# Patient Record
Sex: MALE | Race: OTHER | HISPANIC OR LATINO | Employment: OTHER | URBAN - METROPOLITAN AREA
[De-identification: names, ages, dates, MRNs, and addresses within clinical notes are randomized per-mention and may not be internally consistent; named-entity substitution may affect disease eponyms.]

---

## 2017-03-21 ENCOUNTER — ALLSCRIPTS OFFICE VISIT (OUTPATIENT)
Dept: OTHER | Facility: OTHER | Age: 67
End: 2017-03-21

## 2017-04-07 ENCOUNTER — ALLSCRIPTS OFFICE VISIT (OUTPATIENT)
Dept: OTHER | Facility: OTHER | Age: 67
End: 2017-04-07

## 2018-01-10 ENCOUNTER — GENERIC CONVERSION - ENCOUNTER (OUTPATIENT)
Dept: OTHER | Facility: OTHER | Age: 68
End: 2018-01-10

## 2018-01-10 DIAGNOSIS — I10 ESSENTIAL (PRIMARY) HYPERTENSION: ICD-10-CM

## 2018-01-12 VITALS
DIASTOLIC BLOOD PRESSURE: 70 MMHG | TEMPERATURE: 98.2 F | WEIGHT: 192.24 LBS | SYSTOLIC BLOOD PRESSURE: 132 MMHG | HEIGHT: 65 IN | HEART RATE: 76 BPM | BODY MASS INDEX: 32.03 KG/M2

## 2018-01-13 NOTE — RESULT NOTES
Message   small high risk polyps removed, fair prep  repeat colonoscopy in 1-3 years  enter reminder for 2 years  Verified Results  (1) TISSUE EXAM [id number] 04:57PM Vivian Chowdhury     Test Name Result Flag Reference   LAB AP CASE REPORT (Report)     Surgical Pathology Report             Case: [id number]                   Authorizing Provider: Edmond Medel MD       Collected:      08/18/2016 1657        Ordering Location:   [address] [place] [address]   Received:      08/19/2016 [facility]                               Pathologist:      Darius Auguste MD                           Specimens:  A) - Polyp, Colorectal, 6mm polyp, cecum                                B) - Polyp, Colorectal, descending colon, cold bx   LAB AP FINAL DIAGNOSIS (Report)     A  Polyp, 6mm polyp, cecum:    - Tubular adenoma  - Negative for high grade dysplasia and malignancy  B  Polyp, descending colon:    - Tubular adenoma  - Negative for high grade dysplasia and malignancy  Electronically signed by Darius Auguste MD on 8/22/2016 at 11:10 AM   LAB AP NOTE      Interpretation performed at [facility], [address] [address]   LAB AP SURGICAL ADDITIONAL INFORMATION (Report)     These tests were developed and their performance characteristics   determined by [facility] or [facility]  They may not be cleared or approved by the [place]  Food and   Drug Administration  The FDA has determined that such clearance or   approval is not necessary  These tests are used for clinical purposes  They should not be regarded as investigational or for research  This   laboratory has been approved by CLIA 88, designated as a high-complexity   laboratory and is qualified to perform these tests  LAB AP GROSS DESCRIPTION (Report)     A   The specimen is received in formalin, labeled with the patient's name   and hospital number, and is designated 6 mm polyp cecum  The specimen   consists of a single tan soft tissue fragment measuring 0 4 cm  Entirely   submitted  One cassette  B  The specimen is received in formalin, labeled with the patient's name   and hospital number, and is designated descending colon biopsy  The   specimen consists of a single tan soft tissue fragment measuring 0 4 cm  Entirely submitted  One cassette  Note: The estimated total formalin fixation time based upon information   provided by the submitting clinician and the standard processing schedule   is 35 5 hours      MAC

## 2018-01-24 VITALS
WEIGHT: 194 LBS | HEART RATE: 64 BPM | DIASTOLIC BLOOD PRESSURE: 62 MMHG | TEMPERATURE: 98 F | BODY MASS INDEX: 32.32 KG/M2 | SYSTOLIC BLOOD PRESSURE: 108 MMHG | HEIGHT: 65 IN

## 2018-03-15 DIAGNOSIS — E78.5 HYPERLIPIDEMIA: ICD-10-CM

## 2018-03-15 DIAGNOSIS — I10 HYPERTENSION: Primary | ICD-10-CM

## 2018-03-15 RX ORDER — AMLODIPINE BESYLATE 5 MG/1
5 TABLET ORAL DAILY
Qty: 90 TABLET | Refills: 3 | Status: SHIPPED | OUTPATIENT
Start: 2018-03-15 | End: 2019-01-15 | Stop reason: SDUPTHER

## 2018-03-15 RX ORDER — ATORVASTATIN CALCIUM 20 MG/1
20 TABLET, FILM COATED ORAL
Qty: 90 TABLET | Refills: 3 | Status: SHIPPED | OUTPATIENT
Start: 2018-03-15 | End: 2019-01-15 | Stop reason: SDUPTHER

## 2018-03-15 RX ORDER — LISINOPRIL AND HYDROCHLOROTHIAZIDE 25; 20 MG/1; MG/1
1 TABLET ORAL DAILY
Qty: 90 TABLET | Refills: 3 | Status: SHIPPED | OUTPATIENT
Start: 2018-03-15 | End: 2019-01-15 | Stop reason: SDUPTHER

## 2018-06-19 DIAGNOSIS — K21.9 GERD (GASTROESOPHAGEAL REFLUX DISEASE): Primary | ICD-10-CM

## 2018-06-19 RX ORDER — OMEPRAZOLE 20 MG/1
20 CAPSULE, DELAYED RELEASE ORAL DAILY
Qty: 30 CAPSULE | Refills: 6 | Status: SHIPPED | OUTPATIENT
Start: 2018-06-19 | End: 2019-10-07

## 2018-06-19 RX ORDER — OMEPRAZOLE 20 MG/1
1 CAPSULE, DELAYED RELEASE ORAL DAILY
COMMUNITY
Start: 2017-08-01 | End: 2018-06-19 | Stop reason: SDUPTHER

## 2018-12-04 ENCOUNTER — OFFICE VISIT (OUTPATIENT)
Dept: INTERNAL MEDICINE CLINIC | Facility: CLINIC | Age: 68
End: 2018-12-04
Payer: COMMERCIAL

## 2018-12-04 VITALS
SYSTOLIC BLOOD PRESSURE: 120 MMHG | WEIGHT: 197.97 LBS | DIASTOLIC BLOOD PRESSURE: 60 MMHG | HEART RATE: 60 BPM | HEIGHT: 66 IN | TEMPERATURE: 98.1 F | BODY MASS INDEX: 31.82 KG/M2

## 2018-12-04 DIAGNOSIS — Z00.00 HEALTH CARE MAINTENANCE: Primary | ICD-10-CM

## 2018-12-04 DIAGNOSIS — I10 HYPERTENSION: ICD-10-CM

## 2018-12-04 DIAGNOSIS — Z23 NEED FOR INFLUENZA VACCINATION: ICD-10-CM

## 2018-12-04 PROCEDURE — G0008 ADMIN INFLUENZA VIRUS VAC: HCPCS | Performed by: INTERNAL MEDICINE

## 2018-12-04 PROCEDURE — 90662 IIV NO PRSV INCREASED AG IM: CPT | Performed by: INTERNAL MEDICINE

## 2018-12-04 PROCEDURE — 99213 OFFICE O/P EST LOW 20 MIN: CPT | Performed by: INTERNAL MEDICINE

## 2018-12-04 RX ORDER — POLYETHYLENE GLYCOL 3350, SODIUM CHLORIDE, SODIUM BICARBONATE, POTASSIUM CHLORIDE 420; 11.2; 5.72; 1.48 G/4L; G/4L; G/4L; G/4L
POWDER, FOR SOLUTION ORAL
COMMUNITY
Start: 2016-07-08 | End: 2021-01-06 | Stop reason: ALTCHOICE

## 2018-12-04 NOTE — PROGRESS NOTES
INTERNAL MEDICINE FOLLOW-UP OFFICE VISIT  [facility]  [address] [place], [place] [address] [address]    NAME: Chad Petersen  AGE: 70 y o  SEX: male    DATE OF ENCOUNTER: 12/4/2018    Assessment and Plan   Patient is a 57-year-old male with past medical history of hypertension who presents for general follow-up  Hypertension  Patient is currently taking amlodipine 5 mg daily, lisinopril 20 mg daily, HCTZ 25 mg daily  Pt denies chest pain, palpitations, headache, visual changes  Patient is compliant with medication  · Continue BP meds    GERD  Patient taking Prilosec 20 mg daily  Symptoms occur 1-2 times per week  Consider De-escalating next visit as patient not interested at this time  · Continue Prilosec 20 mg daily    Health maintenance  · Flu shot clinic today  · Up-to-date on colonoscopy (2016, due in September 2019), up-to-date on PPSV23/PCV 13, up-to-date on Tdap (2015)  · Ordered CBC, BMP, A1c, lipid panel S patient has not had labs for over 2 years  Diagnoses and all orders for this visit:    Health care maintenance  -     CBC; Future  -     Lipid panel; Future  -     Basic metabolic panel; Future  -     Hemoglobin A1C; Future  -     CBC; Future  -     Basic metabolic panel; Future  -     Hemoglobin A1C; Future  -     Lipid panel; Future    Need for influenza vaccination  -     PREFERRED: influenza vaccine, 8195-9637, high-dose, PF 0 5 mL, for patients 65 yr+ (FLUZONE HIGH-DOSE)    Other orders  -     polyethylene glycol-electrolytes (NULYTELY) 4000 mL solution;  Take by mouth        Orders Placed This Encounter   Procedures    PREFERRED: influenza vaccine, [id number], high-dose, PF 0 5 mL, for patients 65 yr+ (FLUZONE HIGH-DOSE)    CBC    Lipid panel    Basic metabolic panel    Hemoglobin A1C    CBC    Basic metabolic panel    Hemoglobin A1C    Lipid panel       - Counseling Documentation: patient was counseled regarding: diagnostic results    Chief Complaint     Chief Complaint   Patient presents with    Follow-up     pt "lab request"       History of Present Illness     Patient is a 58-year-old male with past medical history of hypertension presents for general follow-up  See a/p for further details  Patient denies chest pain, palpitations, SOB, cough, abdominal pain, nausea, vomiting, constipation, diarrhea, fevers/chills, headaches, dysuria  The following portions of the patient's history were reviewed and updated as appropriate: allergies, current medications, past family history, past medical history, past social history, past surgical history and problem list     Review of Systems     ROS  CONSTITUTIONAL: Denies any fever, chills, rigors, and weight loss  HEENT: No earache or tinnitus  Denies hearing loss  CARDIOVASCULAR: No chest pain or palpitations  RESPIRATORY: Denies any cough, hemoptysis, shortness of breath or dyspnea on exertion  GASTROINTESTINAL: Denies abdominal pain, nausea, vomitng   NEUROLOGIC: No dizziness or vertigo, denies headaches  MUSCULOSKELETAL: Denies any muscle or joint pain  SKIN: Denies skin rashes or itching  Active Problem List   There is no problem list on file for this patient  Objective     /60 (BP Location: Left arm, Patient Position: Sitting, Cuff Size: Adult)   Pulse 60   Temp 98 1 °F (36 7 °C) (Oral)   Ht 5' 5 5" (1 664 m)   Wt 89 8 kg (197 lb 15 6 oz)   BMI 32 44 kg/m²     Physical Exam:   GENERAL: NAD  HEENT:  NC/AT, PERRL, EOMI, MMM, no scleral icterus  CARDIAC:  RRR, +S1/S2, no S3/S4 heard, no m/g/r  PULMONARY:  CTA B/L, no wheezing/rales/rhonci, non-labored breathing  ABDOMEN:  Soft, NT/ND, +BS, no rebound/guarding/rigidity  Extremities:  2+ Pulses in DP/PT  No edema, cyanosis, or clubbing  NEUROLOGIC:  Alert/oriented x3   No motor or sensory deficits  SKIN:  No rashes or erythema        Current Medications     Current Outpatient Prescriptions:    amLODIPine (NORVASC) 5 mg tablet, Take 1 tablet (5 mg total) by mouth daily, Disp: 90 tablet, Rfl: 3    aspirin 81 MG tablet, Take 81 mg by mouth daily  , Disp: , Rfl:     atorvastatin (LIPITOR) 20 mg tablet, Take 1 tablet (20 mg total) by mouth daily at bedtime, Disp: 90 tablet, Rfl: 3    bisacodyl (bisacodyl) 5 mg EC tablet, Take 5 mg by mouth daily as needed for constipation  , Disp: , Rfl:     lisinopril-hydrochlorothiazide (PRINZIDE,ZESTORETIC) 20-25 MG per tablet, Take 1 tablet by mouth daily, Disp: 90 tablet, Rfl: 3    omeprazole (PriLOSEC) 20 mg delayed release capsule, Take 1 capsule (20 mg total) by mouth daily, Disp: 30 capsule, Rfl: 6    polyethylene glycol-electrolytes (NULYTELY) 4000 mL solution, Take by mouth, Disp: , Rfl:     Health Maintenance     Health Maintenance   Topic Date Due    Depression Screening PHQ  1950    Fall Risk  11/09/2014    INFLUENZA VACCINE  07/01/2018    CRC Screening: Colonoscopy  08/18/2018    DTaP,Tdap,and Td Vaccines (2 - Td) 09/22/2025    Hepatitis C Screening  Completed    Pneumococcal PPSV23/PCV13 65+ Years / Low and Medium Risk  Completed     Immunization History   Administered Date(s) Administered    Influenza Quadrivalent, 6-35 Months IM 01/10/2018    Influenza TIV (IM) 09/22/2015    Pneumococcal Conjugate 13-Valent 01/10/2018    Pneumococcal Polysaccharide PPV23 09/22/2015    Tdap 09/22/2015       REYMUNDO Tate    Internal Medicine PGY-2  12/4/2018 3:16 PM

## 2018-12-11 ENCOUNTER — APPOINTMENT (OUTPATIENT)
Dept: LAB | Facility: CLINIC | Age: 68
End: 2018-12-11
Payer: COMMERCIAL

## 2018-12-11 DIAGNOSIS — Z00.00 HEALTH CARE MAINTENANCE: ICD-10-CM

## 2018-12-11 LAB
ANION GAP SERPL CALCULATED.3IONS-SCNC: 11 MMOL/L (ref 4–13)
BUN SERPL-MCNC: 22 MG/DL (ref 5–25)
CALCIUM SERPL-MCNC: 8.6 MG/DL (ref 8.3–10.1)
CHLORIDE SERPL-SCNC: 105 MMOL/L (ref 100–108)
CHOLEST SERPL-MCNC: 126 MG/DL (ref 50–200)
CO2 SERPL-SCNC: 28 MMOL/L (ref 21–32)
CREAT SERPL-MCNC: 0.93 MG/DL (ref 0.6–1.3)
ERYTHROCYTE [DISTWIDTH] IN BLOOD BY AUTOMATED COUNT: 13.5 % (ref 11.6–15.1)
EST. AVERAGE GLUCOSE BLD GHB EST-MCNC: 126 MG/DL
GFR SERPL CREATININE-BSD FRML MDRD: 83 ML/MIN/1.73SQ M
GLUCOSE P FAST SERPL-MCNC: 81 MG/DL (ref 65–99)
HBA1C MFR BLD: 6 % (ref 4.2–6.3)
HCT VFR BLD AUTO: 39.3 % (ref 36.5–49.3)
HDLC SERPL-MCNC: 60 MG/DL (ref 40–60)
HGB BLD-MCNC: 12.7 G/DL (ref 12–17)
LDLC SERPL CALC-MCNC: 55 MG/DL (ref 0–100)
MCH RBC QN AUTO: 30.3 PG (ref 26.8–34.3)
MCHC RBC AUTO-ENTMCNC: 32.3 G/DL (ref 31.4–37.4)
MCV RBC AUTO: 94 FL (ref 82–98)
NONHDLC SERPL-MCNC: 66 MG/DL
PLATELET # BLD AUTO: 231 THOUSANDS/UL (ref 149–390)
PMV BLD AUTO: 11.2 FL (ref 8.9–12.7)
POTASSIUM SERPL-SCNC: 3.9 MMOL/L (ref 3.5–5.3)
RBC # BLD AUTO: 4.19 MILLION/UL (ref 3.88–5.62)
SODIUM SERPL-SCNC: 144 MMOL/L (ref 136–145)
TRIGL SERPL-MCNC: 56 MG/DL
WBC # BLD AUTO: 7.49 THOUSAND/UL (ref 4.31–10.16)

## 2018-12-11 PROCEDURE — 80048 BASIC METABOLIC PNL TOTAL CA: CPT

## 2018-12-11 PROCEDURE — 80061 LIPID PANEL: CPT

## 2018-12-11 PROCEDURE — 83036 HEMOGLOBIN GLYCOSYLATED A1C: CPT

## 2018-12-11 PROCEDURE — 36415 COLL VENOUS BLD VENIPUNCTURE: CPT

## 2018-12-11 PROCEDURE — 85027 COMPLETE CBC AUTOMATED: CPT

## 2018-12-18 ENCOUNTER — TELEPHONE (OUTPATIENT)
Dept: INTERNAL MEDICINE CLINIC | Facility: CLINIC | Age: 68
End: 2018-12-18

## 2018-12-19 NOTE — TELEPHONE ENCOUNTER
Form completed by Dr Wilfredo Kent) and reviewed by medical assistant/nurse  Form placed in (RED) folder in Clerical flow station

## 2019-01-15 DIAGNOSIS — I10 HYPERTENSION, UNSPECIFIED TYPE: ICD-10-CM

## 2019-01-15 DIAGNOSIS — E78.5 HYPERLIPIDEMIA, UNSPECIFIED HYPERLIPIDEMIA TYPE: ICD-10-CM

## 2019-01-15 RX ORDER — ATORVASTATIN CALCIUM 20 MG/1
20 TABLET, FILM COATED ORAL
Qty: 90 TABLET | Refills: 3 | Status: SHIPPED | OUTPATIENT
Start: 2019-01-15 | End: 2019-12-24 | Stop reason: SDUPTHER

## 2019-01-15 RX ORDER — AMLODIPINE BESYLATE 5 MG/1
5 TABLET ORAL DAILY
Qty: 90 TABLET | Refills: 3 | Status: SHIPPED | OUTPATIENT
Start: 2019-01-15 | End: 2019-12-24 | Stop reason: SDUPTHER

## 2019-01-15 RX ORDER — LISINOPRIL AND HYDROCHLOROTHIAZIDE 25; 20 MG/1; MG/1
1 TABLET ORAL DAILY
Qty: 90 TABLET | Refills: 3 | Status: SHIPPED | OUTPATIENT
Start: 2019-01-15 | End: 2019-12-24 | Stop reason: SDUPTHER

## 2019-05-31 ENCOUNTER — OFFICE VISIT (OUTPATIENT)
Dept: INTERNAL MEDICINE CLINIC | Facility: CLINIC | Age: 69
End: 2019-05-31

## 2019-05-31 VITALS
BODY MASS INDEX: 33.65 KG/M2 | SYSTOLIC BLOOD PRESSURE: 130 MMHG | WEIGHT: 201.94 LBS | HEART RATE: 64 BPM | DIASTOLIC BLOOD PRESSURE: 72 MMHG | HEIGHT: 65 IN | TEMPERATURE: 98.1 F

## 2019-05-31 DIAGNOSIS — K21.9 GERD (GASTROESOPHAGEAL REFLUX DISEASE): Primary | ICD-10-CM

## 2019-05-31 DIAGNOSIS — R73.03 PREDIABETES: ICD-10-CM

## 2019-05-31 DIAGNOSIS — I10 HTN (HYPERTENSION): ICD-10-CM

## 2019-05-31 DIAGNOSIS — Z12.11 COLON CANCER SCREENING: ICD-10-CM

## 2019-05-31 PROCEDURE — 99213 OFFICE O/P EST LOW 20 MIN: CPT | Performed by: INTERNAL MEDICINE

## 2019-07-02 ENCOUNTER — TELEPHONE (OUTPATIENT)
Dept: INTERNAL MEDICINE CLINIC | Facility: CLINIC | Age: 69
End: 2019-07-02

## 2019-07-02 NOTE — TELEPHONE ENCOUNTER
Please review this [facility] us [facility]  Form attached to this encounter  Thank you

## 2019-08-16 ENCOUNTER — OFFICE VISIT (OUTPATIENT)
Dept: GASTROENTEROLOGY | Facility: CLINIC | Age: 69
End: 2019-08-16
Payer: COMMERCIAL

## 2019-08-16 VITALS
BODY MASS INDEX: 33.26 KG/M2 | HEART RATE: 65 BPM | DIASTOLIC BLOOD PRESSURE: 76 MMHG | HEIGHT: 65 IN | TEMPERATURE: 99 F | SYSTOLIC BLOOD PRESSURE: 122 MMHG | WEIGHT: 199.6 LBS

## 2019-08-16 DIAGNOSIS — K21.9 GASTROESOPHAGEAL REFLUX DISEASE, ESOPHAGITIS PRESENCE NOT SPECIFIED: Primary | ICD-10-CM

## 2019-08-16 DIAGNOSIS — Z12.11 SCREENING FOR COLON CANCER: ICD-10-CM

## 2019-08-16 DIAGNOSIS — Z86.010 HISTORY OF COLON POLYPS: ICD-10-CM

## 2019-08-16 PROCEDURE — 99214 OFFICE O/P EST MOD 30 MIN: CPT | Performed by: PHYSICIAN ASSISTANT

## 2019-08-16 NOTE — PATIENT INSTRUCTIONS
Patient scheduled for colon/EGD on 9/17/19 at [facility] w/Dr Librado John/Carlos instructions gone over during office visit  Patient scheduled for f/u on 10/7/19 w/ Mike Gomez

## 2019-08-16 NOTE — PROGRESS NOTES
[facility]  Homa Brooke 70 y o  male MRN: [id number]  Encounter: [id number]    ASSESSMENT AND PLAN:    1  GERD   -he has a long history of reflux, he takes Prilosec 20 mg daily and states that generally his reflux is under good control    -he states that sometimes he will have reflux for a few days in a row and then he will not have any reflux for weeks  -he has had reflux for more than 5 years so I do recommend a screening upper endoscopy for Ortiz's esophagus as he has never had this  He does not have any alarm symptoms  -follow-up in the office after the procedure to discuss potentially weaning off of the omeprazole   -we reviewed reflux precautions including dietary and lifestyle changes  Would suggest weight loss as this will likely help with his reflux symptoms  2  Screening for colon cancer/history of polyps   -He had a colonoscopy in 2016 with 2 polyps removed, diverticulosis seen and internal hemorrhoids noted  The prep quality was fair so 3 year follow-up was recommended, he is now due  Both polyps were tubular adenomas negative for high-grade dysplasia or malignancy  -we discussed the risks and benefits and he is agreeable, he can schedule the colonoscopy and EGD at the same appointment    -he denies any symptoms at this time, he denies any history of difficulty with anesthesia  He would prefer to schedule the [facility] [place] because it is convenient for him   ____________________________________________________________    SUBJECTIVE:    27-year-old male past medical history of hypertension, GERD presents to the office for follow-up  He had a colonoscopy in 2016 with 2 polyps removed, diverticulosis seen and internal hemorrhoids noted  The prep quality was fair so 3 year follow-up was recommended, he is now due  Both polyps were tubular adenomas negative for high-grade dysplasia or malignancy      He states that overall he has been feeling well and he denies any abdominal pain, nausea, vomiting, difficulty swallowing, hematochezia, melena, early satiety, constipation, diarrhea or jaundice  He states he has a bowel movement every day  He does have a history of reflux and takes omeprazole 20 mg daily for this and feels that he generally has good control of his reflux symptoms  He states sometimes he will have reflux for a few days in a row and then he will not have it for weeks  He feels that the medication is working well for him  REVIEW OF SYSTEMS IS OTHERWISE NEGATIVE  Historical Information   Past Medical History:   Diagnosis Date    Cataract     Hypertension     Throat ulcer      Past Surgical History:   Procedure Laterality Date    COLONOSCOPY N/A 8/18/2016    Procedure: COLONOSCOPY;  Surgeon: Prince Ramirez MD;  [facility]; Service:     EYE SURGERY Bilateral      Social History   Social History     Substance and Sexual Activity   Alcohol Use No    Comment: pt "sober for 30+ years"     Social History     Substance and Sexual Activity   Drug Use No     Social History     Tobacco Use   Smoking Status Former Smoker   Smokeless Tobacco Never Used     Family History   Problem Relation Age of Onset    Hypertension Mother     Hypertension Father        Meds/Allergies       Current Outpatient Medications:     amLODIPine (NORVASC) 5 mg tablet    aspirin 81 MG tablet    atorvastatin (LIPITOR) 20 mg tablet    bisacodyl (bisacodyl) 5 mg EC tablet    lisinopril-hydrochlorothiazide (PRINZIDE,ZESTORETIC) 20-25 MG per tablet    omeprazole (PriLOSEC) 20 mg delayed release capsule    polyethylene glycol-electrolytes (NULYTELY) 4000 mL solution    Allergies   Allergen Reactions    Lactose      diarrhea           Objective     Blood pressure 122/76, pulse 65, temperature 99 °F (37 2 °C), temperature source Tympanic, height 5' 5" (1 651 m), weight 90 5 kg (199 lb 9 6 oz)        PHYSICAL EXAM:      Physical Exam Constitutional: He is oriented to person, place, and time  He appears well-developed  No distress  Over weight   HENT:   Head: Normocephalic and atraumatic  Eyes: Right eye exhibits no discharge  Left eye exhibits no discharge  No scleral icterus  Neck: Neck supple  No tracheal deviation present  Cardiovascular: Normal rate, regular rhythm, normal heart sounds and intact distal pulses  Exam reveals no gallop and no friction rub  No murmur heard  Pulmonary/Chest: Effort normal and breath sounds normal  No respiratory distress  He has no wheezes  He has no rales  Abdominal: Soft  Bowel sounds are normal  He exhibits no distension  There is no tenderness  There is no rebound and no guarding  Neurological: He is alert and oriented to person, place, and time  Skin: Skin is warm and dry  Psychiatric: He has a normal mood and affect

## 2019-09-13 NOTE — PRE-PROCEDURE INSTRUCTIONS
Pre-Surgery Instructions:   Medication Instructions    amLODIPine (NORVASC) 5 mg tablet Instructed patient per Anesthesia Guidelines   lisinopril-hydrochlorothiazide (PRINZIDE,ZESTORETIC) 20-25 MG per tablet Instructed patient per Anesthesia Guidelines

## 2019-09-16 ENCOUNTER — ANESTHESIA EVENT (OUTPATIENT)
Dept: GASTROENTEROLOGY | Facility: HOSPITAL | Age: 69
End: 2019-09-16

## 2019-09-16 NOTE — ANESTHESIA PREPROCEDURE EVALUATION
Review of Systems/Medical History  Patient summary reviewed  Chart reviewed      Cardiovascular  Hyperlipidemia, Hypertension ,    Pulmonary  Negative pulmonary ROS        GI/Hepatic    GERD , Bowel prep  Comment: Diverticulosis     Negative  ROS        Endo/Other  Negative endo/other ROS      GYN       Hematology  Negative hematology ROS      Musculoskeletal  Negative musculoskeletal ROS        Neurology  Negative neurology ROS      Psychology   Negative psychology ROS              Physical Exam    Airway    Mallampati score: II  TM Distance: >3 FB  Neck ROM: full     Dental   Comment: Missing many,     Cardiovascular  Cardiovascular exam normal    Pulmonary  Pulmonary exam normal     Other Findings        Anesthesia Plan  ASA Score- 2     Anesthesia Type- IV sedation with anesthesia with ASA Monitors  Additional Monitors:   Airway Plan:         Plan Factors-    Induction-     Postoperative Plan-     Informed Consent- Anesthetic plan and risks discussed with patient  I personally reviewed this patient with the CRNA  Discussed and agreed on the Anesthesia Plan with the CRNA  Georgette Ormond

## 2019-09-17 ENCOUNTER — ANESTHESIA (OUTPATIENT)
Dept: GASTROENTEROLOGY | Facility: HOSPITAL | Age: 69
End: 2019-09-17

## 2019-09-17 ENCOUNTER — HOSPITAL ENCOUNTER (OUTPATIENT)
Dept: GASTROENTEROLOGY | Facility: HOSPITAL | Age: 69
Setting detail: OUTPATIENT SURGERY
Discharge: HOME | End: 2019-09-17
Admitting: ANESTHESIOLOGY
Payer: COMMERCIAL

## 2019-09-17 VITALS
OXYGEN SATURATION: 97 % | RESPIRATION RATE: 18 BRPM | TEMPERATURE: 97.6 F | SYSTOLIC BLOOD PRESSURE: 131 MMHG | DIASTOLIC BLOOD PRESSURE: 62 MMHG | HEART RATE: 60 BPM

## 2019-09-17 DIAGNOSIS — Z12.11 SCREENING FOR COLON CANCER: ICD-10-CM

## 2019-09-17 DIAGNOSIS — K21.9 GASTROESOPHAGEAL REFLUX DISEASE, ESOPHAGITIS PRESENCE NOT SPECIFIED: ICD-10-CM

## 2019-09-17 DIAGNOSIS — Z86.010 HISTORY OF COLON POLYPS: ICD-10-CM

## 2019-09-17 PROCEDURE — 88305 TISSUE EXAM BY PATHOLOGIST: CPT | Performed by: PATHOLOGY

## 2019-09-17 PROCEDURE — NC001 PR NO CHARGE: Performed by: INTERNAL MEDICINE

## 2019-09-17 PROCEDURE — 43239 EGD BIOPSY SINGLE/MULTIPLE: CPT | Performed by: INTERNAL MEDICINE

## 2019-09-17 PROCEDURE — 45380 COLONOSCOPY AND BIOPSY: CPT | Performed by: INTERNAL MEDICINE

## 2019-09-17 RX ORDER — SODIUM CHLORIDE 9 MG/ML
75 INJECTION, SOLUTION INTRAVENOUS CONTINUOUS
Status: DISCONTINUED | OUTPATIENT
Start: 2019-09-17 | End: 2019-09-21 | Stop reason: HOSPADM

## 2019-09-17 RX ORDER — PROPOFOL 10 MG/ML
INJECTION, EMULSION INTRAVENOUS AS NEEDED
Status: DISCONTINUED | OUTPATIENT
Start: 2019-09-17 | End: 2019-09-17 | Stop reason: SURG

## 2019-09-17 RX ORDER — SODIUM CHLORIDE 9 MG/ML
INJECTION, SOLUTION INTRAVENOUS CONTINUOUS PRN
Status: DISCONTINUED | OUTPATIENT
Start: 2019-09-17 | End: 2019-09-17 | Stop reason: SURG

## 2019-09-17 RX ORDER — LIDOCAINE HYDROCHLORIDE 10 MG/ML
INJECTION, SOLUTION INFILTRATION; PERINEURAL AS NEEDED
Status: DISCONTINUED | OUTPATIENT
Start: 2019-09-17 | End: 2019-09-17 | Stop reason: SURG

## 2019-09-17 RX ADMIN — LIDOCAINE HYDROCHLORIDE 50 MG: 10 INJECTION, SOLUTION INFILTRATION; PERINEURAL at 12:45

## 2019-09-17 RX ADMIN — SODIUM CHLORIDE: 0.9 INJECTION, SOLUTION INTRAVENOUS at 12:26

## 2019-09-17 RX ADMIN — PROPOFOL 100 MG: 10 INJECTION, EMULSION INTRAVENOUS at 12:45

## 2019-09-17 RX ADMIN — PROPOFOL 50 MG: 10 INJECTION, EMULSION INTRAVENOUS at 12:57

## 2019-09-17 RX ADMIN — SODIUM CHLORIDE 75 ML/HR: 0.9 INJECTION, SOLUTION INTRAVENOUS at 11:34

## 2019-09-17 RX ADMIN — PROPOFOL 50 MG: 10 INJECTION, EMULSION INTRAVENOUS at 12:51

## 2019-09-17 RX ADMIN — PROPOFOL 50 MG: 10 INJECTION, EMULSION INTRAVENOUS at 12:48

## 2019-09-17 NOTE — NURSING NOTE
Pt is awake,alert,refuses po,wants to eat outside the hospital  Pt verbalizes an understanding of all instructions  Jerry Olson

## 2019-09-17 NOTE — DISCHARGE INSTRUCTIONS
Colonoscopia   LO QUE NECESITA SABER:   Brittny colonoscopia es un procedimiento para examinar con un endoscopio el interior de allred colon (intestino)  Shayy brittny colonoscopia, es posible que le retiren pólipos o crecimientos de tejidos  Es normal que se sienta inflamado o que tenga molestia abdominal  Usted debería estar expulsando los gases  Si tiene hemorroides o si le removieron pólipos, usted podría presentar brittny pequeña cantidad de sangrado  INSTRUCCIONES SOBRE EL HANSEL HOSPITALARIA:   Busque atención médica de inmediato si:   · Usted presenta brittny cantidad hayde de roney chano brillante en francisco evacuaciones intestinales  · Allred abdomen está elena y firme y usted siente dolor intenso  · Usted tiene dificultad repentina para respirar  Pregúntele a allred [employer] vitaminas y minerales son adecuados para usted  · Usted presenta sarpullido o urticaria  · Usted tiene fiebre dentro de las 24 horas después de allred procedimiento       · Usted no ha tenido brittny evacuación intestinal después de 3 días de allred procedimiento  · Usted tiene preguntas o inquietudes acerca de allred condición o cuidado  Actividad:   · No levante nada, no se esfuerce o corra  por 3 días después de allred procedimiento  · Descanse después de allred procedimiento  A usted le schulz administrado medicamento para relajarse  No  maneje o tome decisiones importantes hasta el día siguiente de allred procedimiento  Regrese a francisco actividades normales según le indiquen  · Alivie los gases y la incomodidad de la inflamación  acostándose en allred costado derecho con brittny almohada térmica sobre allred abdomen  Es posible que necesite caminar un poco para ayudar a eliminar los gases  Coma comidas pequeñas hasta que se alivie de la inflamación  Si a usted le removieron pólipos:  Por 7 días después de allred procedimiento:  · No  tome aspirina  · No  realice paseos largos en joey  Acuda a francisco consultas de control con allred médico según le indicaron    Anote francisco preguntas para que se acuerde de hacerlas humberto francisco visitas  © 2017 hermann Gallagher  Information is for End User's use only and may not be sold, redistributed or otherwise used for commercial purposes  All illustrations and images included in CareNotes® are the copyrighted property of A D A M , Inc  or Tomasz Hines  Esta información es sólo para uso en educación  Marrero intención no es darle un consejo médico sobre enfermedades o tratamientos  Colsulte con marrero Melven Rimes farmacéutico antes de seguir cualquier régimen médico para saber si es seguro y efectivo para usted

## 2019-09-17 NOTE — H&P
History and Physical - [facility]  Vance Petersen 70 y o  male MRN: [id number]                  HPI: Clinton Pacheco is a 70y o  year old male who presents for acid reflux and colon cancer screening      REVIEW OF SYSTEMS: Per the HPI, and otherwise unremarkable  Historical Information   Past Medical History:   Diagnosis Date    Cataract     Colon polyp     Diverticulosis     GERD (gastroesophageal reflux disease)     Hyperlipidemia     Hypertension     Throat ulcer      Past Surgical History:   Procedure Laterality Date    COLONOSCOPY N/A 8/18/2016    Procedure: COLONOSCOPY;  Surgeon: Lars Zhong MD;  [facility]; Service:     EYE SURGERY Bilateral      Social History   Social History     Substance and Sexual Activity   Alcohol Use No    Comment: pt "sober for 30+ years"     Social History     Substance and Sexual Activity   Drug Use No     Social History     Tobacco Use   Smoking Status Former Smoker   Smokeless Tobacco Never Used     Family History   Problem Relation Age of Onset    Hypertension Mother     Hypertension Father        Meds/Allergies       (Not in a hospital admission)    Allergies   Allergen Reactions    Lactose      diarrhea       Objective     /64   Pulse 73   Temp 97 7 °F (36 5 °C) (Temporal)   Resp 20   SpO2 94%       PHYSICAL EXAM    Gen: NAD  CV: RRR  CHEST: Clear  ABD: soft, NT/ND  EXT: no edema      ASSESSMENT/PLAN:  This is a 70y o  year old male here for acid reflux and colon cancer screening, and he is stable and optimized for his procedure

## 2019-09-27 DIAGNOSIS — A04.8 H. PYLORI INFECTION: Primary | ICD-10-CM

## 2019-09-27 RX ORDER — CLARITHROMYCIN 500 MG/1
500 TABLET, COATED ORAL EVERY 12 HOURS SCHEDULED
Qty: 28 TABLET | Refills: 0 | Status: SHIPPED | OUTPATIENT
Start: 2019-09-27 | End: 2019-10-11

## 2019-09-27 RX ORDER — OMEPRAZOLE 20 MG/1
20 CAPSULE, DELAYED RELEASE ORAL 2 TIMES DAILY
Qty: 28 CAPSULE | Refills: 0 | Status: SHIPPED | OUTPATIENT
Start: 2019-09-27 | End: 2019-10-07

## 2019-09-27 RX ORDER — AMOXICILLIN 500 MG/1
1000 CAPSULE ORAL EVERY 12 HOURS SCHEDULED
Qty: 56 CAPSULE | Refills: 0 | Status: SHIPPED | OUTPATIENT
Start: 2019-09-27 | End: 2019-10-11

## 2019-10-07 ENCOUNTER — OFFICE VISIT (OUTPATIENT)
Dept: GASTROENTEROLOGY | Facility: CLINIC | Age: 69
End: 2019-10-07
Payer: COMMERCIAL

## 2019-10-07 VITALS
HEIGHT: 65 IN | HEART RATE: 69 BPM | TEMPERATURE: 98 F | WEIGHT: 200.6 LBS | BODY MASS INDEX: 33.42 KG/M2 | SYSTOLIC BLOOD PRESSURE: 124 MMHG | DIASTOLIC BLOOD PRESSURE: 71 MMHG

## 2019-10-07 DIAGNOSIS — Z12.11 COLON CANCER SCREENING: ICD-10-CM

## 2019-10-07 DIAGNOSIS — K21.9 GASTROESOPHAGEAL REFLUX DISEASE, ESOPHAGITIS PRESENCE NOT SPECIFIED: Primary | ICD-10-CM

## 2019-10-07 DIAGNOSIS — A04.8 H. PYLORI INFECTION: ICD-10-CM

## 2019-10-07 PROCEDURE — 99214 OFFICE O/P EST MOD 30 MIN: CPT | Performed by: PHYSICIAN ASSISTANT

## 2019-10-07 RX ORDER — FAMOTIDINE 20 MG/1
20 TABLET, FILM COATED ORAL 2 TIMES DAILY PRN
Qty: 180 TABLET | Refills: 3 | Status: SHIPPED | OUTPATIENT
Start: 2019-10-07 | End: 2020-09-24 | Stop reason: SDUPTHER

## 2019-10-07 NOTE — PROGRESS NOTES
[facility] - Outpatient Follow-up Note  Mikayla Weldon 70 y o  male MRN: [id number]  Encounter: [id number]          ASSESSMENT AND PLAN:      1  Gastroesophageal reflux disease, esophagitis presence not specified:   He was previously on Prilosec 20 milligrams daily he admits to no episodes of acid reflux  We will try weaning him off this medication in use Pepcid 20 milligrams twice daily as needed  If symptoms reoccur, he knows we may resume low-dose Prilosec at least effective dose  GERD handout was offered but he politely declined as he knows what his triggers are   - famotidine (PEPCID) 20 mg tablet; Take 1 tablet (20 mg total) by mouth 2 (two) times a day as needed for heartburn  Dispense: 180 tablet; Refill: 3  -f/u in 6 months    2  H pylori infection:   EGD with biopsy revealed gastritis and erosion in the duodenum  Biopsies revealed H pylori  He was treated with antibiotics  We did discuss that after 2 weeks off Prilosec he will have H pylori stool antigen to ensure eradication of the infection   -check h pylori stool antigen     3  Colon cancer screening:   Colonoscopy 09/2019 with a fair revealed large internal hemorrhoids and 2, less than 5 millimeter polyps in the cecum and transverse colon  One revealed tubular adenoma  Due to fair prep repeat colonoscopy was recommended in 3 years  -repeat colonoscopy in 3 years  -reminder entered    ______________________________________________________________________    SUBJECTIVE:  Fauzia Ryan is a pleasant 76-year-old male with a past medical history hypertension who is here for follow-up of GERD and after EGD and colonoscopy  He underwent EGD and colonoscopy in September of 2018  EGD revealed gastritis and duodenal erosion, biopsies positive for H pylori  Colonoscopy revealed large internal hemorrhoids and 2, less than 5 millimeter polyps in the cecum and transverse colon  Biopsies revealed 1 tubular adenoma    Due to fair bowel prep colonoscopy was recommended to be repeated in 3 years  As far as H pylori he is prescribed antibiotics and states that he has 2 days left  He understands that after he has completed the antibiotics and is off antacid x2 weeks he should have H pylori stool test done to confirm eradication of the infection  He feels well otherwise and denies any heartburn on his Prilosec 20 milligrams daily, nausea, vomiting, abdominal pain, change in bowel habits, melena or hematochezia  REVIEW OF SYSTEMS IS OTHERWISE NEGATIVE  Historical Information   Past Medical History:   Diagnosis Date    Cataract     Colon polyp     Diverticulosis     GERD (gastroesophageal reflux disease)     Hyperlipidemia     Hypertension     Throat ulcer      Past Surgical History:   Procedure Laterality Date    COLONOSCOPY N/A 8/18/2016    Procedure: COLONOSCOPY;  Surgeon: Taurus Pinon MD;  Location: [facility];   Service:     EYE SURGERY Bilateral     UPPER GASTROINTESTINAL ENDOSCOPY       Social History   Social History     Substance and Sexual Activity   Alcohol Use No    Comment: pt "sober for 30+ years"     Social History     Substance and Sexual Activity   Drug Use No     Social History     Tobacco Use   Smoking Status Former Smoker   Smokeless Tobacco Never Used     Family History   Problem Relation Age of Onset    Hypertension Mother     Hypertension Father        Meds/Allergies       Current Outpatient Medications:     amLODIPine (NORVASC) 5 mg tablet    amoxicillin (AMOXIL) 500 mg capsule    aspirin 81 MG tablet    atorvastatin (LIPITOR) 20 mg tablet    bisacodyl (bisacodyl) 5 mg EC tablet    clarithromycin (BIAXIN) 500 mg tablet    lisinopril-hydrochlorothiazide (PRINZIDE,ZESTORETIC) 20-25 MG per tablet    polyethylene glycol-electrolytes (NULYTELY) 4000 mL solution    famotidine (PEPCID) 20 mg tablet    polyethylene glycol (GOLYTELY) 4000 mL solution    Allergies   Allergen Reactions    Lactose diarrhea           Objective     Blood pressure 124/71, pulse 69, temperature 98 °F (36 7 °C), temperature source Tympanic, height 5' 5" (1 651 m), weight 91 kg (200 lb 9 6 oz)  Body mass index is 33 38 kg/m²  PHYSICAL EXAM:      General Appearance:   Alert, cooperative, no distress   HEENT:   Normocephalic, atraumatic, anicteric  Right eye exhibits no discharge  Left eye exhibits no discharge  No scleral icterus    Neck:  Supple, symmetrical, trachea midline, no stridor    Lungs:   Clear to auscultation bilaterally; no rales, rhonchi or wheezing; respirations unlabored    Heart[de-identified]   Regular rate and rhythm; no murmur, rub, or gallop  Abdomen:   Soft, non-tender, non-distended; normal bowel sounds; no masses, no organomegaly    Genitalia:   Deferred    Rectal:   Deferred    Extremities:  No cyanosis, clubbing or edema        Skin:  No jaundice, rashes, or lesions          Lab Results:   No visits with results within 1 Day(s) from this visit     Latest known visit with results is:   Hospital Outpatient Visit on 09/17/2019   Component Date Value    Case Report 09/17/2019                      Value:Surgical Pathology Report                         Case: [id number]                                   Authorizing Provider:  Nicholas Keating MD              Collected:           09/17/2019 1249              Ordering [facility]:     [facility] Received:            09/17/2019 1404                                     Heart Endoscopy                                                              Pathologist:           Taylor Franklin MD                                                        Specimens:   A) - Duodenum, small duodenum erosion bx                                                            B) - Stomach, gastric bx                                                                            C) - Large Intestine, Cecum, cecal polyp                                                            D) - Large Intestine, Transverse Colon, transverse colon polyp                             Final Diagnosis 09/17/2019                      Value: This result contains rich text formatting which cannot be displayed here   Additional Information 09/17/2019                      Value: This result contains rich text formatting which cannot be displayed here   Synoptic Checklist 09/17/2019                      Value:  (COLON/RECTUM POLYP FORM-GI - All Specimens)                                                                                                                 : Adenoma(s)     Iline Mast Description 09/17/2019                      Value: This result contains rich text formatting which cannot be displayed here  Radiology Results:   No results found

## 2019-10-22 ENCOUNTER — APPOINTMENT (OUTPATIENT)
Dept: LAB | Facility: HOSPITAL | Age: 69
End: 2019-10-22
Payer: COMMERCIAL

## 2019-10-22 DIAGNOSIS — A04.8 H. PYLORI INFECTION: ICD-10-CM

## 2019-10-22 PROCEDURE — 87338 HPYLORI STOOL AG IA: CPT

## 2019-10-24 LAB — H PYLORI AG STL QL IA: NEGATIVE

## 2019-12-24 DIAGNOSIS — I10 HYPERTENSION, UNSPECIFIED TYPE: ICD-10-CM

## 2019-12-24 DIAGNOSIS — E78.5 HYPERLIPIDEMIA, UNSPECIFIED HYPERLIPIDEMIA TYPE: ICD-10-CM

## 2019-12-26 RX ORDER — AMLODIPINE BESYLATE 5 MG/1
5 TABLET ORAL DAILY
Qty: 90 TABLET | Refills: 3 | Status: SHIPPED | OUTPATIENT
Start: 2019-12-26 | End: 2020-09-24 | Stop reason: SDUPTHER

## 2019-12-26 RX ORDER — LISINOPRIL AND HYDROCHLOROTHIAZIDE 25; 20 MG/1; MG/1
1 TABLET ORAL DAILY
Qty: 90 TABLET | Refills: 3 | Status: SHIPPED | OUTPATIENT
Start: 2019-12-26 | End: 2020-09-24 | Stop reason: SDUPTHER

## 2019-12-26 RX ORDER — ATORVASTATIN CALCIUM 20 MG/1
20 TABLET, FILM COATED ORAL
Qty: 90 TABLET | Refills: 3 | Status: SHIPPED | OUTPATIENT
Start: 2019-12-26 | End: 2020-09-24 | Stop reason: SDUPTHER

## 2020-02-26 ENCOUNTER — TELEPHONE (OUTPATIENT)
Dept: GASTROENTEROLOGY | Facility: CLINIC | Age: 70
End: 2020-02-26

## 2020-09-24 ENCOUNTER — OFFICE VISIT (OUTPATIENT)
Dept: INTERNAL MEDICINE CLINIC | Facility: CLINIC | Age: 70
End: 2020-09-24

## 2020-09-24 VITALS
WEIGHT: 197.53 LBS | DIASTOLIC BLOOD PRESSURE: 84 MMHG | HEART RATE: 58 BPM | SYSTOLIC BLOOD PRESSURE: 120 MMHG | TEMPERATURE: 98.7 F | OXYGEN SATURATION: 97 % | HEIGHT: 65 IN | BODY MASS INDEX: 32.91 KG/M2

## 2020-09-24 DIAGNOSIS — R73.03 PREDIABETES: ICD-10-CM

## 2020-09-24 DIAGNOSIS — K21.9 GASTROESOPHAGEAL REFLUX DISEASE, ESOPHAGITIS PRESENCE NOT SPECIFIED: ICD-10-CM

## 2020-09-24 DIAGNOSIS — Z23 NEED FOR INFLUENZA VACCINATION: Primary | ICD-10-CM

## 2020-09-24 DIAGNOSIS — I10 HYPERTENSION, UNSPECIFIED TYPE: ICD-10-CM

## 2020-09-24 DIAGNOSIS — E78.5 HYPERLIPIDEMIA, UNSPECIFIED HYPERLIPIDEMIA TYPE: ICD-10-CM

## 2020-09-24 PROCEDURE — G0008 ADMIN INFLUENZA VIRUS VAC: HCPCS | Performed by: INTERNAL MEDICINE

## 2020-09-24 PROCEDURE — 99213 OFFICE O/P EST LOW 20 MIN: CPT | Performed by: INTERNAL MEDICINE

## 2020-09-24 PROCEDURE — 90662 IIV NO PRSV INCREASED AG IM: CPT | Performed by: INTERNAL MEDICINE

## 2020-09-24 RX ORDER — ATORVASTATIN CALCIUM 20 MG/1
20 TABLET, FILM COATED ORAL
Qty: 90 TABLET | Refills: 6 | Status: SHIPPED | OUTPATIENT
Start: 2020-09-24 | End: 2021-10-15 | Stop reason: SDUPTHER

## 2020-09-24 RX ORDER — AMLODIPINE BESYLATE 5 MG/1
5 TABLET ORAL DAILY
Qty: 90 TABLET | Refills: 6 | Status: SHIPPED | OUTPATIENT
Start: 2020-09-24 | End: 2021-10-15 | Stop reason: SDUPTHER

## 2020-09-24 RX ORDER — LISINOPRIL AND HYDROCHLOROTHIAZIDE 25; 20 MG/1; MG/1
1 TABLET ORAL DAILY
Qty: 90 TABLET | Refills: 6 | Status: SHIPPED | OUTPATIENT
Start: 2020-09-24 | End: 2021-10-15 | Stop reason: SDUPTHER

## 2020-09-24 RX ORDER — FAMOTIDINE 20 MG/1
20 TABLET, FILM COATED ORAL 2 TIMES DAILY PRN
Qty: 180 TABLET | Refills: 6 | Status: SHIPPED | OUTPATIENT
Start: 2020-09-24 | End: 2021-05-26 | Stop reason: ALTCHOICE

## 2020-09-24 NOTE — PROGRESS NOTES
[facility] Visit Note  Michele Marco A [place] 78 y o  male   MRN: [id number]    Assessment and Plan      GERD stable at this time  Denies symptoms of [place] today  Patient follows up with gastroenterology  EGD 2019 was significant for gastritis and H pylori which was treated and confirmed eradicated  Plan  · Continue Pepcid 20 mg daily  · Follow-up results of CBC    Hypertension blood pressure today is 120/84  He admits to medication compliance  Plan  · Continue amlodipine 5 mg daily  · Lisinopril- hydrochlorothiazide  · BMP ordered as patient is on diuretics    Pre diabetes A1c 6 2% in 2018  Plan  · Repeat A1c ordered    PHQ-9 Depression Screening    PHQ-9:    Frequency of the following problems over the past two weeks:       Little interest or pleasure in doing things:  0 - not at all  Feeling down, depressed, or hopeless:  0 - not at all  PHQ-2 Score:  0       BMI Counseling: Body mass index is 32 87 kg/m²  The BMI is above normal  Exercise recommendations include exercising 3-5 times per week  Health Maintenance:  · Colonoscopy 2019; tubular adenomatous polyps removed, internal/external hemorrhoids found  Patient has 2 repeat colonoscopy is due in 3 [id number]) because of inadequate bowel prep and follow-up of the polyps per GI recs  · Tdap due 2025, hep C completed  · Annual wellness visit in 3 months  · Patient received flu shot today 09/24/2020    Schedule a follow-up appointment in 6months    Chief Complaint:  Follow-up of chronic medical condition  Subjective     History of Present Illness:  Pt  Is a 76y/o male with a pmhx of GERD,HTN, presents for follow-up of his chronic medical condition  Patient admits to medication compliance  His blood pressure today is 120/84  He denies symptoms of GERD today  Patient was counseled on the benefits of weight loss and exercise/medication compliance    Patient lost 3 lb since 10/19 which is intentional    Scripts for medication refill was sent to the pharmacy  Patient denies headaches, dizziness, lightheadedness, chest pain , shortness of breath, palpitation, nausea , vomiting, diarrhea , constipation, abdominal pain, numbness tingling sensation of his extremities patient looks stable on room air not in any obvious distress  Review of Systems 12 point review of system unrearkable      Current Outpatient Medications:     amLODIPine (NORVASC) 5 mg tablet, Take 1 tablet (5 mg total) by mouth daily, Disp: 90 tablet, Rfl: 6    aspirin 81 MG tablet, Take 81 mg by mouth daily  , Disp: , Rfl:     atorvastatin (LIPITOR) 20 mg tablet, Take 1 tablet (20 mg total) by mouth daily at bedtime, Disp: 90 tablet, Rfl: 6    bisacodyl (bisacodyl) 5 mg EC tablet, Take 5 mg by mouth daily as needed for constipation  , Disp: , Rfl:     famotidine (PEPCID) 20 mg tablet, Take 1 tablet (20 mg total) by mouth 2 (two) times a day as needed for heartburn, Disp: 180 tablet, Rfl: 6    lisinopril-hydrochlorothiazide (PRINZIDE,ZESTORETIC) 20-25 MG per tablet, Take 1 tablet by mouth daily, Disp: 90 tablet, Rfl: 6    polyethylene glycol-electrolytes (NULYTELY) 4000 mL solution, Take by mouth, Disp: , Rfl:     polyethylene glycol (GOLYTELY) 4000 mL solution, Take 4,000 mL by mouth once for 1 dose, Disp: 4000 mL, Rfl: 0  Past Medical History:   Diagnosis Date    Cataract     Colon polyp     Diverticulosis     GERD (gastroesophageal reflux disease)     Hyperlipidemia     Hypertension     Throat ulcer      Past Surgical History:   Procedure Laterality Date    COLONOSCOPY N/A 8/18/2016    Procedure: COLONOSCOPY;  Surgeon: Sivakumar Dunlap MD;  Location[facility];   Service:    [facility] EYE SURGERY Bilateral     UPPER GASTROINTESTINAL ENDOSCOPY       Social History     Socioeconomic History    Marital status: Single     Spouse name: Not on file    Number of children: Not on file    Years of education: Not on file    Highest education level: Not on file   Occupational History    Not on file   Social Needs    Financial resource strain: Not on file    Food insecurity     Worry: Not on file     Inability: Not on file    Transportation needs     Medical: Not on file     Non-medical: Not on file   Tobacco Use    Smoking status: Former Smoker    Smokeless tobacco: Never Used   Substance and Sexual Activity    Alcohol use: No     Comment: pt "sober for 30+ years"    Drug use: No    Sexual activity: Never     Comment:    Lifestyle    Physical activity     Days per week: Not on file     Minutes per session: Not on file    Stress: Not on file   Relationships    Social connections     Talks on phone: Not on file     Gets together: Not on file     Attends Tenriism service: Not on file     Active member of club or organization: Not on file     Attends meetings of clubs or organizations: Not on file     Relationship status: Not on file    Intimate partner violence     Fear of current or ex partner: Not on file     Emotionally abused: Not on file     Physically abused: Not on file     Forced sexual activity: Not on file   Other Topics Concern    Not on file   Social History Narrative    Lives with family     Family History   Problem Relation Age of Onset    Hypertension Mother     Hypertension Father      Allergies   Allergen Reactions    Lactose      diarrhea       Objective     Vitals:    09/24/20 1332   BP: 120/84   BP Location: Left arm   Patient Position: Sitting   Cuff Size: Standard   Pulse: 58   Temp: 98 7 °F (37 1 °C)   TempSrc: Temporal   SpO2: 97%   Weight: 89 6 kg (197 lb 8 5 oz)   Height: 5' 5" (1 651 m)       Physical exam:     GENERAL: NAD   HEENT:  NC/AT, PERRL, EOMI, no scleral icterus  CARDIAC:  RRR, +S1/S2, no S3/S4 heard, no m/g/r  PULMONARY:  CTA B/L, no wheezing/rales/rhonci, non-labored breathing  ABDOMEN:  Soft, NT/ND,no rebound/guarding/rigidity  Extremities:   No edema, cyanosis, or clubbing  NEUROLOGIC: Grossly intact  SKIN:  No rashes or erythema noted on exposed skin  Psych: Normal affect        ==  PLEASE NOTE:  This encounter was completed utilizing the [employer] Direct Speech Voice Recognition Software  Grammatical errors, random word insertions, pronoun errors and incomplete sentences are occasional consequences of the system due to software limitations, ambient noise and hardware issues  These may be missed by proof reading prior to affixing electronic signature  Any questions or concerns about the content, text or information contained within the body of this dictation should be directly addressed to the physician for clarification  Please do not hesitate to call me directly if you have any any questions or concerns

## 2020-09-30 LAB — HBA1C MFR BLD HPLC: 5.8 %

## 2021-01-06 ENCOUNTER — OFFICE VISIT (OUTPATIENT)
Dept: INTERNAL MEDICINE CLINIC | Facility: CLINIC | Age: 71
End: 2021-01-06

## 2021-01-06 VITALS
BODY MASS INDEX: 33.65 KG/M2 | DIASTOLIC BLOOD PRESSURE: 67 MMHG | HEART RATE: 67 BPM | SYSTOLIC BLOOD PRESSURE: 118 MMHG | WEIGHT: 202.2 LBS

## 2021-01-06 DIAGNOSIS — R26.89 BALANCE DISORDER: ICD-10-CM

## 2021-01-06 DIAGNOSIS — I10 HYPERTENSION, UNSPECIFIED TYPE: Primary | ICD-10-CM

## 2021-01-06 PROCEDURE — G0438 PPPS, INITIAL VISIT: HCPCS | Performed by: INTERNAL MEDICINE

## 2021-01-06 RX ORDER — BLOOD PRESSURE TEST KIT
KIT MISCELLANEOUS WEEKLY
Qty: 1 EACH | Refills: 0 | Status: SHIPPED | OUTPATIENT
Start: 2021-01-06 | End: 2022-02-01 | Stop reason: ALTCHOICE

## 2021-01-06 NOTE — PROGRESS NOTES
Assessment and Plan:      GERD stable at this time  Denies symptoms of [place] today  Patient follows up with gastroenterology  EGD 2019 was significant for gastritis and H pylori which was treated and confirmed eradicated  CBC 09/2020   Normal   Plan  · Continue Pepcid 20 mg daily        Hypertension blood pressure today is   118/67  He admits to medication compliance  BMP  09/2020 is normal     Plan  · Continue amlodipine 5 mg daily  · Lisinopril- hydrochlorothiazide  ·   Script was printed for blood pressure acute today and patient  Was advised to present blood pressure log at his next visit  Pre diabetes A1c 5 8 on 09/30/2020 improved from 6 2% in 2018  Patient was counseled on the benefits of diet and exercise and he verbalized understanding  Will consider starting patient on metformin in the future   Obesity patient's BMI is 33 65 patient was counseled on lifestyle modification/ benefits of diet and exercise     Occasional imbalance patient complained of occasional imbalance today  He denies headaches, lightheadedness, chest pain, shortness of breath, palpitation, numbness tingling sensation of his extremity  On examination muscle strength is 5/5 bilateral upper and lower extremity, sensation was intact, normal range-of-motion,normal proprioception  Cranial nerves 1-12 within normal limits , Romberg sign negative  Patient is currently on 2 blood pressure medications  His blood pressures have been SBP between 116-134  I advised patient to monitor blood pressures once a week and return to the clinic with blood pressure log  Will consider discontinue amlodipine 5 mg during my next encounter with patient he blood pressures remain soft  Patient was advised to call the clinic if he develops lightheadedness, dizziness, chest pain, shortness of breath, palpitation   Most recent BMP and CBC unremarkable   Plan  ·  Continue amlodipine 5 mg daily and lisinopril- hydrochlorothiazide 20-25mg daily  · follow-up results of vitamin B12/ folate ordered today at next visit     Preventive health issues were discussed with patient, and age appropriate screening tests were ordered as noted in patient's After Visit Summary  Personalized health advice and appropriate referrals for health education or preventive services given if needed, as noted in patient's After Visit Summary  History of Present Illness:     Patient presents for Welcome to Medicare visit  Patient Care Team:  Karyn Hackett DO as PCP - General (Internal Medicine)  Antony Rivas DO as PCP - [address] [place] [address] (RTE)  Lety Armenta MD as Endoscopist     Review of Systems:     Review of Systems   Constitutional: Negative  HENT: Negative  Respiratory: Negative  Cardiovascular: Negative  Gastrointestinal: Negative  Musculoskeletal: Negative for neck stiffness  Neurological: Negative for dizziness  Occasional loss of balance   Psychiatric/Behavioral: Negative for agitation  Problem List:     Patient Active Problem List   Diagnosis    HTN (hypertension)    GERD (gastroesophageal reflux disease)    Prediabetes    H  pylori infection      Past Medical and Surgical History:     Past Medical History:   Diagnosis Date    Cataract     Colon polyp     Diverticulosis     GERD (gastroesophageal reflux disease)     Hyperlipidemia     Hypertension     Throat ulcer      Past Surgical History:   Procedure Laterality Date    COLONOSCOPY N/A 8/18/2016    Procedure: COLONOSCOPY;  Surgeon: Lety Armenta MD;  [facility];   Service:    [address] [place] [address] EYE SURGERY Bilateral     UPPER GASTROINTESTINAL ENDOSCOPY        Family History:     Family History   Problem Relation Age of Onset    Hypertension Mother     Hypertension Father       Social History:        Social History     Socioeconomic History    Marital status: Single     Spouse name: Not on file    Number of children: Not on file    Years of education: Not on file   [address] [place] [address] Highest education level: Not on file   Occupational History    Not on file   Social Needs    Financial resource strain: Not on file    Food insecurity     Worry: Not on file     Inability: Not on file    Transportation needs     Medical: Not on file     Non-medical: Not on file   Tobacco Use    Smoking status: Former Smoker    Smokeless tobacco: Never Used   Substance and Sexual Activity    Alcohol use: No     Comment: pt "sober for 30+ years"    Drug use: No    Sexual activity: Never     Comment:    Lifestyle    Physical activity     Days per week: Not on file     Minutes per session: Not on file    Stress: Not on file   Relationships    Social connections     Talks on phone: Not on file     Gets together: Not on file     Attends Jew service: Not on file     Active member of club or organization: Not on file     Attends meetings of clubs or organizations: Not on file     Relationship status: Not on file    Intimate partner violence     Fear of current or ex partner: Not on file     Emotionally abused: Not on file     Physically abused: Not on file     Forced sexual activity: Not on file   Other Topics Concern    Not on file   Social History Narrative    Lives with family      Medications and Allergies:     Current Outpatient Medications   Medication Sig Dispense Refill    amLODIPine (NORVASC) 5 mg tablet Take 1 tablet (5 mg total) by mouth daily 90 tablet 6    aspirin 81 MG tablet Take 81 mg by mouth daily   atorvastatin (LIPITOR) 20 mg tablet Take 1 tablet (20 mg total) by mouth daily at bedtime 90 tablet 6    bisacodyl (bisacodyl) 5 mg EC tablet Take 5 mg by mouth daily as needed for constipation        famotidine (PEPCID) 20 mg tablet Take 1 tablet (20 mg total) by mouth 2 (two) times a day as needed for heartburn 180 tablet 6    lisinopril-hydrochlorothiazide (PRINZIDE,ZESTORETIC) 20-25 MG per tablet Take 1 tablet by mouth daily 90 tablet 6    polyethylene glycol (GOLYTELY) 4000 mL solution Take 4,000 mL by mouth once for 1 dose 4000 mL 0    polyethylene glycol-electrolytes (NULYTELY) 4000 mL solution Take by mouth       No current facility-administered medications for this visit  Allergies   Allergen Reactions    Lactose      diarrhea      Immunizations:     Immunization History   Administered Date(s) Administered    Influenza Quadrivalent, 6-35 Months IM 01/10/2018    Influenza, high dose seasonal 0 7 mL 12/04/2018, 09/24/2020    Influenza, seasonal, injectable 09/22/2015    Pneumococcal Conjugate 13-Valent 01/10/2018    Pneumococcal Polysaccharide PPV23 09/22/2015    Tdap 09/22/2015      Health Maintenance:         Topic Date Due    Colonoscopy Surveillance  09/17/2021    Colorectal Cancer Screening  09/17/2029    Hepatitis C Screening  Completed     There are no preventive care reminders to display for this patient  Medicare Screening Tests and Risk Assessments:     Caitlin Collins is here for his Initial Wellness visit  Health Risk Assessment:   Patient rates overall health as very good  Patient feels that their physical health rating is same  Eyesight was rated as same  Hearing was rated as same  Patient feels that their emotional and mental health rating is much better  Pain experienced in the last 7 days has been some  Patient's pain rating has been 6/10  Patient states that he has experienced no weight loss or gain in last 6 months  Fall Risk Screening: In the past year, patient has experienced: no history of falling in past year      Home Safety:  Patient has trouble with stairs inside or outside of their home  Patient has working smoke alarms and has working carbon monoxide detector  Home safety hazards include: none  Nutrition:   Current diet is Regular  Medications:   Patient is currently taking over-the-counter supplements  OTC medications include: see medication list  Patient is able to manage medications       Activities of Daily Living (ADLs)/Instrumental Activities of Daily Living (IADLs):   Walk and transfer into and out of bed and chair?: Yes  Dress and groom yourself?: Yes    Bathe or shower yourself?: Yes    Feed yourself? Yes  Do your laundry/housekeeping?: Yes  Manage your money, pay your bills and track your expenses?: Yes  Make your own meals?: Yes    Do your own shopping?: Yes    Previous Hospitalizations:   Any hospitalizations or ED visits within the last 12 months?: No      PREVENTIVE SCREENINGS      Cardiovascular Screening:    General: Screening Not Indicated and History Lipid Disorder      Diabetes Screening:     General: Screening Current      Colorectal Cancer Screening:     General: Screening Current      Abdominal Aortic Aneurysm (AAA) Screening:    Risk factors include: age between 73-69 yo and tobacco use        Lung Cancer Screening:     General: Screening Not Indicated      Hepatitis C Screening:    General: Screening Current    No exam data present     Physical Exam:     There were no vitals taken for this visit  Physical Exam  Vitals signs and nursing note reviewed  Constitutional:       Appearance: He is well-developed  HENT:      Head: Normocephalic and atraumatic  Nose: Nose normal       Mouth/Throat:      Mouth: Mucous membranes are moist    Eyes:      Extraocular Movements: Extraocular movements intact  Conjunctiva/sclera: Conjunctivae normal       Pupils: Pupils are equal, round, and reactive to light  Neck:      Musculoskeletal: Normal range of motion and neck supple  No neck rigidity or muscular tenderness  Cardiovascular:      Rate and Rhythm: Normal rate and regular rhythm  Heart sounds: No murmur  Pulmonary:      Effort: Pulmonary effort is normal  No respiratory distress  Breath sounds: Normal breath sounds  Abdominal:      General: Bowel sounds are normal       Palpations: Abdomen is soft  Tenderness: There is no abdominal tenderness     Musculoskeletal: Normal range of motion  General: No swelling, tenderness, deformity or signs of injury  Right lower leg: No edema  Left lower leg: No edema  Skin:     General: Skin is warm and dry  Capillary Refill: Capillary refill takes less than 2 seconds  Neurological:      General: No focal deficit present  Mental Status: He is alert and oriented to person, place, and time     Psychiatric:         Mood and Affect: Mood normal          Behavior: Behavior normal           DO Zhang

## 2021-01-06 NOTE — PATIENT INSTRUCTIONS

## 2021-01-13 ENCOUNTER — LAB (OUTPATIENT)
Dept: LAB | Facility: HOSPITAL | Age: 71
End: 2021-01-13
Payer: COMMERCIAL

## 2021-01-13 DIAGNOSIS — K21.9 GASTROESOPHAGEAL REFLUX DISEASE: ICD-10-CM

## 2021-01-13 DIAGNOSIS — R26.89 BALANCE DISORDER: ICD-10-CM

## 2021-01-13 DIAGNOSIS — R73.03 PREDIABETES: ICD-10-CM

## 2021-01-13 DIAGNOSIS — I10 HYPERTENSION, UNSPECIFIED TYPE: ICD-10-CM

## 2021-01-13 LAB
ANION GAP SERPL CALCULATED.3IONS-SCNC: 5 MMOL/L (ref 4–13)
BASOPHILS # BLD AUTO: 0.1 THOUSANDS/ΜL (ref 0–0.1)
BASOPHILS NFR BLD AUTO: 1 % (ref 0–1)
BUN SERPL-MCNC: 22 MG/DL (ref 5–25)
CALCIUM SERPL-MCNC: 8.9 MG/DL (ref 8.3–10.1)
CHLORIDE SERPL-SCNC: 106 MMOL/L (ref 100–108)
CO2 SERPL-SCNC: 30 MMOL/L (ref 21–32)
CREAT SERPL-MCNC: 1 MG/DL (ref 0.6–1.3)
EOSINOPHIL # BLD AUTO: 0.32 THOUSAND/ΜL (ref 0–0.61)
EOSINOPHIL NFR BLD AUTO: 5 % (ref 0–6)
ERYTHROCYTE [DISTWIDTH] IN BLOOD BY AUTOMATED COUNT: 13.6 % (ref 11.6–15.1)
EST. AVERAGE GLUCOSE BLD GHB EST-MCNC: 114 MG/DL
FOLATE SERPL-MCNC: >20 NG/ML (ref 3.1–17.5)
GFR SERPL CREATININE-BSD FRML MDRD: 75 ML/MIN/1.73SQ M
GLUCOSE P FAST SERPL-MCNC: 84 MG/DL (ref 65–99)
HBA1C MFR BLD: 5.6 %
HCT VFR BLD AUTO: 39.8 % (ref 36.5–49.3)
HGB BLD-MCNC: 12.7 G/DL (ref 12–17)
IMM GRANULOCYTES # BLD AUTO: 0.02 THOUSAND/UL (ref 0–0.2)
IMM GRANULOCYTES NFR BLD AUTO: 0 % (ref 0–2)
LYMPHOCYTES # BLD AUTO: 2.39 THOUSANDS/ΜL (ref 0.6–4.47)
LYMPHOCYTES NFR BLD AUTO: 34 % (ref 14–44)
MCH RBC QN AUTO: 29.6 PG (ref 26.8–34.3)
MCHC RBC AUTO-ENTMCNC: 31.9 G/DL (ref 31.4–37.4)
MCV RBC AUTO: 93 FL (ref 82–98)
MONOCYTES # BLD AUTO: 0.85 THOUSAND/ΜL (ref 0.17–1.22)
MONOCYTES NFR BLD AUTO: 12 % (ref 4–12)
NEUTROPHILS # BLD AUTO: 3.39 THOUSANDS/ΜL (ref 1.85–7.62)
NEUTS SEG NFR BLD AUTO: 48 % (ref 43–75)
NRBC BLD AUTO-RTO: 0 /100 WBCS
PLATELET # BLD AUTO: 238 THOUSANDS/UL (ref 149–390)
PMV BLD AUTO: 11 FL (ref 8.9–12.7)
POTASSIUM SERPL-SCNC: 3.7 MMOL/L (ref 3.5–5.3)
RBC # BLD AUTO: 4.29 MILLION/UL (ref 3.88–5.62)
SODIUM SERPL-SCNC: 141 MMOL/L (ref 136–145)
VIT B12 SERPL-MCNC: 1152 PG/ML (ref 100–900)
WBC # BLD AUTO: 7.07 THOUSAND/UL (ref 4.31–10.16)

## 2021-01-13 PROCEDURE — 36415 COLL VENOUS BLD VENIPUNCTURE: CPT

## 2021-01-13 PROCEDURE — 80048 BASIC METABOLIC PNL TOTAL CA: CPT

## 2021-01-13 PROCEDURE — 83036 HEMOGLOBIN GLYCOSYLATED A1C: CPT

## 2021-01-13 PROCEDURE — 85025 COMPLETE CBC W/AUTO DIFF WBC: CPT

## 2021-01-13 PROCEDURE — 82746 ASSAY OF FOLIC ACID SERUM: CPT

## 2021-01-13 PROCEDURE — 82607 VITAMIN B-12: CPT

## 2021-02-08 ENCOUNTER — OFFICE VISIT (OUTPATIENT)
Dept: INTERNAL MEDICINE CLINIC | Facility: CLINIC | Age: 71
End: 2021-02-08

## 2021-02-08 VITALS
HEART RATE: 63 BPM | BODY MASS INDEX: 34.75 KG/M2 | WEIGHT: 208.8 LBS | SYSTOLIC BLOOD PRESSURE: 130 MMHG | OXYGEN SATURATION: 93 % | TEMPERATURE: 97.5 F | DIASTOLIC BLOOD PRESSURE: 65 MMHG

## 2021-02-08 DIAGNOSIS — Z09 FOLLOW UP: Primary | ICD-10-CM

## 2021-02-08 PROCEDURE — 99214 OFFICE O/P EST MOD 30 MIN: CPT | Performed by: INTERNAL MEDICINE

## 2021-02-08 NOTE — PROGRESS NOTES
[address] [facility], [place], operated by [facility] Visit Note  Power Petersen 69 y o  male   MRN: [id number]    Assessment and Plan      GERD stable at this time  Denies symptoms of Nery Greenhouse today  Patient follows up with gastroenterology  EGD 2019 was significant for gastritis and H pylori which was treated and confirmed eradicated  CBC 09/2020   Normal   Plan  · Continue Pepcid 20 mg bid prn         Hypertension blood pressure today is  130/75  He admits to medication compliance  BMP  09/2020 is normal     Plan  · Continue amlodipine 5 mg daily  · Lisinopril- hydrochlorothiazide       Pre diabetes A1c 5 8 on 09/30/2020 improved from 6 2% in 2018  Patient was counseled on the benefits of diet and exercise and he verbalized understanding  Will consider starting patient on metformin in the future        Obesity patient's BMI is 34 75 patient was counseled on lifestyle modification/ benefits of diet and exercise     BMI Counseling: Body mass index is 34 75 kg/m²  The BMI is above normal  Nutrition recommendations include reducing portion sizes, decreasing overall calorie intake, 3-5 servings of fruits/vegetables daily, reducing fast food intake, consuming healthier snacks, decreasing soda and/or juice intake and moderation in carbohydrate intake  Exercise recommendations include exercising 3-5 times per week  Occasional imbalance patient complained of occasional imbalance at his last visit  He admits is resolved He denies headaches, lightheadedness, chest pain, shortness of breath, palpitation, numbness tingling sensation of his extremity  On examination muscle strength is 5/5 bilateral upper and lower extremity, sensation was intact, normal range-of-motion,normal proprioception  Cranial nerves 1-12 within normal limits , Romberg sign negative  he was given a script for BMP, vitamin B12 and folate with results reviewed during today's visit and are all within normal limits    Patient is Japanese-speaking and could not clearly explain what he meant by occasional imbalance apparently at his last visit, he meant "he had not been using his cane and looses balance without his cane" states he feels much better and more confident with his cane  Blood pressure stable at this time  I will not cut back on his blood pressure medication at this time will continue to monitor  Patient was advised to call the clinic if he develops lightheadedness, chest pain, shortness of breath, palpitation, lower extremity weakness and he verbalized understanding  He is given the option of physical therapy but deferred at this time  Health Maintenance:  · Colonoscopy 2019; tubular adenomatous polyps removed, internal/external hemorrhoids found  Patient has 2 repeat colonoscopy is due in 3 [id number]) because of inadequate bowel prep and follow-up of the polyps per GI recs  · Tdap due 2025, hep C completed  · Patient received flu shot 09/24/2020    Schedule a follow-up appointment in 3months     Chief Complaint: follow up medical conditions  Subjective     History of Present Illness:  Pt  Is a 67y/o male with a pmhx of GERD,HTN, presents for follow-up of his chronic medical condition  Patient admits to medication compliance  His blood pressure today is 130/65  He denies symptoms of GERD today, admits to medication compliance  He has gained 6 lb since his last visit on 01/06/2021  He was previously 20 II lb now 208lbs, he states he has been resting more at home and does not do much activity, he was counseled on the benefits diet and exercise    At his last visit he complained of occasional imbalance, was asymptomatic exam at that time 5/5 strength bilateral upper lower extremity, his sensations were intact normal proprioception, range of motion his cranial nerves 1-12 were within normal limits Romberg sign was negative blood pressure was stable and he was given a script for BMP, vitamin B12 and folate with results reviewed during today's visit and are all within normal limits  He did not want any refill of his medication today  Patient denies imbalance he states that he feels very well and comfortable with his cane  Patient denies headaches, dizziness, lightheadedness, chest pain , shortness of breath, palpitation, nausea , vomiting, diarrhea , constipation, abdominal pain, numbness tingling sensation of his extremities patient looks stable on room air not in any obvious distress  Review of Systems 12 point review of system unremarkable except that listed in my HPI above      Current Outpatient Medications:     amLODIPine (NORVASC) 5 mg tablet, Take 1 tablet (5 mg total) by mouth daily, Disp: 90 tablet, Rfl: 6    aspirin 81 MG tablet, Take 81 mg by mouth daily  , Disp: , Rfl:     atorvastatin (LIPITOR) 20 mg tablet, Take 1 tablet (20 mg total) by mouth daily at bedtime, Disp: 90 tablet, Rfl: 6    Blood Pressure KIT, Use once a week, Disp: 1 each, Rfl: 0    lisinopril-hydrochlorothiazide (PRINZIDE,ZESTORETIC) 20-25 MG per tablet, Take 1 tablet by mouth daily, Disp: 90 tablet, Rfl: 6    bisacodyl (bisacodyl) 5 mg EC tablet, Take 5 mg by mouth daily as needed for constipation  , Disp: , Rfl:     famotidine (PEPCID) 20 mg tablet, Take 1 tablet (20 mg total) by mouth 2 (two) times a day as needed for heartburn, Disp: 180 tablet, Rfl: 6  Past Medical History:   Diagnosis Date    Cataract     Colon polyp     Diverticulosis     GERD (gastroesophageal reflux disease)     Hyperlipidemia     Hypertension     Throat ulcer      Past Surgical History:   Procedure Laterality Date    COLONOSCOPY N/A 8/18/2016    Procedure: COLONOSCOPY;  Surgeon: Samir Quiroz MD;  [facility];   Service:    [employer] EYE SURGERY Bilateral     UPPER GASTROINTESTINAL ENDOSCOPY       Social History     Socioeconomic History    Marital status: Single     Spouse name: Not on file    Number of children: Not on file    Years of education: Not on file    Highest education level: Not on file   Occupational History    Not on file   Social Needs    Financial resource strain: Not on file    Food insecurity     Worry: Not on file     Inability: Not on file    Transportation needs     Medical: Not on file     Non-medical: Not on file   Tobacco Use    Smoking status: Former Smoker    Smokeless tobacco: Never Used   Substance and Sexual Activity    Alcohol use: No     Comment: pt "sober for 30+ years"    Drug use: No    Sexual activity: Never     Comment:    Lifestyle    Physical activity     Days per week: Not on file     Minutes per session: Not on file    Stress: Not on file   Relationships    Social connections     Talks on phone: Not on file     Gets together: Not on file     Attends Worship service: Not on file     Active member of club or organization: Not on file     Attends meetings of clubs or organizations: Not on file     Relationship status: Not on file    Intimate partner violence     Fear of current or ex partner: Not on file     Emotionally abused: Not on file     Physically abused: Not on file     Forced sexual activity: Not on file   Other Topics Concern    Not on file   Social History Narrative    Lives with family     Family History   Problem Relation Age of Onset    Hypertension Mother     Hypertension Father      Allergies   Allergen Reactions    Lactose      diarrhea       Objective     Vitals:    02/08/21 0952   BP: 130/65   BP Location: Left arm   Patient Position: Sitting   Cuff Size: Standard   Pulse: 63   Temp: 97 5 °F (36 4 °C)   TempSrc: Temporal   SpO2: 93%   Weight: 94 7 kg (208 lb 12 8 oz)       Physical exam:     GENERAL: NAD   HEENT:  NC/AT, PERRL, EOMI, no scleral icterus  CARDIAC:  RRR, +S1/S2, no S3/S4 heard, no m/g/r  PULMONARY:  CTA B/L, no wheezing/rales/rhonci, non-labored breathing  ABDOMEN:  Soft, NT/ND,no rebound/guarding/rigidity  Extremities:  No edema, cyanosis, or clubbing  NEUROLOGIC: Grossly intact  SKIN:  No rashes or erythema noted on exposed skin  Psych: Normal affect        ==  PLEASE NOTE:  This encounter was completed utilizing the [employer] Direct Speech Voice Recognition Software  Grammatical errors, random word insertions, pronoun errors and incomplete sentences are occasional consequences of the system due to software limitations, ambient noise and hardware issues  These may be missed by proof reading prior to affixing electronic signature  Any questions or concerns about the content, text or information contained within the body of this dictation should be directly addressed to the physician for clarification  Please do not hesitate to call me directly if you have any any questions or concerns

## 2021-05-26 ENCOUNTER — OFFICE VISIT (OUTPATIENT)
Dept: INTERNAL MEDICINE CLINIC | Facility: CLINIC | Age: 71
End: 2021-05-26

## 2021-05-26 VITALS
OXYGEN SATURATION: 97 % | SYSTOLIC BLOOD PRESSURE: 137 MMHG | WEIGHT: 209.2 LBS | TEMPERATURE: 97 F | BODY MASS INDEX: 34.85 KG/M2 | HEART RATE: 61 BPM | DIASTOLIC BLOOD PRESSURE: 75 MMHG | HEIGHT: 65 IN

## 2021-05-26 DIAGNOSIS — K59.00 CONSTIPATION, UNSPECIFIED CONSTIPATION TYPE: Primary | ICD-10-CM

## 2021-05-26 PROCEDURE — 99213 OFFICE O/P EST LOW 20 MIN: CPT | Performed by: INTERNAL MEDICINE

## 2021-05-26 RX ORDER — DOCUSATE SODIUM 100 MG/1
100 CAPSULE, LIQUID FILLED ORAL 2 TIMES DAILY
Qty: 10 CAPSULE | Refills: 0 | Status: SHIPPED | OUTPATIENT
Start: 2021-05-26 | End: 2021-09-01 | Stop reason: ALTCHOICE

## 2021-05-26 NOTE — PROGRESS NOTES
[facility]  INTERNAL MEDICINE OFFICE VISIT     PATIENT INFORMATION     Barrington Petersen   69 y o  male   MRN: [id number]    ASSESSMENT/PLAN     GERD stable at this time  Denies symptoms of Tadeo Fujita today  Patient follows up with gastroenterology  EGD 2019 was significant for gastritis and H pylori which was treated and confirmed eradicated  CBC 01/2021   Normal       Hypertension blood pressure today is  137/75  He admits to medication compliance  BMP  1/2021 is normal     Plan  - Continue amlodipine 5 mg daily  - Lisinopril- hydrochlorothiazide 20-12  5      Pre diabetes A1c 5 6 on 1/13/21  Patient was counseled on the benefits of diet and exercise and he verbalized understanding  Will consider starting patient on metformin in the future if A1c becomes higher       Obesity patient's BMI is 34 75 patient was counseled on lifestyle modification/ benefits of diet and exercise    Constipation Pt  Complains of hard stool, no hematochezia  - Colace 100mg bid     PHQ-9 Depression Screening    PHQ-9:   Frequency of the following problems over the past two weeks:      Little interest or pleasure in doing things: 0 - not at all  Feeling down, depressed, or hopeless: 0 - not at all  PHQ-2 Score: 0         HEALTH MAINTENANCE     Immunization History   Administered Date(s) Administered    Influenza Quadrivalent, 6-35 Months IM 01/10/2018    Influenza, high dose seasonal 0 7 mL 12/04/2018, 09/24/2020    Influenza, seasonal, injectable 09/22/2015    Pneumococcal Conjugate 13-Valent 01/10/2018    Pneumococcal Polysaccharide PPV23 09/22/2015    Tdap 09/22/2015       Colonoscopy 2019; tubular adenomatous polyps removed, internal/external hemorrhoids found  Patient has 2 repeat colonoscopy is due in 3 [facility]60) because of inadequate bowel prep and follow-up of the polyps per GI recs    ·Tdap due 2025, hep C completed  ·Patient received flu shot 09/24/2020       CHIEF COMPLAINT     Chief Complaint   Patient presents with    Follow-up      HISTORY OF PRESENT ILLNESS   Patient is a 78-year-old male with a past medical history of GERD, hypertension, obesity presents for  3 months follow-up of his chronic medical condition  Today he states his GERD is stable and does not require his Pepcid 20 mg b i d  p r n ,  Blood pressure today is 137/75 has been between 269B to 437F systolic's over 65 diastolic's in previous visit,  I will continue current blood pressure regimen  Patient has a BMI of 34 81 with discussed at length about the importance of diet and exercise and he verbalized understanding  At his last visit he complained of occasional imbalance without his walker his X76 and folic acid is unremarkable , he denies falling in the past year  Patient was offered physical therapy and he declined I offered during this visit today and he still declining I encouraged patient to think about  starting Physical therapy, this will be discussed at his next visit  Patient denies headaches, dizziness, lightheadedness, chest pain , shortness of breath, palpitation, nausea , vomiting, diarrhea , constipation, abdominal pain, numbness tingling sensation of his extremities patient looks stable on room air not in any obvious distress  He has no complaint at this time  REVIEW OF SYSTEMS     Review of Systems 12 point review of system is unremarkable  OBJECTIVE     Vitals:    05/26/21 1433   BP: 137/75   BP Location: Left arm   Patient Position: Sitting   Cuff Size: Standard   Pulse: 61   Temp: (!) 97 °F (36 1 °C)   TempSrc: Temporal   SpO2: 97%   Weight: 94 9 kg (209 lb 3 2 oz)   Height: 5' 5" (1 651 m)     Physical Exam  Vitals signs and nursing note reviewed  Constitutional:       Appearance: Normal appearance  He is well-developed  He is obese  HENT:      Head: Normocephalic and atraumatic        Mouth/Throat:      Mouth: Mucous membranes are moist    Eyes:      Extraocular Movements: Extraocular movements intact  Conjunctiva/sclera: Conjunctivae normal    Neck:      Musculoskeletal: Normal range of motion and neck supple  Cardiovascular:      Rate and Rhythm: Normal rate and regular rhythm  Pulses: Normal pulses  Heart sounds: Normal heart sounds  No murmur  Pulmonary:      Effort: Pulmonary effort is normal  No respiratory distress  Breath sounds: Normal breath sounds  Abdominal:      General: Bowel sounds are normal  There is no distension  Palpations: Abdomen is soft  Tenderness: There is no abdominal tenderness  Musculoskeletal: Normal range of motion  General: No swelling or tenderness  Skin:     General: Skin is warm and dry  Capillary Refill: Capillary refill takes less than 2 seconds  Neurological:      General: No focal deficit present  Mental Status: He is alert and oriented to person, place, and time  Mental status is at baseline  Psychiatric:         Mood and Affect: Mood normal          Behavior: Behavior normal        CURRENT MEDICATIONS     Current Outpatient Medications:     amLODIPine (NORVASC) 5 mg tablet, Take 1 tablet (5 mg total) by mouth daily, Disp: 90 tablet, Rfl: 6    aspirin 81 MG tablet, Take 81 mg by mouth daily  , Disp: , Rfl:     atorvastatin (LIPITOR) 20 mg tablet, Take 1 tablet (20 mg total) by mouth daily at bedtime, Disp: 90 tablet, Rfl: 6    Blood Pressure KIT, Use once a week, Disp: 1 each, Rfl: 0    lisinopril-hydrochlorothiazide (PRINZIDE,ZESTORETIC) 20-25 MG per tablet, Take 1 tablet by mouth daily, Disp: 90 tablet, Rfl: 6    bisacodyl (bisacodyl) 5 mg EC tablet, Take 5 mg by mouth daily as needed for constipation  , Disp: , Rfl:     docusate sodium (COLACE) 100 mg capsule, Take 1 capsule (100 mg total) by mouth 2 (two) times a day, Disp: 10 capsule, Rfl: 0    PAST MEDICAL & SURGICAL HISTORY     Past Medical History:   Diagnosis Date    Cataract     Colon polyp     Diverticulosis     GERD (gastroesophageal reflux disease)     Hyperlipidemia     Hypertension     Throat ulcer      Past Surgical History:   Procedure Laterality Date    COLONOSCOPY N/A 8/18/2016    Procedure: COLONOSCOPY;  Surgeon: Shira Ames MD;  [facility];   Service:     EYE SURGERY Bilateral     UPPER GASTROINTESTINAL ENDOSCOPY       SOCIAL & FAMILY HISTORY     Social History     Socioeconomic History    Marital status: Single     Spouse name: Not on file    Number of children: Not on file    Years of education: Not on file    Highest education level: Not on file   Occupational History    Not on file   Social Needs    Financial resource strain: Not hard at all    Food insecurity     Worry: Never true     Inability: Never true   [employer] needs     Medical: No     Non-medical: No   Tobacco Use    Smoking status: Former Smoker    Smokeless tobacco: Never Used   Substance and Sexual Activity    Alcohol use: No     Comment: pt "sober for 30+ years"    Drug use: No    Sexual activity: Never     Comment:    Lifestyle    Physical activity     Days per week: 0 days     Minutes per session: 0 min    Stress: Not on file   Relationships    Social connections     Talks on phone: Not on file     Gets together: Not on file     Attends Religion service: Not on file     Active member of club or organization: Not on file     Attends meetings of clubs or organizations: Not on file     Relationship status: Not on file    Intimate partner violence     Fear of current or ex partner: Not on file     Emotionally abused: Not on file     Physically abused: Not on file     Forced sexual activity: Not on file   Other Topics Concern    Not on file   Social History Narrative    Lives with family     Social History     Substance and Sexual Activity   Alcohol Use No    Comment: pt "sober for 30+ years"     Substance and Sexual Activity   Alcohol Use No    Comment: pt "sober for 30+ years"        Substance and Sexual Activity   Drug Use No     Social History     Tobacco Use   Smoking Status Former Smoker   Smokeless Tobacco Never Used     Family History   Problem Relation Age of Onset    Hypertension Mother     Hypertension Father      ==  Virginia Hinkle MD  Internal Medicine Residency, PGY-2  [facility]  [address] [facility] [address] [address] [facility] [address] [facility]  Office: [phone]  Fax: [phone]

## 2021-09-01 ENCOUNTER — OFFICE VISIT (OUTPATIENT)
Dept: FAMILY MEDICINE CLINIC | Facility: CLINIC | Age: 71
End: 2021-09-01
Payer: COMMERCIAL

## 2021-09-01 ENCOUNTER — LAB (OUTPATIENT)
Dept: LAB | Facility: HOSPITAL | Age: 71
End: 2021-09-01
Payer: COMMERCIAL

## 2021-09-01 ENCOUNTER — HOSPITAL ENCOUNTER (OUTPATIENT)
Dept: RADIOLOGY | Facility: HOSPITAL | Age: 71
Discharge: HOME | End: 2021-09-01
Payer: COMMERCIAL

## 2021-09-01 VITALS
SYSTOLIC BLOOD PRESSURE: 120 MMHG | BODY MASS INDEX: 34.16 KG/M2 | OXYGEN SATURATION: 98 % | TEMPERATURE: 98 F | HEART RATE: 78 BPM | WEIGHT: 205 LBS | DIASTOLIC BLOOD PRESSURE: 70 MMHG | RESPIRATION RATE: 16 BRPM | HEIGHT: 65 IN

## 2021-09-01 DIAGNOSIS — G89.29 CHRONIC PAIN OF BOTH KNEES: ICD-10-CM

## 2021-09-01 DIAGNOSIS — M25.561 CHRONIC PAIN OF BOTH KNEES: ICD-10-CM

## 2021-09-01 DIAGNOSIS — E83.51 HYPOCALCEMIA: ICD-10-CM

## 2021-09-01 DIAGNOSIS — M25.562 CHRONIC PAIN OF BOTH KNEES: ICD-10-CM

## 2021-09-01 DIAGNOSIS — R26.89 BALANCE DISORDER: Primary | ICD-10-CM

## 2021-09-01 DIAGNOSIS — E78.2 MIXED HYPERLIPIDEMIA: ICD-10-CM

## 2021-09-01 DIAGNOSIS — D72.829 LEUKOCYTOSIS, UNSPECIFIED TYPE: ICD-10-CM

## 2021-09-01 DIAGNOSIS — I10 HYPERTENSION, UNSPECIFIED TYPE: Primary | ICD-10-CM

## 2021-09-01 LAB
ALBUMIN SERPL BCP-MCNC: 4.5 G/DL (ref 3–5.2)
ALP SERPL-CCNC: 68 U/L (ref 43–122)
ALT SERPL W P-5'-P-CCNC: 29 U/L
ANION GAP SERPL CALCULATED.3IONS-SCNC: 11 MMOL/L (ref 5–14)
AST SERPL W P-5'-P-CCNC: 40 U/L (ref 17–59)
BASOPHILS # BLD AUTO: 0.1 THOUSANDS/ΜL (ref 0–0.1)
BASOPHILS NFR BLD AUTO: 1 % (ref 0–1)
BILIRUB SERPL-MCNC: 0.46 MG/DL
BUN SERPL-MCNC: 22 MG/DL (ref 5–25)
CALCIUM SERPL-MCNC: 9.4 MG/DL (ref 8.4–10.2)
CHLORIDE SERPL-SCNC: 99 MMOL/L (ref 97–108)
CHOLEST SERPL-MCNC: 135 MG/DL
CO2 SERPL-SCNC: 29 MMOL/L (ref 22–30)
CREAT SERPL-MCNC: 1.03 MG/DL (ref 0.7–1.5)
EOSINOPHIL # BLD AUTO: 0.1 THOUSAND/ΜL (ref 0–0.4)
EOSINOPHIL NFR BLD AUTO: 1 % (ref 0–6)
ERYTHROCYTE [DISTWIDTH] IN BLOOD BY AUTOMATED COUNT: 13.7 %
FOLATE SERPL-MCNC: >20 NG/ML (ref 3.1–17.5)
GFR SERPL CREATININE-BSD FRML MDRD: 73 ML/MIN/1.73SQ M
GLUCOSE P FAST SERPL-MCNC: 90 MG/DL (ref 70–99)
HCT VFR BLD AUTO: 39.2 % (ref 41–53)
HDLC SERPL-MCNC: 42 MG/DL
HGB BLD-MCNC: 13 G/DL (ref 13.5–17.5)
LDLC SERPL CALC-MCNC: 67 MG/DL
LYMPHOCYTES # BLD AUTO: 2.1 THOUSANDS/ΜL (ref 0.5–4)
LYMPHOCYTES NFR BLD AUTO: 26 % (ref 25–45)
MCH RBC QN AUTO: 30.3 PG (ref 26–34)
MCHC RBC AUTO-ENTMCNC: 33.1 G/DL (ref 31–36)
MCV RBC AUTO: 91 FL (ref 80–100)
MONOCYTES # BLD AUTO: 0.8 THOUSAND/ΜL (ref 0.2–0.9)
MONOCYTES NFR BLD AUTO: 10 % (ref 1–10)
NEUTROPHILS # BLD AUTO: 5 THOUSANDS/ΜL (ref 1.8–7.8)
NEUTS SEG NFR BLD AUTO: 62 % (ref 45–65)
NONHDLC SERPL-MCNC: 93 MG/DL
PLATELET # BLD AUTO: 241 THOUSANDS/UL (ref 150–450)
PMV BLD AUTO: 9.2 FL (ref 8.9–12.7)
POTASSIUM SERPL-SCNC: 3.8 MMOL/L (ref 3.6–5)
PROT SERPL-MCNC: 7.7 G/DL (ref 5.9–8.4)
RBC # BLD AUTO: 4.29 MILLION/UL (ref 4.5–5.9)
SODIUM SERPL-SCNC: 139 MMOL/L (ref 137–147)
TRIGL SERPL-MCNC: 129 MG/DL
VIT B12 SERPL-MCNC: 1478 PG/ML (ref 100–900)
WBC # BLD AUTO: 8.1 THOUSAND/UL (ref 4.5–11)

## 2021-09-01 PROCEDURE — 99214 OFFICE O/P EST MOD 30 MIN: CPT | Performed by: FAMILY MEDICINE

## 2021-09-01 PROCEDURE — 80053 COMPREHEN METABOLIC PANEL: CPT

## 2021-09-01 PROCEDURE — 80061 LIPID PANEL: CPT

## 2021-09-01 PROCEDURE — 82607 VITAMIN B-12: CPT

## 2021-09-01 PROCEDURE — 73562 X-RAY EXAM OF KNEE 3: CPT

## 2021-09-01 PROCEDURE — 82746 ASSAY OF FOLIC ACID SERUM: CPT

## 2021-09-01 PROCEDURE — 36415 COLL VENOUS BLD VENIPUNCTURE: CPT

## 2021-09-01 PROCEDURE — 85025 COMPLETE CBC W/AUTO DIFF WBC: CPT

## 2021-09-01 RX ORDER — FAMOTIDINE 20 MG/1
20 TABLET, FILM COATED ORAL 2 TIMES DAILY
COMMUNITY
End: 2022-08-09 | Stop reason: ALTCHOICE

## 2021-09-01 NOTE — PROGRESS NOTES
Assessment/Plan:    HTN (hypertension)  Blood pressure is well control, patient will continue same treatment  Patient understands the risks associated with HTN and the need for adequate control and adherence to therapy  Explained to patient that therapeutic measure is lifelong lifestyle modification including:  Sodium reduction (<2 g/day)  Dietary Approaches to Stop Hypertension (DASH) diet (3 servings of fruit and vegetables daily, whole grains, low sodium, low-fat proteins)   Weight loss to BMI under 30 kg/m^2  Physical activity: 3 to 5 times/week of daily aerobic exercise for 30- to 50-minute sessions as tolerated   Avoid alcohol consumption  BMI 34 0-34 9,adult  exercise routinely, control carb's in diet, control weight or avoid gain more, increase grain , vegetables and fruit in diet  Chronic pain of both knees  The choice of NSAID's must be use with caution in a higher risk patient  Acetaminophen can be used safetly in a appropriate dose only in a needed basis  The use ice and physical therapy is necessary in order to get the best results  Always use ice and therapy to decrease or avoid the need for a Pharmacological agent  Hypocalcemia  I reviewed previous laboratory data and consulting notes, we are going to repeat labs to ensure stability      Leukocytosis  Repeat labs  Mixed hyperlipidemia  The need for statin therapy was explained to Mr Manoj Mendozaswetha  The USPSTF recommends that adults without a history of cardiovascular disease (CVD) (ie, symptomatic coronary artery disease or ischemic stroke) use a low- to moderate-dose statin for the prevention of CVD events and mortality when all of the following criteria are met: 1) they are aged 36 to 76 years; 2) they have 1 or more CVD risk factors (ie, dyslipidemia, diabetes, hypertension, or smoking); and 3) they have a calculated 10-year risk of a cardiovascular event of 10% or greater    Identification of dyslipidemia and calculation of 10-year CVD event risk requires universal lipids screening in adults aged 36 to 76 years  See the [address] Considerations section for more information on lipids screening and the assessment of cardiovascular risk  Diagnoses and all orders for this visit:    Hypertension, unspecified type    Leukocytosis, unspecified type  -     CBC and differential; Future    Hypocalcemia  -     Comprehensive metabolic panel; Future    BMI 34 0-34 9,adult    Mixed hyperlipidemia  -     Lipid panel; Future    Chronic pain of both knees  -     XR knee 3 vw left non injury; Future  -     XR knee 3 vw right non injury; Future    Other orders  -     famotidine (PEPCID) 20 mg tablet; Take 20 mg by mouth 2 (two) times a day          Subjective:      Patient ID: Greg Delaney is a 69 y o  male  Hypertension  Patient is here to follow HTN, patient states good compliance with treatment  Denies chest pain, shortness of breath, urinary problems  No exercise but follows low salt diet  Current medication includes: ACE inhibitor, diuretic and calcium channel blocker agents  amLODIPine  aspirin  atorvastatin  bisacodyl  famotidine  lisinopril-hydrochlorothiazide        Legs weakness: caused by increased amount of pain, going on for past 10 years  Gait problems: uses a cane  The following portions of the patient's history were reviewed and updated as appropriate: allergies, current medications, past family history, past medical history, past social history, past surgical history and problem list     Review of Systems   Constitutional: Negative for diaphoresis, fatigue, fever and unexpected weight change  Respiratory: Negative for apnea, cough, choking, chest tightness and shortness of breath  Cardiovascular: Negative for chest pain, palpitations and leg swelling  Gastrointestinal: Negative for abdominal distention, abdominal pain, anal bleeding, blood in stool and constipation  Musculoskeletal: Positive for arthralgias (bilateral knees ) and gait problem  Negative for back pain and joint swelling  Neurological: Negative for dizziness, facial asymmetry, light-headedness and headaches  Hematological:        Patient has history of leukocytosis  Psychiatric/Behavioral: Negative for behavioral problems, dysphoric mood and self-injury  The patient is not nervous/anxious  Objective:      /70 (BP Location: Left arm, Patient Position: Sitting, Cuff Size: Standard)   Pulse 78   Temp 98 °F (36 7 °C) (Temporal)   Resp 16   Ht 5' 5" (1 651 m)   Wt 93 kg (205 lb)   SpO2 98%   BMI 34 11 kg/m²          Physical Exam  Vitals and nursing note reviewed  Neck:      Thyroid: No thyroid mass or thyromegaly  Vascular: No carotid bruit or JVD  Trachea: No tracheal tenderness  Cardiovascular:      Rate and Rhythm: Normal rate and regular rhythm  No extrasystoles are present  Pulses: Normal pulses  Heart sounds: Normal heart sounds  Heart sounds not distant  No friction rub  Pulmonary:      Effort: Pulmonary effort is normal  No tachypnea or bradypnea  Breath sounds: Normal breath sounds  No stridor  Abdominal:      General: Bowel sounds are normal  There is no abdominal bruit  Palpations: Abdomen is soft  There is no hepatomegaly or splenomegaly  Hernia: No hernia is present  Genitourinary:     Testes:         Left: Tenderness (of knees, lost of quads muscle strength ) present  Musculoskeletal:         General: Normal range of motion  Cervical back: No edema or rigidity  Skin:     General: Skin is warm and dry  Neurological:      Mental Status: He is oriented to person, place, and time  Deep Tendon Reflexes: Reflexes are normal and symmetric  Psychiatric:         Behavior: Behavior normal          Thought Content: Thought content normal          Judgment: Judgment normal          BMI Counseling:  Body mass index is 34 11 kg/m²  The BMI is above normal  Nutrition recommendations include consuming healthier snacks, decreasing soda and/or juice intake and moderation in carbohydrate intake  Exercise recommendations include exercising 3-5 times per week

## 2021-09-09 PROBLEM — G89.29 CHRONIC PAIN OF BOTH KNEES: Status: ACTIVE | Noted: 2021-09-09

## 2021-09-09 PROBLEM — D72.829 LEUKOCYTOSIS: Status: ACTIVE | Noted: 2021-09-09

## 2021-09-09 PROBLEM — M25.562 CHRONIC PAIN OF BOTH KNEES: Status: ACTIVE | Noted: 2021-09-09

## 2021-09-09 PROBLEM — E83.51 HYPOCALCEMIA: Status: ACTIVE | Noted: 2021-09-09

## 2021-09-09 PROBLEM — M25.561 CHRONIC PAIN OF BOTH KNEES: Status: ACTIVE | Noted: 2021-09-09

## 2021-09-09 PROBLEM — E78.2 MIXED HYPERLIPIDEMIA: Status: ACTIVE | Noted: 2021-09-09

## 2021-09-09 NOTE — ASSESSMENT & PLAN NOTE
The need for statin therapy was explained to Mr Mango Meyers  The USPSTF recommends that adults without a history of cardiovascular disease (CVD) (ie, symptomatic coronary artery disease or ischemic stroke) use a low- to moderate-dose statin for the prevention of CVD events and mortality when all of the following criteria are met: 1) they are aged 36 to 76 years; 2) they have 1 or more CVD risk factors (ie, dyslipidemia, diabetes, hypertension, or smoking); and 3) they have a calculated 10-year risk of a cardiovascular event of 10% or greater  Identification of dyslipidemia and calculation of 10-year CVD event risk requires universal lipids screening in adults aged 36 to 76 years  See the [address] Considerations section for more information on lipids screening and the assessment of cardiovascular risk

## 2021-09-09 NOTE — ASSESSMENT & PLAN NOTE
The choice of NSAID's must be use with caution in a higher risk patient  Acetaminophen can be used safetly in a appropriate dose only in a needed basis  The use ice and physical therapy is necessary in order to get the best results  Always use ice and therapy to decrease or avoid the need for a Pharmacological agent

## 2021-09-09 NOTE — ASSESSMENT & PLAN NOTE
exercise routinely, control carb's in diet, control weight or avoid gain more, increase grain , vegetables and fruit in diet

## 2021-09-15 ENCOUNTER — HOSPITAL ENCOUNTER (EMERGENCY)
Facility: HOSPITAL | Age: 71
Discharge: HOME | End: 2021-09-15
Attending: EMERGENCY MEDICINE | Admitting: EMERGENCY MEDICINE
Payer: COMMERCIAL

## 2021-09-15 VITALS
RESPIRATION RATE: 18 BRPM | DIASTOLIC BLOOD PRESSURE: 58 MMHG | OXYGEN SATURATION: 99 % | HEART RATE: 69 BPM | SYSTOLIC BLOOD PRESSURE: 130 MMHG | TEMPERATURE: 97.7 F

## 2021-09-15 DIAGNOSIS — N17.9 ACUTE KIDNEY INJURY (HCC): ICD-10-CM

## 2021-09-15 DIAGNOSIS — R42 LIGHTHEADEDNESS: Primary | ICD-10-CM

## 2021-09-15 LAB
ALBUMIN SERPL BCP-MCNC: 3.6 G/DL (ref 3.5–5)
ALP SERPL-CCNC: 73 U/L (ref 46–116)
ALT SERPL W P-5'-P-CCNC: 31 U/L (ref 12–78)
ANION GAP SERPL CALCULATED.3IONS-SCNC: 7 MMOL/L (ref 4–13)
AST SERPL W P-5'-P-CCNC: 30 U/L (ref 5–45)
BASOPHILS # BLD AUTO: 0.11 THOUSANDS/ΜL (ref 0–0.1)
BASOPHILS NFR BLD AUTO: 1 % (ref 0–1)
BILIRUB SERPL-MCNC: 0.34 MG/DL (ref 0.2–1)
BUN SERPL-MCNC: 28 MG/DL (ref 5–25)
CALCIUM SERPL-MCNC: 9 MG/DL (ref 8.3–10.1)
CHLORIDE SERPL-SCNC: 107 MMOL/L (ref 100–108)
CO2 SERPL-SCNC: 26 MMOL/L (ref 21–32)
CREAT SERPL-MCNC: 1.71 MG/DL (ref 0.6–1.3)
EOSINOPHIL # BLD AUTO: 0.07 THOUSAND/ΜL (ref 0–0.61)
EOSINOPHIL NFR BLD AUTO: 1 % (ref 0–6)
ERYTHROCYTE [DISTWIDTH] IN BLOOD BY AUTOMATED COUNT: 13.7 % (ref 11.6–15.1)
GFR SERPL CREATININE-BSD FRML MDRD: 39 ML/MIN/1.73SQ M
GLUCOSE SERPL-MCNC: 111 MG/DL (ref 65–140)
HCT VFR BLD AUTO: 38.1 % (ref 36.5–49.3)
HGB BLD-MCNC: 12.7 G/DL (ref 12–17)
IMM GRANULOCYTES # BLD AUTO: 0.06 THOUSAND/UL (ref 0–0.2)
IMM GRANULOCYTES NFR BLD AUTO: 1 % (ref 0–2)
LYMPHOCYTES # BLD AUTO: 1.28 THOUSANDS/ΜL (ref 0.6–4.47)
LYMPHOCYTES NFR BLD AUTO: 12 % (ref 14–44)
MCH RBC QN AUTO: 30.6 PG (ref 26.8–34.3)
MCHC RBC AUTO-ENTMCNC: 33.3 G/DL (ref 31.4–37.4)
MCV RBC AUTO: 92 FL (ref 82–98)
MONOCYTES # BLD AUTO: 0.86 THOUSAND/ΜL (ref 0.17–1.22)
MONOCYTES NFR BLD AUTO: 8 % (ref 4–12)
NEUTROPHILS # BLD AUTO: 8.19 THOUSANDS/ΜL (ref 1.85–7.62)
NEUTS SEG NFR BLD AUTO: 77 % (ref 43–75)
NRBC BLD AUTO-RTO: 0 /100 WBCS
PLATELET # BLD AUTO: 218 THOUSANDS/UL (ref 149–390)
PMV BLD AUTO: 10.5 FL (ref 8.9–12.7)
POTASSIUM SERPL-SCNC: 3.9 MMOL/L (ref 3.5–5.3)
PROT SERPL-MCNC: 7.4 G/DL (ref 6.4–8.2)
RBC # BLD AUTO: 4.15 MILLION/UL (ref 3.88–5.62)
SODIUM SERPL-SCNC: 140 MMOL/L (ref 136–145)
TROPONIN I SERPL-MCNC: <0.02 NG/ML
WBC # BLD AUTO: 10.57 THOUSAND/UL (ref 4.31–10.16)

## 2021-09-15 PROCEDURE — 85025 COMPLETE CBC W/AUTO DIFF WBC: CPT | Performed by: EMERGENCY MEDICINE

## 2021-09-15 PROCEDURE — 36415 COLL VENOUS BLD VENIPUNCTURE: CPT | Performed by: EMERGENCY MEDICINE

## 2021-09-15 PROCEDURE — 99285 EMERGENCY DEPT VISIT HI MDM: CPT | Performed by: EMERGENCY MEDICINE

## 2021-09-15 PROCEDURE — 93005 ELECTROCARDIOGRAM TRACING: CPT

## 2021-09-15 PROCEDURE — 99284 EMERGENCY DEPT VISIT MOD MDM: CPT

## 2021-09-15 PROCEDURE — 80053 COMPREHEN METABOLIC PANEL: CPT | Performed by: EMERGENCY MEDICINE

## 2021-09-15 PROCEDURE — 84484 ASSAY OF TROPONIN QUANT: CPT | Performed by: STUDENT IN AN ORGANIZED HEALTH CARE EDUCATION/TRAINING PROGRAM

## 2021-09-15 NOTE — DISCHARGE INSTRUCTIONS
You were seen in the emergency department after almost passing out while in the heat  Your evaluation did not show any emergent findings  However, you did sustain an acute kidney injury  Please follow-up with your primary care provider in 2-5 days for further repeat blood work  Please drink plenty of fluids  Return to the emergency department if this occurs again, or if you develop any chest pain, shortness of breath, nausea, vomiting, dizziness, or any other new or concerning symptoms  Lo vieron en el departamento de emergencias después de orin desmayarse mientras estaba en el calor  Allred evaluación no mostró ningún hallazgo emergente  Sin embargo, sufrió brittny lesión renal aguda  Leana un seguimiento con allred proveedor de atención primaria en 2-5 días para repetir los análisis de [place]  Myra muchos líquidos  Regrese a la lindy de emergencias si esto ocurre nuevamente, o si presenta dolor en el pecho, dificultad para respirar, náuseas, vómitos, mareos o cualquier otro síntoma nuevo o preocupante

## 2021-09-15 NOTE — ED ATTENDING ATTESTATION
Final Diagnosis:  1  Lightheadedness    2  Acute kidney injury [facility])      ED Course as of Sep 19 1655   Wed Sep 15, 2021   1821 Repeat in the near future  Creatinine(!): 1 71 1821 Likely dehydration  Creatinine(!): 1 71       I, Nestor Mercado MD, saw and evaluated the patient  All available labs and X-rays were ordered by me or the resident and have been reviewed by myself  I discussed the patient with the resident / non-physician and agree with the resident's / non-physician practitioner's findings and plan as documented in the resident's / non-physician practicitioner's note, except where noted  At this point, I agree with the current assessment done in the ED  I was present during key portions of all procedures performed unless otherwise stated  Chief Complaint   Patient presents with    Syncope     pt was at home playing the piano and had a syncopal episode  Family caught him, no trauma, no head strike, no SOB, no chest pain     This is a 69 y o  male presenting for evaluation of syncopal episode  The patient states that he had a good breakfast this morning, seemed to be doing okay throughout most today, so was outside playing the piano when all the sudden he started get very nauseous, felt extremely hot  His family saw that he was not normal so caught him before he could fall over his head  No head strike, no shortness of breath no chest pain  Felt better after resting  He was given some food or something the eat EMS was called and he felt better  No fevers no vomiting  No chest pain shortness of breath  No palpitations  He has little bit of a headache  No bowel or bladder incontinence  No seizure activity  No history of similar  PMH:   has a past medical history of Cataract, Colon polyp, Diverticulosis, Gastritis, GERD (gastroesophageal reflux disease), Hyperlipidemia, Hypertension, and Throat ulcer      PSH:   has a past surgical history that includes Eye surgery (Bilateral); Colonoscopy (N/A, 8/18/2016); Upper gastrointestinal endoscopy (2019); and Colonoscopy w/ polypectomy (2019)  Social:  Social History     Substance and Sexual Activity   Alcohol Use No    Comment: pt "sober for 30+ years"     Social History     Tobacco Use   Smoking Status Former Smoker   Smokeless Tobacco Never Used     Social History     Substance and Sexual Activity   Drug Use No     PE:  Vitals:    09/15/21 1701   BP: 130/58   BP Location: Left arm   Pulse: 69   Resp: 18   Temp: 97 7 °F (36 5 °C)   TempSrc: Oral   SpO2: 99%   General: VSS, NAD, awake, alert  Well-nourished, well-developed  Appears stated age  Head: Normocephalic, atraumatic, nontender  Eyes: PERRL, EOM-I  No diplopia  No hyphema  No subconjunctival hemorrhages  Symmetrical lids  ENTAtraumatic external nose and ears  MMM  No stridor  Normal phonation  No drooling  Base of mouth is soft  No mastoid tenderness  Neck: Symmetric, trachea midline  No JVD  CV: Peripheral pulses +2 throughout  No chest wall tenderness  Lungs:   Unlabored   No retractions  No crepitus  No tachypnea  No paradoxical motion  Abd: +BS, soft, NT/ND    MSK:   FROM   No lower extremity edema  Back:   No CVAT  Skin: Dry, intact  Neuro: AAOx3, GCS 15, CN II-XII grossly intact  Motor grossly intact  Psychiatric/Behavioral: Appropriate mood and affect   Exam: deferred  A:  - heat related syncope  P:  - Given age/RFs ? syncope w/u + fluids  - likely DC    - 13 point ROS was performed and all are normal unless stated in the history above  - Nursing note reviewed  Vitals reviewed  - Orders placed by myself and/or advanced practitioner / resident     - Previous chart was reviewed  - Georgian language barrier: even with , he started to go on tangents and required redirection (by the  himself)  - History obtained from patient  - There are no limitations to the history obtained     - Critical care time: Not applicable for this patient       Code Status: No Order  Advance Directive and Living Will:      Power of :    POLST:      Medications - No data to display    No orders to display     Orders Placed This Encounter   Procedures    ED ECG Documentation Only    CBC and differential    Comprehensive metabolic panel    Troponin I    EKG RESULTS    ECG 12 lead    ECG 12 lead     Labs Reviewed   CBC AND DIFFERENTIAL - Abnormal       Result Value Ref Range Status    WBC 10 57 (*) 4 31 - 10 16 Thousand/uL Final    RBC 4 15  3 88 - 5 62 Million/uL Final    Hemoglobin 12 7  12 0 - 17 0 g/dL Final    Hematocrit 38 1  36 5 - 49 3 % Final    MCV 92  82 - 98 fL Final    MCH 30 6  26 8 - 34 3 pg Final    MCHC 33 3  31 4 - 37 4 g/dL Final    RDW 13 7  11 6 - 15 1 % Final    MPV 10 5  8 9 - 12 7 fL Final    Platelets 745  338 - 390 Thousands/uL Final    nRBC 0  /100 WBCs Final    Neutrophils Relative 77 (*) 43 - 75 % Final    Immat GRANS % 1  0 - 2 % Final    Lymphocytes Relative 12 (*) 14 - 44 % Final    Monocytes Relative 8  4 - 12 % Final    Eosinophils Relative 1  0 - 6 % Final    Basophils Relative 1  0 - 1 % Final    Neutrophils Absolute 8 19 (*) 1 85 - 7 62 Thousands/µL Final    Immature Grans Absolute 0 06  0 00 - 0 20 Thousand/uL Final    Lymphocytes Absolute 1 28  0 60 - 4 47 Thousands/µL Final    Monocytes Absolute 0 86  0 17 - 1 22 Thousand/µL Final    Eosinophils Absolute 0 07  0 00 - 0 61 Thousand/µL Final    Basophils Absolute 0 11 (*) 0 00 - 0 10 Thousands/µL Final   COMPREHENSIVE METABOLIC PANEL - Abnormal    Sodium 140  136 - 145 mmol/L Final    Potassium 3 9  3 5 - 5 3 mmol/L Final    Chloride 107  100 - 108 mmol/L Final    CO2 26  21 - 32 mmol/L Final    ANION GAP 7  4 - 13 mmol/L Final    BUN 28 (*) 5 - 25 mg/dL Final    Creatinine 1 71 (*) 0 60 - 1 30 mg/dL Final    Comment: Standardized to IDMS reference method    Glucose 111  65 - 140 mg/dL Final    Comment: If the patient is fasting, the ADA then defines impaired fasting glucose as > 100 mg/dL and diabetes as > or equal to 123 mg/dL  Specimen collection should occur prior to Sulfasalazine administration due to the potential for falsely depressed results  Specimen collection should occur prior to Sulfapyridine administration due to the potential for falsely elevated results  Calcium 9 0  8 3 - 10 1 mg/dL Final    AST 30  5 - 45 U/L Final    Comment: Specimen collection should occur prior to Sulfasalazine administration due to the potential for falsely depressed results  ALT 31  12 - 78 U/L Final    Comment: Specimen collection should occur prior to Sulfasalazine and/or Sulfapyridine administration due to the potential for falsely depressed results  Alkaline Phosphatase 73  46 - 116 U/L Final    Total Protein 7 4  6 4 - 8 2 g/dL Final    Albumin 3 6  3 5 - 5 0 g/dL Final    Total Bilirubin 0 34  0 20 - 1 00 mg/dL Final    Comment: Use of this assay is not recommended for patients undergoing treatment with eltrombopag due to the potential for falsely elevated results      eGFR 39  ml/min/1 73sq m Final    Narrative:     [place] guidelines for Chronic Kidney Disease (CKD):     Stage 1 with normal or high GFR (GFR > 90 mL/min/1 73 square meters)    Stage 2 Mild CKD (GFR = 60-89 mL/min/1 73 square meters)    Stage 3A Moderate CKD (GFR = 45-59 mL/min/1 73 square meters)    Stage 3B Moderate CKD (GFR = 30-44 mL/min/1 73 square meters)    Stage 4 Severe CKD (GFR = 15-29 mL/min/1 73 square meters)    Stage 5 End Stage CKD (GFR <15 mL/min/1 73 square meters)  Note: GFR calculation is accurate only with a steady state creatinine   TROPONIN I - Normal    Troponin I <0 02  <=0 04 ng/mL Final    Comment: Siemens Chemistry analyzer 99% cutoff is > 0 04 ng/mL in network labs     o cTnI 99% cutoff is useful only when applied to patients in the clinical setting of myocardial ischemia   o cTnI 99% cutoff should be interpreted in the context of clinical history, ECG findings and possibly cardiac imaging to establish correct diagnosis  o cTnI 99% cutoff may be suggestive but clearly not indicative of a coronary event without the clinical setting of myocardial ischemia  Time reflects when diagnosis was documented in both MDM as applicable and the Disposition within this note       Time User Action Codes Description Comment    9/15/2021  6:20 PM Shonda Jairon Add [R55] Vasovagal syncope     9/15/2021  6:20 PM Shonda Jairon Remove [R55] Vasovagal syncope     9/15/2021  6:21 PM Shonda Jairon Add [R42] Lightheadedness     9/15/2021  6:21 PM Shonda Jairon Add [N17 9] Acute kidney injury [facility])           ED Disposition       ED Disposition Condition Date/Time Comment    Discharge Stable Wed Sep 15, 2021  6:20 PM 19 Wilda Anderson discharge to home/self care  Follow-up Information       Follow up With Specialties Details Why Contact Info Additional [address] Emergency Department Emergency Medicine  As needed [address]  958 [facility] 64 [facility] Emergency Department, [address] [place] [address] 108    [address]  Call in 1 day  [phone]             Discharge Medication List as of 9/15/2021  6:26 PM        CONTINUE these medications which have NOT CHANGED    Details   amLODIPine (NORVASC) 5 mg tablet Take 1 tablet (5 mg total) by mouth daily, Starting Thu 9/24/2020, Normal      aspirin 81 MG tablet Take 81 mg by mouth daily  , Historical Med      atorvastatin (LIPITOR) 20 mg tablet Take 1 tablet (20 mg total) by mouth daily at bedtime, Starting Thu 9/24/2020, Normal      bisacodyl (bisacodyl) 5 mg EC tablet Take 5 mg by mouth daily as needed for constipation  , Historical Med      Blood Pressure KIT Use once a week, Starting Wed 1/6/2021, Print      famotidine (PEPCID) 20 mg tablet Take 20 mg by mouth 2 (two) times a day, Historical Med      lisinopril-hydrochlorothiazide (PRINZIDE,ZESTORETIC) 20-25 MG per tablet Take 1 tablet by mouth daily, Starting Thu 9/24/2020, Normal           No discharge procedures on file  Prior to Admission Medications   Prescriptions Last Dose Informant Patient Reported? Taking? Blood Pressure KIT   No No   Sig: Use once a week   amLODIPine (NORVASC) 5 mg tablet   No No   Sig: Take 1 tablet (5 mg total) by mouth daily   aspirin 81 MG tablet  Self Yes No   Sig: Take 81 mg by mouth daily  atorvastatin (LIPITOR) 20 mg tablet   No No   Sig: Take 1 tablet (20 mg total) by mouth daily at bedtime   bisacodyl (bisacodyl) 5 mg EC tablet  Self Yes No   Sig: Take 5 mg by mouth daily as needed for constipation  famotidine (PEPCID) 20 mg tablet   Yes No   Sig: Take 20 mg by mouth 2 (two) times a day   lisinopril-hydrochlorothiazide (PRINZIDE,ZESTORETIC) 20-25 MG per tablet   No No   Sig: Take 1 tablet by mouth daily      Facility-Administered Medications: None       Portions of the record may have been created with voice recognition software  Occasional wrong word or "sound a like" substitutions may have occurred due to the inherent limitations of voice recognition software  Read the chart carefully and recognize, using context, where substitutions have occurred      Electronically signed by:  Medardo Ford

## 2021-09-16 ENCOUNTER — OFFICE VISIT (OUTPATIENT)
Dept: FAMILY MEDICINE CLINIC | Facility: CLINIC | Age: 71
End: 2021-09-16
Payer: COMMERCIAL

## 2021-09-16 VITALS
HEART RATE: 71 BPM | WEIGHT: 207 LBS | TEMPERATURE: 98 F | HEIGHT: 65 IN | RESPIRATION RATE: 18 BRPM | DIASTOLIC BLOOD PRESSURE: 68 MMHG | BODY MASS INDEX: 34.49 KG/M2 | OXYGEN SATURATION: 98 % | SYSTOLIC BLOOD PRESSURE: 132 MMHG

## 2021-09-16 DIAGNOSIS — T67.1XXD HEAT SYNCOPE, SUBSEQUENT ENCOUNTER: Primary | ICD-10-CM

## 2021-09-16 DIAGNOSIS — N17.9 AKI (ACUTE KIDNEY INJURY) (HCC): ICD-10-CM

## 2021-09-16 PROBLEM — T67.1XXA HEAT SYNCOPE: Status: ACTIVE | Noted: 2021-09-16

## 2021-09-16 PROBLEM — D72.829 LEUKOCYTOSIS: Status: RESOLVED | Noted: 2021-09-09 | Resolved: 2021-09-16

## 2021-09-16 PROBLEM — A04.8 H. PYLORI INFECTION: Status: RESOLVED | Noted: 2019-09-27 | Resolved: 2021-09-16

## 2021-09-16 PROCEDURE — 99214 OFFICE O/P EST MOD 30 MIN: CPT | Performed by: PHYSICIAN ASSISTANT

## 2021-09-16 NOTE — PROGRESS NOTES
Assessment/Plan:    Heat syncope  Syncope secondary to dehydration and exposure to heat  Pt reports that he was sitting in the sun playing the piano for 4 hours  He denies drinking anything throughout that time, as he sings while he plays and reports that he is unable to drink while he is singing and playing  No injury or head strike  Advised pt to drink more water and avoid sitting out in the heat to prevent this from recurring  MIKE (acute kidney injury) ([facility] )  Lab Results   Component Value Date/Time    CREATININE 1 71 (H) 09/15/2021 05:19 PM    CREATININE 0 79 10/28/2015 09:51 AM    BUN 28 (H) 09/15/2021 05:19 PM    BUN 15 10/28/2015 09:51 AM    EGFR 39 09/15/2021 05:19 PM     MIKE 2/2 dehydration  1L NSS given in ED  Denies NSAID use  Advised pt to drink more water, as he does not drink much  Repeat BMP in one week to ensure resolution of MIKE  Diagnoses and all orders for this visit:    Heat syncope, subsequent encounter    MIKE (acute kidney injury) ([facility] )  -     Basic metabolic panel; Future          Subjective:      Patient ID: Dez Dahl is a 69 y o  male  Branda Shell presents to the office today for follow up s/p ED visit yesterday, 9/15, for evaluation following a syncopal episode  He was outside playing the piano and began to feel hot and nauseous  His family noticed that he wasn't looking well and were able to catch him when he passed out, so there was no injury or head strike  In the ED he was found to have an MIKE and was given 1000mL NSS and discharged to home  Pt is Korean speaking only, audio  Antolin Contreras ([id number]) provided interpretive services throughout encounter           The following portions of the patient's history were reviewed and updated as appropriate: allergies, current medications, past family history, past medical history, past social history, past surgical history and problem list     Review of Systems   Constitutional: Negative for chills, fever and unexpected weight change  HENT: Negative for congestion, rhinorrhea and sore throat  Eyes: Negative for visual disturbance  Respiratory: Negative for cough and shortness of breath  Cardiovascular: Negative for chest pain, palpitations and leg swelling  Gastrointestinal: Negative for abdominal pain, constipation, diarrhea, nausea and vomiting  Genitourinary: Negative for dysuria  Musculoskeletal: Negative for arthralgias and neck pain  Neurological: Negative for syncope and headaches  Psychiatric/Behavioral: The patient is not nervous/anxious  Objective:      /68 (BP Location: Left arm, Patient Position: Sitting, Cuff Size: Standard)   Pulse 71   Temp 98 °F (36 7 °C) (Temporal)   Resp 18   Ht 5' 5" (1 651 m)   Wt 93 9 kg (207 lb)   SpO2 98%   BMI 34 45 kg/m²          Physical Exam  Vitals reviewed  Constitutional:       General: He is not in acute distress  Appearance: He is obese  He is not toxic-appearing  HENT:      Head: Normocephalic and atraumatic  Eyes:      Extraocular Movements: Extraocular movements intact  Conjunctiva/sclera: Conjunctivae normal    Cardiovascular:      Rate and Rhythm: Normal rate and regular rhythm  Pulses: Normal pulses  Heart sounds: Normal heart sounds  No murmur heard  No friction rub  No gallop  Pulmonary:      Effort: Pulmonary effort is normal  No respiratory distress  Breath sounds: Normal breath sounds  No stridor  No wheezing, rhonchi or rales  Musculoskeletal:         General: Normal range of motion  Cervical back: Normal range of motion  Skin:     General: Skin is warm and dry  Neurological:      Mental Status: He is alert and oriented to person, place, and time  Gait: Gait normal    Psychiatric:         Mood and Affect: Mood normal          Behavior: Behavior normal          Thought Content:  Thought content normal            Chief Complaint   Patient presents with    Follow-up     ER

## 2021-09-16 NOTE — ASSESSMENT & PLAN NOTE
Syncope secondary to dehydration and exposure to heat  Pt reports that he was sitting in the sun playing the piano for 4 hours  He denies drinking anything throughout that time, as he sings while he plays and reports that he is unable to drink while he is singing and playing  No injury or head strike  Advised pt to drink more water and avoid sitting out in the heat to prevent this from recurring

## 2021-09-16 NOTE — ASSESSMENT & PLAN NOTE
Lab Results   Component Value Date/Time    CREATININE 1 71 (H) 09/15/2021 05:19 PM    CREATININE 0 79 10/28/2015 09:51 AM    BUN 28 (H) 09/15/2021 05:19 PM    BUN 15 10/28/2015 09:51 AM    EGFR 39 09/15/2021 05:19 PM     MIKE 2/2 dehydration  1L NSS given in ED  Denies NSAID use  Advised pt to drink more water, as he does not drink much  Repeat BMP in one week to ensure resolution of MIKE

## 2021-09-17 LAB
ATRIAL RATE: 131 BPM
P AXIS: 50 DEGREES
PR INTERVAL: 174 MS
QRS AXIS: 30 DEGREES
QRSD INTERVAL: 82 MS
QT INTERVAL: 446 MS
QTC INTERVAL: 430 MS
T WAVE AXIS: 38 DEGREES
VENTRICULAR RATE: 56 BPM

## 2021-09-17 PROCEDURE — 93010 ELECTROCARDIOGRAM REPORT: CPT | Performed by: INTERNAL MEDICINE

## 2021-09-17 NOTE — ED PROVIDER NOTES
History  Chief Complaint   Patient presents with    Syncope     pt was at home playing the piano and had a syncopal episode  Family caught him, no trauma, no head strike, no SOB, no chest pain     Patient is a 34-year-old male, past medical history of hypertension, hyperlipidemia, and GERD, who presents to the emergency department after almost passing out  Patient states this occurred just prior to arrival   He states he was playing piano outside in the heat when he began to feel very lightheaded and dizzy  He subsequently felt like he was going to pass out and started to fall off the bench, however his family caught him and he did not fall to the floor  He did not lose consciousness and remembers everything  After several minutes, patient states he began to feel better  However, EMS was called and patient was brought to the emergency department for further evaluation  Patient states he currently feels 100% back to normal and has no complaints at this time  He denies any chest pain, palpitations, shortness of breath, headaches, abdominal pain, or back pain prior to passing out  He states he has had similar episodes in the past, last episode a year ago in an almost identical situation  He states he did eat and drink this morning, but has not had anything since breakfast   He denies any fever, chills, nausea, vomiting, diarrhea, or any other new or concerning symptoms  History provided by:  Patient   used: Yes        Prior to Admission Medications   Prescriptions Last Dose Informant Patient Reported? Taking? Blood Pressure KIT   No No   Sig: Use once a week   amLODIPine (NORVASC) 5 mg tablet   No No   Sig: Take 1 tablet (5 mg total) by mouth daily   aspirin 81 MG tablet  Self Yes No   Sig: Take 81 mg by mouth daily     atorvastatin (LIPITOR) 20 mg tablet   No No   Sig: Take 1 tablet (20 mg total) by mouth daily at bedtime   bisacodyl (bisacodyl) 5 mg EC tablet  Self Yes No   Sig: Take 5 mg by mouth daily as needed for constipation  famotidine (PEPCID) 20 mg tablet   Yes No   Sig: Take 20 mg by mouth 2 (two) times a day   lisinopril-hydrochlorothiazide (PRINZIDE,ZESTORETIC) 20-25 MG per tablet   No No   Sig: Take 1 tablet by mouth daily      Facility-Administered Medications: None       Past Medical History:   Diagnosis Date    Cataract     Colon polyp     Diverticulosis     Gastritis     GERD (gastroesophageal reflux disease)     Hyperlipidemia     Hypertension     Throat ulcer        Past Surgical History:   Procedure Laterality Date    COLONOSCOPY N/A 8/18/2016    Procedure: COLONOSCOPY;  Surgeon: Neda Ralph MD;  [facility]; Service:     COLONOSCOPY W/ POLYPECTOMY  2019    EYE SURGERY Bilateral     UPPER GASTROINTESTINAL ENDOSCOPY  2019       Family History   Problem Relation Age of Onset    Hypertension Mother     Hypertension Father      I have reviewed and agree with the history as documented  E-Cigarette/Vaping    E-Cigarette Use Never User      E-Cigarette/Vaping Substances    Nicotine No     THC No     CBD No     Flavoring No     Other No     Unknown No      Social History     Tobacco Use    Smoking status: Former Smoker    Smokeless tobacco: Never Used   Vaping Use    Vaping Use: Never used   Substance Use Topics    Alcohol use: No     Comment: pt "sober for 30+ years"    Drug use: No        Review of Systems   Constitutional: Negative for chills and fever  Respiratory: Negative for cough and shortness of breath  Cardiovascular: Negative for chest pain and leg swelling  Gastrointestinal: Negative for abdominal pain, diarrhea, nausea and vomiting  Musculoskeletal: Negative for back pain and neck pain  Neurological: Positive for dizziness and light-headedness  Near-syncope   All other systems reviewed and are negative        Physical Exam  ED Triage Vitals [09/15/21 1701]   Temperature Pulse Respirations Blood Pressure SpO2 97 7 °F (36 5 °C) 69 18 130/58 99 %      Temp Source Heart Rate Source Patient Position - Orthostatic VS BP Location FiO2 (%)   Oral Monitor Lying Left arm --      Pain Score       --             Orthostatic Vital Signs  Vitals:    09/15/21 1701   BP: 130/58   Pulse: 69   Patient Position - Orthostatic VS: Lying       Physical Exam  Vitals and nursing note reviewed  Constitutional:       General: He is not in acute distress  Appearance: He is well-developed  He is not diaphoretic  HENT:      Head: Normocephalic and atraumatic  Right Ear: External ear normal       Left Ear: External ear normal       Nose: Nose normal       Mouth/Throat:      Mouth: Mucous membranes are moist    Eyes:      General: Lids are normal  No scleral icterus  Cardiovascular:      Rate and Rhythm: Normal rate and regular rhythm  Heart sounds: Normal heart sounds  No murmur heard  No friction rub  No gallop  Pulmonary:      Effort: Pulmonary effort is normal  No respiratory distress  Breath sounds: Normal breath sounds  No wheezing or rales  Abdominal:      Palpations: Abdomen is soft  Tenderness: There is no abdominal tenderness  There is no guarding or rebound  Musculoskeletal:         General: No deformity  Normal range of motion  Skin:     General: Skin is warm and dry  Neurological:      General: No focal deficit present  Mental Status: He is alert and oriented to person, place, and time  GCS: GCS eye subscore is 4  GCS verbal subscore is 5  GCS motor subscore is 6  Sensory: Sensation is intact  Motor: Motor function is intact     Psychiatric:         Mood and Affect: Mood normal          Behavior: Behavior normal          ED Medications  Medications - No data to display    Diagnostic Studies  Results Reviewed     Procedure Component Value Units Date/Time    Troponin I [[id number]]  (Normal) Collected: 09/15/21 1719    Lab Status: Final result Specimen: Blood from Arm, Left Updated: 09/15/21 1805     Troponin I <0 02 ng/mL     Comprehensive metabolic panel [[id number]]  (Abnormal) Collected: 09/15/21 1719    Lab Status: Final result Specimen: Blood from Arm, Left Updated: 09/15/21 1804     Sodium 140 mmol/L      Potassium 3 9 mmol/L      Chloride 107 mmol/L      CO2 26 mmol/L      ANION GAP 7 mmol/L      BUN 28 mg/dL      Creatinine 1 71 mg/dL      Glucose 111 mg/dL      Calcium 9 0 mg/dL      AST 30 U/L      ALT 31 U/L      Alkaline Phosphatase 73 U/L      Total Protein 7 4 g/dL      Albumin 3 6 g/dL      Total Bilirubin 0 34 mg/dL      eGFR 39 ml/min/1 73sq m     Narrative:      [place] guidelines for Chronic Kidney Disease (CKD):     Stage 1 with normal or high GFR (GFR > 90 mL/min/1 73 square meters)    Stage 2 Mild CKD (GFR = 60-89 mL/min/1 73 square meters)    Stage 3A Moderate CKD (GFR = 45-59 mL/min/1 73 square meters)    Stage 3B Moderate CKD (GFR = 30-44 mL/min/1 73 square meters)    Stage 4 Severe CKD (GFR = 15-29 mL/min/1 73 square meters)    Stage 5 End Stage CKD (GFR <15 mL/min/1 73 square meters)  Note: GFR calculation is accurate only with a steady state creatinine    CBC and differential [[id number]]  (Abnormal) Collected: 09/15/21 1719    Lab Status: Final result Specimen: Blood from Arm, Left Updated: 09/15/21 1742     WBC 10 57 Thousand/uL      RBC 4 15 Million/uL      Hemoglobin 12 7 g/dL      Hematocrit 38 1 %      MCV 92 fL      MCH 30 6 pg      MCHC 33 3 g/dL      RDW 13 7 %      MPV 10 5 fL      Platelets 343 Thousands/uL      nRBC 0 /100 WBCs      Neutrophils Relative 77 %      Immat GRANS % 1 %      Lymphocytes Relative 12 %      Monocytes Relative 8 %      Eosinophils Relative 1 %      Basophils Relative 1 %      Neutrophils Absolute 8 19 Thousands/µL      Immature Grans Absolute 0 06 Thousand/uL      Lymphocytes Absolute 1 28 Thousands/µL      Monocytes Absolute 0 86 Thousand/µL      Eosinophils Absolute 0 07 Thousand/µL Basophils Absolute 0 11 Thousands/µL                  No orders to display         Procedures  ECG 12 Lead Documentation Only    Date/Time: 9/15/2021 5:23 PM  Performed by: Scarlet Cobian DO  Authorized by: Scarlet Cobian DO     ECG reviewed by me, the ED Provider: yes    Patient location:  ED  Interpretation:     Interpretation: abnormal    Rate:     ECG rate:  56    ECG rate assessment: normal    Rhythm:     Rhythm: sinus rhythm    Ectopy:     Ectopy: PAC    QRS:     QRS axis:  Normal  Conduction:     Conduction: normal    ST segments:     ST segments:  Normal  T waves:     T waves: normal            ED Course  ED Course as of Sep 16 2126   Wed Sep 15, 2021   1742 WBC(!): 10 57   1742 Hemoglobin: 12 7   1805 Sodium: 140   1805 Potassium: 3 9   1805 Creatinine(!): 1 71   1805 BUN(!): 28   1805 eGFR: 39   1807 Troponin I: <0 02   1808 Patient was re-evaluated  Resting comfortably  No complaints at this time  Will discharge  Discussed results and findings with patient  Explained he does have an MIKE  As patient is currently tolerating p o  and has already received fluids, no further management necessary at this time  However, instructed patient to receive repeat blood work within the next couple of days by his primary care provider  Return precautions discussed  Patient verbalized understanding and agreed to plan of care  SBIRT 22yo+      Most Recent Value   SBIRT (22 yo +)   In order to provide better care to our patients, we are screening all of our patients for alcohol and drug use  Would it be okay to ask you these screening questions? Unable to answer at this time Filed at: 09/15/2021 1831                MDM  Number of Diagnoses or Management Options  Acute kidney injury [facility])  Lightheadedness  Diagnosis management comments: Patient is a 69 y o  male who presents to the ED for presyncope      Symptoms are consistent with pre-syncope, most likely due to vasovagal episode  Differential diagnosis includes orthostatic/dehydration  Presentation not consistent with seizures given short time course, no postictal state, and no seizure activity  Low suspicion for acute neurologic catastrophes including ICH given lack of trauma, or risk factors for bleeding  Low suspicion for vascular catastrophes including PE, thoracic aortic dissection, AAA rupture  Presentation not consistent with acute life threatening arrhythmia, structural heart disease or ACS  Plan: labs, troponin, EKG, IVF, serial reassessment                 Portions of the record may have been created with voice recognition software  Occasional wrong word or "sound a like" substitutions may have occurred due to the inherent limitations of voice recognition software  Read the chart carefully and recognize, using context, where substitutions have occurred  Amount and/or Complexity of Data Reviewed  Clinical lab tests: ordered and reviewed  Tests in the medicine section of CPT®: ordered and reviewed    Risk of Complications, Morbidity, and/or Mortality  Presenting problems: moderate  Diagnostic procedures: moderate  Management options: moderate    Patient Progress  Patient progress: stable      Disposition  Final diagnoses:   Lightheadedness   Acute kidney injury ([address] )     Time reflects when diagnosis was documented in both MDM as applicable and the Disposition within this note     Time User Action Codes Description Comment    9/15/2021  6:20 PM Laban First Add [R55] Vasovagal syncope     9/15/2021  6:20 PM Laban First Remove [R55] Vasovagal syncope     9/15/2021  6:21 PM Laban First Add [R42] Lightheadedness     9/15/2021  6:21 PM Laban First Add [N17 9] Acute kidney injury [facility])       ED Disposition     ED Disposition Condition Date/Time Comment    Discharge Stable Wed Sep 15, 2021  6:20 PM Major Anderson discharge to home/self care              Follow-up Information     Follow up With Specialties Details Why Contact Info Additional Information    [address] [address] [facility] Emergency Department Emergency Medicine  As needed [address]  [address] [facility] [address] Emergency Department, [address] , [place], [place], [address]    [address]  Call in 1 day  [phone]             Discharge Medication List as of 9/15/2021  6:26 PM      CONTINUE these medications which have NOT CHANGED    Details   amLODIPine (NORVASC) 5 mg tablet Take 1 tablet (5 mg total) by mouth daily, Starting Thu 9/24/2020, Normal      aspirin 81 MG tablet Take 81 mg by mouth daily  , Historical Med      atorvastatin (LIPITOR) 20 mg tablet Take 1 tablet (20 mg total) by mouth daily at bedtime, Starting Thu 9/24/2020, Normal      bisacodyl (bisacodyl) 5 mg EC tablet Take 5 mg by mouth daily as needed for constipation  , Historical Med      Blood Pressure KIT Use once a week, Starting Wed 1/6/2021, Print      famotidine (PEPCID) 20 mg tablet Take 20 mg by mouth 2 (two) times a day, Historical Med      lisinopril-hydrochlorothiazide (PRINZIDE,ZESTORETIC) 20-25 MG per tablet Take 1 tablet by mouth daily, Starting Thu 9/24/2020, Normal           No discharge procedures on file  PDMP Review     None           ED Provider  Attending physically available and evaluated Dez Dahl I managed the patient along with the ED Attending      Electronically Signed by         Corine Napier DO  09/17/21 [address] [place] [address], DO  09/17/21 124

## 2021-10-01 DIAGNOSIS — G89.29 CHRONIC PAIN OF BOTH KNEES: Primary | ICD-10-CM

## 2021-10-01 DIAGNOSIS — M25.561 CHRONIC PAIN OF BOTH KNEES: Primary | ICD-10-CM

## 2021-10-01 DIAGNOSIS — M25.562 CHRONIC PAIN OF BOTH KNEES: Primary | ICD-10-CM

## 2021-10-04 ENCOUNTER — APPOINTMENT (OUTPATIENT)
Dept: LAB | Facility: HOSPITAL | Age: 71
End: 2021-10-04
Payer: COMMERCIAL

## 2021-10-04 DIAGNOSIS — N17.9 AKI (ACUTE KIDNEY INJURY) (HCC): ICD-10-CM

## 2021-10-04 LAB
ANION GAP SERPL CALCULATED.3IONS-SCNC: 5 MMOL/L (ref 4–13)
BUN SERPL-MCNC: 24 MG/DL (ref 5–25)
CALCIUM SERPL-MCNC: 8.9 MG/DL (ref 8.3–10.1)
CHLORIDE SERPL-SCNC: 104 MMOL/L (ref 100–108)
CO2 SERPL-SCNC: 28 MMOL/L (ref 21–32)
CREAT SERPL-MCNC: 0.95 MG/DL (ref 0.6–1.3)
GFR SERPL CREATININE-BSD FRML MDRD: 80 ML/MIN/1.73SQ M
GLUCOSE P FAST SERPL-MCNC: 92 MG/DL (ref 65–99)
POTASSIUM SERPL-SCNC: 3.5 MMOL/L (ref 3.5–5.3)
SODIUM SERPL-SCNC: 137 MMOL/L (ref 136–145)

## 2021-10-04 PROCEDURE — 80048 BASIC METABOLIC PNL TOTAL CA: CPT

## 2021-10-04 PROCEDURE — 36415 COLL VENOUS BLD VENIPUNCTURE: CPT

## 2021-10-15 DIAGNOSIS — E78.5 HYPERLIPIDEMIA, UNSPECIFIED HYPERLIPIDEMIA TYPE: ICD-10-CM

## 2021-10-15 DIAGNOSIS — I10 HYPERTENSION, UNSPECIFIED TYPE: ICD-10-CM

## 2021-10-15 RX ORDER — AMLODIPINE BESYLATE 5 MG/1
5 TABLET ORAL DAILY
Qty: 30 TABLET | Refills: 0 | Status: SHIPPED | OUTPATIENT
Start: 2021-10-15 | End: 2022-02-01 | Stop reason: SDUPTHER

## 2021-10-15 RX ORDER — ATORVASTATIN CALCIUM 20 MG/1
20 TABLET, FILM COATED ORAL
Qty: 30 TABLET | Refills: 0 | Status: SHIPPED | OUTPATIENT
Start: 2021-10-15 | End: 2022-02-01 | Stop reason: SDUPTHER

## 2021-10-15 RX ORDER — LISINOPRIL AND HYDROCHLOROTHIAZIDE 25; 20 MG/1; MG/1
1 TABLET ORAL DAILY
Qty: 30 TABLET | Refills: 0 | Status: SHIPPED | OUTPATIENT
Start: 2021-10-15 | End: 2022-02-01 | Stop reason: SDUPTHER

## 2021-10-29 ENCOUNTER — OFFICE VISIT (OUTPATIENT)
Dept: OBGYN CLINIC | Facility: MEDICAL CENTER | Age: 71
End: 2021-10-29
Payer: COMMERCIAL

## 2021-10-29 ENCOUNTER — APPOINTMENT (OUTPATIENT)
Dept: RADIOLOGY | Facility: MEDICAL CENTER | Age: 71
End: 2021-10-29
Payer: COMMERCIAL

## 2021-10-29 VITALS
SYSTOLIC BLOOD PRESSURE: 129 MMHG | DIASTOLIC BLOOD PRESSURE: 61 MMHG | WEIGHT: 208.9 LBS | HEART RATE: 62 BPM | HEIGHT: 65 IN | BODY MASS INDEX: 34.81 KG/M2

## 2021-10-29 DIAGNOSIS — M17.12 PRIMARY OSTEOARTHRITIS OF LEFT KNEE: ICD-10-CM

## 2021-10-29 DIAGNOSIS — M17.0 ARTHRITIS OF BOTH KNEES: ICD-10-CM

## 2021-10-29 DIAGNOSIS — M17.11 PRIMARY OSTEOARTHRITIS OF RIGHT KNEE: Primary | ICD-10-CM

## 2021-10-29 PROCEDURE — 99204 OFFICE O/P NEW MOD 45 MIN: CPT | Performed by: ORTHOPAEDIC SURGERY

## 2021-10-29 PROCEDURE — 20610 DRAIN/INJ JOINT/BURSA W/O US: CPT | Performed by: ORTHOPAEDIC SURGERY

## 2021-10-29 PROCEDURE — 73560 X-RAY EXAM OF KNEE 1 OR 2: CPT

## 2021-10-29 RX ORDER — LIDOCAINE HYDROCHLORIDE 10 MG/ML
3 INJECTION, SOLUTION INFILTRATION; PERINEURAL
Status: COMPLETED | OUTPATIENT
Start: 2021-10-29 | End: 2021-10-29

## 2021-10-29 RX ORDER — METHYLPREDNISOLONE ACETATE 40 MG/ML
2 INJECTION, SUSPENSION INTRA-ARTICULAR; INTRALESIONAL; INTRAMUSCULAR; SOFT TISSUE
Status: COMPLETED | OUTPATIENT
Start: 2021-10-29 | End: 2021-10-29

## 2021-10-29 RX ADMIN — LIDOCAINE HYDROCHLORIDE 3 ML: 10 INJECTION, SOLUTION INFILTRATION; PERINEURAL at 10:57

## 2021-10-29 RX ADMIN — METHYLPREDNISOLONE ACETATE 2 ML: 40 INJECTION, SUSPENSION INTRA-ARTICULAR; INTRALESIONAL; INTRAMUSCULAR; SOFT TISSUE at 10:57

## 2021-11-08 ENCOUNTER — TELEPHONE (OUTPATIENT)
Dept: INTERNAL MEDICINE CLINIC | Facility: CLINIC | Age: 71
End: 2021-11-08

## 2021-12-10 ENCOUNTER — OFFICE VISIT (OUTPATIENT)
Dept: OBGYN CLINIC | Facility: MEDICAL CENTER | Age: 71
End: 2021-12-10
Payer: COMMERCIAL

## 2021-12-10 VITALS
BODY MASS INDEX: 34.66 KG/M2 | HEART RATE: 63 BPM | HEIGHT: 65 IN | WEIGHT: 208 LBS | DIASTOLIC BLOOD PRESSURE: 55 MMHG | SYSTOLIC BLOOD PRESSURE: 118 MMHG

## 2021-12-10 DIAGNOSIS — M17.12 PRIMARY OSTEOARTHRITIS OF LEFT KNEE: ICD-10-CM

## 2021-12-10 DIAGNOSIS — M17.11 PRIMARY OSTEOARTHRITIS OF RIGHT KNEE: Primary | ICD-10-CM

## 2021-12-10 PROCEDURE — 99213 OFFICE O/P EST LOW 20 MIN: CPT | Performed by: ORTHOPAEDIC SURGERY

## 2021-12-29 ENCOUNTER — APPOINTMENT (OUTPATIENT)
Dept: RADIOLOGY | Facility: MEDICAL CENTER | Age: 71
End: 2021-12-29
Payer: COMMERCIAL

## 2021-12-29 ENCOUNTER — OFFICE VISIT (OUTPATIENT)
Dept: OBGYN CLINIC | Facility: MEDICAL CENTER | Age: 71
End: 2021-12-29
Payer: COMMERCIAL

## 2021-12-29 VITALS
SYSTOLIC BLOOD PRESSURE: 127 MMHG | HEIGHT: 65 IN | DIASTOLIC BLOOD PRESSURE: 82 MMHG | HEART RATE: 77 BPM | BODY MASS INDEX: 33.82 KG/M2 | WEIGHT: 203 LBS

## 2021-12-29 DIAGNOSIS — G89.29 CHRONIC BILATERAL LOW BACK PAIN WITHOUT SCIATICA: Primary | ICD-10-CM

## 2021-12-29 DIAGNOSIS — M47.816 LUMBAR SPONDYLOSIS: ICD-10-CM

## 2021-12-29 DIAGNOSIS — M54.50 LUMBAR PAIN: ICD-10-CM

## 2021-12-29 DIAGNOSIS — M54.50 CHRONIC BILATERAL LOW BACK PAIN WITHOUT SCIATICA: Primary | ICD-10-CM

## 2021-12-29 PROCEDURE — 99214 OFFICE O/P EST MOD 30 MIN: CPT | Performed by: STUDENT IN AN ORGANIZED HEALTH CARE EDUCATION/TRAINING PROGRAM

## 2021-12-29 PROCEDURE — 72100 X-RAY EXAM L-S SPINE 2/3 VWS: CPT

## 2022-01-31 ENCOUNTER — OFFICE VISIT (OUTPATIENT)
Dept: OBGYN CLINIC | Facility: MEDICAL CENTER | Age: 72
End: 2022-01-31
Payer: COMMERCIAL

## 2022-01-31 VITALS
HEART RATE: 60 BPM | DIASTOLIC BLOOD PRESSURE: 66 MMHG | BODY MASS INDEX: 34.32 KG/M2 | HEIGHT: 65 IN | SYSTOLIC BLOOD PRESSURE: 139 MMHG | TEMPERATURE: 97.9 F | WEIGHT: 206 LBS

## 2022-01-31 DIAGNOSIS — M17.12 PRIMARY OSTEOARTHRITIS OF LEFT KNEE: ICD-10-CM

## 2022-01-31 DIAGNOSIS — M17.11 PRIMARY OSTEOARTHRITIS OF RIGHT KNEE: Primary | ICD-10-CM

## 2022-01-31 PROCEDURE — 99213 OFFICE O/P EST LOW 20 MIN: CPT | Performed by: ORTHOPAEDIC SURGERY

## 2022-01-31 PROCEDURE — 20610 DRAIN/INJ JOINT/BURSA W/O US: CPT | Performed by: ORTHOPAEDIC SURGERY

## 2022-01-31 RX ORDER — LIDOCAINE HYDROCHLORIDE 10 MG/ML
3 INJECTION, SOLUTION INFILTRATION; PERINEURAL
Status: COMPLETED | OUTPATIENT
Start: 2022-01-31 | End: 2022-01-31

## 2022-01-31 RX ORDER — TRIAMCINOLONE ACETONIDE 40 MG/ML
80 INJECTION, SUSPENSION INTRA-ARTICULAR; INTRAMUSCULAR
Status: COMPLETED | OUTPATIENT
Start: 2022-01-31 | End: 2022-01-31

## 2022-01-31 RX ADMIN — LIDOCAINE HYDROCHLORIDE 3 ML: 10 INJECTION, SOLUTION INFILTRATION; PERINEURAL at 10:23

## 2022-01-31 RX ADMIN — TRIAMCINOLONE ACETONIDE 80 MG: 40 INJECTION, SUSPENSION INTRA-ARTICULAR; INTRAMUSCULAR at 10:23

## 2022-01-31 NOTE — PROGRESS NOTES
Assessment/Plan:  1  Primary osteoarthritis of right knee    2  Primary osteoarthritis of left knee      Orders Placed This Encounter   Procedures    Large joint arthrocentesis: bilateral knee     Patient used  yoni on his phone to speak  Declined translation otherwise  Received steroid injection today bilateral knee's  Patient knows to ice and avoid strenuous activity for 1-2 days if needed  Can repeat cortisone in 3-4 months if needed  Stationary bike or elliptical for low impact exercise  Oral anti inflammatories as needed for pain    Return in about 3 months (around 4/30/2022) for Recheck  I answered all of the patient's questions during the visit and provided education of the patient's condition during the visit  The patient verbalized understanding of the information given and agrees with the plan  This note was dictated using [email or web address] software  It may contain errors including improperly dictated words  Please contact physician directly for any questions  Subjective   Chief Complaint: No chief complaint on file  Naz Rand is a 66 y o  male who presents for follow up for bilateral knee arthritis  Severe osteoarthritis of both knee's  At his last visit on 10/29/2022 new one he had both knees injected with steroid with benefit but starting to wear off  Pain primarily activity based  He did try hinged knee braces prior to last appt but bothersome  Review of Systems  ROS:    See HPI for musculoskeletal review  All other systems reviewed are negative     History:  Past Medical History:   Diagnosis Date    Cataract     Colon polyp     Diverticulosis     Gastritis     GERD (gastroesophageal reflux disease)     Hyperlipidemia     Hypertension     Throat ulcer      Past Surgical History:   Procedure Laterality Date    COLONOSCOPY N/A 8/18/2016    Procedure: COLONOSCOPY;  Surgeon: Donna Ramos MD;  [facility];   Service:     COLONOSCOPY W/ POLYPECTOMY 2019    EYE SURGERY Bilateral     UPPER GASTROINTESTINAL ENDOSCOPY  2019     Social History   Social History     Substance and Sexual Activity   Alcohol Use No    Comment: pt "sober for 30+ years"     Social History     Substance and Sexual Activity   Drug Use No     Social History     Tobacco Use   Smoking Status Former Smoker   Smokeless Tobacco Never Used     Family History:   Family History   Problem Relation Age of Onset    Hypertension Mother     Hypertension Father        Meds/Allergies   (Not in a hospital admission)    Allergies   Allergen Reactions    Lactose - Food Allergy Diarrhea     diarrhea          Objective     /66   Pulse 60   Temp 97 9 °F (36 6 °C)   Ht 5' 5" (1 651 m)   Wt 93 4 kg (206 lb)   BMI 34 28 kg/m²      PE:  AAOx 3  WDWN  Hearing intact, no drainage from eyes  no audible wheezing  no abdominal distension  LE compartments soft, skin intact    Ortho Exam:  bilateral Knee:   No erythema  no swelling  no effusion  no warmth  AROM: 5-115 on left, right 0-115   Stable to varus/valgus stress    Imaging Studies: no new imaging to review        Large joint arthrocentesis: bilateral knee  Universal Protocol:  Consent: Verbal consent obtained    Risks and benefits: risks, benefits and alternatives were discussed  Consent given by: patient  Patient understanding: patient states understanding of the procedure being performed  Site marked: the operative site was marked  Patient identity confirmed: verbally with patient    Supporting Documentation  Indications: pain   Procedure Details  Location: knee - bilateral knee  Preparation: Patient was prepped and draped in the usual sterile fashion  Needle size: 22 G  Ultrasound guidance: no  Approach: anterolateral    Medications (Right): 3 mL lidocaine 1 %; 80 mg triamcinolone acetonide 40 mg/mLMedications (Left): 3 mL lidocaine 1 %; 80 mg triamcinolone acetonide 40 mg/mL   Patient tolerance: patient tolerated the procedure well with no immediate complications  Dressing:  Sterile dressing applied                Scribe Attestation    I,:  Corinne Castano am acting as a scribe while in the presence of the attending physician :       I,:  Rosario Guillen DO personally performed the services described in this documentation    as scribed in my presence :

## 2022-02-01 ENCOUNTER — OFFICE VISIT (OUTPATIENT)
Dept: FAMILY MEDICINE CLINIC | Facility: CLINIC | Age: 72
End: 2022-02-01
Payer: COMMERCIAL

## 2022-02-01 VITALS
BODY MASS INDEX: 33.99 KG/M2 | HEIGHT: 65 IN | HEART RATE: 78 BPM | SYSTOLIC BLOOD PRESSURE: 130 MMHG | OXYGEN SATURATION: 98 % | TEMPERATURE: 97.9 F | DIASTOLIC BLOOD PRESSURE: 70 MMHG | RESPIRATION RATE: 16 BRPM | WEIGHT: 204 LBS

## 2022-02-01 DIAGNOSIS — D72.829 LEUKOCYTOSIS, UNSPECIFIED TYPE: Primary | ICD-10-CM

## 2022-02-01 DIAGNOSIS — E78.5 HYPERLIPIDEMIA, UNSPECIFIED HYPERLIPIDEMIA TYPE: ICD-10-CM

## 2022-02-01 DIAGNOSIS — I10 HYPERTENSION, UNSPECIFIED TYPE: ICD-10-CM

## 2022-02-01 DIAGNOSIS — K59.00 CONSTIPATION, UNSPECIFIED CONSTIPATION TYPE: ICD-10-CM

## 2022-02-01 DIAGNOSIS — Z23 NEED FOR SHINGLES VACCINE: ICD-10-CM

## 2022-02-01 DIAGNOSIS — Z00.00 MEDICARE ANNUAL WELLNESS VISIT, SUBSEQUENT: ICD-10-CM

## 2022-02-01 PROCEDURE — G0439 PPPS, SUBSEQ VISIT: HCPCS | Performed by: FAMILY MEDICINE

## 2022-02-01 PROCEDURE — 99214 OFFICE O/P EST MOD 30 MIN: CPT | Performed by: FAMILY MEDICINE

## 2022-02-01 RX ORDER — ZOSTER VACCINE RECOMBINANT, ADJUVANTED 50 MCG/0.5
0.5 KIT INTRAMUSCULAR ONCE
Qty: 1 EACH | Refills: 1 | Status: SHIPPED | OUTPATIENT
Start: 2022-02-01 | End: 2022-02-01

## 2022-02-01 RX ORDER — LINACLOTIDE 145 UG/1
1 CAPSULE, GELATIN COATED ORAL DAILY
Qty: 30 CAPSULE | Refills: 5 | Status: SHIPPED | OUTPATIENT
Start: 2022-02-01 | End: 2022-08-09 | Stop reason: SDUPTHER

## 2022-02-01 RX ORDER — LISINOPRIL AND HYDROCHLOROTHIAZIDE 25; 20 MG/1; MG/1
1 TABLET ORAL DAILY
Qty: 90 TABLET | Refills: 3 | Status: SHIPPED | OUTPATIENT
Start: 2022-02-01 | End: 2022-08-09 | Stop reason: SDUPTHER

## 2022-02-01 RX ORDER — AMLODIPINE BESYLATE 5 MG/1
5 TABLET ORAL DAILY
Qty: 90 TABLET | Refills: 3 | Status: SHIPPED | OUTPATIENT
Start: 2022-02-01 | End: 2022-08-09 | Stop reason: SDUPTHER

## 2022-02-01 RX ORDER — ATORVASTATIN CALCIUM 20 MG/1
20 TABLET, FILM COATED ORAL
Qty: 90 TABLET | Refills: 3 | Status: SHIPPED | OUTPATIENT
Start: 2022-02-01 | End: 2022-08-09 | Stop reason: SDUPTHER

## 2022-02-01 NOTE — PATIENT INSTRUCTIONS

## 2022-02-05 ENCOUNTER — LAB (OUTPATIENT)
Dept: LAB | Facility: HOSPITAL | Age: 72
End: 2022-02-05
Payer: COMMERCIAL

## 2022-02-05 DIAGNOSIS — D72.829 LEUKOCYTOSIS, UNSPECIFIED TYPE: ICD-10-CM

## 2022-02-05 LAB
BASOPHILS # BLD AUTO: 0.05 THOUSANDS/ΜL (ref 0–0.1)
BASOPHILS NFR BLD AUTO: 0 % (ref 0–1)
EOSINOPHIL # BLD AUTO: 0.09 THOUSAND/ΜL (ref 0–0.61)
EOSINOPHIL NFR BLD AUTO: 1 % (ref 0–6)
ERYTHROCYTE [DISTWIDTH] IN BLOOD BY AUTOMATED COUNT: 14.2 % (ref 11.6–15.1)
HCT VFR BLD AUTO: 39.8 % (ref 36.5–49.3)
HGB BLD-MCNC: 13 G/DL (ref 12–17)
IMM GRANULOCYTES # BLD AUTO: 0.16 THOUSAND/UL (ref 0–0.2)
IMM GRANULOCYTES NFR BLD AUTO: 1 % (ref 0–2)
LYMPHOCYTES # BLD AUTO: 2.24 THOUSANDS/ΜL (ref 0.6–4.47)
LYMPHOCYTES NFR BLD AUTO: 17 % (ref 14–44)
MCH RBC QN AUTO: 30.3 PG (ref 26.8–34.3)
MCHC RBC AUTO-ENTMCNC: 32.7 G/DL (ref 31.4–37.4)
MCV RBC AUTO: 93 FL (ref 82–98)
MONOCYTES # BLD AUTO: 1.2 THOUSAND/ΜL (ref 0.17–1.22)
MONOCYTES NFR BLD AUTO: 9 % (ref 4–12)
NEUTROPHILS # BLD AUTO: 9.18 THOUSANDS/ΜL (ref 1.85–7.62)
NEUTS SEG NFR BLD AUTO: 72 % (ref 43–75)
NRBC BLD AUTO-RTO: 0 /100 WBCS
PLATELET # BLD AUTO: 268 THOUSANDS/UL (ref 149–390)
PMV BLD AUTO: 10.7 FL (ref 8.9–12.7)
RBC # BLD AUTO: 4.29 MILLION/UL (ref 3.88–5.62)
WBC # BLD AUTO: 12.92 THOUSAND/UL (ref 4.31–10.16)

## 2022-02-05 PROCEDURE — 85025 COMPLETE CBC W/AUTO DIFF WBC: CPT

## 2022-02-05 PROCEDURE — 36415 COLL VENOUS BLD VENIPUNCTURE: CPT

## 2022-02-09 ENCOUNTER — OFFICE VISIT (OUTPATIENT)
Dept: OBGYN CLINIC | Facility: MEDICAL CENTER | Age: 72
End: 2022-02-09
Payer: COMMERCIAL

## 2022-02-09 VITALS
WEIGHT: 199.8 LBS | BODY MASS INDEX: 33.29 KG/M2 | HEIGHT: 65 IN | SYSTOLIC BLOOD PRESSURE: 120 MMHG | HEART RATE: 65 BPM | DIASTOLIC BLOOD PRESSURE: 63 MMHG | TEMPERATURE: 98.2 F

## 2022-02-09 DIAGNOSIS — M47.816 LUMBAR SPONDYLOSIS: ICD-10-CM

## 2022-02-09 DIAGNOSIS — M54.50 CHRONIC BILATERAL LOW BACK PAIN WITHOUT SCIATICA: Primary | ICD-10-CM

## 2022-02-09 DIAGNOSIS — G89.29 CHRONIC BILATERAL LOW BACK PAIN WITHOUT SCIATICA: Primary | ICD-10-CM

## 2022-02-09 PROCEDURE — 99213 OFFICE O/P EST LOW 20 MIN: CPT | Performed by: STUDENT IN AN ORGANIZED HEALTH CARE EDUCATION/TRAINING PROGRAM

## 2022-02-09 NOTE — PROGRESS NOTES
1  Chronic bilateral low back pain without sciatica     2  Lumbar spondylosis       No orders of the defined types were placed in this encounter  Imaging Studies (I personally reviewed images in PACS):     X-ray lumbar spine 12/29/2021:  Mild general spondylotic changes of the lumbar spine but otherwise no acute osseous abnormalities or lesions  Alignment is intact  IMPRESSION:   Chronic axial midline and paralumbar back pain - improved since last visit   Lumbar spondylosis    Other factors:   BMI 29   Occupation as a     PLAN:     Clinical exam and radiographic imaging reviewed with patient today, with impression as per above  I have discussed with the patient the pathophysiology of this diagnosis and reviewed how the examination correlates with this diagnosis   I reassured patient that he is progressively improving since last visit despite not attending formal physical therapy  I did encourage him to have a home exercise routine thus I did provide him home exercises as per patient instructions  I recommended daily adherence to home exercises prevent recurrence of this pain   In regards to pain control recommended p r n  Use of acetaminophen, heat/ice therapy 20 minutes on/off  Return if symptoms worsen or fail to improve  If no improvement or worsening pain of his back on follow-up visit, will consider ordering an MRI of his lumbar spine without contrast potentially referring to pain management for further treatment options   utilized for entirety of office visit including history, physical exam, results review, and patient instructions/treatment plan  CHIEF COMPLAINT:  F/o Low back pain      HPI:  Juaquin Burton is a 66 y o  male  who presents for     Visit 2/9/2022: Follow-up evaluation of low back pain:  Improved  Patient admits he did not follow-up with physical therapy since last visit    However, he feels that his pain has significantly improved since last visit  He does report some stiffness of his back but otherwise he is able to complete activities of daily living and sleep without issues  He denies rarely taking pain medications for his back  He denies waking up in the middle night in regards to back pain  He is interested in doing some degree therapy at home to prevent recurrence of this pain in the future  Medical, Surgical, Family, and Social History    Past Medical History:   Diagnosis Date    Cataract     Colon polyp     Diverticulosis     Gastritis     GERD (gastroesophageal reflux disease)     Hyperlipidemia     Hypertension     Throat ulcer     Visual impairment      Past Surgical History:   Procedure Laterality Date    COLONOSCOPY N/A 8/18/2016    Procedure: COLONOSCOPY;  Surgeon: Sergio Haines MD;  Location:  GI LAB;   Service:     COLONOSCOPY W/ POLYPECTOMY  2019    EYE SURGERY Bilateral     UPPER GASTROINTESTINAL ENDOSCOPY  2019     Social History   Social History     Substance and Sexual Activity   Alcohol Use No    Comment: pt "sober for 30+ years"     Social History     Substance and Sexual Activity   Drug Use No     Social History     Tobacco Use   Smoking Status Former Smoker   Smokeless Tobacco Never Used     Family History   Problem Relation Age of Onset    Hypertension Mother     Hypertension Father      Allergies   Allergen Reactions    Lactose - Food Allergy Diarrhea     diarrhea          Physical Exam  /63   Pulse 65   Temp 98 2 °F (36 8 °C)   Ht 5' 5" (1 651 m)   Wt 90 6 kg (199 lb 12 8 oz)   BMI 33 25 kg/m²     Constitutional:  see vital signs  Gen: well-developed, normocephalic/atraumatic, well-groomed  Eyes: No inflammation or discharge of conjunctiva or lids; sclera clear   Pharynx: no inflammation, lesion, or mass of lips  Neck: supple, no masses, non-distended  MSK: no inflammation, lesion, mass, or clubbing of nails and digits except for other than mentioned below  SKIN: no visible rashes or skin lesions  Pulmonary/Chest: Effort normal  No respiratory distress         Ortho Exam  BACK EXAM:  Gait: normal, no trendelenberg gait, no antalgic gait    BACK TENDERNESS:  Spinous Processes: denies  Paraspinal Muscles: denies  SI Joint: no  Sacrum: no    ROM:   Flexion: 90  Extension: 5  Lateral flexion: 10 b/l  Rotation: 20 b/l    DERMATOMAL SENSATION:  L1: normal   L2: normal   L3: normal   L4: normal   L5: normal   S1: normal    STRENGTH (bilateral):  Knee Extension: 5/5  Knee Flexion: 5/5  Foot Dorsiflexion: 5/5  Great Toe Extension: 5/5  Foot Plantarflexion: 5/5  Hip Flexion: 5/5    REFLEXES:  Patellar: 2+ bilateral  Achilles: 1+ bilateral  Clonus: negative bilateral    BACK:   SUPINE STRAIGHT LEG: negative (but aggravates lumbar pain)    HIP:  LOG ROLL: negative  RUFUS: negative  FADIR: negative

## 2022-02-09 NOTE — PATIENT INSTRUCTIONS
Ejercicios para la espalda baja   LO QUE NECESITA SABER:   Los ejercicios de la espalda baja ayudan a sanar y a fortalecer los músculos de allred espalda para evitar otra lesión  Pregúntele a allred médico si usted necesita acudir con un fisioterapeuta para que le indique ejercicios más avanzado  INSTRUCCIONES SOBRE EL HANSEL HOSPITALARIA:   Regrese a la lindy de emergencias si:  · Usted tiene dolor severo que le impide moverse  Comuníquese con allred médico si:  · Allred dolor empeora  · Usted tiene un dolor nuevo  · Usted tiene preguntas o inquietudes acerca de allred condición o cuidado  Realice los ejercicios para la espalda baja de manera hernandez:  · Ivan francisco ejercicios sobre brittny colchoneta o superficie firme (no en la cama) para corina soporte a la columna y evitar dolor en la parte baja de la espalda  · Muévase lenta y suavemente  Evite movimientos rápidos o bruscos  · Respire normalmente  No contenga la respiración  · Deténgase si siente dolor  Es normal que sienta cierta molestia al principio  Practicar los ejercicios con regularidad ayudará a disminuir allred incomodidad con el paso del [place]  Ejercicios para la espalda baja: Allred médico podría recomendarle que realice ejercicios para la espalda de 10 a 30 minutos cada día  También podría recomendarle que ivan ejercicios 1 a 3 veces cada día  Pregunte a allred médico cuáles ejercicios son los mejores para usted y con qué frecuencia hacerlos  · Bombeo del tobillo: Acuéstese boca arriba  Levante allred pie (con francisco dedos apuntando hacia allred porfirio)  Luego, baje allred pie (con los dedos apuntando lejos de usted)  Repita shanta ejercicio 10 veces en cada lado  · Deslizamiento de talón: Acuéstese boca arriba  Muy despacio doble brittny pierna y luego enderécela  Luego, doble la otra pierna y enderécela  Repita 10 veces en cada lado  · Inclinación pélvica: Acuéstese boca arriba con francisco rodillas dobladas y francisco pies planos sobre el piso   Coloque francisco brazos en [facility] posición relajada junto a allred cuerpo  Contraiga los músculos de allred abdomen y aplane allred espalda contra el piso  Sostenga está posición por 5 segundos  Repita 5 veces  · Estiramiento de la espalda: Acuéstese boca arriba con francisco dionne detrás de allred porfirio  Doble francisco rodillas y gire la mitad de allred cuerpo hacia un lado  Mantenga esta posición por 10 segundos  Repita 3 veces en cada lado  · Levantamiento de la pierna estirada: Acuéstese boca Richad Chahal con brittny pierna estirada  Doble la otra rodilla  Contraiga allred abdomen y luego levante lentamente la pierna estirada entre 6 a 12 pulgadas del piso  Mantenga esta posición por 1 a 5 segundos  Baje allred pierna lentamente  Repita 10 veces en cada pierna  · Rodillas al pecho: Acuéstese boca arriba con francisco rodillas dobladas y francisco pies planos sobre el piso  Kishan Pa brittny de las rodillas hacia allred pecho y sosténgala por 5 segundos  Regrese allred pierna a la posición inicial  Levante la otra rodilla hacia el pecho y sosténgala por 5 segundos  Leana esto 5 veces en cada lado  · Posición kenia camello: Coloque francisco dionne y [employer]  Arquee allred espalda [facility], hacia el techo y [place]  Arquee allred atye dorsal lo más posible  Sostenga está posición por 5 segundos  Levante allred porfirio hacia arriba y baje allred pecho hacia el piso  Sostenga está posición por 5 segundos  Leana 3 series o guru se le indique  · Posición de cuclillas contra la pared: Párese con allred espalda contra la pared  Contraiga los músculos de allred abdomen  Lentamente deslice allred cuerpo hasta que francisco rodillas queden dobladas en un ángulo de 45 grados  Mantenga esta posición por 5 segundos  Deslice lentamente allred espalda hacia arriba hasta quedar de pie  Repita 10 veces  · Posición de acurrucarse: Acuéstese boca arriba con francisco rodillas dobladas y francisco pies planos sobre el piso  Coloque francisco dionne con las sachin hacia abajo debajo de la curva de la parte baja de allred espalda  Después, con francisco codos [employer], levante francisco hombros y [place] 2 a 3 pulgadas  Mantenga allred porfirio a la misma altura de francisco hombros  Mantenga esta posición por 5 segundos  Cuando usted pueda hacer shanta ejercicio sin sentir dolor por 10 a 15 segundos, puede entonces añadir brittny rotación  Mientras francisco hombros y allred pecho estén levantados del piso, voltee levemente hacia la izquierda y [place]  Repita en el otro lado  · Ejercicio pájaro raquel: Coloque francisco dionne y rodillas sobre el piso  Mantenga francisco muñecas directamente debajo de francisco hombros y francisco rodillas directamente debajo de francisco caderas  Contraiga allred ombligo hacia adentro en dirección a allred columna  No estire ni arquee allred espalda  Ponga tensos francisco músculos abdominales  Levante un brazo extendido para que se alinee con allred porfirio  Luego, levante la pierna opuesta a allred brazo  Mantenga esta posición por 15 segundos  Baje allred Nydia Furlong y pierna lentamente y british [place] de lado  Leana 5 series  © Copyright [employer] 2021 Information is for End User's use only and may not be sold, redistributed or otherwise used for commercial purposes  All illustrations and images included in CareNotes® are the copyrighted property of A D A M , Inc  or [address] [place] [address] es sólo para uso en educación  Allred intención no es darle un consejo médico sobre enfermedades o tratamientos  Colsulte con allred Ozie Sharp farmacéutico antes de seguir cualquier régimen médico para saber si es seguro y efectivo para usted

## 2022-02-10 NOTE — RESULT ENCOUNTER NOTE
Please call patient  He has mild elevation fof white blood cells   We will repeat again in 1 month, then every 3 months

## 2022-02-16 NOTE — PROGRESS NOTES
Assessment/Plan:  1  Medicare annual wellness visit, subsequent  During this visit we have a goal to personalize prevention  I discussed the patient about Hyperlipidemia Hypertension- and unspecific constipation, the need for a life style plan and decrease the impact of current problems  Health risk assessment was discussed with patient also and the ways to stay healthier  We reviewed also the current medications, the need to avoid polypharmacy in his current treatment; also about how the chronic conditions are impacting now and later  Recommended a healthy diet and exercising frequently will help to control better patient's current chronic conditions;  Immunizations, and the need to compliance with current CDC's recommendations  Patient declined at this time advanced directives  I encouraged against the use alcohol, tobacco, recreational illegal prescribed and non-prescribed drugs  About smoking: recommended avoid exposure to second hand smoking  2  Leukocytosis, unspecified type  I reviewed previous laboratory data and consulting notes, we are going to repeat labs to ensure stability     CBC and differential; Future    3  Need for shingles vaccine  Explained to patient that Shingles, also known as herpes zoster, is a painful skin rash caused by reactivation of the varicella zoster virus (the same virus that causes chickenpox)  Facts: 99% of people over 48years of age are living with the virus that causes shingles  1 in 3 people will get shingles in their lifetime  Buelah Abt is the only vaccine proven to be up to 90% effective at preventing shingles in clinical trials  Zoster Vac Recomb Adjuvanted (Shingrix) 50 MCG/0 5ML SUSR; Inject 0 5 mL into a muscle once for 1 dose Repeat dose in 2 to 6 months  Dispense: 1 each; Refill: 1    4  Hypertension, unspecified type  Blood pressure is well control, patient will continue same treatment    Patient understands the risks associated with HTN and the need for adequate control and adherence to therapy  Explained to patient that therapeutic measure is lifelong lifestyle modification including:  Sodium reduction (<2 g/day)  Dietary Approaches to Stop Hypertension (DASH) diet (3 servings of fruit and vegetables daily, whole grains, low sodium, low-fat proteins)   Weight loss to BMI under 30 kg/m^2  Physical activity: 3 to 5 times/week of daily aerobic exercise for 30- to 50-minute sessions as tolerated   Avoid alcohol consumption  amLODIPine (NORVASC) 5 mg tablet; Take 1 tablet (5 mg total) by mouth daily  Dispense: 90 tablet; Refill: 3   lisinopril-hydrochlorothiazide (PRINZIDE,ZESTORETIC) 20-25 MG per tablet; Take 1 tablet by mouth daily  Dispense: 90 tablet; Refill: 3    5  Hyperlipidemia, unspecified hyperlipidemia type  Patient has hyperlipidemia , current recommendations are exercise and decrease  saturated fat in his diet  Will repeat that this labs and make further decisions in base non-HDL numbers and the vascular disease risks  atorvastatin (LIPITOR) 20 mg tablet; Take 1 tablet (20 mg total) by mouth daily at bedtime  Dispense: 90 tablet; Refill: 3    6  Constipation, unspecified constipation type   A high fiber diet has been found to increase bowel frequency and to be effective in treatment of constipation  Dietary fiber is a natural component of plant products, such as fruit, vegetables, and grains  It provides the bulk needed by the colon to eliminate body waste  As fiber passes through the colon, it acts as a sponge by absorbing water  This results in bulkier and softer stools  Waste then moves through the body more quickly allowing easier and more regular bowel movements  A diet high in fiber is not recommended for individuals who are immobile or who do not consume at least 1500 milliliters of fluids per day     linaCLOtide (Linzess) 145 MCG CAPS; Take 1 capsule (145 mcg total) by mouth daily  Dispense: 30 capsule;  Refill: 5  No problem-specific Assessment & Plan notes found for this encounter  Diagnoses and all orders for this visit:    Leukocytosis, unspecified type  -     CBC and differential; Future    Medicare annual wellness visit, subsequent    Need for shingles vaccine  -     Zoster Vac Recomb Adjuvanted (Shingrix) 50 MCG/0 5ML SUSR; Inject 0 5 mL into a muscle once for 1 dose Repeat dose in 2 to 6 months    Hypertension, unspecified type  -     amLODIPine (NORVASC) 5 mg tablet; Take 1 tablet (5 mg total) by mouth daily  -     lisinopril-hydrochlorothiazide (PRINZIDE,ZESTORETIC) 20-25 MG per tablet; Take 1 tablet by mouth daily    Hyperlipidemia, unspecified hyperlipidemia type  -     atorvastatin (LIPITOR) 20 mg tablet; Take 1 tablet (20 mg total) by mouth daily at bedtime    Constipation, unspecified constipation type  -     linaCLOtide (Linzess) 145 MCG CAPS; Take 1 capsule (145 mcg total) by mouth daily          Subjective:      Patient ID: Inez Pritchett is a 66 y o  male  Constipation is a chronic problem  The current episode started more than 1 year ago  The onset quality is sudden  The problem occurs intermittently  The problem has been waxing and waning  Eliel Zarate has pain occasionally in the lower abdomen  he has been using Colace and other home remedies  he does have recent colonoscopy  Patient is here also to follow HTN, patient states good compliance with treatment  Denies chest pain, shortness of breath, urinary problems  No exercise but follows low salt diet  Current medication includes: ACE inhibitor and calcium channel blocker agents      List of meds as follow:  amLODIPine  atorvastatin  lisinopril-hydrochlorothiazide        The following portions of the patient's history were reviewed and updated as appropriate: allergies, current medications, past family history, past medical history, past social history, past surgical history and problem list     Review of Systems      Objective:      /70 (BP Location: Left arm, Patient Position: Sitting, Cuff Size: Standard)   Pulse 78   Temp 97 9 °F (36 6 °C) (Temporal)   Resp 16   Ht 5' 5" (1 651 m)   Wt 92 5 kg (204 lb)   SpO2 98%   BMI 33 95 kg/m²          Physical Exam

## 2022-03-15 ENCOUNTER — APPOINTMENT (OUTPATIENT)
Dept: LAB | Facility: HOSPITAL | Age: 72
End: 2022-03-15
Payer: COMMERCIAL

## 2022-05-02 ENCOUNTER — OFFICE VISIT (OUTPATIENT)
Dept: OBGYN CLINIC | Facility: MEDICAL CENTER | Age: 72
End: 2022-05-02
Payer: COMMERCIAL

## 2022-05-02 VITALS
HEART RATE: 60 BPM | DIASTOLIC BLOOD PRESSURE: 68 MMHG | BODY MASS INDEX: 33.72 KG/M2 | WEIGHT: 202.4 LBS | SYSTOLIC BLOOD PRESSURE: 133 MMHG | HEIGHT: 65 IN

## 2022-05-02 DIAGNOSIS — M17.12 PRIMARY OSTEOARTHRITIS OF LEFT KNEE: ICD-10-CM

## 2022-05-02 DIAGNOSIS — M17.11 PRIMARY OSTEOARTHRITIS OF RIGHT KNEE: Primary | ICD-10-CM

## 2022-05-02 PROCEDURE — 20610 DRAIN/INJ JOINT/BURSA W/O US: CPT | Performed by: ORTHOPAEDIC SURGERY

## 2022-05-02 PROCEDURE — 99213 OFFICE O/P EST LOW 20 MIN: CPT | Performed by: ORTHOPAEDIC SURGERY

## 2022-05-02 RX ORDER — TRIAMCINOLONE ACETONIDE 40 MG/ML
40 INJECTION, SUSPENSION INTRA-ARTICULAR; INTRAMUSCULAR
Status: COMPLETED | OUTPATIENT
Start: 2022-05-02 | End: 2022-05-02

## 2022-05-02 RX ORDER — LIDOCAINE HYDROCHLORIDE 10 MG/ML
4 INJECTION, SOLUTION INFILTRATION; PERINEURAL
Status: COMPLETED | OUTPATIENT
Start: 2022-05-02 | End: 2022-05-02

## 2022-05-02 RX ADMIN — LIDOCAINE HYDROCHLORIDE 4 ML: 10 INJECTION, SOLUTION INFILTRATION; PERINEURAL at 11:38

## 2022-05-02 RX ADMIN — TRIAMCINOLONE ACETONIDE 40 MG: 40 INJECTION, SUSPENSION INTRA-ARTICULAR; INTRAMUSCULAR at 11:38

## 2022-05-02 NOTE — PROGRESS NOTES
Assessment/Plan:  1  Primary osteoarthritis of right knee    2  Primary osteoarthritis of left knee      Orders Placed This Encounter   Procedures    Large joint arthrocentesis: bilateral knee     -Findings today are consistent with bilateral knee severe osteoarthritis   - Cortisone steroid injection was administered today- bilateral knee  Patient should avoid strenuous activities for the next 1-2 days  Patient should avoid vaccines for the next 2 weeks if possible  If patient is diabetic, should monitor glucose levels for the next 7 to 10 days  Can apply cold compress for soreness  If patient feels relief with the cortisone injections, procedure can be repeated every 3 months  If patient sees minimal/no relief with cortisone injection, treatment with VISCO injections can be further discussed  Return in about 3 months (around 8/2/2022)  I answered all of the patient's questions during the visit and provided education of the patient's condition during the visit  The patient verbalized understanding of the information given and agrees with the plan  This note was dictated using [employer] software  It may contain errors including improperly dictated words  Please contact physician directly for any questions  Subjective   Chief Complaint:   Chief Complaint   Patient presents with    Left Knee - Follow-up    Right Knee - Follow-up       HPI  Vani Archer is a 66 y o  male who presents for follow up for bilateral knee OA  Patient was last seen on 1/31/2022 and received bilateral knee CSI- Patient states he received about 3 months of relief  Pain is located joint line and is described as achy  Patient states he experience increased pain with weight bearing activities  Patient wears bilateral knee compression sleeves daily  Patient is interested in repeat injections today  Translation via language line translation provided    Review of Systems  ROS:    See HPI for musculoskeletal review     All other systems reviewed are negative     History:  Past Medical History:   Diagnosis Date    Cataract     Colon polyp     Diverticulosis     Gastritis     GERD (gastroesophageal reflux disease)     Hyperlipidemia     Hypertension     Throat ulcer     Visual impairment      Past Surgical History:   Procedure Laterality Date    COLONOSCOPY N/A 8/18/2016    Procedure: COLONOSCOPY;  Surgeon: Hi Denny MD;  Location: [facility]; Service:     COLONOSCOPY W/ POLYPECTOMY  2019    EYE SURGERY Bilateral     UPPER GASTROINTESTINAL ENDOSCOPY  2019     Social History   Social History     Substance and Sexual Activity   Alcohol Use No    Comment: pt "sober for 30+ years"     Social History     Substance and Sexual Activity   Drug Use No     Social History     Tobacco Use   Smoking Status Former Smoker   Smokeless Tobacco Never Used     Family History:   Family History   Problem Relation Age of Onset    Hypertension Mother     Hypertension Father        Current Outpatient Medications on File Prior to Visit   Medication Sig Dispense Refill    amLODIPine (NORVASC) 5 mg tablet Take 1 tablet (5 mg total) by mouth daily 90 tablet 3    atorvastatin (LIPITOR) 20 mg tablet Take 1 tablet (20 mg total) by mouth daily at bedtime 90 tablet 3    famotidine (PEPCID) 20 mg tablet Take 20 mg by mouth 2 (two) times a day      linaCLOtide (Linzess) 145 MCG CAPS Take 1 capsule (145 mcg total) by mouth daily 30 capsule 5    lisinopril-hydrochlorothiazide (PRINZIDE,ZESTORETIC) 20-25 MG per tablet Take 1 tablet by mouth daily 90 tablet 3     No current facility-administered medications on file prior to visit       Allergies   Allergen Reactions    Lactose - Food Allergy Diarrhea     diarrhea        Objective     /68   Pulse 60   Ht 5' 5" (1 651 m)   Wt 91 8 kg (202 lb 6 4 oz)   BMI 33 68 kg/m²      PE:  AAOx 3  WDWN  Hearing intact, no drainage from eyes  no audible wheezing  no abdominal distension  LE compartments soft, skin intact    Ortho Exam:  bilateral Knee:   No erythema  no swelling  no effusion  no warmth  AROM: left 0-115 Right 0-115  Stable to varus/valgus stress    Imaging Studies: No new imaging reviewed today    Large joint arthrocentesis: bilateral knee  Universal Protocol:  Consent: Verbal consent obtained  Risks and benefits: risks, benefits and alternatives were discussed  Consent given by: patient  Time out: Immediately prior to procedure a "time out" was called to verify the correct patient, procedure, equipment, support staff and site/side marked as required    Timeout called at: 5/2/2022 11:37 AM   Patient understanding: patient states understanding of the procedure being performed  Site marked: the operative site was marked  Patient identity confirmed: verbally with patient    Supporting Documentation  Indications: pain   Procedure Details  Location: knee - bilateral knee  Preparation: Patient was prepped and draped in the usual sterile fashion  Needle size: 22 G  Ultrasound guidance: no  Approach: anterolateral    Medications (Right): 4 mL lidocaine 1 %; 40 mg triamcinolone acetonide 40 mg/mLMedications (Left): 40 mg triamcinolone acetonide 40 mg/mL; 4 mL lidocaine 1 %   Patient tolerance: patient tolerated the procedure well with no immediate complications  Dressing:  Sterile dressing applied          Scribe Attestation    I,:  Mg Burt am acting as a scribe while in the presence of the attending physician :       I,:  Alverto العلي DO personally performed the services described in this documentation    as scribed in my presence :

## 2022-05-04 ENCOUNTER — OFFICE VISIT (OUTPATIENT)
Dept: FAMILY MEDICINE CLINIC | Facility: CLINIC | Age: 72
End: 2022-05-04
Payer: COMMERCIAL

## 2022-05-04 VITALS
HEART RATE: 64 BPM | HEIGHT: 65 IN | BODY MASS INDEX: 33.66 KG/M2 | TEMPERATURE: 97.9 F | DIASTOLIC BLOOD PRESSURE: 70 MMHG | WEIGHT: 202 LBS | RESPIRATION RATE: 20 BRPM | SYSTOLIC BLOOD PRESSURE: 138 MMHG | OXYGEN SATURATION: 98 %

## 2022-05-04 DIAGNOSIS — N13.8 BPH WITH URINARY OBSTRUCTION: ICD-10-CM

## 2022-05-04 DIAGNOSIS — K62.5 RECTAL BLEEDING: ICD-10-CM

## 2022-05-04 DIAGNOSIS — E66.9 OBESITY (BMI 30.0-34.9): ICD-10-CM

## 2022-05-04 DIAGNOSIS — N40.2 PROSTATE NODULE: ICD-10-CM

## 2022-05-04 DIAGNOSIS — Z00.01 ENCOUNTER FOR GENERAL ADULT MEDICAL EXAMINATION WITH ABNORMAL FINDINGS: Primary | ICD-10-CM

## 2022-05-04 DIAGNOSIS — E78.5 HYPERLIPIDEMIA, UNSPECIFIED HYPERLIPIDEMIA TYPE: ICD-10-CM

## 2022-05-04 DIAGNOSIS — N40.1 BPH WITH URINARY OBSTRUCTION: ICD-10-CM

## 2022-05-04 PROCEDURE — 99397 PER PM REEVAL EST PAT 65+ YR: CPT | Performed by: FAMILY MEDICINE

## 2022-05-04 RX ORDER — FINASTERIDE 5 MG/1
5 TABLET, FILM COATED ORAL DAILY
Qty: 90 TABLET | Refills: 3 | Status: SHIPPED | OUTPATIENT
Start: 2022-05-04 | End: 2022-08-09 | Stop reason: SDUPTHER

## 2022-05-04 NOTE — PROGRESS NOTES
Assessment/Plan:    Physical exam was unremarkable except by the report of patient having rectal bleeding with not finding any positive finding the physical exam   He has a prostate nodule and PSA done to be requested  He has symptoms of BPH and will start penis study  I will follow patient in three month  No problem-specific Assessment & Plan notes found for this encounter  Diagnoses and all orders for this visit:    Encounter for general adult medical examination with abnormal findings    Rectal bleeding  -     Ambulatory referral to General Surgery; Future    Prostate nodule  -     PSA, total and free; Future    Obesity (BMI 30 0-34  9)    Hyperlipidemia, unspecified hyperlipidemia type  -     Comprehensive metabolic panel; Future  -     Lipid panel; Future    BPH with urinary obstruction  -     finasteride (PROSCAR) 5 mg tablet; Take 1 tablet (5 mg total) by mouth daily          Subjective:      Patient ID: Alan Castro is a 66 y o  male  HPI    Patient is complaining of rectal bleeding  Today is his annual physical exam   He has no bleeding at this time  The bleeding has not being   Extensive  He does have any abdominal pain at this time  he has urinary symptoms of BPH  His score AUA is 18  The following portions of the patient's history were reviewed and updated as appropriate: allergies, current medications, past family history, past medical history, past social history, past surgical history and problem list     Review of Systems      Objective:      /70 (BP Location: Left arm, Patient Position: Sitting, Cuff Size: Standard)   Pulse 64   Temp 97 9 °F (36 6 °C) (Temporal)   Resp 20   Ht 5' 5" (1 651 m)   Wt 91 6 kg (202 lb)   SpO2 98%   BMI 33 61 kg/m²          Physical Exam  Vitals and nursing note reviewed  Neck:      Thyroid: No thyroid mass or thyromegaly  Vascular: No carotid bruit or JVD  Trachea: No tracheal tenderness     Cardiovascular:      Rate and Rhythm: Normal rate and regular rhythm  No extrasystoles are present  Pulses: Normal pulses  Heart sounds: Normal heart sounds  Heart sounds not distant  No friction rub  Pulmonary:      Effort: Pulmonary effort is normal  No tachypnea or bradypnea  Breath sounds: Normal breath sounds  No stridor  Abdominal:      General: Bowel sounds are normal  There is no abdominal bruit  Palpations: Abdomen is soft  There is no hepatomegaly or splenomegaly  Hernia: No hernia is present  Genitourinary:     Prostate: Enlarged (40 g) and nodules present  Rectum: Guaiac result negative  No mass, tenderness, external hemorrhoid or internal hemorrhoid  Musculoskeletal:         General: Normal range of motion  Cervical back: No edema or rigidity  Skin:     General: Skin is warm and dry  Neurological:      Mental Status: He is oriented to person, place, and time  Deep Tendon Reflexes: Reflexes are normal and symmetric  Psychiatric:         Behavior: Behavior normal          Thought Content: Thought content normal          Judgment: Judgment normal            BMI Counseling: Body mass index is 33 61 kg/m²  The BMI is above normal  Nutrition recommendations include moderation in carbohydrate intake and increasing intake of lean protein  Exercise recommendations include exercising 3-5 times per week

## 2022-05-05 ENCOUNTER — TELEPHONE (OUTPATIENT)
Dept: ADMINISTRATIVE | Facility: OTHER | Age: 72
End: 2022-05-05

## 2022-05-05 NOTE — TELEPHONE ENCOUNTER
----- Message from Tami Anders RN sent at 5/4/2022  2:51 PM EDT -----  Regarding: medicare pe  05/04/22 2:52 PM    Hello, our patient Yariel Matt has had Medicare AWV completed/performed  Please assist in updating the patient chart by pulling the Care Everywhere (CE) document  and pulling the document from Notes Tab within Chart Review   The date of service is 2/2022    Thank you,  Tami Anders RN   [facility] [address]

## 2022-05-05 NOTE — TELEPHONE ENCOUNTER
Upon review of the In Basket request we were able to note that a Health Maintenance (HM) modifier was needed to update HM  We have made the appropriate adjustments to the HM Modifier  For future, HM Modifiers should be adjusted by the office staff  If you require additional support on adjusting modifiers, please email our Practice Liaisons  AWV's  should be linked at the office level  Any additional questions or concerns should be emailed to the Practice Liaisons via [email or web address] email, please do not reply via In Basket      Thank you  Jack Villalobos MA

## 2022-05-09 ENCOUNTER — LAB (OUTPATIENT)
Dept: LAB | Facility: HOSPITAL | Age: 72
End: 2022-05-09
Payer: COMMERCIAL

## 2022-05-09 DIAGNOSIS — N40.2 PROSTATE NODULE: ICD-10-CM

## 2022-05-09 DIAGNOSIS — E78.5 HYPERLIPIDEMIA, UNSPECIFIED HYPERLIPIDEMIA TYPE: ICD-10-CM

## 2022-05-09 LAB
ALBUMIN SERPL BCP-MCNC: 3.5 G/DL (ref 3.5–5)
ALP SERPL-CCNC: 54 U/L (ref 46–116)
ALT SERPL W P-5'-P-CCNC: 34 U/L (ref 12–78)
ANION GAP SERPL CALCULATED.3IONS-SCNC: 3 MMOL/L (ref 4–13)
AST SERPL W P-5'-P-CCNC: 22 U/L (ref 5–45)
BILIRUB SERPL-MCNC: 0.65 MG/DL (ref 0.2–1)
BUN SERPL-MCNC: 16 MG/DL (ref 5–25)
CALCIUM SERPL-MCNC: 9 MG/DL (ref 8.3–10.1)
CHLORIDE SERPL-SCNC: 106 MMOL/L (ref 100–108)
CHOLEST SERPL-MCNC: 145 MG/DL
CO2 SERPL-SCNC: 30 MMOL/L (ref 21–32)
CREAT SERPL-MCNC: 0.87 MG/DL (ref 0.6–1.3)
GFR SERPL CREATININE-BSD FRML MDRD: 86 ML/MIN/1.73SQ M
GLUCOSE P FAST SERPL-MCNC: 96 MG/DL (ref 65–99)
HDLC SERPL-MCNC: 77 MG/DL
LDLC SERPL CALC-MCNC: 46 MG/DL (ref 0–100)
NONHDLC SERPL-MCNC: 68 MG/DL
POTASSIUM SERPL-SCNC: 3.8 MMOL/L (ref 3.5–5.3)
PROT SERPL-MCNC: 7.5 G/DL (ref 6.4–8.2)
SODIUM SERPL-SCNC: 139 MMOL/L (ref 136–145)
TRIGL SERPL-MCNC: 111 MG/DL

## 2022-05-09 PROCEDURE — 84154 ASSAY OF PSA FREE: CPT

## 2022-05-09 PROCEDURE — 80053 COMPREHEN METABOLIC PANEL: CPT

## 2022-05-09 PROCEDURE — 80061 LIPID PANEL: CPT

## 2022-05-09 PROCEDURE — 84153 ASSAY OF PSA TOTAL: CPT

## 2022-05-10 LAB
PSA FREE MFR SERPL: 20.6 %
PSA FREE SERPL-MCNC: 0.33 NG/ML
PSA SERPL-MCNC: 1.6 NG/ML (ref 0–4)

## 2022-05-31 RX ORDER — SODIUM CHLORIDE 9 MG/ML
75 INJECTION, SOLUTION INTRAVENOUS CONTINUOUS
Status: CANCELLED | OUTPATIENT
Start: 2022-05-31

## 2022-06-01 ENCOUNTER — HOSPITAL ENCOUNTER (OUTPATIENT)
Dept: GASTROENTEROLOGY | Facility: HOSPITAL | Age: 72
Setting detail: OUTPATIENT SURGERY
Discharge: HOME | End: 2022-06-01
Attending: SURGERY | Admitting: SURGERY
Payer: COMMERCIAL

## 2022-06-01 ENCOUNTER — ANESTHESIA EVENT (OUTPATIENT)
Dept: GASTROENTEROLOGY | Facility: HOSPITAL | Age: 72
End: 2022-06-01

## 2022-06-01 ENCOUNTER — ANESTHESIA (OUTPATIENT)
Dept: GASTROENTEROLOGY | Facility: HOSPITAL | Age: 72
End: 2022-06-01

## 2022-06-01 VITALS
DIASTOLIC BLOOD PRESSURE: 79 MMHG | SYSTOLIC BLOOD PRESSURE: 143 MMHG | TEMPERATURE: 97.5 F | OXYGEN SATURATION: 96 % | WEIGHT: 202 LBS | BODY MASS INDEX: 33.66 KG/M2 | RESPIRATION RATE: 18 BRPM | HEIGHT: 65 IN | HEART RATE: 60 BPM

## 2022-06-01 DIAGNOSIS — K62.5 HEMORRHAGE OF ANUS AND RECTUM: ICD-10-CM

## 2022-06-01 PROCEDURE — 88305 TISSUE EXAM BY PATHOLOGIST: CPT | Performed by: PATHOLOGY

## 2022-06-01 RX ORDER — SODIUM CHLORIDE 9 MG/ML
INJECTION, SOLUTION INTRAVENOUS CONTINUOUS PRN
Status: DISCONTINUED | OUTPATIENT
Start: 2022-06-01 | End: 2022-06-01

## 2022-06-01 RX ORDER — PROPOFOL 10 MG/ML
INJECTION, EMULSION INTRAVENOUS AS NEEDED
Status: DISCONTINUED | OUTPATIENT
Start: 2022-06-01 | End: 2022-06-01

## 2022-06-01 RX ORDER — SODIUM CHLORIDE 9 MG/ML
75 INJECTION, SOLUTION INTRAVENOUS CONTINUOUS
Status: DISCONTINUED | OUTPATIENT
Start: 2022-06-01 | End: 2022-06-05 | Stop reason: HOSPADM

## 2022-06-01 RX ADMIN — PROPOFOL 100 MG: 10 INJECTION, EMULSION INTRAVENOUS at 08:49

## 2022-06-01 RX ADMIN — PROPOFOL 20 MG: 10 INJECTION, EMULSION INTRAVENOUS at 08:53

## 2022-06-01 RX ADMIN — SODIUM CHLORIDE: 0.9 INJECTION, SOLUTION INTRAVENOUS at 08:43

## 2022-06-01 RX ADMIN — PROPOFOL 20 MG: 10 INJECTION, EMULSION INTRAVENOUS at 08:57

## 2022-06-01 RX ADMIN — SODIUM CHLORIDE 75 ML/HR: 0.9 INJECTION, SOLUTION INTRAVENOUS at 06:54

## 2022-06-01 RX ADMIN — PROPOFOL 20 MG: 10 INJECTION, EMULSION INTRAVENOUS at 08:55

## 2022-06-01 RX ADMIN — PROPOFOL 20 MG: 10 INJECTION, EMULSION INTRAVENOUS at 08:51

## 2022-06-01 RX ADMIN — PROPOFOL 20 MG: 10 INJECTION, EMULSION INTRAVENOUS at 09:04

## 2022-06-01 RX ADMIN — PROPOFOL 20 MG: 10 INJECTION, EMULSION INTRAVENOUS at 09:02

## 2022-06-01 NOTE — H&P
H&P Exam - General Surgery   Via Hong Love 66 y o  male MRN: [id number]  Unit/Bed#:  Encounter: [id number]    Assessment/Plan     Assessment:  Rectal bleeding  Plan:  Colonoscopy    History of Present Illness   History, ROS and PFSH unobtainable from any source due to none  HPI:  Via Hong Love is a 66 y o  male who presents with rectal bleeding  Denies abdominal pain  Patient's last colonoscopy was 2019  Review of Systems   All other systems reviewed and are negative  Historical Information   Past Medical History:   Diagnosis Date    Cataract     Colon polyp     Diverticulosis     Gastritis     GERD (gastroesophageal reflux disease)     Hyperlipidemia     Hypertension     Throat ulcer     Visual impairment      Past Surgical History:   Procedure Laterality Date    COLONOSCOPY N/A 8/18/2016    Procedure: COLONOSCOPY;  Surgeon: Anne España MD;  Location: [facility];   Service:     COLONOSCOPY W/ POLYPECTOMY  2019    EYE SURGERY Bilateral     UPPER GASTROINTESTINAL ENDOSCOPY  2019     Social History   Social History     Substance and Sexual Activity   Alcohol Use No    Comment: pt "sober for 30+ years"     Social History     Substance and Sexual Activity   Drug Use No     Social History     Tobacco Use   Smoking Status Former Smoker   Smokeless Tobacco Never Used     E-Cigarette/Vaping    E-Cigarette Use Never User      E-Cigarette/Vaping Substances    Nicotine No     THC No     CBD No     Flavoring No     Other No     Unknown No      Family History: non-contributory    Meds/Allergies   all medications and allergies reviewed  Allergies   Allergen Reactions    Lactose - Food Allergy Diarrhea     diarrhea       Objective   First Vitals:   Blood Pressure: 157/72 (06/01/22 0645)  Pulse: 93 (06/01/22 0645)  Temperature: 97 8 °F (36 6 °C) (06/01/22 0645)  Temp Source: Temporal (06/01/22 0645)  Respirations: 20 (06/01/22 0645)  Height: 5' 5" (165 1 cm) (06/01/22 0293)  Weight - Scale: 91 6 kg (202 lb) (06/01/22 0649)  SpO2: 96 % (06/01/22 0645)    Current Vitals:   Blood Pressure: 157/72 (06/01/22 0645)  Pulse: 93 (06/01/22 0645)  Temperature: 97 8 °F (36 6 °C) (06/01/22 0645)  Temp Source: Temporal (06/01/22 0645)  Respirations: 20 (06/01/22 0645)  Height: 5' 5" (165 1 cm) (06/01/22 5076)  Weight - Scale: 91 6 kg (202 lb) (06/01/22 0649)  SpO2: 96 % (06/01/22 0645)    No intake or output data in the 24 hours ending 06/01/22 0757    Invasive Devices  Report    Peripheral Intravenous Line  Duration           Peripheral IV 12/14/16 Right Antecubital 1994 days    Peripheral IV 06/01/22 Right Hand <1 day                Physical Exam  Constitutional:       Appearance: Normal appearance  Cardiovascular:      Heart sounds: Normal heart sounds  Pulmonary:      Breath sounds: Normal breath sounds  Abdominal:      Palpations: Abdomen is soft  Tenderness: There is no abdominal tenderness  Musculoskeletal:         General: Normal range of motion  Cervical back: Normal range of motion  Skin:     General: Skin is warm and dry  Neurological:      General: No focal deficit present  Mental Status: He is alert and oriented to person, place, and time  Psychiatric:         Mood and Affect: Mood normal          Behavior: Behavior normal          Thought Content: Thought content normal          Judgment: Judgment normal          Lab Results: I have personally reviewed pertinent lab results  Imaging: I have personally reviewed pertinent reports  EKG, Pathology, and Other Studies: I have personally reviewed pertinent reports  Code Status: No Order  Advance Directive and Living Will:      Power of :    POLST:      Counseling / Coordination of Care  Total floor / unit time spent today 30 minutes  Greater than 50% of total time was spent with the patient and / or family counseling and / or coordination of care    A description of the counseling / coordination of care: Colonoscopy

## 2022-06-01 NOTE — ANESTHESIA PREPROCEDURE EVALUATION
Procedure:  COLONOSCOPY    Relevant Problems   CARDIO   (+) HTN (hypertension)   (+) Mixed hyperlipidemia      GI/HEPATIC   (+) GERD (gastroesophageal reflux disease)      /RENAL   (+) MIKE (acute kidney injury) ([facility] [address] )      Other   (+) Prediabetes        Physical Exam    Airway    Mallampati score: II  TM Distance: >3 FB  Neck ROM: full     Dental   upper dentures,     Cardiovascular      Pulmonary      Other Findings        Anesthesia Plan  ASA Score- 2     Anesthesia Type- IV sedation with anesthesia with ASA Monitors  Additional Monitors:   Airway Plan:           Plan Factors-Exercise tolerance (METS): >4 METS  Chart reviewed  Patient summary reviewed  Patient is not a current smoker  Patient did not smoke on day of surgery  Induction- intravenous  Postoperative Plan-     Informed Consent- Anesthetic plan and risks discussed with patient  I personally reviewed this patient with the CRNA  Discussed and agreed on the Anesthesia Plan with the CRNA  Mann Meyers

## 2022-06-01 NOTE — INTERVAL H&P NOTE
H&P reviewed  After examining the patient I find no changes in the patients condition since the H&P had been written      Vitals:    06/01/22 0645   BP: 157/72   Pulse: 93   Resp: 20   Temp: 97 8 °F (36 6 °C)   SpO2: 96%

## 2022-06-01 NOTE — ANESTHESIA POSTPROCEDURE EVALUATION
Post-Op Assessment Note    CV Status:  Stable    Pain management: adequate     Mental Status:  Alert and awake   Hydration Status:  Euvolemic   PONV Controlled:  Controlled   Airway Patency:  Patent      Post Op Vitals Reviewed: Yes      Staff: CRNA         No complications documented      BP   133/66   Temp      Pulse  63   Resp   12   SpO2   94

## 2022-06-07 NOTE — RESULT ENCOUNTER NOTE
Please call patient, colonoscopy reported a cecal polyp, we are awaiting for pathological report  No need for further colonoscopy at this time  In any case that will depend of results of polyp

## 2022-08-01 ENCOUNTER — OFFICE VISIT (OUTPATIENT)
Dept: OBGYN CLINIC | Facility: MEDICAL CENTER | Age: 72
End: 2022-08-01
Payer: COMMERCIAL

## 2022-08-01 VITALS
WEIGHT: 198.2 LBS | BODY MASS INDEX: 33.02 KG/M2 | DIASTOLIC BLOOD PRESSURE: 72 MMHG | HEART RATE: 61 BPM | SYSTOLIC BLOOD PRESSURE: 139 MMHG | HEIGHT: 65 IN

## 2022-08-01 DIAGNOSIS — M17.0 BILATERAL PRIMARY OSTEOARTHRITIS OF KNEE: Primary | ICD-10-CM

## 2022-08-01 PROCEDURE — 99213 OFFICE O/P EST LOW 20 MIN: CPT | Performed by: PHYSICIAN ASSISTANT

## 2022-08-01 PROCEDURE — 20610 DRAIN/INJ JOINT/BURSA W/O US: CPT | Performed by: PHYSICIAN ASSISTANT

## 2022-08-01 RX ORDER — TRIAMCINOLONE ACETONIDE 40 MG/ML
40 INJECTION, SUSPENSION INTRA-ARTICULAR; INTRAMUSCULAR
Status: COMPLETED | OUTPATIENT
Start: 2022-08-01 | End: 2022-08-01

## 2022-08-01 RX ORDER — BUPIVACAINE HYDROCHLORIDE 5 MG/ML
2 INJECTION, SOLUTION EPIDURAL; INTRACAUDAL
Status: COMPLETED | OUTPATIENT
Start: 2022-08-01 | End: 2022-08-01

## 2022-08-01 RX ADMIN — BUPIVACAINE HYDROCHLORIDE 2 ML: 5 INJECTION, SOLUTION EPIDURAL; INTRACAUDAL at 09:57

## 2022-08-01 RX ADMIN — TRIAMCINOLONE ACETONIDE 40 MG: 40 INJECTION, SUSPENSION INTRA-ARTICULAR; INTRAMUSCULAR at 09:57

## 2022-08-01 NOTE — PROGRESS NOTES
Assessment/Plan:  1  Bilateral primary osteoarthritis of knee      Orders Placed This Encounter   Procedures    Large joint arthrocentesis: bilateral knee       · Patient has severe bilateral knee osteoarthritis  · Patient received bilateral knee steroid injections today  Tolerated the procedures well  Advised to apply ice and avoid strenuous activity for 1-2 days as needed  · May take tylenol prn pain  · Continue activity as tolerated  · Patient would like to consider TKA in the future  He is aware he must wait 3 months between CSI and TKA  Return in about 3 months (around 11/1/2022) for bilat knee CSI vs TKA  I answered all of the patient's questions during the visit and provided education of the patient's condition during the visit  The patient verbalized understanding of the information given and agrees with the plan  This note was dictated using [employer] software  It may contain errors including improperly dictated words  Please contact physician directly for any questions  Subjective   Chief Complaint:   Chief Complaint   Patient presents with    Left Knee - Follow-up    Right Knee - Follow-up       HPI  Raynard Burkitt is a 66 y o  male who presents for follow up for bilateral knee pain and severe OA  Ipad was utilized for [address] translation  Patient received bilateral knee steroid injections on 5/2/22 and reports good pain relief  He notes the return of bilateral knee pain  He does not notice one knee being worse than the other  Patient is not taking anything for pain  He is interested in TKA in the future but has to organize at home first  He would like steroid injections today  Review of Systems  ROS:    See HPI for musculoskeletal review     All other systems reviewed are negative     History:  Past Medical History:   Diagnosis Date    Cataract     Colon polyp     Diverticulosis     Gastritis     GERD (gastroesophageal reflux disease)     Hyperlipidemia     Hypertension     Throat ulcer     Visual impairment      Past Surgical History:   Procedure Laterality Date    COLONOSCOPY N/A 8/18/2016    Procedure: COLONOSCOPY;  Surgeon: Ketty Gramajo MD;  Location: [facility]; Service:     COLONOSCOPY W/ POLYPECTOMY  2019    EYE SURGERY Bilateral     UPPER GASTROINTESTINAL ENDOSCOPY  2019     Social History   Social History     Substance and Sexual Activity   Alcohol Use No    Comment: pt "sober for 30+ years"     Social History     Substance and Sexual Activity   Drug Use No     Social History     Tobacco Use   Smoking Status Former Smoker   Smokeless Tobacco Never Used     Family History:   Family History   Problem Relation Age of Onset    Hypertension Mother     Hypertension Father        Current Outpatient Medications on File Prior to Visit   Medication Sig Dispense Refill    amLODIPine (NORVASC) 5 mg tablet Take 1 tablet (5 mg total) by mouth daily 90 tablet 3    atorvastatin (LIPITOR) 20 mg tablet Take 1 tablet (20 mg total) by mouth daily at bedtime 90 tablet 3    famotidine (PEPCID) 20 mg tablet Take 20 mg by mouth 2 (two) times a day      finasteride (PROSCAR) 5 mg tablet Take 1 tablet (5 mg total) by mouth daily 90 tablet 3    linaCLOtide (Linzess) 145 MCG CAPS Take 1 capsule (145 mcg total) by mouth daily 30 capsule 5    lisinopril-hydrochlorothiazide (PRINZIDE,ZESTORETIC) 20-25 MG per tablet Take 1 tablet by mouth daily 90 tablet 3     No current facility-administered medications on file prior to visit       Allergies   Allergen Reactions    Lactose - Food Allergy Diarrhea     diarrhea        Objective     /72   Pulse 61   Ht 5' 5" (1 651 m)   Wt 89 9 kg (198 lb 3 2 oz)   BMI 32 98 kg/m²      PE:  AAOx 3  WDWN  Hearing intact, no drainage from eyes  no audible wheezing  no abdominal distension  LE compartments soft, skin intact    Ortho Exam:  bilateral Knee:   No erythema  no swelling  no effusion  no warmth  + TTP medial joint line left knee  AROM: 3- 115  Stable to varus/valgus stress      Large joint arthrocentesis: bilateral knee  Universal Protocol:  Consent: Verbal consent obtained    Risks and benefits: risks, benefits and alternatives were discussed  Consent given by: patient  Site marked: the operative site was marked  Supporting Documentation  Indications: pain   Procedure Details  Location: knee - bilateral knee  Preparation: Patient was prepped and draped in the usual sterile fashion  Needle size: 22 G  Ultrasound guidance: no  Approach: anterolateral    Medications (Right): 2 mL bupivacaine (PF) 0 5 %; 40 mg triamcinolone acetonide 40 mg/mLMedications (Left): 2 mL bupivacaine (PF) 0 5 %; 40 mg triamcinolone acetonide 40 mg/mL   Patient tolerance: patient tolerated the procedure well with no immediate complications  Dressing:  Sterile dressing applied          Scribe Attestation    I,:  Emily Reaves PA-C am acting as a scribe while in the presence of the attending physician :       I,:  Suzanne Robertson DO personally performed the services described in this documentation    as scribed in my presence :

## 2022-08-09 ENCOUNTER — OFFICE VISIT (OUTPATIENT)
Dept: FAMILY MEDICINE CLINIC | Facility: CLINIC | Age: 72
End: 2022-08-09
Payer: COMMERCIAL

## 2022-08-09 VITALS
DIASTOLIC BLOOD PRESSURE: 76 MMHG | TEMPERATURE: 98 F | HEART RATE: 78 BPM | HEIGHT: 65 IN | BODY MASS INDEX: 32.65 KG/M2 | WEIGHT: 196 LBS | OXYGEN SATURATION: 98 % | SYSTOLIC BLOOD PRESSURE: 136 MMHG | RESPIRATION RATE: 20 BRPM

## 2022-08-09 DIAGNOSIS — E78.5 HYPERLIPIDEMIA, UNSPECIFIED HYPERLIPIDEMIA TYPE: ICD-10-CM

## 2022-08-09 DIAGNOSIS — N40.1 BPH WITH URINARY OBSTRUCTION: ICD-10-CM

## 2022-08-09 DIAGNOSIS — I10 HYPERTENSION, UNSPECIFIED TYPE: ICD-10-CM

## 2022-08-09 DIAGNOSIS — N13.8 BPH WITH URINARY OBSTRUCTION: ICD-10-CM

## 2022-08-09 DIAGNOSIS — K59.00 CONSTIPATION, UNSPECIFIED CONSTIPATION TYPE: ICD-10-CM

## 2022-08-09 PROCEDURE — 99214 OFFICE O/P EST MOD 30 MIN: CPT | Performed by: FAMILY MEDICINE

## 2022-08-09 RX ORDER — AMLODIPINE BESYLATE 5 MG/1
5 TABLET ORAL DAILY
Qty: 90 TABLET | Refills: 3 | Status: SHIPPED | OUTPATIENT
Start: 2022-08-09

## 2022-08-09 RX ORDER — ATORVASTATIN CALCIUM 20 MG/1
20 TABLET, FILM COATED ORAL
Qty: 90 TABLET | Refills: 3 | Status: SHIPPED | OUTPATIENT
Start: 2022-08-09

## 2022-08-09 RX ORDER — LINACLOTIDE 145 UG/1
1 CAPSULE, GELATIN COATED ORAL DAILY
Qty: 30 CAPSULE | Refills: 5 | Status: SHIPPED | OUTPATIENT
Start: 2022-08-09

## 2022-08-09 RX ORDER — LISINOPRIL AND HYDROCHLOROTHIAZIDE 25; 20 MG/1; MG/1
1 TABLET ORAL DAILY
Qty: 90 TABLET | Refills: 3 | Status: SHIPPED | OUTPATIENT
Start: 2022-08-09

## 2022-08-09 RX ORDER — FINASTERIDE 5 MG/1
5 TABLET, FILM COATED ORAL DAILY
Qty: 90 TABLET | Refills: 3 | Status: SHIPPED | OUTPATIENT
Start: 2022-08-09

## 2022-08-09 NOTE — PROGRESS NOTES
Assessment/Plan:  Blood pressure is well control, patient will continue same treatment  Patient understands the risks associated with HTN and the need for adequate control and adherence to therapy  Explained to patient that therapeutic measure is lifelong lifestyle modification including:  Sodium reduction (<2 g/day)  Dietary Approaches to Stop Hypertension (DASH) diet (3 servings of fruit and vegetables daily, whole grains, low sodium, low-fat proteins)   Weight loss to BMI under 30 kg/m^2  Physical activity: 3 to 5 times/week of daily aerobic exercise for 30- to 50-minute sessions as tolerated   Avoid alcohol consumption  No problem-specific Assessment & Plan notes found for this encounter  Diagnoses and all orders for this visit:    BPH with urinary obstruction  -     finasteride (PROSCAR) 5 mg tablet; Take 1 tablet (5 mg total) by mouth daily    Hyperlipidemia, unspecified hyperlipidemia type  -     atorvastatin (LIPITOR) 20 mg tablet; Take 1 tablet (20 mg total) by mouth daily at bedtime    Hypertension, unspecified type  -     amLODIPine (NORVASC) 5 mg tablet; Take 1 tablet (5 mg total) by mouth daily  -     lisinopril-hydrochlorothiazide (PRINZIDE,ZESTORETIC) 20-25 MG per tablet; Take 1 tablet by mouth daily    Constipation, unspecified constipation type  -     linaCLOtide (Linzess) 145 MCG CAPS; Take 1 capsule (145 mcg total) by mouth daily          Subjective:    Patient ID: Raynard Burkitt is a 66 y o  male  HPI   Patient is here to follow  HTN, patient states good compliance with treatment  Denies chest pain, shortness of breath, angina, urinary problems  No exercise but follows low salt diet       Lab Results   Component Value Date/Time    HGBA1C 5 6 01/13/2021 08:49 AM    HGBA1C 5 8 (H) 09/30/2020 09:25 AM    CHOLESTEROL 145 05/09/2022 08:56 AM    LDLCALC 46 05/09/2022 08:56 AM    LDLCALC 85 09/22/2015 10:56 AM    TRIG 111 05/09/2022 08:56 AM    TRIG 91 09/22/2015 10:56 AM CREATININE 0 87 05/09/2022 08:56 AM    CREATININE 0 79 10/28/2015 09:51 AM    EGFR 86 05/09/2022 08:56 AM       amLODIPine  atorvastatin  finasteride  Linzess Caps  lisinopril-hydrochlorothiazide       The following portions of the patient's history were reviewed and updated as appropriate: allergies, current medications, past family history, past medical history, past social history, past surgical history and problem list     Review of Systems   Constitutional: Negative for diaphoresis, fatigue, fever and unexpected weight change  Respiratory: Positive for cough  Negative for apnea, choking, chest tightness and shortness of breath  Cardiovascular: Negative for chest pain, palpitations and leg swelling  Gastrointestinal: Negative for abdominal distention, abdominal pain, anal bleeding, blood in stool and constipation  Musculoskeletal: Negative for arthralgias, back pain, gait problem and joint swelling  Neurological: Negative for dizziness, facial asymmetry, light-headedness and headaches  Psychiatric/Behavioral: Negative for behavioral problems, dysphoric mood and self-injury  The patient is not nervous/anxious  Objective:      /76 (BP Location: Left arm, Patient Position: Sitting, Cuff Size: Standard)   Pulse 78   Temp 98 °F (36 7 °C) (Temporal)   Resp 20   Ht 5' 5" (1 651 m)   Wt 88 9 kg (196 lb)   SpO2 98%   BMI 32 62 kg/m²          Physical Exam  Vitals and nursing note reviewed  Neck:      Thyroid: No thyroid mass or thyromegaly  Vascular: No carotid bruit or JVD  Trachea: No tracheal tenderness  Cardiovascular:      Rate and Rhythm: Normal rate and regular rhythm  No extrasystoles are present  Pulses: Normal pulses  Heart sounds: Normal heart sounds  Heart sounds not distant  No friction rub  Pulmonary:      Effort: Pulmonary effort is normal  No tachypnea or bradypnea  Breath sounds: No stridor        Comments: Decreased breath sounds in the right upper lobe  Abdominal:      General: Bowel sounds are normal  There is no abdominal bruit  Palpations: Abdomen is soft  There is no hepatomegaly or splenomegaly  Hernia: No hernia is present  Musculoskeletal:         General: Normal range of motion  Cervical back: No edema or rigidity  Skin:     General: Skin is warm and dry  Neurological:      Mental Status: He is oriented to person, place, and time  Deep Tendon Reflexes: Reflexes are normal and symmetric  Psychiatric:         Behavior: Behavior normal          Thought Content:  Thought content normal          Judgment: Judgment normal

## 2022-11-02 ENCOUNTER — OFFICE VISIT (OUTPATIENT)
Dept: OBGYN CLINIC | Facility: MEDICAL CENTER | Age: 72
End: 2022-11-02

## 2022-11-02 VITALS
DIASTOLIC BLOOD PRESSURE: 64 MMHG | BODY MASS INDEX: 32.32 KG/M2 | SYSTOLIC BLOOD PRESSURE: 136 MMHG | HEART RATE: 62 BPM | WEIGHT: 194 LBS | HEIGHT: 65 IN

## 2022-11-02 DIAGNOSIS — M17.0 BILATERAL PRIMARY OSTEOARTHRITIS OF KNEE: Primary | ICD-10-CM

## 2022-11-02 RX ORDER — BUPIVACAINE HYDROCHLORIDE 2.5 MG/ML
1 INJECTION, SOLUTION INFILTRATION; PERINEURAL
Status: COMPLETED | OUTPATIENT
Start: 2022-11-02 | End: 2022-11-02

## 2022-11-02 RX ORDER — TRIAMCINOLONE ACETONIDE 40 MG/ML
20 INJECTION, SUSPENSION INTRA-ARTICULAR; INTRAMUSCULAR
Status: COMPLETED | OUTPATIENT
Start: 2022-11-02 | End: 2022-11-02

## 2022-11-02 RX ADMIN — BUPIVACAINE HYDROCHLORIDE 1 ML: 2.5 INJECTION, SOLUTION INFILTRATION; PERINEURAL at 11:55

## 2022-11-02 RX ADMIN — TRIAMCINOLONE ACETONIDE 20 MG: 40 INJECTION, SUSPENSION INTRA-ARTICULAR; INTRAMUSCULAR at 11:55

## 2022-11-02 NOTE — PROGRESS NOTES
Assessment/Plan:  1  Bilateral primary osteoarthritis of knee      Orders Placed This Encounter   Procedures   • Large joint arthrocentesis: bilateral knee     · Patient has severe bilateral knee osteoarthritis   · Received bilateral knee  steroid injection today  Patient knows to ice and avoid strenuous activity for 1-2 days if needed  · Continue using over the counter knee braces for comfort   · Encouraged patient to work on his motion   · May may consider TKA in the new year   Return in about 3 months (around 2/2/2023) for Recheck Bilateral knee   I answered all of the patient's questions during the visit and provided education of the patient's condition during the visit  The patient verbalized understanding of the information given and agrees with the plan  This note was dictated using [employer] software  It may contain errors including improperly dictated words  Please contact physician directly for any questions  Subjective   Chief Complaint:   Chief Complaint   Patient presents with   • Left Knee - Follow-up   • Right Knee - Follow-up       [facility]  Debra Wilson is a 66 y o  male who presents for follow up for chronic bilateral knee pain due to severe osteoarthritis  Patient had bilateral knee steroid injections on 08/01/2022 and had relief close to three months  He states left knee is worse than right  He states he is having pain anterior aspect of knees  He denies any instability  Pain is worse with walking, standing, stairs and increased activities  He has been wearing over-the-counter knee braces with comfort  He is  not taking anything for pain  He is using a cane for his balance issue  Review of Systems  ROS:    See HPI for musculoskeletal review     All other systems reviewed are negative     History:  Past Medical History:   Diagnosis Date   • Cataract    • Colon polyp    • Diverticulosis    • Gastritis    • GERD (gastroesophageal reflux disease)    • Hyperlipidemia    • Hypertension    • Throat ulcer    • Visual impairment      Past Surgical History:   Procedure Laterality Date   • COLONOSCOPY N/A 08/18/2016    Procedure: COLONOSCOPY;  Surgeon: Amparo Gonzalez MD;  Location: [facility]; Service:    • COLONOSCOPY W/ POLYPECTOMY  2019    repeat 2022   • EYE SURGERY Bilateral    • UPPER GASTROINTESTINAL ENDOSCOPY  2019     Social History   Social History     Substance and Sexual Activity   Alcohol Use No    Comment: pt "sober for 30+ years"     Social History     Substance and Sexual Activity   Drug Use No     Social History     Tobacco Use   Smoking Status Former Smoker   Smokeless Tobacco Never Used     Family History:   Family History   Problem Relation Age of Onset   • Hypertension Mother    • Hypertension Father        Current Outpatient Medications on File Prior to Visit   Medication Sig Dispense Refill   • amLODIPine (NORVASC) 5 mg tablet Take 1 tablet (5 mg total) by mouth daily 90 tablet 3   • atorvastatin (LIPITOR) 20 mg tablet Take 1 tablet (20 mg total) by mouth daily at bedtime 90 tablet 3   • finasteride (PROSCAR) 5 mg tablet Take 1 tablet (5 mg total) by mouth daily 90 tablet 3   • linaCLOtide (Linzess) 145 MCG CAPS Take 1 capsule (145 mcg total) by mouth daily 30 capsule 5   • lisinopril-hydrochlorothiazide (PRINZIDE,ZESTORETIC) 20-25 MG per tablet Take 1 tablet by mouth daily 90 tablet 3     No current facility-administered medications on file prior to visit       Allergies   Allergen Reactions   • Lactose - Food Allergy Diarrhea     diarrhea        Objective     /64   Pulse 62   Ht 5' 5" (1 651 m)   Wt 88 kg (194 lb)   BMI 32 28 kg/m²      PE:  AAOx 3  WDWN  Hearing intact, no drainage from eyes  no audible wheezing  no abdominal distension  LE compartments soft, skin intact    Ortho Exam:  right Knee:   No erythema  no swelling  no effusion  no warmth  AROM:0-120       left Knee:   No erythema  no swelling  no effusion  no warmth  AROM: 0-110       Large joint arthrocentesis: bilateral knee  Universal Protocol:  Risks and benefits: risks, benefits and alternatives were discussed  Consent given by: patient  Time out: Immediately prior to procedure a "time out" was called to verify the correct patient, procedure, equipment, support staff and site/side marked as required    Timeout called at: 11/2/2022 11:55 AM   Patient understanding: patient states understanding of the procedure being performed  Site marked: the operative site was marked  Procedure Details  Location: knee - bilateral knee  Preparation: Patient was prepped and draped in the usual sterile fashion  Needle size: 22 G  Approach: anterolateral    Medications (Right): 1 mL bupivacaine 0 25 %; 20 mg triamcinolone acetonide 40 mg/mLMedications (Left): 1 mL bupivacaine 0 25 %; 20 mg triamcinolone acetonide 40 mg/mL   Dressing:  Sterile dressing applied                 Scribe Attestation    I,:  Sergio Malik am acting as a scribe while in the presence of the attending physician :       I,:  Alda Valdez,  personally performed the services described in this documentation    as scribed in my presence :

## 2023-01-25 PROBLEM — T67.1XXA HEAT SYNCOPE: Status: RESOLVED | Noted: 2021-09-16 | Resolved: 2023-01-25

## 2023-01-25 PROBLEM — N17.9 AKI (ACUTE KIDNEY INJURY) (HCC): Status: RESOLVED | Noted: 2021-09-16 | Resolved: 2023-01-25

## 2023-01-26 DIAGNOSIS — K59.00 CONSTIPATION, UNSPECIFIED CONSTIPATION TYPE: ICD-10-CM

## 2023-01-26 DIAGNOSIS — N40.1 BPH WITH URINARY OBSTRUCTION: ICD-10-CM

## 2023-01-26 DIAGNOSIS — N13.8 BPH WITH URINARY OBSTRUCTION: ICD-10-CM

## 2023-01-26 DIAGNOSIS — E78.5 HYPERLIPIDEMIA, UNSPECIFIED HYPERLIPIDEMIA TYPE: ICD-10-CM

## 2023-01-26 DIAGNOSIS — I10 HYPERTENSION, UNSPECIFIED TYPE: ICD-10-CM

## 2023-01-26 RX ORDER — FINASTERIDE 5 MG/1
5 TABLET, FILM COATED ORAL DAILY
Qty: 90 TABLET | Refills: 3 | Status: SHIPPED | OUTPATIENT
Start: 2023-01-26

## 2023-01-26 RX ORDER — LINACLOTIDE 145 UG/1
1 CAPSULE, GELATIN COATED ORAL DAILY
Qty: 30 CAPSULE | Refills: 5 | Status: SHIPPED | OUTPATIENT
Start: 2023-01-26

## 2023-01-26 RX ORDER — AMLODIPINE BESYLATE 5 MG/1
5 TABLET ORAL DAILY
Qty: 90 TABLET | Refills: 3 | Status: SHIPPED | OUTPATIENT
Start: 2023-01-26

## 2023-01-26 RX ORDER — LISINOPRIL AND HYDROCHLOROTHIAZIDE 25; 20 MG/1; MG/1
1 TABLET ORAL DAILY
Qty: 90 TABLET | Refills: 3 | Status: SHIPPED | OUTPATIENT
Start: 2023-01-26

## 2023-01-26 RX ORDER — ATORVASTATIN CALCIUM 20 MG/1
20 TABLET, FILM COATED ORAL
Qty: 90 TABLET | Refills: 3 | Status: SHIPPED | OUTPATIENT
Start: 2023-01-26

## 2023-02-01 ENCOUNTER — OFFICE VISIT (OUTPATIENT)
Dept: OBGYN CLINIC | Facility: MEDICAL CENTER | Age: 73
End: 2023-02-01

## 2023-02-01 VITALS
SYSTOLIC BLOOD PRESSURE: 148 MMHG | HEIGHT: 65 IN | BODY MASS INDEX: 34.42 KG/M2 | HEART RATE: 62 BPM | WEIGHT: 206.6 LBS | DIASTOLIC BLOOD PRESSURE: 69 MMHG

## 2023-02-01 DIAGNOSIS — M17.0 BILATERAL PRIMARY OSTEOARTHRITIS OF KNEE: Primary | ICD-10-CM

## 2023-02-01 RX ORDER — BUPIVACAINE HYDROCHLORIDE 2.5 MG/ML
2 INJECTION, SOLUTION INFILTRATION; PERINEURAL
Status: COMPLETED | OUTPATIENT
Start: 2023-02-01 | End: 2023-02-01

## 2023-02-01 RX ORDER — METHYLPREDNISOLONE ACETATE 40 MG/ML
1 INJECTION, SUSPENSION INTRA-ARTICULAR; INTRALESIONAL; INTRAMUSCULAR; SOFT TISSUE
Status: COMPLETED | OUTPATIENT
Start: 2023-02-01 | End: 2023-02-01

## 2023-02-01 RX ADMIN — BUPIVACAINE HYDROCHLORIDE 2 ML: 2.5 INJECTION, SOLUTION INFILTRATION; PERINEURAL at 10:46

## 2023-02-01 RX ADMIN — METHYLPREDNISOLONE ACETATE 1 ML: 40 INJECTION, SUSPENSION INTRA-ARTICULAR; INTRALESIONAL; INTRAMUSCULAR; SOFT TISSUE at 10:46

## 2023-02-01 NOTE — PROGRESS NOTES
Assessment/Plan:  1  Bilateral primary osteoarthritis of knee      Orders Placed This Encounter   Procedures   • Large joint arthrocentesis: bilateral knee     · Patient has severe bilateral knee osteoarthritis  · Received Bilateral knee  steroid injection today  Patient knows to ice and avoid strenuous activity for 1-2 days if needed  · He is aware he would need to wait 3 months after injections to have surgery  He can came back in 3 months for right knee CSI    · Continue bracing for comfort   · He would like to discuss surgery for  Left TKA  He has picked a date for 7/3/23  Will start the process scheduling surgery for left TKA   He will have to come back in June to sign consent for surgery   Return in about 3 months (around 5/1/2023) for Right knee CSI    I answered all of the patient's questions during the visit and provided education of the patient's condition during the visit  The patient verbalized understanding of the information given and agrees with the plan  This note was dictated using [employer] software  It may contain errors including improperly dictated words  Please contact physician directly for any questions  Subjective   Chief Complaint:   Chief Complaint   Patient presents with   • Left Knee - Follow-up   • Right Knee - Follow-up       HPI  Carlos Eduardo Munguia is a 67 y o  male who presents for follow up for bilateral knee pain due to severe osteoarthritis  He had bilateral knee steroid injections on 11/2/2022 and states he had relief until recently  He states left knee is worse than right knee  He is having pain anterior aspect bilaterally that comes and goes  Pain is worse with transitional positions and walking  He has been wearing bilateral knee braces with comfort  He is currently not taking any pain medications  Review of Systems  ROS:    See HPI for musculoskeletal review     All other systems reviewed are negative     History:  Past Medical History:   Diagnosis Date • Cataract    • Colon polyp    • Diverticulosis    • Gastritis    • GERD (gastroesophageal reflux disease)    • Hyperlipidemia    • Hypertension    • Throat ulcer    • Visual impairment      Past Surgical History:   Procedure Laterality Date   • COLONOSCOPY N/A 08/18/2016    Procedure: COLONOSCOPY;  Surgeon: Dema Councilman, MD;  Location: [facility]; Service:    • COLONOSCOPY W/ POLYPECTOMY  2019    repeat 2022   • EYE SURGERY Bilateral    • UPPER GASTROINTESTINAL ENDOSCOPY  2019     Social History   Social History     Substance and Sexual Activity   Alcohol Use No    Comment: pt "sober for 30+ years"     Social History     Substance and Sexual Activity   Drug Use No     Social History     Tobacco Use   Smoking Status Former   Smokeless Tobacco Never     Family History:   Family History   Problem Relation Age of Onset   • Hypertension Mother    • Hypertension Father        Current Outpatient Medications on File Prior to Visit   Medication Sig Dispense Refill   • amLODIPine (NORVASC) 5 mg tablet Take 1 tablet (5 mg total) by mouth daily 90 tablet 3   • atorvastatin (LIPITOR) 20 mg tablet Take 1 tablet (20 mg total) by mouth daily at bedtime 90 tablet 3   • finasteride (PROSCAR) 5 mg tablet Take 1 tablet (5 mg total) by mouth daily 90 tablet 3   • linaCLOtide (Linzess) 145 MCG CAPS Take 1 capsule (145 mcg total) by mouth daily 30 capsule 5   • lisinopril-hydrochlorothiazide (PRINZIDE,ZESTORETIC) 20-25 MG per tablet Take 1 tablet by mouth daily 90 tablet 3     No current facility-administered medications on file prior to visit       Allergies   Allergen Reactions   • Lactose - Food Allergy Diarrhea     diarrhea        Objective     /69   Pulse 62   Ht 5' 5" (1 651 m)   Wt 93 7 kg (206 lb 9 6 oz)   BMI 34 38 kg/m²      PE:  AAOx 3  WDWN  Hearing intact, no drainage from eyes  no audible wheezing  no abdominal distension  LE compartments soft, skin intact    Ortho Exam:  bilateral Knee:   No erythema  no swelling  no effusion  no warmth  AROM: Right 0-120  Left 0-110   Stable to varus/valgus stress    Large joint arthrocentesis: bilateral knee  Universal Protocol:  Consent: Verbal consent obtained  Risks and benefits: risks, benefits and alternatives were discussed  Consent given by: patient  Time out: Immediately prior to procedure a "time out" was called to verify the correct patient, procedure, equipment, support staff and site/side marked as required    Timeout called at: 2/1/2023 10:45 AM   Patient understanding: patient states understanding of the procedure being performed  Site marked: the operative site was marked  Supporting Documentation  Indications: pain and joint swelling   Procedure Details  Location: knee - bilateral knee  Preparation: Patient was prepped and draped in the usual sterile fashion  Needle size: 22 G  Approach: anterolateral    Medications (Right): 2 mL bupivacaine 0 25 %; 1 mL methylPREDNISolone acetate 40 mg/mLMedications (Left): 2 mL bupivacaine 0 25 %; 1 mL methylPREDNISolone acetate 40 mg/mL   Patient tolerance: patient tolerated the procedure well with no immediate complications  Dressing:  Sterile dressing applied            Scribe Attestation    I,:  Robert Malik am acting as a scribe while in the presence of the attending physician :       I,:  Ann-Marie Santiago DO personally performed the services described in this documentation    as scribed in my presence :

## 2023-02-09 ENCOUNTER — OFFICE VISIT (OUTPATIENT)
Dept: FAMILY MEDICINE CLINIC | Facility: CLINIC | Age: 73
End: 2023-02-09

## 2023-02-09 VITALS
BODY MASS INDEX: 34.15 KG/M2 | WEIGHT: 200 LBS | TEMPERATURE: 98 F | SYSTOLIC BLOOD PRESSURE: 126 MMHG | RESPIRATION RATE: 16 BRPM | OXYGEN SATURATION: 98 % | HEART RATE: 64 BPM | DIASTOLIC BLOOD PRESSURE: 70 MMHG | HEIGHT: 64 IN

## 2023-02-09 DIAGNOSIS — Z87.891 FORMER SMOKER: ICD-10-CM

## 2023-02-09 DIAGNOSIS — R09.89 ABDOMINAL BRUIT: ICD-10-CM

## 2023-02-09 DIAGNOSIS — M25.562 CHRONIC PAIN OF BOTH KNEES: Primary | ICD-10-CM

## 2023-02-09 DIAGNOSIS — Z00.00 MEDICARE ANNUAL WELLNESS VISIT, SUBSEQUENT: ICD-10-CM

## 2023-02-09 DIAGNOSIS — I10 PRIMARY HYPERTENSION: ICD-10-CM

## 2023-02-09 DIAGNOSIS — M25.561 CHRONIC PAIN OF BOTH KNEES: Primary | ICD-10-CM

## 2023-02-09 DIAGNOSIS — R73.03 PRE-DIABETES: ICD-10-CM

## 2023-02-09 DIAGNOSIS — G89.29 CHRONIC PAIN OF BOTH KNEES: Primary | ICD-10-CM

## 2023-02-09 NOTE — PATIENT INSTRUCTIONS
Medicare Preventive Visit Patient Instructions  Thank you for completing your Welcome to Medicare Visit or Medicare Annual Wellness Visit today  Your next wellness visit will be due in one year (2/10/2024)  The screening/preventive services that you may require over the next 5-10 years are detailed below  Some tests may not apply to you based off risk factors and/or age  Screening tests ordered at today's visit but not completed yet may show as past due  Also, please note that scanned in results may not display below  Preventive Screenings:  Service Recommendations Previous Testing/Comments   Colorectal Cancer Screening  · Colonoscopy    · Fecal Occult Blood Test (FOBT)/Fecal Immunochemical Test (FIT)  · Fecal DNA/Cologuard Test  · Flexible Sigmoidoscopy Age: 39-70 years old   Colonoscopy: every 10 years (May be performed more frequently if at higher risk)  OR  FOBT/FIT: every 1 year  OR  Cologuard: every 3 years  OR  Sigmoidoscopy: every 5 years  Screening may be recommended earlier than age 39 if at higher risk for colorectal cancer  Also, an individualized decision between you and your healthcare provider will decide whether screening between the ages of 74-80 would be appropriate   Colonoscopy: 06/01/2022  FOBT/FIT: Not on file  Cologuard: Not on file  Sigmoidoscopy: Not on file    Screening Current     Prostate Cancer Screening Individualized decision between patient and health care provider in men between ages of 53-78   Medicare will cover every 12 months beginning on the day after your 50th birthday PSA: 1 6 ng/mL     Screening Current     Hepatitis C Screening Once for adults born between 1945 and 1965  More frequently in patients at high risk for Hepatitis C Hep C Antibody: 09/22/2015    Screening Current   Diabetes Screening 1-2 times per year if you're at risk for diabetes or have pre-diabetes Fasting glucose: 96 mg/dL (5/9/2022)  A1C: 5 6 % (1/13/2021)  Screening Current   Cholesterol Screening Once every 5 years if you don't have a lipid disorder  May order more often based on risk factors  Lipid panel: 05/09/2022  Screening Not Indicated  History Lipid Disorder      Other Preventive Screenings Covered by Medicare:  1  Abdominal Aortic Aneurysm (AAA) Screening: covered once if your at risk  You're considered to be at risk if you have a family history of AAA or a male between the age of 73-68 who smoking at least 100 cigarettes in your lifetime  2  Lung Cancer Screening: covers low dose CT scan once per year if you meet all of the following conditions: (1) Age 50-69; (2) No signs or symptoms of lung cancer; (3) Current smoker or have quit smoking within the last 15 years; (4) You have a tobacco smoking history of at least 20 pack years (packs per day x number of years you smoked); (5) You get a written order from a healthcare provider  3  Glaucoma Screening: covered annually if you're considered high risk: (1) You have diabetes OR (2) Family history of glaucoma OR (3)  aged 48 and older OR (3)  American aged 72 and older  3  Osteoporosis Screening: covered every 2 years if you meet one of the following conditions: (1) Have a vertebral abnormality; (2) On glucocorticoid therapy for more than 3 months; (3) Have primary hyperparathyroidism; (4) On osteoporosis medications and need to assess response to drug therapy  5  HIV Screening: covered annually if you're between the age of 12-76  Also covered annually if you are younger than 13 and older than 72 with risk factors for HIV infection  For pregnant patients, it is covered up to 3 times per pregnancy      Immunizations:  Immunization Recommendations   Influenza Vaccine Annual influenza vaccination during flu season is recommended for all persons aged >= 6 months who do not have contraindications   Pneumococcal Vaccine   * Pneumococcal conjugate vaccine = PCV13 (Prevnar 13), PCV15 (Vaxneuvance), PCV20 (Prevnar 20)  * Pneumococcal polysaccharide vaccine = PPSV23 (Pneumovax) Adults 2364 years old: 1-3 doses may be recommended based on certain risk factors  Adults 72 years old: 1-2 doses may be recommended based off what pneumonia vaccine you previously received   Hepatitis B Vaccine 3 dose series if at intermediate or high risk (ex: diabetes, end stage renal disease, liver disease)   Tetanus (Td) Vaccine - COST NOT COVERED BY MEDICARE PART B Following completion of primary series, a booster dose should be given every 10 years to maintain immunity against tetanus  Td may also be given as tetanus wound prophylaxis  Tdap Vaccine - COST NOT COVERED BY MEDICARE PART B Recommended at least once for all adults  For pregnant patients, recommended with each pregnancy  Shingles Vaccine (Shingrix) - COST NOT COVERED BY MEDICARE PART B  2 shot series recommended in those aged 48 and above     Health Maintenance Due:      Topic Date Due   • Colorectal Cancer Screening  05/31/2027   • Hepatitis C Screening  Completed     Immunizations Due:      Topic Date Due   • COVID-19 Vaccine (4 - Booster for Pfizer series) 12/07/2021   • Influenza Vaccine (1) 09/01/2022     Advance Directives   What are advance directives? Advance directives are legal documents that state your wishes and plans for medical care  These plans are made ahead of time in case you lose your ability to make decisions for yourself  Advance directives can apply to any medical decision, such as the treatments you want, and if you want to donate organs  What are the types of advance directives? There are many types of advance directives, and each state has rules about how to use them  You may choose a combination of any of the following:  · Living will: This is a written record of the treatment you want  You can also choose which treatments you do not want, which to limit, and which to stop at a certain time  This includes surgery, medicine, IV fluid, and tube feedings     · Durable power of  for healthcare [facility]): This is a written record that states who you want to make healthcare choices for you when you are unable to make them for yourself  This person, called a proxy, is usually a family member or a friend  You may choose more than 1 proxy  · Do not resuscitate (DNR) order:  A DNR order is used in case your heart stops beating or you stop breathing  It is a request not to have certain forms of treatment, such as CPR  A DNR order may be included in other types of advance directives  · Medical directive: This covers the care that you want if you are in a coma, near death, or unable to make decisions for yourself  You can list the treatments you want for each condition  Treatment may include pain medicine, surgery, blood transfusions, dialysis, IV or tube feedings, and a ventilator (breathing machine)  · Values history: This document has questions about your views, beliefs, and how you feel and think about life  This information can help others choose the care that you would choose  Why are advance directives important? An advance directive helps you control your care  Although spoken wishes may be used, it is better to have your wishes written down  Spoken wishes can be misunderstood, or not followed  Treatments may be given even if you do not want them  An advance directive may make it easier for your family to make difficult choices about your care  Weight Management   Why it is important to manage your weight:  Being overweight increases your risk of health conditions such as heart disease, high blood pressure, type 2 diabetes, and certain types of cancer  It can also increase your risk for osteoarthritis, sleep apnea, and other respiratory problems  Aim for a slow, steady weight loss  Even a small amount of weight loss can lower your risk of health problems    How to lose weight safely:  A safe and healthy way to lose weight is to eat fewer calories and get regular exercise  You can lose up about 1 pound a week by decreasing the number of calories you eat by 500 calories each day  Healthy meal plan for weight management:  A healthy meal plan includes a variety of foods, contains fewer calories, and helps you stay healthy  A healthy meal plan includes the following:  · Eat whole-grain foods more often  A healthy meal plan should contain fiber  Fiber is the part of grains, fruits, and vegetables that is not broken down by your body  Whole-grain foods are healthy and provide extra fiber in your diet  Some examples of whole-grain foods are whole-wheat breads and pastas, oatmeal, brown rice, and bulgur  · Eat a variety of vegetables every day  Include dark, leafy greens such as spinach, kale, jenny greens, and mustard greens  Eat yellow and orange vegetables such as carrots, sweet potatoes, and winter squash  · Eat a variety of fruits every day  Choose fresh or canned fruit (canned in its own juice or light syrup) instead of juice  Fruit juice has very little or no fiber  · Eat low-fat dairy foods  Drink fat-free (skim) milk or 1% milk  Eat fat-free yogurt and low-fat cottage cheese  Try low-fat cheeses such as mozzarella and other reduced-fat cheeses  · Choose meat and other protein foods that are low in fat  Choose beans or other legumes such as split peas or lentils  Choose fish, skinless poultry (chicken or turkey), or lean cuts of red meat (beef or pork)  Before you cook meat or poultry, cut off any visible fat  · Use less fat and oil  Try baking foods instead of frying them  Add less fat, such as margarine, sour cream, regular salad dressing and mayonnaise to foods  Eat fewer high-fat foods  Some examples of high-fat foods include french fries, doughnuts, ice cream, and cakes  · Eat fewer sweets  Limit foods and drinks that are high in sugar  This includes candy, cookies, regular soda, and sweetened drinks    Exercise:  Exercise at least 30 minutes per day on most days of the week  Some examples of exercise include walking, biking, dancing, and swimming  You can also fit in more physical activity by taking the stairs instead of the elevator or parking farther away from stores  Ask your healthcare provider about the best exercise plan for you  © Copyright [employer] 2018 Information is for End User's use only and may not be sold, redistributed or otherwise used for commercial purposes   All illustrations and images included in CareNotes® are the copyrighted property of [employer]  or [address] [place] [address]

## 2023-02-09 NOTE — PROGRESS NOTES
Assessment and Plan:   1  Chronic pain of both knees  He will look forward surgery when His ortho doctor available after maternity leave  In mid time, use topical Voltaren  - Diclofenac Sodium (VOLTAREN) 1 %; Apply 2 g topically 4 (four) times a day  Dispense: 100 g; Refill: 5    2  Pre-diabetes  Patient has elevation of blood sugar without diagnosis of diabetes  Risk explained, follow up in 3 months after life style modification that was explained to patient and committed to proceed withy       - HEMOGLOBIN A1C W/ EAG ESTIMATION; Future    3  BMI 34 0-34 9,adult  The BMI is above normal  Nutrition recommendations include reducing portion sizes, decreasing overall calorie intake, 3-5 servings of fruits/vegetables daily, reducing fast food intake, consuming healthier snacks, decreasing soda and/or juice intake, moderation in carbohydrate intake, increasing intake of lean protein, reducing intake of saturated fat and trans fat, and reducing intake of cholesterol  Exercise recommendations include moderate aerobic physical activity for 150 minutes/week, vigorous aerobic physical activity for 75 minutes/week if possible, the most usual recommendation is exercising 3-5 times per week, joining a gym is a reasonable option with the precaution of the current situation, and strength training exercises are always important as we the as well  4  Primary hypertension  good control, continue same/  - Comprehensive metabolic panel; Future  - Lipid panel; Future    5  Abdominal bruit  *questionable bruit, requesting Abdominal US/Doppler   - US abdominal aorta; Future    6  Former smoker  Higher risk for AAA  Never examined before/  - US abdominal aorta;  Future      Problem List Items Addressed This Visit     Chronic pain of both knees - Primary    Relevant Medications    Diclofenac Sodium (VOLTAREN) 1 %    HTN (hypertension)    Relevant Orders    Comprehensive metabolic panel    Lipid panel   Other Visit Diagnoses Pre-diabetes        Relevant Orders    HEMOGLOBIN A1C W/ EAG ESTIMATION    BMI 34 0-34 9,adult        Abdominal bruit        Relevant Orders    US abdominal aorta    Former smoker        Relevant Orders    US abdominal aorta           Preventive health issues were discussed with patient, and age appropriate screening tests were ordered as noted in patient's After Visit Summary  Personalized health advice and appropriate referrals for health education or preventive services given if needed, as noted in patient's After Visit Summary  History of Present Illness:     Patient presents for a Medicare Wellness Visit    Patient presents with:  Medicare Wellness Visit  Hypertension: Patient is here to follow  HTN, patient states good compliance with treatment  Denies chest pain, shortness of breath, angina, urinary problems  No exercise but follows low salt diet  Lab Results       Component                Value               Date/Time                  HGBA1C                   5 6                 01/13/2021 08:49 AM        HGBA1C                   5 8 (H)             09/30/2020 09:25 AM        CHOLESTEROL              145                 05/09/2022 08:56 AM        LDLCALC                  46                  05/09/2022 08:56 AM        LDLCALC                  85                  09/22/2015 10:56 AM        TRIG                     111                 05/09/2022 08:56 AM        TRIG                     91                  09/22/2015 10:56 AM        CREATININE               0 87                05/09/2022 08:56 AM        CREATININE               0 79                10/28/2015 09:51 AM        EGFR                     86                  05/09/2022 08:56 AM     amLODIPine  atorvastatin  Diclofenac Sodium  finasteride  Linzess Caps  lisinopril-hydrochlorothiazide     Bilateral knee pain         Patient Care Team:  Toshia Kaye MD as PCP - General (Family Medicine)  Toshia Kaye MD as PCP - [address] [place] [address] (RTE)  Humza Cruz MD as PCP - [facility] ([facility])  Evie Rhodes MD as Endoscopist     Review of Systems:     Review of Systems   Constitutional: Negative for diaphoresis, fatigue, fever and unexpected weight change  Respiratory: Negative for apnea, cough, choking, chest tightness and shortness of breath  Cardiovascular: Negative for chest pain, palpitations and leg swelling  Gastrointestinal: Negative for abdominal distention, abdominal pain, anal bleeding, blood in stool and constipation  Musculoskeletal: Positive for arthralgias  Negative for back pain, gait problem and joint swelling  Neurological: Negative for dizziness, facial asymmetry, light-headedness and headaches  Psychiatric/Behavioral: Negative for behavioral problems, dysphoric mood and self-injury  The patient is not nervous/anxious  Problem List:     Patient Active Problem List   Diagnosis   • HTN (hypertension)   • GERD (gastroesophageal reflux disease)   • Prediabetes   • Hypocalcemia   • Mixed hyperlipidemia   • Chronic pain of both knees      Past Medical and Surgical History:     Past Medical History:   Diagnosis Date   • Cataract    • Colon polyp    • Diverticulosis    • Gastritis    • GERD (gastroesophageal reflux disease)    • Hyperlipidemia    • Hypertension    • Pre-diabetes    • Throat ulcer    • Visual impairment      Past Surgical History:   Procedure Laterality Date   • COLONOSCOPY N/A 08/18/2016    Procedure: COLONOSCOPY;  Surgeon: Evie Rhodes MD;  Location: [facility];   Service:    • COLONOSCOPY W/ POLYPECTOMY  2019    repeat 2022   • EYE SURGERY Bilateral    • UPPER GASTROINTESTINAL ENDOSCOPY  2019      Family History:     Family History   Problem Relation Age of Onset   • Hypertension Mother    • Hypertension Father       Social History:     Social History     Socioeconomic History   • Marital status: Single     Spouse name: None   • Number of children: None   • Years of education: None   • Highest education level: None   Occupational History   • None   Tobacco Use   • Smoking status: Former   • Smokeless tobacco: Never   Vaping Use   • Vaping Use: Never used   Substance and Sexual Activity   • Alcohol use: No     Comment: pt "sober for 30+ years"   • Drug use: No   • Sexual activity: Not Currently     Partners: Female     Comment:    Other Topics Concern   • None   Social History Narrative    Lives with family     Social Determinants of Health     Financial Resource Strain: Low Risk    • Difficulty of Paying Living Expenses: Not hard at all   Food Insecurity: Not on file   Transportation Needs: No Transportation Needs   • Lack of Transportation (Medical): No   • Lack of Transportation (Non-Medical): No   Physical Activity: Not on file   Stress: Not on file   Social Connections: Not on file   Intimate Partner Violence: Not on file   Housing Stability: Not on file      Medications and Allergies:     Current Outpatient Medications   Medication Sig Dispense Refill   • Diclofenac Sodium (VOLTAREN) 1 % Apply 2 g topically 4 (four) times a day 100 g 5   • amLODIPine (NORVASC) 5 mg tablet Take 1 tablet (5 mg total) by mouth daily 90 tablet 3   • atorvastatin (LIPITOR) 20 mg tablet Take 1 tablet (20 mg total) by mouth daily at bedtime 90 tablet 3   • finasteride (PROSCAR) 5 mg tablet Take 1 tablet (5 mg total) by mouth daily 90 tablet 3   • linaCLOtide (Linzess) 145 MCG CAPS Take 1 capsule (145 mcg total) by mouth daily 30 capsule 5   • lisinopril-hydrochlorothiazide (PRINZIDE,ZESTORETIC) 20-25 MG per tablet Take 1 tablet by mouth daily 90 tablet 3     No current facility-administered medications for this visit       Allergies   Allergen Reactions   • Lactose - Food Allergy Diarrhea     diarrhea      Immunizations:     Immunization History   Administered Date(s) Administered   • COVID-19 PFIZER VACCINE 0 3 ML IM 03/01/2021, 03/22/2021, 10/12/2021   • Influenza Quadrivalent, 6-35 Months IM 01/10/2018   • Influenza, high dose seasonal 0 7 mL 12/04/2018, 09/24/2020   • Influenza, seasonal, injectable 09/22/2015   • Pneumococcal Conjugate 13-Valent 01/10/2018   • Pneumococcal Polysaccharide PPV23 09/22/2015   • Tdap 09/22/2015      Health Maintenance:         Topic Date Due   • Colorectal Cancer Screening  05/31/2027   • Hepatitis C Screening  Completed         Topic Date Due   • COVID-19 Vaccine (4 - Booster for Pfizer series) 12/07/2021   • Influenza Vaccine (1) 09/01/2022      Medicare Screening Tests and Risk Assessments:     Magdy Amaro is here for his Subsequent Wellness visit  Last Medicare Wellness visit information reviewed, patient interviewed and updates made to the record today  Health Risk Assessment:   Patient rates overall health as good  Patient feels that their physical health rating is slightly worse  Patient is satisfied with their life  Eyesight was rated as same  Hearing was rated as same  Patient feels that their emotional and mental health rating is same  Patients states they are never, rarely angry  Patient states they are sometimes unusually tired/fatigued  Pain experienced in the last 7 days has been some  Patient's pain rating has been 3/10  Patient states that he has experienced no weight loss or gain in last 6 months  Depression Screening:   PHQ-2 Score: 0      Fall Risk Screening: In the past year, patient has experienced: no history of falling in past year      Home Safety:  Patient has trouble with stairs inside or outside of their home  Patient has working smoke alarms and has working carbon monoxide detector  Home safety hazards include: none  Nutrition:   Current diet is Regular  Medications:   Patient is currently taking over-the-counter supplements  OTC medications include: see medication list  Patient is able to manage medications       Activities of Daily Living (ADLs)/Instrumental Activities of Daily Living (IADLs):   Walk and transfer into and out of bed and chair?: Yes  Dress and groom yourself?: Yes    Bathe or shower yourself?: Yes    Feed yourself? Yes  Do your laundry/housekeeping?: Yes  Manage your money, pay your bills and track your expenses?: Yes  Make your own meals?: Yes    Do your own shopping?: Yes    Previous Hospitalizations:   Any hospitalizations or ED visits within the last 12 months?: Yes    How many hospitalizations have you had in the last year?: 1-2    Advance Care Planning:   Living will: No    Durable POA for healthcare: No    Advanced directive: No    Advanced directive counseling given: Yes    Five wishes given: Yes    Patient declined ACP directive: No    End of Life Decisions reviewed with patient: Yes    Provider agrees with end of life decisions: Yes      Cognitive Screening:   Provider or family/friend/caregiver concerned regarding cognition?: No    PREVENTIVE SCREENINGS      Cardiovascular Screening:    General: Screening Current    Due for: Lipid Panel      Diabetes Screening:     General: Screening Current    Due for: Blood Glucose      Colorectal Cancer Screening:     General: Screening Current    Due for: FOBT/FIT      Prostate Cancer Screening:    General: Screening Current      Osteoporosis Screening:    General: Screening Not Indicated    Due for: DXA Axial      Abdominal Aortic Aneurysm (AAA) Screening:    Risk factors include: age between 73-67 yo and tobacco use        General: Screening Not Indicated    Due for: Screening AAA Ultrasound      Lung Cancer Screening:     General: Screening Not Indicated      Hepatitis C Screening:    General: Screening Current    Hep C Screening Accepted: Yes      Screening, Brief Intervention, and Referral to Treatment (SBIRT)    Screening  Typical number of drinks in a day: 0  Typical number of drinks in a week: 0  Interpretation: Low risk drinking behavior      Single Item Drug Screening:  How often have you used an illegal drug (including marijuana) or a prescription medication for non-medical reasons in the past year? never    Single Item Drug Screen Score: 0  Interpretation: Negative screen for possible drug use disorder    Other Counseling Topics:   Alcohol use counseling, car/seat belt/driving safety, skin self-exam, sunscreen and calcium and vitamin D intake and regular weightbearing exercise  No results found  Physical Exam:     /70 (BP Location: Left arm, Patient Position: Sitting, Cuff Size: Standard)   Pulse 64   Temp 98 °F (36 7 °C) (Tympanic)   Resp 16   Ht 5' 4" (1 626 m)   Wt 90 7 kg (200 lb)   SpO2 98%   BMI 34 33 kg/m²     Physical Exam  Constitutional:       Appearance: Normal appearance  Cardiovascular:      Rate and Rhythm: Normal rate and regular rhythm  Abdominal:      Comments: Abdominal bruit     Musculoskeletal:         General: Tenderness ( bilateral knee ) present            Toshia Kaye MD

## 2023-02-22 LAB — HBA1C MFR BLD HPLC: 5.6 %

## 2023-03-01 ENCOUNTER — HOSPITAL ENCOUNTER (OUTPATIENT)
Dept: RADIOLOGY | Facility: HOSPITAL | Age: 73
Discharge: HOME | End: 2023-03-01

## 2023-03-01 DIAGNOSIS — R09.89 ABDOMINAL BRUIT: ICD-10-CM

## 2023-03-01 DIAGNOSIS — Z87.891 FORMER SMOKER: ICD-10-CM

## 2023-05-04 ENCOUNTER — OFFICE VISIT (OUTPATIENT)
Dept: OBGYN CLINIC | Facility: MEDICAL CENTER | Age: 73
End: 2023-05-04

## 2023-05-04 VITALS
HEART RATE: 67 BPM | WEIGHT: 206 LBS | SYSTOLIC BLOOD PRESSURE: 132 MMHG | HEIGHT: 64 IN | DIASTOLIC BLOOD PRESSURE: 69 MMHG | BODY MASS INDEX: 35.17 KG/M2

## 2023-05-04 DIAGNOSIS — G89.29 BILATERAL CHRONIC KNEE PAIN: ICD-10-CM

## 2023-05-04 DIAGNOSIS — M25.561 BILATERAL CHRONIC KNEE PAIN: ICD-10-CM

## 2023-05-04 DIAGNOSIS — M17.11 PRIMARY OSTEOARTHRITIS OF RIGHT KNEE: Primary | ICD-10-CM

## 2023-05-04 DIAGNOSIS — M25.562 BILATERAL CHRONIC KNEE PAIN: ICD-10-CM

## 2023-05-04 DIAGNOSIS — M17.12 PRIMARY OSTEOARTHRITIS OF LEFT KNEE: ICD-10-CM

## 2023-05-04 RX ORDER — TRIAMCINOLONE ACETONIDE 40 MG/ML
40 INJECTION, SUSPENSION INTRA-ARTICULAR; INTRAMUSCULAR
Status: COMPLETED | OUTPATIENT
Start: 2023-05-04 | End: 2023-05-04

## 2023-05-04 RX ORDER — BUPIVACAINE HYDROCHLORIDE 2.5 MG/ML
2 INJECTION, SOLUTION INFILTRATION; PERINEURAL
Status: COMPLETED | OUTPATIENT
Start: 2023-05-04 | End: 2023-05-04

## 2023-05-04 RX ADMIN — TRIAMCINOLONE ACETONIDE 40 MG: 40 INJECTION, SUSPENSION INTRA-ARTICULAR; INTRAMUSCULAR at 12:09

## 2023-05-04 RX ADMIN — BUPIVACAINE HYDROCHLORIDE 2 ML: 2.5 INJECTION, SOLUTION INFILTRATION; PERINEURAL at 12:09

## 2023-05-04 NOTE — PROGRESS NOTES
Assessment/Plan:  1  Primary osteoarthritis of right knee    2  Primary osteoarthritis of left knee    3  Bilateral chronic knee pain      Orders Placed This Encounter   Procedures    Large joint arthrocentesis       · Patient has severe bilateral knee osteoarthritis  · Patient is a surgical candidate at this point  He had previously selected July 3 for left TKA but he would like to postpone until 14th Calendar was updated  Patient would like to return in 1 month for his preoperative visit at which point details of surgery will be reviewed, he will sign consent, and x-rays will be obtained  He will likely require [facility] and cardiology clearance  · Patient received bilateral knee steroid injections today  Tolerated the procedures well  Postinjection instructions reviewed  Patient is aware he must wait 3 months between steroid injections and surgery  · May take Tylenol as needed for pain  · Continue activity as tolerated  · Continue knee sleeve as needed for comfort  Return in 1 month (on 6/4/2023) for L TKA planning  I answered all of the patient's questions during the visit and provided education of the patient's condition during the visit  The patient verbalized understanding of the information given and agrees with the plan  This note was dictated using [employer] software  It may contain errors including improperly dictated words  Please contact physician directly for any questions  Subjective   Chief Complaint:   Chief Complaint   Patient presents with    Left Knee - Follow-up    Right Knee - Follow-up       HÉCTOR  Uli Dimitris is a 67 y o  male who presents for follow up for bilateral knee pain  Cypriot translation provided by Jessi Conte  Patient received bilateral knee steroid injections at his last visit on 2/1/2023 and reports good pain relief from this injection    Patient states that he would like to postpone his left total knee replacement which was scheduled for 7/3 and "would like to pursue this in August instead  He would like to have steroid injections today  Left knee pain is worse than right knee pain he notes discomfort and stiffness  Patient has been wearing a knee sleeve for compression and support  Patient states he is not taking anything for pain  Patient would like to return in 1 month for preoperative visit with Dr Saloni Rene  Review of Systems  ROS:    See HPI for musculoskeletal review  All other systems reviewed are negative     History:  Past Medical History:   Diagnosis Date    Cataract     Colon polyp     Diverticulosis     Gastritis     GERD (gastroesophageal reflux disease)     Hyperlipidemia     Hypertension     Pre-diabetes     Throat ulcer     Visual impairment      Past Surgical History:   Procedure Laterality Date    COLONOSCOPY N/A 08/18/2016    Procedure: COLONOSCOPY;  Surgeon: Keiko Wiggins MD;  Location:  GI LAB;   Service:     COLONOSCOPY W/ POLYPECTOMY  2019    repeat 2022    EYE SURGERY Bilateral     UPPER GASTROINTESTINAL ENDOSCOPY  2019     Social History   Social History     Substance and Sexual Activity   Alcohol Use No    Comment: pt \"sober for 30+ years\"     Social History     Substance and Sexual Activity   Drug Use No     Social History     Tobacco Use   Smoking Status Former   Smokeless Tobacco Never     Family History:   Family History   Problem Relation Age of Onset    Hypertension Mother     Hypertension Father        Current Outpatient Medications on File Prior to Visit   Medication Sig Dispense Refill    amLODIPine (NORVASC) 5 mg tablet Take 1 tablet (5 mg total) by mouth daily 90 tablet 3    atorvastatin (LIPITOR) 20 mg tablet Take 1 tablet (20 mg total) by mouth daily at bedtime 90 tablet 3    Diclofenac Sodium (VOLTAREN) 1 % Apply 2 g topically 4 (four) times a day 100 g 5    finasteride (PROSCAR) 5 mg tablet Take 1 tablet (5 mg total) by mouth daily 90 tablet 3    linaCLOtide (Linzess) 145 MCG CAPS Take 1 " "capsule (145 mcg total) by mouth daily 30 capsule 5    lisinopril-hydrochlorothiazide (PRINZIDE,ZESTORETIC) 20-25 MG per tablet Take 1 tablet by mouth daily 90 tablet 3     No current facility-administered medications on file prior to visit  Allergies   Allergen Reactions    Lactose - Food Allergy Diarrhea     diarrhea        Objective     /69   Pulse 67   Ht 5' 4\" (1 626 m)   Wt 93 4 kg (206 lb)   BMI 35 36 kg/m²      PE:  AAOx 3  WDWN  Hearing intact, no drainage from eyes  no audible wheezing  no abdominal distension  LE compartments soft, skin intact    Ortho Exam:  bilateral Knee:   No erythema  no swelling  no effusion  no warmth  No TTP  AROM: right 5- 115, left 5- 110  Stable to varus/valgus stress      Large joint arthrocentesis: bilateral knee  Universal Protocol:  Consent: Verbal consent obtained    Risks and benefits: risks, benefits and alternatives were discussed  Consent given by: patient  Site marked: the operative site was marked  Supporting Documentation  Indications: pain   Procedure Details  Location: knee - bilateral knee  Preparation: Patient was prepped and draped in the usual sterile fashion  Needle size: 22 G  Ultrasound guidance: no  Approach: anterolateral    Medications (Right): 2 mL bupivacaine 0 25 %; 40 mg triamcinolone acetonide 40 mg/mLMedications (Left): 2 mL bupivacaine 0 25 %; 40 mg triamcinolone acetonide 40 mg/mL   Patient tolerance: patient tolerated the procedure well with no immediate complications  Dressing:  Sterile dressing applied                  "

## 2023-06-07 ENCOUNTER — OFFICE VISIT (OUTPATIENT)
Dept: OBGYN CLINIC | Facility: MEDICAL CENTER | Age: 73
End: 2023-06-07
Payer: COMMERCIAL

## 2023-06-07 ENCOUNTER — APPOINTMENT (OUTPATIENT)
Dept: RADIOLOGY | Facility: MEDICAL CENTER | Age: 73
End: 2023-06-07
Payer: COMMERCIAL

## 2023-06-07 VITALS
HEART RATE: 59 BPM | WEIGHT: 203.2 LBS | HEIGHT: 64 IN | DIASTOLIC BLOOD PRESSURE: 66 MMHG | BODY MASS INDEX: 34.69 KG/M2 | SYSTOLIC BLOOD PRESSURE: 145 MMHG

## 2023-06-07 DIAGNOSIS — Z01.89 ENCOUNTER FOR LOWER EXTREMITY COMPARISON IMAGING STUDY: ICD-10-CM

## 2023-06-07 DIAGNOSIS — M17.12 PRIMARY OSTEOARTHRITIS OF LEFT KNEE: ICD-10-CM

## 2023-06-07 DIAGNOSIS — M17.12 PRIMARY OSTEOARTHRITIS OF LEFT KNEE: Primary | ICD-10-CM

## 2023-06-07 DIAGNOSIS — Z01.818 PREOPERATIVE TESTING: ICD-10-CM

## 2023-06-07 PROCEDURE — 73560 X-RAY EXAM OF KNEE 1 OR 2: CPT

## 2023-06-07 PROCEDURE — 73564 X-RAY EXAM KNEE 4 OR MORE: CPT

## 2023-06-07 PROCEDURE — 99213 OFFICE O/P EST LOW 20 MIN: CPT | Performed by: ORTHOPAEDIC SURGERY

## 2023-06-07 PROCEDURE — 77073 BONE LENGTH STUDIES: CPT

## 2023-06-07 RX ORDER — CHLORHEXIDINE GLUCONATE 4 G/100ML
SOLUTION TOPICAL DAILY PRN
OUTPATIENT
Start: 2023-06-07

## 2023-06-07 RX ORDER — ACETAMINOPHEN 325 MG/1
975 TABLET ORAL ONCE
OUTPATIENT
Start: 2023-06-07 | End: 2023-06-07

## 2023-06-07 RX ORDER — CEFAZOLIN SODIUM 2 G/50ML
2000 SOLUTION INTRAVENOUS ONCE
OUTPATIENT
Start: 2023-06-07 | End: 2023-06-07

## 2023-06-07 RX ORDER — CHLORHEXIDINE GLUCONATE 0.12 MG/ML
15 RINSE ORAL ONCE
OUTPATIENT
Start: 2023-06-07 | End: 2023-06-07

## 2023-06-07 RX ORDER — SODIUM CHLORIDE 9 MG/ML
75 INJECTION, SOLUTION INTRAVENOUS CONTINUOUS
OUTPATIENT
Start: 2023-06-07

## 2023-06-07 RX ORDER — TRANEXAMIC ACID 10 MG/ML
1000 INJECTION, SOLUTION INTRAVENOUS ONCE
OUTPATIENT
Start: 2023-06-07 | End: 2023-06-07

## 2023-06-07 NOTE — PROGRESS NOTES
Assessment/Plan:  1  Primary osteoarthritis of left knee    2  Encounter for lower extremity comparison imaging study    3  Preoperative testing      Orders Placed This Encounter   Procedures   • TEDS Stockings   • XR knee 4+ vw left injury   • XR knee 1 or 2 vw right   • XR bone length (scanogram)   • Comprehensive metabolic panel   • Hemoglobin A1C W/EAG Estimation   • CBC and differential   • C-reactive protein   • Anemia Panel w/Reflex   • Protime-INR   • APTT   • Ambulatory referral to Physical Therapy   • Ambulatory referral to Internal Medicine   • Ambulatory referral to Cardiology   • Ambulatory referral to Dentistry   • Type and screen       · Patient has severe bilateral knee osteoarthritis  • Kevin Lees has severe bilateral knee arthritis  He has tried conservative treatment without adequate relief  We discussed treatment options as well as risks and benefits of treatment options  The patient would like to proceed with a left total knee arthroplasty  The risks of surgery include, but are not limited to infection, blood clot, wound healing problems, blood loss, damage to blood vessels and nerves, persistent pain and stiffness, fracture, need for additional surgery, need for revision surgery, failure of hardware, heart attack, stroke, death  The patient understood and agreed to by oral and written consent  I answered all questions regarding surgery    • Left TKA currently scheduled for 8/14/2023  • The patient will be on asprin 325mg BID for DVT prophylaxis - explained to patient that he will discontinue 81mg asprin for cardiac health while on this protocol  · The patient will obtain clearance from cardiology, internal medicine, and dentist  · Potential same day discharge  · Vitamin supplementation and post op pain medication protocol were reviewed with patient in great detail  · Post-operative rehabilitation and follow up protocol were reviewed with patient    Return Postop, for Post-op evaluation  I answered all of the patient's questions during the visit and provided education of the patient's condition during the visit  The patient verbalized understanding of the information given and agrees with the plan  This note was dictated using [employer] software  It may contain errors including improperly dictated words  Please contact physician directly for any questions  Subjective   Chief Complaint:   Chief Complaint   Patient presents with   • Left Knee - Follow-up       HPI  Allegra Godinez is a 67 y o  male who presents for follow up for bilateral knee pain  Yakut translation provided by Max Ellison MA  Patient received bilateral knee steroid injections at his last visit on 05/04/2023 and reports good pain relief from these injections, unfortunately his pain returned after approximately 1-1 5 months  Patient states that he would like to move forward with left total knee replacement which is currently scheduled for 8/14/2023  On today's presentation, he states that his left knee pain is worse than right knee pain  He reports stiffness, soreness, and pain exacerbation with knee flexion and extension motions  Patient has been wearing a knee sleeve for compression and support  Patient states he is not taking anything for pain  History of MRSA: no  History of blood clots: no  Family history of blood clots: no  History of Hepatitis C:no  History of HIV: no  Are you a smoker:no  Are you diabetic:no  Do you see a cardiologist: no  Have you been vaccinated for COVID:yes  Have you seen a dentist in the past year: no, 3-5 years    Review of Systems  ROS:    See HPI for musculoskeletal review     All other systems reviewed are negative     History:  Past Medical History:   Diagnosis Date   • Cataract    • Colon polyp    • Diverticulosis    • Gastritis    • GERD (gastroesophageal reflux disease)    • Hyperlipidemia    • Hypertension    • Pre-diabetes    • Throat ulcer    • Visual "impairment      Past Surgical History:   Procedure Laterality Date   • COLONOSCOPY N/A 08/18/2016    Procedure: COLONOSCOPY;  Surgeon: Corina Sams MD;  Location: [facility]; Service:    • COLONOSCOPY W/ POLYPECTOMY  2019    repeat 2022   • EYE SURGERY Bilateral    • UPPER GASTROINTESTINAL ENDOSCOPY  2019     Social History   Social History     Substance and Sexual Activity   Alcohol Use No    Comment: pt \"sober for 30+ years\"     Social History     Substance and Sexual Activity   Drug Use No     Social History     Tobacco Use   Smoking Status Former   Smokeless Tobacco Never     Family History:   Family History   Problem Relation Age of Onset   • Hypertension Mother    • Hypertension Father        Current Outpatient Medications on File Prior to Visit   Medication Sig Dispense Refill   • amLODIPine (NORVASC) 5 mg tablet Take 1 tablet (5 mg total) by mouth daily 90 tablet 3   • atorvastatin (LIPITOR) 20 mg tablet Take 1 tablet (20 mg total) by mouth daily at bedtime 90 tablet 3   • Diclofenac Sodium (VOLTAREN) 1 % Apply 2 g topically 4 (four) times a day 100 g 5   • finasteride (PROSCAR) 5 mg tablet Take 1 tablet (5 mg total) by mouth daily 90 tablet 3   • linaCLOtide (Linzess) 145 MCG CAPS Take 1 capsule (145 mcg total) by mouth daily 30 capsule 5   • lisinopril-hydrochlorothiazide (PRINZIDE,ZESTORETIC) 20-25 MG per tablet Take 1 tablet by mouth daily 90 tablet 3     No current facility-administered medications on file prior to visit  Allergies   Allergen Reactions   • Lactose - Food Allergy Diarrhea     diarrhea        Objective     /66   Pulse 59   Ht 5' 4\" (1 626 m)   Wt 92 2 kg (203 lb 3 2 oz)   BMI 34 88 kg/m²      PE:  AAOx 3  WDWN  Hearing intact, no drainage from eyes  no audible wheezing  no abdominal distension  LE compartments soft, skin intact    Ortho Exam:  bilateral Knee:    Antalgic gait pattern, uses single point cane as assistive device  No erythema  No swelling  Mild effusion  No " warmth  TTP over medial joint line  AROM: right 5- 115, left 3- 105  Stable to varus/valgus stress  Positive crepitus with active and passive motion    Study review:  Attending Physician has personally reviewed pertinent imaging in PACS, impression is as follows:    Review of radiographic series taken 6/7/2023 of the left knee shows advanced tricompartmental osteoarthritis of the bilateral knees, left more significant that right   Evidence of diffuse osteophyte formation, subchondral sclerosis, and complete joint space loss of the medial and lateral compartments in PA flex view      Procedures   No procedures performed this visit    Scribe Attestation    I,:  Georgette Pickett am acting as a scribe while in the presence of the attending physician :       I,:  Sheree Mcgee, DO personally performed the services described in this documentation    as scribed in my presence :

## 2023-06-09 ENCOUNTER — TELEPHONE (OUTPATIENT)
Dept: OBGYN CLINIC | Facility: CLINIC | Age: 73
End: 2023-06-09

## 2023-06-09 NOTE — TELEPHONE ENCOUNTER
Caller: Patient ( utilized)     Doctor: Dr Garibay     Reason for call: Needs some clarification on the referrals and where he is to be going to get cleared for surgery     Call back#: [phone]

## 2023-06-09 NOTE — TELEPHONE ENCOUNTER
Please use translation phones for 3 way call with  and patient if I am not available when you get back

## 2023-06-12 NOTE — TELEPHONE ENCOUNTER
Caller: Patient/    Doctor: Zina Parmar    Reason for call: Patient asked if he needs to see the dentist prior to surgery?  Advised will have office contact patient    Call back#: [phone]

## 2023-06-22 ENCOUNTER — OFFICE VISIT (OUTPATIENT)
Dept: DENTISTRY | Facility: CLINIC | Age: 73
End: 2023-06-22

## 2023-06-22 DIAGNOSIS — Z01.21 ENCOUNTER FOR DENTAL EXAMINATION AND CLEANING WITH ABNORMAL FINDINGS: Primary | ICD-10-CM

## 2023-06-22 PROCEDURE — D0150 COMPREHENSIVE ORAL EVALUATION - NEW OR ESTABLISHED PATIENT: HCPCS

## 2023-06-22 PROCEDURE — D0272 BITEWINGS - 2 RADIOGRAPHIC IMAGES: HCPCS

## 2023-06-22 PROCEDURE — D0330 PANORAMIC RADIOGRAPHIC IMAGE: HCPCS

## 2023-06-22 NOTE — PROGRESS NOTES
Comprehensive Exam/Clearance for Knee Replacement Surgery    Samreen Posey presents for a comprehensive exam  Verbal consent for treatment given in addition to the forms  Vietnamese iPAD  used  Reviewed health history - Patient is ASA II  Consents signed: Yes    Perio: 2B  Pain Scale: 0/10  Caries Assessment: moderate  Radiographs: Bitewing 2, Panorex    Oral Hygiene instruction reviewed and given  Hygiene recall visits recommended to the patient  Treatment Plan:  1  Infection control: N/A  2  Periodontal therapy: prophy for knee clearance; will re-evaluate after if SRPs are needed  3  Caries control: none  4  Occlusal evaluation: supra erupted teeth, but not hitting mandibular ridge    Prognosis is Good  Referrals needed: No    Next visit: prophy w/ hygiene for clearance for knee replacement  Pt does not have form from  , will need to call   (# below) to provide clearance  Remind pt to discuss w/ orthopedic surgeon if he needs to take antibiotics prior to dental appts in future after knee replacement surgery    LEFT KNEE REPLACEMENT  SURGERY DAY 08-  DR LEIVA  TEL: [phone]

## 2023-07-03 ENCOUNTER — OFFICE VISIT (OUTPATIENT)
Dept: DENTISTRY | Facility: CLINIC | Age: 73
End: 2023-07-03

## 2023-07-03 VITALS — DIASTOLIC BLOOD PRESSURE: 52 MMHG | SYSTOLIC BLOOD PRESSURE: 133 MMHG

## 2023-07-03 DIAGNOSIS — Z01.21 ENCOUNTER FOR DENTAL EXAMINATION AND CLEANING WITH ABNORMAL FINDINGS: Primary | ICD-10-CM

## 2023-07-03 PROCEDURE — D1110 PROPHYLAXIS - ADULT: HCPCS

## 2023-07-03 NOTE — DENTAL PROCEDURE DETAILS
Pt presented to [facility] dental clinic for previously scheduled prophy apt. And to obtain dental clearance for upcoming scheduled knee replacement surgery  8/14/23. Used Andorran translation Ipad [id number] & [id number]  Cc: none  Reviewed medical history    Prophy completed  Heavy supra and subgingival plaque, calculus and bldg. present  Hand scaled, flossed polished. Stressed importance of better homecare. Told pt using  that prior to surgery he needed SRP due to periodontal disease.  Pt understands and was ok with our trt plan  Dr. Constanza Burt denied clearance for dental until SRPs completed    [facility]  Form given to pt to give to his surgeon

## 2023-07-17 ENCOUNTER — CONSULT (OUTPATIENT)
Dept: CARDIOLOGY CLINIC | Facility: CLINIC | Age: 73
End: 2023-07-17
Payer: COMMERCIAL

## 2023-07-17 VITALS
WEIGHT: 200 LBS | BODY MASS INDEX: 34.15 KG/M2 | SYSTOLIC BLOOD PRESSURE: 136 MMHG | HEIGHT: 64 IN | DIASTOLIC BLOOD PRESSURE: 70 MMHG | HEART RATE: 51 BPM

## 2023-07-17 DIAGNOSIS — Z01.810 PREOP CARDIOVASCULAR EXAM: Primary | ICD-10-CM

## 2023-07-17 DIAGNOSIS — E78.2 MIXED HYPERLIPIDEMIA: ICD-10-CM

## 2023-07-17 DIAGNOSIS — E66.9 CLASS 1 OBESITY WITH SERIOUS COMORBIDITY AND BODY MASS INDEX (BMI) OF 34.0 TO 34.9 IN ADULT, UNSPECIFIED OBESITY TYPE: ICD-10-CM

## 2023-07-17 DIAGNOSIS — Z01.818 PREOPERATIVE TESTING: ICD-10-CM

## 2023-07-17 DIAGNOSIS — M17.12 PRIMARY OSTEOARTHRITIS OF LEFT KNEE: ICD-10-CM

## 2023-07-17 DIAGNOSIS — I10 PRIMARY HYPERTENSION: ICD-10-CM

## 2023-07-17 PROCEDURE — 99204 OFFICE O/P NEW MOD 45 MIN: CPT | Performed by: STUDENT IN AN ORGANIZED HEALTH CARE EDUCATION/TRAINING PROGRAM

## 2023-07-17 PROCEDURE — 93000 ELECTROCARDIOGRAM COMPLETE: CPT | Performed by: STUDENT IN AN ORGANIZED HEALTH CARE EDUCATION/TRAINING PROGRAM

## 2023-07-17 NOTE — PROGRESS NOTES
Cardiology Consultation     Kev Ovalles  [id number]  1950  [facility] CARDIOLOGY [address] [place] [address]      1. Preop cardiovascular exam  POCT ECG      2. Primary hypertension        3. Mixed hyperlipidemia        4. Class 1 obesity with serious comorbidity and body mass index (BMI) of 34.0 to 34.9 in adult, unspecified obesity type        5. Primary osteoarthritis of left knee  Ambulatory referral to Cardiology      6. Preoperative testing  Ambulatory referral to Cardiology          Discussion/Summary:  Preop cardiovascular exam  - Patient scheduled for left knee total arthroscopy on 8/14/2023  -No acute ischemic changes on ECG, relatively unchanged from prior on 9/15/2021  -Able to complete greater than 4 METS of activity without any chest pain or other cardiac symptoms  - No significant murmur or other concerning physical exam findings  - He is stable and low risk from a cardiac standpoint proceed with his left knee total arthroscopy  - He does not require any further cardiac testing at this time  Hypertension  - Continue with amlodipine 5 mg daily, and lisinopril/HCTZ 20/25 mg daily  -Blood pressure is well controlled today  Hyperlipidemia  - Continue with atorvastatin 20 mg daily  -Lipid profile 2/22/2023 showed total cholesterol 140, triglycerides 60, HDL 67, LDL 61  Obesity  - BMI of 34  -Encouraged healthy diet and exercising  Prediabetes  - Hemoglobin A1c 5.6 on 2/22/2023  Former smoker  Osteoarthritis    Follow-up as needed. History of Present Illness:  Kev Ovalles is a 67y.o. year old male with a past medical history of hypertension, hyperlipidemia, prediabetes, obesity, former smoker, GERD and osteoarthritis. Patient is mostly Belarusian-speaking, but does speak some [place]. Offered to use our  phone, however patient preferred to use [employer] on his phone for the visit. He reports feeling well today.   His main complaint is pain in his left knee which has been making it difficult for him to ambulate. He does go to the senior center every day and it takes him about 20 to 30 minutes in total each way to walk to the bus and then to the senior center. Denies any episodes of chest pain, palpitations, shortness of breath, or other cardiac symptoms with exertion. Denies any prior cardiac history. Denies any problems with anesthesia in the past.  Former smoker and former alcohol use.     Patient Active Problem List   Diagnosis   • HTN (hypertension)   • GERD (gastroesophageal reflux disease)   • Prediabetes   • Hypocalcemia   • Mixed hyperlipidemia   • Chronic pain of both knees   • Preoperative testing   • Primary osteoarthritis of left knee     Past Medical History:   Diagnosis Date   • Cataract    • Colon polyp    • Diverticulosis    • Gastritis    • GERD (gastroesophageal reflux disease)    • Hyperlipidemia    • Hypertension    • Pre-diabetes    • Throat ulcer    • Visual impairment      Social History     Socioeconomic History   • Marital status: Single     Spouse name: Not on file   • Number of children: Not on file   • Years of education: Not on file   • Highest education level: Not on file   Occupational History   • Not on file   Tobacco Use   • Smoking status: Former   • Smokeless tobacco: Never   Vaping Use   • Vaping Use: Never used   Substance and Sexual Activity   • Alcohol use: No     Comment: pt "sober for 30+ years"   • Drug use: No   • Sexual activity: Not Currently     Partners: Female     Comment:    Other Topics Concern   • Not on file   Social History Narrative    Lives with family     Social Determinants of Health     Financial Resource Strain: Low Risk  (2/9/2023)    Overall Financial Resource Strain (CARDIA)    • Difficulty of Paying Living Expenses: Not hard at all   Food Insecurity: No Food Insecurity (5/26/2021)    Hunger Vital Sign    • Worried About Running Out of Food in the Last Year: Never true    • Ran Out of Food in the Last Year: Never true   Transportation Needs: No Transportation Needs (2/9/2023)    PRAPARE - Transportation    • Lack of Transportation (Medical): No    • Lack of Transportation (Non-Medical): No   Physical Activity: Inactive (5/26/2021)    Exercise Vital Sign    • Days of Exercise per Week: 0 days    • Minutes of Exercise per Session: 0 min   Stress: Not on file   Social Connections: Not on file   Intimate Partner Violence: Not on file   Housing Stability: Not on file      Family History   Problem Relation Age of Onset   • Hypertension Mother    • Hypertension Father      Past Surgical History:   Procedure Laterality Date   • COLONOSCOPY N/A 08/18/2016    Procedure: COLONOSCOPY;  Surgeon: Giselle Meier MD;  Location: [facility];   Service:    • COLONOSCOPY W/ POLYPECTOMY  2019    repeat 2022   • EYE SURGERY Bilateral    • UPPER GASTROINTESTINAL ENDOSCOPY  2019       Current Outpatient Medications:   •  amLODIPine (NORVASC) 5 mg tablet, Take 1 tablet (5 mg total) by mouth daily, Disp: 90 tablet, Rfl: 3  •  atorvastatin (LIPITOR) 20 mg tablet, Take 1 tablet (20 mg total) by mouth daily at bedtime, Disp: 90 tablet, Rfl: 3  •  linaCLOtide (Linzess) 145 MCG CAPS, Take 1 capsule (145 mcg total) by mouth daily, Disp: 30 capsule, Rfl: 5  •  lisinopril-hydrochlorothiazide (PRINZIDE,ZESTORETIC) 20-25 MG per tablet, Take 1 tablet by mouth daily, Disp: 90 tablet, Rfl: 3  •  Diclofenac Sodium (VOLTAREN) 1 %, Apply 2 g topically 4 (four) times a day (Patient not taking: Reported on 7/17/2023), Disp: 100 g, Rfl: 5  •  finasteride (PROSCAR) 5 mg tablet, Take 1 tablet (5 mg total) by mouth daily (Patient not taking: Reported on 7/17/2023), Disp: 90 tablet, Rfl: 3  Allergies   Allergen Reactions   • Lactose - Food Allergy Diarrhea     diarrhea         Labs:  Lab Results   Component Value Date    ALT 34 05/09/2022    AST 22 05/09/2022    BUN 16 05/09/2022    CALCIUM 9.0 05/09/2022     2022    CHOL 167 2015    CO2 30 2022    CREATININE 0.87 2022    HDL 77 2022    HCT 39.2 2022    HGB 12.7 2022    HGBA1C 5.6 2023     2022    K 3.8 2022    PSA 1.6 2022     10/28/2015    TRIG 111 2022    WBC 8.82 2022       Imaging: No results found. EC2023: Sinus bradycardia at a rate of 51 bpm, possible septal infarct age-indeterminate    Review of Systems:  Review of Systems   Constitutional: Negative for chills, diaphoresis, fatigue and fever. HENT: Negative for congestion. Eyes: Negative for photophobia and visual disturbance. Respiratory: Negative for chest tightness and shortness of breath. Cardiovascular: Negative for chest pain, palpitations and leg swelling. Gastrointestinal: Negative for abdominal distention, abdominal pain, diarrhea, nausea and vomiting. Genitourinary: Negative for difficulty urinating and dysuria. Musculoskeletal: Positive for arthralgias and gait problem. Negative for joint swelling. Skin: Negative for color change, pallor and rash. Neurological: Negative for dizziness, syncope, numbness and headaches. Psychiatric/Behavioral: Negative for agitation, behavioral problems and confusion.          Vitals:    23 1542   BP: 136/70   Pulse: (!) 51      Vitals:    23 1542   Weight: 90.7 kg (200 lb)     Height: 5' 4" (162.6 cm)     Physical Exam:  General appearance:  Appears stated age, alert, well appearing and in no distress  HEENT:  PERRLA, EOMI, no scleral icterus, no conjunctival pallor  NECK:  Supple, No elevated JVP, no thyromegaly, no carotid bruits  HEART:  Regular rate and rhythm, normal S1/S2, no S3/S4, no murmur or rub  LUNGS:  Clear to auscultation bilaterally  ABDOMEN:  Soft, non-tender, positive bowel sounds, no rebound or guarding, no organomegaly   EXTREMITIES:  No edema  VASCULAR:  Normal pedal pulses   SKIN: No lesions or rashes on exposed skin  NEURO:  CN II-XII intact, no focal deficits

## 2023-07-18 ENCOUNTER — TELEPHONE (OUTPATIENT)
Dept: OBGYN CLINIC | Facility: HOSPITAL | Age: 73
End: 2023-07-18

## 2023-07-18 NOTE — TELEPHONE ENCOUNTER
Caller: [address] [place] [address]    Doctor: Gina Pérez     Reason for call: pt is requesting his PAT bloodwork be faxed to # [phone] Attn: [address] [place] [address]    Call back#: [phone]

## 2023-07-19 ENCOUNTER — TELEPHONE (OUTPATIENT)
Dept: OBGYN CLINIC | Facility: HOSPITAL | Age: 73
End: 2023-07-19

## 2023-07-19 LAB — HBA1C MFR BLD HPLC: 5.6 %

## 2023-07-24 ENCOUNTER — ANESTHESIA EVENT (OUTPATIENT)
Dept: PERIOP | Facility: HOSPITAL | Age: 73
End: 2023-07-24
Payer: COMMERCIAL

## 2023-07-24 ENCOUNTER — OFFICE VISIT (OUTPATIENT)
Dept: DENTISTRY | Facility: CLINIC | Age: 73
End: 2023-07-24

## 2023-07-24 VITALS — DIASTOLIC BLOOD PRESSURE: 75 MMHG | HEART RATE: 73 BPM | SYSTOLIC BLOOD PRESSURE: 144 MMHG

## 2023-07-24 DIAGNOSIS — K05.30 PERIODONTITIS: Primary | ICD-10-CM

## 2023-07-24 PROCEDURE — D4342 PERIODONTAL SCALING AND ROOT PLANING - 1 TO 3 TEETH PER QUADRANT: HCPCS

## 2023-07-24 NOTE — PRE-PROCEDURE INSTRUCTIONS
Pre-Surgery Instructions:   Medication Instructions   • amLODIPine (NORVASC) 5 mg tablet Take Day of Surgery; Continue to take as prescribed including DOS with a small sip of water, unless usually taken at night   • atorvastatin (LIPITOR) 20 mg tablet Take Day of Surgery; Continue to take as prescribed including DOS with a small sip of water, unless usually taken at night   • linaCLOtide (Linzess) 145 MCG CAPS Take Day of Surgery; Continue to take as prescribed including DOS with a small sip of water, unless usually taken at night   • lisinopril-hydrochlorothiazide (PRINZIDE,ZESTORETIC) 20-25 MG per tablet Do not take day of surgery; continue as prescribed excluding DOS    Medication instructions for day surgery reviewed. Please use only a sip of water to take your instructed medications. Avoid all over the counter vitamins, supplements and NSAIDS for one week prior to surgery per anesthesia guidelines. Tylenol is ok to take as needed. You will receive a call one business day prior to surgery with an arrival time and hospital directions. If your surgery is scheduled on a Monday, the hospital will be calling you on the Friday prior to your surgery. If you have not heard from anyone by 8pm, please call the hospital supervisor through the hospital  at [phone]. Reema Perez [phone]). Do not eat or drink anything after midnight the night before your surgery, including candy, mints, lifesavers, or chewing gum. Do not drink alcohol 24hrs before your surgery. Try not to smoke at least 24hrs before your surgery. Follow the pre surgery showering instructions as listed in the [facility] Surgical Experience Booklet” or otherwise provided by your surgeon's office. Do not shave the surgical area 24 hours before surgery. Do not apply any lotions, creams, including makeup, cologne, deodorant, or perfumes after showering on the day of your surgery. No contact lenses, eye make-up, or artificial eyelashes. Remove nail polish, including gel polish, and any artificial, gel, or acrylic nails if possible. Remove all jewelry including rings and body piercing jewelry. Wear causal clothing that is easy to take on and off. Consider your type of surgery. Keep any valuables, jewelry, piercings at home. Please bring any specially ordered equipment (sling, braces) if indicated. Arrange for a responsible person to drive you to and from the hospital on the day of your surgery. Visitor Guidelines discussed. Call the surgeon's office with any new illnesses, exposures, or additional questions prior to surgery. Please reference your [facility] Surgical Experience Booklet” for additional information to prepare for your upcoming surgery. See Geriatric Assessment below. ..    • Cognitive Assessment:   • CAM:   • TUG <15 sec:  • Falls (last 6 months): no  • Hand  score:  -Attempt 1:  -Attempt 2:  -Attempt 3:  • Elliott Total Score: 18  • PHQ- 9 Depression Scale:0  • Nutrition Assessment Score:12  • METS:7.73  • Health goals:  -What are your overall health goals? (quit smoking, wt. loss, rest, decrease stress)  Lose weight, increase mobility    -What brings you strength? (family, friends, Confucianism, health)  Family and friends    -What activities are important to you? (exercise, reading, travel, work)  exercise

## 2023-07-24 NOTE — PROGRESS NOTES
SRP in quads : UR,UL,LL 1-3 teeth each quad    Reviewed meds/hhx in [facility]  ASA: 2    Patient needs clearance for knee replacement surgery, he is already scheduled for surgery 8-14-23    Anesthesia-  Topical gel benzocaine 20% was applied to tissue. UR/UL/LL Buccal infiltrations, short, 1 carp 4% Septo w/ epi 1:100,000  Neg aspirations and no complications for all. Cavitron and hand instruments used  Bleeding: heavy  Supra/sub calculus was removed from tooth surfaces  Irrigated w/ .12% Chlorhexidine Gluconate    OHI: reviewed with the patient the need to maintain proper oral hygiene regimen at home. Brushing 2x/day for 2 minutes, flossing subgingivally daily and mouthrinse 1-2x/day for 60 seconds. Recommend otc pain reliever when he gets home prior to local anesthesia wearing off. Recommend warm salt water rinses. Recommend excellent homecare regimen especially on post SRP. Advised patient periodontal disease is an oral disease we can treat and maintain but cannot cure. Without proper dental visits and proper at home dental care this disease may return. Patient understands. Following scaling and root planing, a 4-6 week Perio Re-eval is recommended, to evaluate whether patient will need further periodontal surgery. Recommend maintaining 3/4 month recalls. Clearance form was printed and given to Dr Bailey Bailey for clearance approval prior to patient leaving today.

## 2023-07-25 DIAGNOSIS — M17.12 PRIMARY OSTEOARTHRITIS OF LEFT KNEE: Primary | ICD-10-CM

## 2023-07-25 RX ORDER — ASCORBIC ACID 500 MG
500 TABLET ORAL DAILY
Qty: 30 TABLET | Refills: 1 | Status: SHIPPED | OUTPATIENT
Start: 2023-07-25

## 2023-07-25 RX ORDER — FERROUS SULFATE TAB EC 324 MG (65 MG FE EQUIVALENT) 324 (65 FE) MG
324 TABLET DELAYED RESPONSE ORAL
Qty: 30 TABLET | Refills: 1 | Status: SHIPPED | OUTPATIENT
Start: 2023-07-25

## 2023-07-25 RX ORDER — FOLIC ACID 1 MG/1
1 TABLET ORAL DAILY
Qty: 30 TABLET | Refills: 1 | Status: SHIPPED | OUTPATIENT
Start: 2023-07-25

## 2023-07-25 RX ORDER — MULTIVITAMIN
1 TABLET ORAL DAILY
Qty: 30 TABLET | Refills: 1 | Status: SHIPPED | OUTPATIENT
Start: 2023-07-25

## 2023-07-26 NOTE — TELEPHONE ENCOUNTER
Preoperative Elective Admission Assessment s/w pt x25m w/ sp , Lee #[id number]    •     Living Situation: lives alone in [place] apartment , w/ elevator. Declined SW ref. First Floor Setup:  yes     DME: cane. RW ordered. pt aware [employer] will reach out about to the pt directly with financial responsibility and delivery information for pt's ordered DME. Patient's Current Level of Function: ambulates using the cane. Independent with ADLs. Post-op Caregiver and transportation: "many friends". Confirms he has spoken with them to confirm that a responsible friend can drive him home and get him settled in the apartment. Outpatient Physical Therapy Site:  Site: [facility]   pre and post-op appts scheduled? Y     Medication Management: self   Preferred Pharmacy for Post-op Medications: [address]   Blood Management Vitamin Regimen: taking as prescribed. Denies questions. Post-op anticoagulant: per surgeon ov note ""The patient will be on asprin 325mg BID for DVT prophylaxis - explained to patient that he will discontinue 81mg asprin for cardiac health while on this protocol"     DC Plan: pt plans for DC to home. Barriers to DC identified preoperatively: None    BMI: 34.33    Pt IQ: Pt is Belarusian speaking/writing, would need Mongolian Pt IQ if available. Needs all surveys. Patient Education:  Pt educated on post-op pain, early mobilization (POD0), same day DC, OP PT goal.  Patient educated that our goal is to appropriately discharge patient based off their post-op function while striving to maintain maximal independence. The goal is to discharge patient to home and for them to attend outpatient physical therapy.     Assigned to care team? y    Reviewed per OV note  • "The patient will be on asprin 325mg BID for DVT prophylaxis - explained to patient that he will discontinue 81mg asprin for cardiac health while on this protocol  • The patient will obtain clearance from cardiology, internal medicine, and dentist  Potential same day discharge"    CHW/SW: Pt declined. Pt encouraged to call with any questions, concerns or issues.

## 2023-07-31 ENCOUNTER — EVALUATION (OUTPATIENT)
Dept: PHYSICAL THERAPY | Facility: REHABILITATION | Age: 73
End: 2023-07-31
Payer: COMMERCIAL

## 2023-07-31 DIAGNOSIS — Z01.818 PREOPERATIVE TESTING: ICD-10-CM

## 2023-07-31 DIAGNOSIS — M17.12 PRIMARY OSTEOARTHRITIS OF LEFT KNEE: ICD-10-CM

## 2023-07-31 PROCEDURE — 97161 PT EVAL LOW COMPLEX 20 MIN: CPT

## 2023-07-31 NOTE — PROGRESS NOTES
Internal Medicine Pre-Operative Evaluation:     Reason for Visit: Pre-operative Evaluation for Risk Stratification and Optimization    Patient ID: Oscar Pollock is a 67 y.o. male. Surgery: Arthroplasty of left knee  Referring Provider: Dr. Young Barreto      Recommendations to Proceed withSurgery    Patient is considered to be Low risk for Medium risk procedure. After evaluation and discussion with patient with emphasis that all surgery has some degree of inherent risk it is determined this procedure is of acceptable risk  medically. Patient may proceed with planned procedure      Assessment      Primary osteoarthritis left knee  • Failed conservative measures  • Electing to undergo total joint arthroplasty    Pre-operative Medical Evaluation for planned surgery  • Patient meets preoperative quality goals as noted below  • Recommendations as listed in PLAN section below  • Contact surgical nurse  navigator with any questions regarding preoperative plan or schedule. HTN  • Stable  • Cont current medications as directed  • Monitor post operative BP   • Hold lisinopril/hctz the day prior to surgery and the morning of surgery  • OK to take amlodipine the morning of surgery    GERD  • Cont PPI  • Monitor for nausea/vomiting    Poor dentition  · Recent cleaning  · Recommend frequent follow up for periodontal disease    Obesity  • Recommend ongoing attempts at weight loss  • Current BMI 34          Patient Active Problem List   Diagnosis   • HTN (hypertension)   • GERD (gastroesophageal reflux disease)   • Prediabetes   • Hypocalcemia   • Mixed hyperlipidemia   • Chronic pain of both knees   • Preoperative testing   • Primary osteoarthritis of left knee        Plan:     1. Further preoperative workup as follows:   - none no further testing required may proceed with surgery    2.  Medication Management/Recommendations:   - continue all current medicines through morning of surgery with sip of water except the following:  - hold ACE / ARB specifically  24 hours prior to surgery  - hold diuretic / water pill  24 hours prior to surgery  - hold aspirin 7 days prior to surgery  - avoid use of NSAID such as motrin, advil, aleve for 7 days prior to surgery  - hold all OTC herbal or nutritional supplements 7 days before surgery    3. Patient requires further consultation with:   No Consults Required    4. Discharge Planning / Barriers to Discharge  none identified - patients has post discharge therapy plan in place, transportation arranged for discharge day, adequate family support at home to assist with discharge to home. Subjective:           History of Present Illness:     Trudy Lassiter is a 67 y.o. male who presents to the office today for a preoperative consultation at the request of surgeon. The patient understands this is an elective procedure and not emergent. They are electing to undergo planned procedure with an understanding that all surgery has inherent risk. They have worked with their surgeon and failed conservative treatment measures. Today they present for preoperative risk assessment and recommendations for optimization in preparation for surgery. Pt seen in surgical optimization center for upcoming proposed surgery. They have failed previous conservative measures and have elected surgical intervention. Pt meets presurgical lab and BMI optimization goals. Upon interview questioning patient is able to perform greater than 4 METs workload in daily life without any reported cardio-pulmonary symptoms. Cardiology cleared patient 7/17/23          ROS: No TIA's or unusual headaches, no dysphagia. No prolonged cough. No dyspnea or chest pain on exertion. No abdominal pain, change in bowel habits, black or bloody stools. No urinary tract or BPH symptoms. Positive reported pain in arthritic joint. Positive difficulty with gait. No skin rashes or issues. Objective:      /74   Pulse 61   Ht 5' 4" (1.626 m)   Wt 90.2 kg (198 lb 12.8 oz)   BMI 34.12 kg/m²       General Appearance: no distress, conversive  HEENT: PERRLA, conjuctiva normal; oropharynx clear; mucous membranes moist;   Neck:  Supple, no lymphadenopathy or thyromegaly  Lungs: breath sounds normal, normal respiratory effort, no retractions, expiratory effort normal  CV: normal heart sounds S1/S2, PMI normal   ABD: soft non tender, no masses , no hepatic or splenomegaly  EXT: DP pulses intact, no lymphadenopathy, no edema  Skin: normal turgor, normal texture, no rash  Psych: affect normal, mood normal  Neuro: AAOx3        The following portions of the patient's history were reviewed and updated as appropriate: allergies, current medications, past family history, past medical history, past social history, past surgical history and problem list.     Past History:       Past Medical History:   Diagnosis Date   • Cataract    • Colon polyp    • Diverticulosis    • Gastritis    • GERD (gastroesophageal reflux disease)    • Hyperlipidemia    • Hypertension    • Pre-diabetes    • Throat ulcer    • Visual impairment     Past Surgical History:   Procedure Laterality Date   • COLONOSCOPY N/A 08/18/2016    Procedure: COLONOSCOPY;  Surgeon: Ani Lowery MD;  Location: [facility]; Service:    • COLONOSCOPY W/ POLYPECTOMY  2019    repeat 2022   • EYE SURGERY Bilateral    • TONSILLECTOMY     • UPPER GASTROINTESTINAL ENDOSCOPY  2019          Social History     Tobacco Use   • Smoking status: Former   • Smokeless tobacco: Never   Vaping Use   • Vaping Use: Never used   Substance Use Topics   • Alcohol use: No     Comment: pt "sober for 30+ years"   • Drug use: No     Family History   Problem Relation Age of Onset   • Hypertension Mother    • Hypertension Father           Allergies:      Allergies   Allergen Reactions   • Lactose - Food Allergy Diarrhea     diarrhea        Current Medications:     Current Outpatient Medications   Medication Instructions   • amLODIPine (NORVASC) 5 mg, Oral, Daily   • ascorbic acid (VITAMIN C) 500 mg, Oral, Daily, Begin 30 days prior to surgery. • atorvastatin (LIPITOR) 20 mg, Oral, Daily at bedtime   • ferrous sulfate 324 mg, Oral, Daily before breakfast, Begin 30 days prior to surgery. • folic acid (FOLVITE) 1 mg, Oral, Daily, Begin 30 days prior to surgery. • Linzess 145 mcg, Oral, Daily   • lisinopril-hydrochlorothiazide (PRINZIDE,ZESTORETIC) 20-25 MG per tablet 1 tablet, Oral, Daily   • Multiple Vitamin (multivitamin) tablet 1 tablet, Oral, Daily, Begin 30 days prior to surgery. PRE-OP WORKSHEET DATA    Assessment of Pre-Operative Risks     MLJ Quality Hard Stops:  BMI (<40) : Estimated body mass index is 34.12 kg/m² as calculated from the following:    Height as of this encounter: 5' 4" (1.626 m). Weight as of this encounter: 90.2 kg (198 lb 12.8 oz). Hgb ( >11): Lab Results   Component Value Date    HGB 12.7 05/09/2022     HbA1c (<7.0) :   Lab Results   Component Value Date    HGBA1C 5.6 07/19/2023     GFR (>60) (Less then 45 = Nephrology consult):  CrCl cannot be calculated (Patient's most recent lab result is older than the maximum 7 days allowed. ). Active Decompensated Chronic Conditions which would delay surgery  No acutely decompensated medical issues such as recent CVA, MI, new onset arrhythmia, severe aortic stenosis, CHF, uncontrolled COPD       Exercise Capacity: (if less the 4 mets consider functional assessment via cardiac stress testing or consultation)    · Able to walk 2 blocks without symptoms?: Yes  · Able to walk 1 flights without symptoms?: Yes    Assessment of intra and post operative respiratory, hemodynamic and thrombotic risks     Prior Anesthesia Reactions: No     Personal history of venous thromboembolic disease? No    History of steroid use > 5 mg for >2 weeks within last year?  No    Cardiac Risk Estimation: per the Revised Cardiac Risk Index (Circ. 100:1043, 1999),     The patient's risk factors for cardiac complications include :  none    [facility]ell[facility] has a in hospital cardiac risk of RCI RISK CLASS I (0 risk factors, risk of major cardiac compl. appr. 0.5%) based on RCRI calculator          Pre-Op Data Reviewed:       Laboratory Results: I have personally reviewed the pertinent laboratory results/reports     EKG:I have personally reviewed pertinent reports. . I personally reviewed and interpreted available tracings in the electronic medical record    7/17/23=Sinus bradycardia at a rate of 51 bpm, possible septal infarct age-indeterminate    OLD RECORDS: reviewed old records in the chart review section if EHR on day of visit.     Previous cardiopulmonary studies within the past year:  · Echocardiogram: no  · Cardiac Catheterization: no  · Stress Test: no       Time of visit including pre-visit chart review, visit and post-visit coordination of plan and care , review of pre-surgical lab work, preparation and time spent documenting note in electronic medical record, time spent face-to-face in physical examination answering patient questions by care team 55 minutes             [address] [facility]

## 2023-07-31 NOTE — H&P (VIEW-ONLY)
Internal Medicine Pre-Operative Evaluation:     Reason for Visit: Pre-operative Evaluation for Risk Stratification and Optimization    Patient ID: Zeke Polo is a 67 y.o. male. Surgery: Arthroplasty of left knee  Referring Provider: Dr. Nisha Stephens      Recommendations to Proceed withSurgery    Patient is considered to be Low risk for Medium risk procedure. After evaluation and discussion with patient with emphasis that all surgery has some degree of inherent risk it is determined this procedure is of acceptable risk  medically. Patient may proceed with planned procedure      Assessment      Primary osteoarthritis left knee  • Failed conservative measures  • Electing to undergo total joint arthroplasty    Pre-operative Medical Evaluation for planned surgery  • Patient meets preoperative quality goals as noted below  • Recommendations as listed in PLAN section below  • Contact surgical nurse  navigator with any questions regarding preoperative plan or schedule. HTN  • Stable  • Cont current medications as directed  • Monitor post operative BP   • Hold lisinopril/hctz the day prior to surgery and the morning of surgery  • OK to take amlodipine the morning of surgery    GERD  • Cont PPI  • Monitor for nausea/vomiting    Poor dentition  · Recent cleaning  · Recommend frequent follow up for periodontal disease    Obesity  • Recommend ongoing attempts at weight loss  • Current BMI 34          Patient Active Problem List   Diagnosis   • HTN (hypertension)   • GERD (gastroesophageal reflux disease)   • Prediabetes   • Hypocalcemia   • Mixed hyperlipidemia   • Chronic pain of both knees   • Preoperative testing   • Primary osteoarthritis of left knee        Plan:     1. Further preoperative workup as follows:   - none no further testing required may proceed with surgery    2.  Medication Management/Recommendations:   - continue all current medicines through morning of surgery with sip of water except the following:  - hold ACE / ARB specifically  24 hours prior to surgery  - hold diuretic / water pill  24 hours prior to surgery  - hold aspirin 7 days prior to surgery  - avoid use of NSAID such as motrin, advil, aleve for 7 days prior to surgery  - hold all OTC herbal or nutritional supplements 7 days before surgery    3. Patient requires further consultation with:   No Consults Required    4. Discharge Planning / Barriers to Discharge  none identified - patients has post discharge therapy plan in place, transportation arranged for discharge day, adequate family support at home to assist with discharge to home. Subjective:           History of Present Illness:     Adam Mike is a 67 y.o. male who presents to the office today for a preoperative consultation at the request of surgeon. The patient understands this is an elective procedure and not emergent. They are electing to undergo planned procedure with an understanding that all surgery has inherent risk. They have worked with their surgeon and failed conservative treatment measures. Today they present for preoperative risk assessment and recommendations for optimization in preparation for surgery. Pt seen in surgical optimization center for upcoming proposed surgery. They have failed previous conservative measures and have elected surgical intervention. Pt meets presurgical lab and BMI optimization goals. Upon interview questioning patient is able to perform greater than 4 METs workload in daily life without any reported cardio-pulmonary symptoms. Cardiology cleared patient 7/17/23          ROS: No TIA's or unusual headaches, no dysphagia. No prolonged cough. No dyspnea or chest pain on exertion. No abdominal pain, change in bowel habits, black or bloody stools. No urinary tract or BPH symptoms. Positive reported pain in arthritic joint. Positive difficulty with gait. No skin rashes or issues. Objective:      /74   Pulse 61   Ht 5' 4" (1.626 m)   Wt 90.2 kg (198 lb 12.8 oz)   BMI 34.12 kg/m²       General Appearance: no distress, conversive  HEENT: PERRLA, conjuctiva normal; oropharynx clear; mucous membranes moist;   Neck:  Supple, no lymphadenopathy or thyromegaly  Lungs: breath sounds normal, normal respiratory effort, no retractions, expiratory effort normal  CV: normal heart sounds S1/S2, PMI normal   ABD: soft non tender, no masses , no hepatic or splenomegaly  EXT: DP pulses intact, no lymphadenopathy, no edema  Skin: normal turgor, normal texture, no rash  Psych: affect normal, mood normal  Neuro: AAOx3        The following portions of the patient's history were reviewed and updated as appropriate: allergies, current medications, past family history, past medical history, past social history, past surgical history and problem list.     Past History:       Past Medical History:   Diagnosis Date   • Cataract    • Colon polyp    • Diverticulosis    • Gastritis    • GERD (gastroesophageal reflux disease)    • Hyperlipidemia    • Hypertension    • Pre-diabetes    • Throat ulcer    • Visual impairment     Past Surgical History:   Procedure Laterality Date   • COLONOSCOPY N/A 08/18/2016    Procedure: COLONOSCOPY;  Surgeon: Moose Blackwell MD;  Location: [facility]; Service:    • COLONOSCOPY W/ POLYPECTOMY  2019    repeat 2022   • EYE SURGERY Bilateral    • TONSILLECTOMY     • UPPER GASTROINTESTINAL ENDOSCOPY  2019          Social History     Tobacco Use   • Smoking status: Former   • Smokeless tobacco: Never   Vaping Use   • Vaping Use: Never used   Substance Use Topics   • Alcohol use: No     Comment: pt "sober for 30+ years"   • Drug use: No     Family History   Problem Relation Age of Onset   • Hypertension Mother    • Hypertension Father           Allergies:      Allergies   Allergen Reactions   • Lactose - Food Allergy Diarrhea     diarrhea        Current Medications:     Current Outpatient Medications   Medication Instructions   • amLODIPine (NORVASC) 5 mg, Oral, Daily   • ascorbic acid (VITAMIN C) 500 mg, Oral, Daily, Begin 30 days prior to surgery. • atorvastatin (LIPITOR) 20 mg, Oral, Daily at bedtime   • ferrous sulfate 324 mg, Oral, Daily before breakfast, Begin 30 days prior to surgery. • folic acid (FOLVITE) 1 mg, Oral, Daily, Begin 30 days prior to surgery. • Linzess 145 mcg, Oral, Daily   • lisinopril-hydrochlorothiazide (PRINZIDE,ZESTORETIC) 20-25 MG per tablet 1 tablet, Oral, Daily   • Multiple Vitamin (multivitamin) tablet 1 tablet, Oral, Daily, Begin 30 days prior to surgery. PRE-OP WORKSHEET DATA    Assessment of Pre-Operative Risks     MLJ Quality Hard Stops:  BMI (<40) : Estimated body mass index is 34.12 kg/m² as calculated from the following:    Height as of this encounter: 5' 4" (1.626 m). Weight as of this encounter: 90.2 kg (198 lb 12.8 oz). Hgb ( >11): Lab Results   Component Value Date    HGB 12.7 05/09/2022     HbA1c (<7.0) :   Lab Results   Component Value Date    HGBA1C 5.6 07/19/2023     GFR (>60) (Less then 45 = Nephrology consult):  CrCl cannot be calculated (Patient's most recent lab result is older than the maximum 7 days allowed. ). Active Decompensated Chronic Conditions which would delay surgery  No acutely decompensated medical issues such as recent CVA, MI, new onset arrhythmia, severe aortic stenosis, CHF, uncontrolled COPD       Exercise Capacity: (if less the 4 mets consider functional assessment via cardiac stress testing or consultation)    · Able to walk 2 blocks without symptoms?: Yes  · Able to walk 1 flights without symptoms?: Yes    Assessment of intra and post operative respiratory, hemodynamic and thrombotic risks     Prior Anesthesia Reactions: No     Personal history of venous thromboembolic disease? No    History of steroid use > 5 mg for >2 weeks within last year?  No    Cardiac Risk Estimation: per the Revised Cardiac Risk Index (Circ. 100:1043, 1999),     The patient's risk factors for cardiac complications include :  none    [facility] has a in hospital cardiac risk of RCI RISK CLASS I (0 risk factors, risk of major cardiac compl. appr. 0.5%) based on RCRI calculator          Pre-Op Data Reviewed:       Laboratory Results: I have personally reviewed the pertinent laboratory results/reports     EKG:I have personally reviewed pertinent reports. . I personally reviewed and interpreted available tracings in the electronic medical record    7/17/23=Sinus bradycardia at a rate of 51 bpm, possible septal infarct age-indeterminate    OLD RECORDS: reviewed old records in the chart review section if EHR on day of visit.     Previous cardiopulmonary studies within the past year:  · Echocardiogram: no  · Cardiac Catheterization: no  · Stress Test: no       Time of visit including pre-visit chart review, visit and post-visit coordination of plan and care , review of pre-surgical lab work, preparation and time spent documenting note in electronic medical record, time spent face-to-face in physical examination answering patient questions by care team 55 minutes             [address]

## 2023-07-31 NOTE — PROGRESS NOTES
PT Evaluation     Today's date: 2023  Patient name: Max Mccabe  : 1950  MRN: [id number]  Referring provider: Pamella Broom  Dx:   Encounter Diagnosis     ICD-10-CM    1. Primary osteoarthritis of left knee  M17.12 Ambulatory referral to Physical Therapy     PT plan of care cert/re-cert      2. Preoperative testing  Z01.818 Ambulatory referral to Physical Therapy     PT plan of care cert/re-cert          Start Time: 1400  Stop Time: 1440  Total time in clinic (min): 40 minutes    Assessment  Assessment details: Max Mccabe is a  67 y.o. male presenting to PT for 1 pre-operative visit for a left TKA. Discussed DOS and patient's questions were answered to patient's satisfaction. Mobility/ROM results per above. Strength results per above. Balance/Gait (including Timed Up & Go) per above. Home Assessment was reviewed with patient. Home Preparation Checklist was reviewed with patient including identification of care partner. Post-operative pain management expectations discussed to the patient's satisfaction. Post-operative gait training for level ground, stairs, and car transfers was performed. Patient demonstrated competence with immediate post-operative home exercise program.  Expectation is that patient will be d/c to home with outpatient PT and will benefit from PT to address gait/strength dysfunction along with knee range of motion deficits to return to their prior level of functioning. Impairments: abnormal gait, abnormal or restricted ROM, abnormal movement, activity intolerance, impaired balance, impaired physical strength, lacks appropriate home exercise program and pain with function    Symptom irritability: moderateUnderstanding of Dx/Px/POC: good   Prognosis: good    Goals  Short Term Goals:   1. Patient will demonstrate independence with HEP by providing return demonstration of exercises  2.  Patient will report decreased symptom intensity during activity by 50%  3. Patient will negotiate full flight of stairs with LRAD  4. Patient will attain 90% of pre-op range of motion  5. Patient will reduce 5xsts by 3 seconds  6. Patient will reduce TUG time by 2 seconds  7. Patient will walk one mile without onset of knee pain    Long Term Goals:   1. Patient will improve FOTO to greater then goal  2. Patient will improve pain with activity to 2/10 or less  3. Patient will continue with HEP to allow for continued progress and function  4. Patient will equalize range of motion to contralateral side  5. Patient will reduce 5x sit to stand time by 9 seconds to demonstrate reduction in falls risk. 6. Patient will walk 2-3 miles without difficulty. Plan  Patient would benefit from: skilled physical therapy and PT eval  Referral necessary: No  Planned modality interventions: biofeedback, cryotherapy and thermotherapy: hydrocollator packs  Planned therapy interventions: joint mobilization, manual therapy, muscle pump exercises, neuromuscular re-education, patient education, strengthening, stretching, therapeutic activities, therapeutic exercise, home exercise program, graded exercise, graded activity, gait training, functional ROM exercises, flexibility, body mechanics training, balance, IASTM, kinesiology taping, massage, Nieves taping, motor coordination training and balance/weight bearing training  Frequency: 2x week  Plan of Care beginning date: 7/31/2023  Plan of Care expiration date: 10/9/2023  Treatment plan discussed with: patient        Subjective Evaluation    History of Present Illness  Mechanism of injury: Ver[place] Vyas is scheduled for a left TKA on 8/14 with Dr. Savage Collins. He reports 1-2 year of knee pain before his surgery. He says hie left leg gives him a lot of pain especially when he stands up from sitting down, or going up and down stairs. He says with standing or stairs, he gets a painful click in the front of his knee.  He is able to decrease his pain if he takes the stairs slowly or goes down stairs backwards. He says he wants to go up and down stairs easier, walk farther, and get up and down easier. He can currently walk about one mile before he has to stop because of his knee, and wants to walk about 4 miles. His daughter will be coming to stay with him for two weeks after surgery to help him. He lives on the 7th floor, but has an elevator to get to his floor. Recurrent probem    Quality of life: good    Patient Goals  Patient goals for therapy: increased motion, improved balance, decreased pain, increased strength, independence with ADLs/IADLs and return to sport/leisure activities  Patient's goals regarding treatment: Go up and down stairs easier, travel, walk longer distances (4 miles)   Pain  Current pain ratin  At best pain ratin  At worst pain rating: 10  Location: Left Knee    Social Support  Steps to enter house: yes  Stairs in house: no   Lives in: condominium  Lives with: alone    Treatments  Previous treatment: injection treatment and medication  Current treatment: medication        Objective  Interprative services provided by: # [id number]  [facility] Most Recent Value   PT/OT G-Codes    Current Score 79   Projected Score 86        GAIT: Patient walks with a single point cane, antalgic gait with slow, cautious steps.            MMT         AROM          PROM    Hip       L       R        L           R      L     R   Knee         Ext (L3)  2 3  2 0   Flex (S2)  110 105  108 112          Functional mobility    TUG: 15 seconds    5 x STS: 21 seconds      Patient education   Provided education about            Precautions: L TKA on       Manuals                                                                 Neuro Re-Ed                                                                                                        Ther Ex             Quad sets HEP            SLR HEP            Heel slides HEP Ther Activity                                       Gait Training                                       Modalities

## 2023-07-31 NOTE — PATIENT INSTRUCTIONS
Contact surgical nurse  navigator with any questions regarding preoperative plan or schedule.   Stop all over the counter supplements, herbal, naturopathic  medications for 1 week prior to surgery UNLESS prescribed by your surgeon  Hold NSAIDS (i.e. advil, alleve, motrin, ibuprofen, celebrex) minimum 7 days prior to surgery  Hold Asprin minimum 7 days prior to surgery  Recommend using Tylenol ( acetaminophen ) 500mg every eight hours during the first week post discharge in conjunction with any additional pain medicine prescribed by your surgeon  Use bowel medicines prescribed by your surgeon ( colace) daily post op during the first 1-2 weeks to avoid post operative constipation issues  Call [phone] with any post discharge concerns or medical issues  The morning of surgery take only the following medication with small sip of water: amlodipine  Hold lisinopril/hctz the day prior to surgery and the morning of surgery

## 2023-08-02 ENCOUNTER — OFFICE VISIT (OUTPATIENT)
Age: 73
End: 2023-08-02
Payer: COMMERCIAL

## 2023-08-02 VITALS
HEART RATE: 61 BPM | SYSTOLIC BLOOD PRESSURE: 142 MMHG | BODY MASS INDEX: 33.94 KG/M2 | DIASTOLIC BLOOD PRESSURE: 74 MMHG | HEIGHT: 64 IN | WEIGHT: 198.8 LBS

## 2023-08-02 DIAGNOSIS — K21.9 GASTROESOPHAGEAL REFLUX DISEASE, UNSPECIFIED WHETHER ESOPHAGITIS PRESENT: ICD-10-CM

## 2023-08-02 DIAGNOSIS — I10 HYPERTENSION, UNSPECIFIED TYPE: Primary | ICD-10-CM

## 2023-08-02 DIAGNOSIS — M17.12 PRIMARY OSTEOARTHRITIS OF LEFT KNEE: ICD-10-CM

## 2023-08-02 PROCEDURE — 99214 OFFICE O/P EST MOD 30 MIN: CPT | Performed by: INTERNAL MEDICINE

## 2023-08-03 ENCOUNTER — TELEPHONE (OUTPATIENT)
Age: 73
End: 2023-08-03

## 2023-08-03 NOTE — TELEPHONE ENCOUNTER
Caller: Daughter     Doctor: Dr. Radha Bonilla    Reason for call: Daughter calling stating that her father has surgery on 8/14/23 with Dr. Radha Bonilla and she has some questions in regard to the upcoming surgery. Patient asking to speak to surgery Scheduler.     Call back#: [phone]

## 2023-08-03 NOTE — TELEPHONE ENCOUNTER
Made multiple attempts to reach the patient's daughter at this number - I am having trouble getting it to dial out. If she calls back please provide her with my direct callback number, [phone]. Rec wait 3-4 wks prior to measurements for IOL sx, to ensure accuracy.

## 2023-08-04 ENCOUNTER — TELEPHONE (OUTPATIENT)
Dept: OBGYN CLINIC | Facility: MEDICAL CENTER | Age: 73
End: 2023-08-04

## 2023-08-04 NOTE — TELEPHONE ENCOUNTER
Caller: Patient    Doctor: Ye Clements    Reason for call: calling to check on the status of her message. Provided phone # per note below.     Call back#: n/a

## 2023-08-04 NOTE — TELEPHONE ENCOUNTER
Spoke with the patient's daughter. We reviewed that he is a potential same day discharge for surgery and that his surgical team would decide on the day of surgery if he would be going home same day or staying overnight. Discussed that she will be providing his transportation to and from the hospital.We discussed that they will receive a call with the surgery time on Friday 8/11. She expressed concerns that he is forgetting some of his information. Stated she would sit down with him later today and look at the [facility] [place] booklet to get information. She was encouraged to call back with any questions she may have.

## 2023-08-08 ENCOUNTER — OFFICE VISIT (OUTPATIENT)
Dept: FAMILY MEDICINE CLINIC | Facility: CLINIC | Age: 73
End: 2023-08-08
Payer: COMMERCIAL

## 2023-08-08 VITALS
TEMPERATURE: 98.1 F | DIASTOLIC BLOOD PRESSURE: 70 MMHG | RESPIRATION RATE: 16 BRPM | HEIGHT: 64 IN | HEART RATE: 60 BPM | WEIGHT: 204 LBS | SYSTOLIC BLOOD PRESSURE: 130 MMHG | BODY MASS INDEX: 34.83 KG/M2 | OXYGEN SATURATION: 98 %

## 2023-08-08 DIAGNOSIS — M17.12 OSTEOARTHRITIS OF LEFT KNEE, UNSPECIFIED OSTEOARTHRITIS TYPE: ICD-10-CM

## 2023-08-08 DIAGNOSIS — Z01.810 PREOPERATIVE CARDIOVASCULAR EXAMINATION: Primary | ICD-10-CM

## 2023-08-08 PROCEDURE — 99214 OFFICE O/P EST MOD 30 MIN: CPT | Performed by: FAMILY MEDICINE

## 2023-08-08 NOTE — PROGRESS NOTES
Name: Esau Romo      : 1950      MRN: [id number]  Encounter Provider: Eusebia Bailey MD  Encounter Date: 2023   Encounter department: [address] [place] [address]   After exam patient and reviewing labs and EKG, I found patient medically cleared for surgery. 1. Preoperative cardiovascular examination    2. Osteoarthritis of left knee, unspecified osteoarthritis type    3. BMI 35.0-35.9,adult    exercise routinely, control carb's in diet, control weight or avoid gain more, increase grain , vegetables and fruit in diet. Subjective      HPI   Here for Preoperative evaluation of his left knee by Katherine Cheema DO on 2023. Review of Systems    Current Outpatient Medications on File Prior to Visit   Medication Sig   • acetaminophen (TYLENOL) 500 mg tablet Take 2 tablets (1,000 mg total) by mouth every 8 (eight) hours   • amLODIPine (NORVASC) 5 mg tablet Take 1 tablet (5 mg total) by mouth daily   • ascorbic acid (VITAMIN C) 500 MG tablet Take 1 tablet (500 mg total) by mouth daily Begin 30 days prior to surgery. • aspirin 325 mg tablet Take 1 tablet (325 mg total) by mouth 2 (two) times a day Take with food. • atorvastatin (LIPITOR) 20 mg tablet Take 1 tablet (20 mg total) by mouth daily at bedtime   • docusate sodium (COLACE) 100 mg capsule Take 1 capsule (100 mg total) by mouth 2 (two) times a day   • ferrous sulfate 324 (65 Fe) mg Take 1 tablet (324 mg total) by mouth daily before breakfast Begin 30 days prior to surgery. • folic acid (FOLVITE) 1 mg tablet Take 1 tablet (1 mg total) by mouth daily Begin 30 days prior to surgery.    • gabapentin (NEURONTIN) 100 mg capsule Take 1 capsule (100 mg total) by mouth 3 (three) times a day   • linaCLOtide (Linzess) 145 MCG CAPS Take 1 capsule (145 mcg total) by mouth daily   • lisinopril-hydrochlorothiazide (PRINZIDE,ZESTORETIC) 20-25 MG per tablet Take 1 tablet by mouth daily • Multiple Vitamin (multivitamin) tablet Take 1 tablet by mouth daily Begin 30 days prior to surgery. • oxyCODONE (ROXICODONE) 10 MG TABS Take 1 tablet (10 mg total) by mouth every 4 (four) hours as needed for severe pain Max Daily Amount: 60 mg   • promethazine (PHENERGAN) 12.5 MG tablet Take 1 tablet (12.5 mg total) by mouth every 6 (six) hours as needed for nausea or vomiting       Objective     /70 (BP Location: Left arm, Patient Position: Sitting, Cuff Size: Standard)   Pulse 60   Temp 98.1 °F (36.7 °C) (Tympanic)   Resp 16   Ht 5' 4" (1.626 m)   Wt 92.5 kg (204 lb)   SpO2 98%   BMI 35.02 kg/m²     Physical Exam   Non distress. Lungs are cleared  Heart  rr and r no murmurs. Knee tenderness left more than right. Juan Luis Martinez MD  BMI Counseling: Body mass index is 35.02 kg/m². The BMI is above normal. Nutrition recommendations include reducing portion sizes.

## 2023-08-13 NOTE — ANESTHESIA PREPROCEDURE EVALUATION
Procedure:  ARTHROPLASTY KNEE TOTAL (Left: Knee)    Relevant Problems   CARDIO   (+) HTN (hypertension)   (+) Mixed hyperlipidemia      GI/HEPATIC   (+) GERD (gastroesophageal reflux disease)      MUSCULOSKELETAL   (+) Osteoarthritis of left knee      Other   (+) BMI 35.0-35.9,adult        Physical Exam    Airway    Mallampati score: III  TM Distance: >3 FB  Neck ROM: full     Dental       Cardiovascular  Cardiovascular exam normal    Pulmonary  Pulmonary exam normal     Other Findings        Anesthesia Plan  ASA Score- 2     Anesthesia Type- spinal with ASA Monitors. Additional Monitors:   Airway Plan:     Comment: Adductor block . Plan Factors-Exercise tolerance (METS): >4 METS. Chart reviewed. EKG reviewed. Existing labs reviewed. Patient summary reviewed. Patient is not a current smoker. Patient not instructed to abstain from smoking on day of procedure. Patient did not smoke on day of surgery. Induction-     Postoperative Plan- Plan for postoperative opioid use. Informed Consent- Anesthetic plan and risks discussed with patient. I personally reviewed this patient with the CRNA. Discussed and agreed on the Anesthesia Plan with the CRNA. .        Lab Results   Component Value Date    HGBA1C 5.6 07/19/2023       Lab Results   Component Value Date     10/28/2015    K 3.8 05/09/2022     05/09/2022    CO2 30 05/09/2022    ANIONGAP 7 10/28/2015    BUN 16 05/09/2022    CREATININE 0.87 05/09/2022    GLUCOSE 84 10/28/2015    GLUF 96 05/09/2022    CALCIUM 9.0 05/09/2022    AST 22 05/09/2022    ALT 34 05/09/2022    ALKPHOS 54 05/09/2022    EGFR 86 05/09/2022       Lab Results   Component Value Date    WBC 8.82 05/09/2022    HGB 12.7 05/09/2022    HCT 39.2 05/09/2022    MCV 94 05/09/2022     05/09/2022   Sinus bradycardia at a rate of 51 bpm, possible septal infarct age-indeterminate  Cardiology clearance reviewed

## 2023-08-14 ENCOUNTER — HOSPITAL ENCOUNTER (OUTPATIENT)
Facility: HOSPITAL | Age: 73
Setting detail: OUTPATIENT SURGERY
Discharge: HOME | End: 2023-08-14
Attending: ORTHOPAEDIC SURGERY | Admitting: ORTHOPAEDIC SURGERY
Payer: COMMERCIAL

## 2023-08-14 ENCOUNTER — ANESTHESIA (OUTPATIENT)
Dept: PERIOP | Facility: HOSPITAL | Age: 73
End: 2023-08-14
Payer: COMMERCIAL

## 2023-08-14 VITALS
HEIGHT: 64 IN | SYSTOLIC BLOOD PRESSURE: 142 MMHG | OXYGEN SATURATION: 98 % | HEART RATE: 57 BPM | BODY MASS INDEX: 34.49 KG/M2 | RESPIRATION RATE: 18 BRPM | WEIGHT: 202 LBS | TEMPERATURE: 97.6 F | DIASTOLIC BLOOD PRESSURE: 71 MMHG

## 2023-08-14 DIAGNOSIS — Z96.652 STATUS POST TOTAL KNEE REPLACEMENT, LEFT: Primary | ICD-10-CM

## 2023-08-14 LAB
ABO GROUP BLD: NORMAL
ABO GROUP BLD: NORMAL
BLD GP AB SCN SERPL QL: NEGATIVE
GLUCOSE SERPL-MCNC: 107 MG/DL (ref 65–140)
RH BLD: POSITIVE
RH BLD: POSITIVE
SPECIMEN EXPIRATION DATE: NORMAL

## 2023-08-14 PROCEDURE — C1776 JOINT DEVICE (IMPLANTABLE): HCPCS | Performed by: ORTHOPAEDIC SURGERY

## 2023-08-14 PROCEDURE — 86900 BLOOD TYPING SEROLOGIC ABO: CPT | Performed by: ORTHOPAEDIC SURGERY

## 2023-08-14 PROCEDURE — 97163 PT EVAL HIGH COMPLEX 45 MIN: CPT

## 2023-08-14 PROCEDURE — 82948 REAGENT STRIP/BLOOD GLUCOSE: CPT

## 2023-08-14 PROCEDURE — 27447 TOTAL KNEE ARTHROPLASTY: CPT | Performed by: ORTHOPAEDIC SURGERY

## 2023-08-14 PROCEDURE — C1713 ANCHOR/SCREW BN/BN,TIS/BN: HCPCS | Performed by: ORTHOPAEDIC SURGERY

## 2023-08-14 PROCEDURE — 27447 TOTAL KNEE ARTHROPLASTY: CPT | Performed by: PHYSICIAN ASSISTANT

## 2023-08-14 PROCEDURE — 86850 RBC ANTIBODY SCREEN: CPT | Performed by: ORTHOPAEDIC SURGERY

## 2023-08-14 PROCEDURE — 86920 COMPATIBILITY TEST SPIN: CPT

## 2023-08-14 PROCEDURE — C9290 INJ, BUPIVACAINE LIPOSOME: HCPCS | Performed by: ANESTHESIOLOGY

## 2023-08-14 PROCEDURE — 86901 BLOOD TYPING SEROLOGIC RH(D): CPT | Performed by: ORTHOPAEDIC SURGERY

## 2023-08-14 DEVICE — ATTUNE PATELLA MEDIALIZED DOME 41MM CEMENTED AOX
Type: IMPLANTABLE DEVICE | Site: KNEE | Status: FUNCTIONAL
Brand: ATTUNE

## 2023-08-14 DEVICE — ATTUNE KNEE SYSTEM TIBIAL BASE FIXED BEARING SIZE 8 CEMENTED
Type: IMPLANTABLE DEVICE | Site: KNEE | Status: FUNCTIONAL
Brand: ATTUNE

## 2023-08-14 DEVICE — SMARTSET HIGH PERFORMANCE MV MEDIUM VISCOSITY BONE CEMENT 40G
Type: IMPLANTABLE DEVICE | Site: KNEE | Status: FUNCTIONAL
Brand: SMARTSET

## 2023-08-14 DEVICE — ATTUNE KNEE SYSTEM FEMORAL CRUCIATE RETAINING SIZE 8 LEFT CEMENTED
Type: IMPLANTABLE DEVICE | Site: KNEE | Status: FUNCTIONAL
Brand: ATTUNE

## 2023-08-14 DEVICE — ATTUNE KNEE SYSTEM TIBIAL INSERT FIXED BEARING MEDIAL STABILIZED LEFT AOX 8, 8MM
Type: IMPLANTABLE DEVICE | Site: KNEE | Status: FUNCTIONAL
Brand: ATTUNE

## 2023-08-14 RX ORDER — ONDANSETRON 2 MG/ML
4 INJECTION INTRAMUSCULAR; INTRAVENOUS ONCE AS NEEDED
Status: DISCONTINUED | OUTPATIENT
Start: 2023-08-14 | End: 2023-08-14 | Stop reason: HOSPADM

## 2023-08-14 RX ORDER — SENNOSIDES 8.6 MG
1 TABLET ORAL DAILY
Status: DISCONTINUED | OUTPATIENT
Start: 2023-08-14 | End: 2023-08-15 | Stop reason: HOSPADM

## 2023-08-14 RX ORDER — SODIUM CHLORIDE, SODIUM LACTATE, POTASSIUM CHLORIDE, CALCIUM CHLORIDE 600; 310; 30; 20 MG/100ML; MG/100ML; MG/100ML; MG/100ML
125 INJECTION, SOLUTION INTRAVENOUS CONTINUOUS
Status: DISCONTINUED | OUTPATIENT
Start: 2023-08-14 | End: 2023-08-15 | Stop reason: HOSPADM

## 2023-08-14 RX ORDER — BISACODYL 10 MG
10 SUPPOSITORY, RECTAL RECTAL DAILY PRN
Status: DISCONTINUED | OUTPATIENT
Start: 2023-08-14 | End: 2023-08-15 | Stop reason: HOSPADM

## 2023-08-14 RX ORDER — DOCUSATE SODIUM 100 MG/1
100 CAPSULE, LIQUID FILLED ORAL 2 TIMES DAILY
Status: DISCONTINUED | OUTPATIENT
Start: 2023-08-14 | End: 2023-08-15 | Stop reason: HOSPADM

## 2023-08-14 RX ORDER — GABAPENTIN 100 MG/1
100 CAPSULE ORAL EVERY 8 HOURS
Status: DISCONTINUED | OUTPATIENT
Start: 2023-08-14 | End: 2023-08-15 | Stop reason: HOSPADM

## 2023-08-14 RX ORDER — LUBIPROSTONE 24 UG/1
24 CAPSULE ORAL 2 TIMES DAILY
Status: DISCONTINUED | OUTPATIENT
Start: 2023-08-14 | End: 2023-08-15 | Stop reason: HOSPADM

## 2023-08-14 RX ORDER — GABAPENTIN 100 MG/1
100 CAPSULE ORAL 3 TIMES DAILY
Qty: 90 CAPSULE | Refills: 0 | Status: SHIPPED | OUTPATIENT
Start: 2023-08-14

## 2023-08-14 RX ORDER — CHLORHEXIDINE GLUCONATE 0.12 MG/ML
15 RINSE ORAL ONCE
Status: COMPLETED | OUTPATIENT
Start: 2023-08-14 | End: 2023-08-14

## 2023-08-14 RX ORDER — CHLORHEXIDINE GLUCONATE 4 G/100ML
SOLUTION TOPICAL DAILY PRN
Status: DISCONTINUED | OUTPATIENT
Start: 2023-08-14 | End: 2023-08-14 | Stop reason: HOSPADM

## 2023-08-14 RX ORDER — FOLIC ACID 1 MG/1
1 TABLET ORAL DAILY
Status: DISCONTINUED | OUTPATIENT
Start: 2023-08-14 | End: 2023-08-15 | Stop reason: HOSPADM

## 2023-08-14 RX ORDER — SODIUM CHLORIDE, SODIUM LACTATE, POTASSIUM CHLORIDE, CALCIUM CHLORIDE 600; 310; 30; 20 MG/100ML; MG/100ML; MG/100ML; MG/100ML
50 INJECTION, SOLUTION INTRAVENOUS CONTINUOUS
Status: DISCONTINUED | OUTPATIENT
Start: 2023-08-14 | End: 2023-08-14

## 2023-08-14 RX ORDER — OXYCODONE HYDROCHLORIDE 10 MG/1
10 TABLET ORAL EVERY 4 HOURS PRN
Status: DISCONTINUED | OUTPATIENT
Start: 2023-08-14 | End: 2023-08-15 | Stop reason: HOSPADM

## 2023-08-14 RX ORDER — ASCORBIC ACID 500 MG
500 TABLET ORAL DAILY
Status: DISCONTINUED | OUTPATIENT
Start: 2023-08-14 | End: 2023-08-15 | Stop reason: HOSPADM

## 2023-08-14 RX ORDER — MAGNESIUM HYDROXIDE 1200 MG/15ML
LIQUID ORAL AS NEEDED
Status: DISCONTINUED | OUTPATIENT
Start: 2023-08-14 | End: 2023-08-14 | Stop reason: HOSPADM

## 2023-08-14 RX ORDER — BUPIVACAINE HYDROCHLORIDE 7.5 MG/ML
INJECTION, SOLUTION INTRASPINAL AS NEEDED
Status: DISCONTINUED | OUTPATIENT
Start: 2023-08-14 | End: 2023-08-14

## 2023-08-14 RX ORDER — SODIUM CHLORIDE 9 MG/ML
75 INJECTION, SOLUTION INTRAVENOUS CONTINUOUS
Status: DISCONTINUED | OUTPATIENT
Start: 2023-08-14 | End: 2023-08-14

## 2023-08-14 RX ORDER — EPHEDRINE SULFATE 50 MG/ML
INJECTION INTRAVENOUS AS NEEDED
Status: DISCONTINUED | OUTPATIENT
Start: 2023-08-14 | End: 2023-08-14

## 2023-08-14 RX ORDER — HYDROMORPHONE HCL/PF 1 MG/ML
0.5 SYRINGE (ML) INJECTION
Status: DISCONTINUED | OUTPATIENT
Start: 2023-08-14 | End: 2023-08-14 | Stop reason: HOSPADM

## 2023-08-14 RX ORDER — CALCIUM CARBONATE 500 MG/1
1000 TABLET, CHEWABLE ORAL DAILY PRN
Status: DISCONTINUED | OUTPATIENT
Start: 2023-08-14 | End: 2023-08-15 | Stop reason: HOSPADM

## 2023-08-14 RX ORDER — ATORVASTATIN CALCIUM 20 MG/1
20 TABLET, FILM COATED ORAL
Status: DISCONTINUED | OUTPATIENT
Start: 2023-08-14 | End: 2023-08-15 | Stop reason: HOSPADM

## 2023-08-14 RX ORDER — ONDANSETRON 2 MG/ML
4 INJECTION INTRAMUSCULAR; INTRAVENOUS EVERY 4 HOURS PRN
Status: DISCONTINUED | OUTPATIENT
Start: 2023-08-14 | End: 2023-08-15 | Stop reason: HOSPADM

## 2023-08-14 RX ORDER — BUPIVACAINE HYDROCHLORIDE 2.5 MG/ML
INJECTION, SOLUTION EPIDURAL; INFILTRATION; INTRACAUDAL
Status: COMPLETED | OUTPATIENT
Start: 2023-08-14 | End: 2023-08-14

## 2023-08-14 RX ORDER — MIDAZOLAM HYDROCHLORIDE 2 MG/2ML
INJECTION, SOLUTION INTRAMUSCULAR; INTRAVENOUS
Status: COMPLETED | OUTPATIENT
Start: 2023-08-14 | End: 2023-08-14

## 2023-08-14 RX ORDER — CEPHALEXIN 500 MG/1
500 CAPSULE ORAL EVERY 12 HOURS SCHEDULED
Qty: 2 CAPSULE | Refills: 0 | Status: SHIPPED | OUTPATIENT
Start: 2023-08-14 | End: 2023-08-15

## 2023-08-14 RX ORDER — ACETAMINOPHEN 325 MG/1
975 TABLET ORAL ONCE
Status: COMPLETED | OUTPATIENT
Start: 2023-08-14 | End: 2023-08-14

## 2023-08-14 RX ORDER — OXYCODONE HYDROCHLORIDE 5 MG/1
5 TABLET ORAL EVERY 4 HOURS PRN
Status: DISCONTINUED | OUTPATIENT
Start: 2023-08-14 | End: 2023-08-15 | Stop reason: HOSPADM

## 2023-08-14 RX ORDER — OXYCODONE HYDROCHLORIDE 10 MG/1
10 TABLET ORAL EVERY 4 HOURS PRN
Qty: 42 TABLET | Refills: 0 | Status: SHIPPED | OUTPATIENT
Start: 2023-08-14

## 2023-08-14 RX ORDER — FENTANYL CITRATE/PF 50 MCG/ML
25 SYRINGE (ML) INJECTION
Status: DISCONTINUED | OUTPATIENT
Start: 2023-08-14 | End: 2023-08-14 | Stop reason: HOSPADM

## 2023-08-14 RX ORDER — ENOXAPARIN SODIUM 100 MG/ML
40 INJECTION SUBCUTANEOUS DAILY
Status: DISCONTINUED | OUTPATIENT
Start: 2023-08-14 | End: 2023-08-15 | Stop reason: HOSPADM

## 2023-08-14 RX ORDER — ASPIRIN 325 MG
325 TABLET ORAL 2 TIMES DAILY
Qty: 84 TABLET | Refills: 0 | Status: SHIPPED | OUTPATIENT
Start: 2023-08-14

## 2023-08-14 RX ORDER — CEFAZOLIN SODIUM 2 G/50ML
2000 SOLUTION INTRAVENOUS EVERY 8 HOURS
Status: DISCONTINUED | OUTPATIENT
Start: 2023-08-14 | End: 2023-08-15 | Stop reason: HOSPADM

## 2023-08-14 RX ORDER — FENTANYL CITRATE 50 UG/ML
INJECTION, SOLUTION INTRAMUSCULAR; INTRAVENOUS
Status: COMPLETED | OUTPATIENT
Start: 2023-08-14 | End: 2023-08-14

## 2023-08-14 RX ORDER — SODIUM CHLORIDE 9 MG/ML
INJECTION, SOLUTION INTRAVENOUS AS NEEDED
Status: DISCONTINUED | OUTPATIENT
Start: 2023-08-14 | End: 2023-08-14 | Stop reason: HOSPADM

## 2023-08-14 RX ORDER — PROPOFOL 10 MG/ML
INJECTION, EMULSION INTRAVENOUS CONTINUOUS PRN
Status: DISCONTINUED | OUTPATIENT
Start: 2023-08-14 | End: 2023-08-14

## 2023-08-14 RX ORDER — DEXAMETHASONE SODIUM PHOSPHATE 10 MG/ML
INJECTION, SOLUTION INTRAMUSCULAR; INTRAVENOUS AS NEEDED
Status: DISCONTINUED | OUTPATIENT
Start: 2023-08-14 | End: 2023-08-14

## 2023-08-14 RX ORDER — HYDROMORPHONE HCL/PF 1 MG/ML
0.5 SYRINGE (ML) INJECTION EVERY 4 HOURS PRN
Status: DISCONTINUED | OUTPATIENT
Start: 2023-08-14 | End: 2023-08-15 | Stop reason: HOSPADM

## 2023-08-14 RX ORDER — PROMETHAZINE HYDROCHLORIDE 25 MG/ML
12.5 INJECTION, SOLUTION INTRAMUSCULAR; INTRAVENOUS ONCE AS NEEDED
Status: DISCONTINUED | OUTPATIENT
Start: 2023-08-14 | End: 2023-08-14 | Stop reason: HOSPADM

## 2023-08-14 RX ORDER — CEFAZOLIN SODIUM 2 G/50ML
2000 SOLUTION INTRAVENOUS ONCE
Status: COMPLETED | OUTPATIENT
Start: 2023-08-14 | End: 2023-08-14

## 2023-08-14 RX ORDER — TRANEXAMIC ACID 10 MG/ML
1000 INJECTION, SOLUTION INTRAVENOUS ONCE
Status: COMPLETED | OUTPATIENT
Start: 2023-08-14 | End: 2023-08-14

## 2023-08-14 RX ORDER — PROMETHAZINE HYDROCHLORIDE 12.5 MG/1
12.5 TABLET ORAL EVERY 6 HOURS PRN
Qty: 12 TABLET | Refills: 0 | Status: SHIPPED | OUTPATIENT
Start: 2023-08-14

## 2023-08-14 RX ORDER — ACETAMINOPHEN 325 MG/1
975 TABLET ORAL EVERY 8 HOURS SCHEDULED
Status: DISCONTINUED | OUTPATIENT
Start: 2023-08-14 | End: 2023-08-15 | Stop reason: HOSPADM

## 2023-08-14 RX ORDER — ONDANSETRON 2 MG/ML
INJECTION INTRAMUSCULAR; INTRAVENOUS AS NEEDED
Status: DISCONTINUED | OUTPATIENT
Start: 2023-08-14 | End: 2023-08-14

## 2023-08-14 RX ORDER — ACETAMINOPHEN 500 MG
1000 TABLET ORAL EVERY 8 HOURS
Refills: 0
Start: 2023-08-14

## 2023-08-14 RX ORDER — AMLODIPINE BESYLATE 5 MG/1
5 TABLET ORAL DAILY
Status: DISCONTINUED | OUTPATIENT
Start: 2023-08-14 | End: 2023-08-15 | Stop reason: HOSPADM

## 2023-08-14 RX ORDER — DOCUSATE SODIUM 100 MG/1
100 CAPSULE, LIQUID FILLED ORAL 2 TIMES DAILY
Qty: 30 CAPSULE | Refills: 0 | Status: SHIPPED | OUTPATIENT
Start: 2023-08-14

## 2023-08-14 RX ADMIN — ENOXAPARIN SODIUM 40 MG: 40 INJECTION SUBCUTANEOUS at 16:40

## 2023-08-14 RX ADMIN — MIDAZOLAM 2 MG: 1 INJECTION INTRAMUSCULAR; INTRAVENOUS at 09:50

## 2023-08-14 RX ADMIN — SODIUM CHLORIDE, SODIUM LACTATE, POTASSIUM CHLORIDE, AND CALCIUM CHLORIDE: .6; .31; .03; .02 INJECTION, SOLUTION INTRAVENOUS at 12:01

## 2023-08-14 RX ADMIN — FOLIC ACID 1 MG: 1 TABLET ORAL at 16:38

## 2023-08-14 RX ADMIN — PHENYLEPHRINE HYDROCHLORIDE 30 MCG/MIN: 10 INJECTION INTRAVENOUS at 11:36

## 2023-08-14 RX ADMIN — CHLORHEXIDINE GLUCONATE 0.12% ORAL RINSE 15 ML: 1.2 LIQUID ORAL at 07:51

## 2023-08-14 RX ADMIN — TRANEXAMIC ACID 1000 MG: 10 INJECTION, SOLUTION INTRAVENOUS at 11:08

## 2023-08-14 RX ADMIN — CEFAZOLIN SODIUM 2000 MG: 2 SOLUTION INTRAVENOUS at 19:24

## 2023-08-14 RX ADMIN — SODIUM CHLORIDE, SODIUM LACTATE, POTASSIUM CHLORIDE, AND CALCIUM CHLORIDE 125 ML/HR: .6; .31; .03; .02 INJECTION, SOLUTION INTRAVENOUS at 16:44

## 2023-08-14 RX ADMIN — FENTANYL CITRATE 50 MCG: 50 INJECTION, SOLUTION INTRAMUSCULAR; INTRAVENOUS at 09:50

## 2023-08-14 RX ADMIN — BUPIVACAINE HYDROCHLORIDE IN DEXTROSE 1.6 ML: 7.5 INJECTION, SOLUTION SUBARACHNOID at 11:02

## 2023-08-14 RX ADMIN — SENNOSIDES 8.6 MG: 8.6 TABLET, FILM COATED ORAL at 16:39

## 2023-08-14 RX ADMIN — BUPIVACAINE 20 ML: 13.3 INJECTION, SUSPENSION, LIPOSOMAL INFILTRATION at 09:57

## 2023-08-14 RX ADMIN — SODIUM CHLORIDE, SODIUM LACTATE, POTASSIUM CHLORIDE, AND CALCIUM CHLORIDE 50 ML/HR: .6; .31; .03; .02 INJECTION, SOLUTION INTRAVENOUS at 07:52

## 2023-08-14 RX ADMIN — ACETAMINOPHEN 975 MG: 325 TABLET ORAL at 16:38

## 2023-08-14 RX ADMIN — EPHEDRINE SULFATE 10 MG: 50 INJECTION, SOLUTION INTRAVENOUS at 11:29

## 2023-08-14 RX ADMIN — ONDANSETRON 4 MG: 2 INJECTION INTRAMUSCULAR; INTRAVENOUS at 11:11

## 2023-08-14 RX ADMIN — DEXAMETHASONE SODIUM PHOSPHATE 10 MG: 10 INJECTION, SOLUTION INTRAMUSCULAR; INTRAVENOUS at 11:09

## 2023-08-14 RX ADMIN — ONDANSETRON 4 MG: 2 INJECTION INTRAMUSCULAR; INTRAVENOUS at 20:51

## 2023-08-14 RX ADMIN — ACETAMINOPHEN 975 MG: 325 TABLET ORAL at 07:51

## 2023-08-14 RX ADMIN — SODIUM CHLORIDE, SODIUM LACTATE, POTASSIUM CHLORIDE, AND CALCIUM CHLORIDE: .6; .31; .03; .02 INJECTION, SOLUTION INTRAVENOUS at 11:15

## 2023-08-14 RX ADMIN — OXYCODONE HYDROCHLORIDE AND ACETAMINOPHEN 500 MG: 500 TABLET ORAL at 16:38

## 2023-08-14 RX ADMIN — PROPOFOL 140 MCG/KG/MIN: 10 INJECTION, EMULSION INTRAVENOUS at 11:05

## 2023-08-14 RX ADMIN — CEFAZOLIN SODIUM 2000 MG: 2 SOLUTION INTRAVENOUS at 10:48

## 2023-08-14 RX ADMIN — BUPIVACAINE HYDROCHLORIDE 10 ML: 2.5 INJECTION, SOLUTION EPIDURAL; INFILTRATION; INTRACAUDAL; PERINEURAL at 10:00

## 2023-08-14 RX ADMIN — IRON SUCROSE 300 MG: 20 INJECTION, SOLUTION INTRAVENOUS at 16:30

## 2023-08-14 RX ADMIN — GABAPENTIN 100 MG: 100 CAPSULE ORAL at 16:39

## 2023-08-14 NOTE — ANESTHESIA PROCEDURE NOTES
Spinal Block    Patient location during procedure: OR  Start time: 8/14/2023 11:02 AM  Reason for block: primary anesthetic  Staffing  Performed by: Ford Mora CRNA  Authorized by: Joyce Taylor MD    Preanesthetic Checklist  Completed: patient identified, IV checked, site marked, risks and benefits discussed, surgical consent, monitors and equipment checked, pre-op evaluation and timeout performed  Spinal Block  Patient position: sitting  Prep: Betadine  Patient monitoring: continuous pulse ox, frequent blood pressure checks and heart rate  Approach: midline  Location: L3-4  Injection technique: single-shot  Needle  Needle type: pencil-tip   Needle gauge: 25 G  Needle length: 10 cm  Assessment  Sensory level: T10  Injection Assessment:  positive aspiration for clear CSF.   Post-procedure:  site cleaned  Additional Notes  Lot # [id number]  Expiration date 2025-07-31

## 2023-08-14 NOTE — ANESTHESIA POSTPROCEDURE EVALUATION
Post-Op Assessment Note    CV Status:  Stable  Pain Score: 0    Pain management: adequate     Mental Status:  Alert and awake   Hydration Status:  Euvolemic   PONV Controlled:  Controlled   Airway Patency:  Patent   Two or more mitigation strategies used for obstructive sleep apnea   Post Op Vitals Reviewed: Yes            There were no known notable events for this encounter.     BP   146/65   Temp  97.4   Pulse 62   Resp  12   SpO2   98%

## 2023-08-14 NOTE — DISCHARGE INSTR - AVS FIRST PAGE
TOTAL KNEE REPLACEMENT DISCHARGE INSTRUCTIONS    Surgical Dressing: You may leave your dressing in place for 1 week. If it starts lifting off you can change it prior to that. Once you start changing your dressing you should change your dressing daily until drainage stops. Do not put any lotions or creams on your incision. If there are any signs of infection such as drainage persisting beyond 7 days, unusual looking drainage (yellow, green), increased redness around the incision, or fever/chills let your doctor know. Do not get your incision wet. Continue to wear your REGINALD stockings 2 weeks after surgery. You can remove at night them to wash, hang to dry, and reapply in the morning after sponge bath or shower (do not get your incision wet). You can wear them as needed after that. If the stockings are too tight at the top you can cut the elastic. Medications:  Upon discharge you will be given a prescription for an anticoagulant (i.e. Ecotrin (Aspirin), Coumadin (Warfarin), Lovenox (Enoxaparin)). Take as prescribed. Do not take any over the counter NSAIDs (i.e. Ibuprofen, Motrin, Advil, Naprosyn, Aleve) while on your anticoagulation medication unless otherwise specified by your surgeon. You will also be given Gabapentin if appropriate and a narcotic pain reliever for pain. Please be mindful of the number of pills you have left. You can only get a refill Monday-Friday during business hours from your surgeon or the physician assistant. You will not be given any refills on nights and weekends. Call ahead if needed. Please include tylenol in your pain regimen as well. You will also be given Colace to prevent constipation that can be caused by narcotics. Please include other over- the- counter medications for constipation if needed. If you are on Coumadin (Warfarin) you will need your blood drawn every Monday and Thursday once you are home.   Initially your visiting nurse will draw your blood. Later you will go to an outpatient lab. You will receive a phone call the next day and your Coumadin (warfarin) will be dosed based on these results. Take this dosage daily until you hear from us next. Walking:  Use two crutches or a walker for EVERY step. Gradually increase your walking daily. You can progress to a cane as tolerated once advised by your physical therapist.  Be sure to work on advancing your range of motion daily. Bathing:  Do not get your incision wet until follow up in the office. No tub baths. Do not submerge your incision. You may shower but you must cover your incision so it does NOT get wet. Gauze and Tegaderm dressing can be used to cover your incision. Once your original dressing comes off. These can be found at the drug store. Use your crutches/walker to get in and out of the shower. Use a chair if needed. Sexual Relations:  Resume according to your comfort. Swimming:  No swimming or hot tubs until approved by your physician. Swimming will be allowed once your incision is well healed. Driving: You may ride as a passenger now. No driving until your follow-up appointment. To get into the car use the front passenger seat with the seat pushed back as far as possible. Scoot yourself back in the seat. Use your hands to assist your legs into the car. Physical therapy:  Continue with exercises at home. Plan on going to outpatient physical therapy within a couple days of your surgery once you are home. If you are doing home physical therapy please call the office for a prescription once you are ready to transition to outpatient physical therapy. Special considerations: To minimize swelling, stiffness, and decrease pain use cold as needed, but not heat. Ice 20 minutes at a time with a cloth between your skin and the ice. Ice after walking, when you have pain, or after you have completed your exercises.   Limit your sitting to 60 minutes at one time. Continue to wear your REGINALD stockings 2 weeks after surgery. You can remove at night them to wash, hang to dry, and reapply in the morning after sponge bath or shower (do not get your incision wet). You can wear them as needed after that. If the stockings are too tight at the top you can cut the elastic. Follow up:  Call [phone] to make an appointment to see your surgeon within 2 weeks of your surgery if you haven’t done so already. If you have any questions please call [phone] for any concerns as well. For the future:  Antibiotics for dental appointments is controversial.  If you have multiple medical problems we would recommend for you to call the office for a prescription for antibiotics to be taken one hour prior to your dental appointment. If you do not have multiple medical problems it is your choice if you would like to take antibiotics prior to dental appointments. We would also recommend you talking to your dentist prior to your appointment as well. For any invasive procedures (i.e. endoscopy, colonoscopy, etc.) inform the doctor performing the procedure that you have a total knee replacement and they will give you antibiotics just prior to the procedure.

## 2023-08-14 NOTE — OP NOTE
OPERATIVE REPORT  PATIENT NAME: Max Mccabe  : 1950  MRN: [id number]  Pt Location:  [facility] OR ROOM 11    Surgery Date: 2023    Surgeon(s) and Role:     * Pamella Kendrick, DO - Primary     * India Trevino PA-C - Assisting      * Jennifer Barreto, ATC - Assisting    Preop Diagnosis:  Primary osteoarthritis of left knee [M17.12]    Post-Op Diagnosis Codes:     * Primary osteoarthritis of left knee [M17.12]    Procedure(s):  Left - ARTHROPLASTY KNEE TOTAL    Specimens:  * No specimens in log *    Estimated Blood Loss:   50 mL    Drains:  Closed/Suction Drain Left;Superior; Anterior Knee Accordion 10 Fr. (Active)   Status Accordion suction 23 1301   Number of days: 0       Anesthesia Type:   Spinal     Operative Indications:  Primary osteoarthritis of left knee [M17.12]    Operative Findings:  See below    Complications:   None    Knee Technique: Lateral Retinacular Release  Knee Approach: Medial Parapatellar    Procedure and Technique:  Implants:  Depuy Attune  CR femoral component size 8  Tibial base fixed bearing size 8  Tibial insert fixed bearing MS size 8, 8mm  Dome patella size 41mm    INDICATIONS FOR PROCEDURE:  The patients is a 67 y.o. male who presented to the office with  knee OA. Conservative treatment was attempted for quite some time and ultimately failed. He has severe progressive pain, stiffness, and disability and now elects to proceed with left total knee replacement surgery. Extensive counseling in regards to the reasons for surgical intervention as well as the risks and benefits of surgery were reviewed. The risks include, but are not limited to infection, extensive blood loss, blood clots, wound healing problems, fracture, need for additional surgery, need for revision surgery, failure of hardware, persistent pain and stiffness, heart attack, stroke, death. The patient understood and agreed to by oral and written consent.     OPERATIVE PROCEDURE:  The patient was identified as Ammy Hunter by His ID bracelet by the surgical staff in the preoperative area at [facility].  The patient was wheeled back to the surgical room and placed on the operative table. Anesthesia was administered. Preoperative antibiotics were given. The patient remained in the supine position and all bony prominences were carefully protected. A tourniquet was applied to the patient’s left upper thigh. The left leg was then prepped and draped in the usual sterile fashion. A timeout was performed where the patient’s name and surgical site were once again identified. The incision was marked out. The leg was elevated and the tourniquet was inflated to 250 mmHg. An incision was made over anterior aspect of the knee. Dissection was made through the skin and subcutaneous tissue. A medial parapatellar arthrotomy was made and the medial tissues were elevated while protecting the MCL. Remnants of the ACL, medial and lateral menisci were removed and retractors were placed. Severe osteoarthritis was noted. The femoral canal was opened with a drill and the contents were suctioned and the canal was irrigated. We used an intramedullary guide on the femoral side and resected 9mm of distal femur in 5 degrees of valgus. Osteophytes were removed. We then subluxated the tibia forward and opened the tibial canal with a drill. The contents of the canal were then suctioned and the canal was irrigated. We placed an intramedullary guide and resected 6mm of bone based on the highest point of the lateral tibial plateau perpendicular to the mechanical axis of the tibia. Next, the flexion and extension gaps were analyzed with a spacer block and visualization. Rosaura’s line and the epicondylar axis were then identified. The 4 in 1 cutting block was placed in 3 degrees of external rotation and resections were made.   Posterior osteophytes and any remnants of the medial and lateral menisci were removed. Trials were then placed and the knee was felt to be stable and well balanced throughout the arc of motion. The patella was measured and a portion of the articular aspect of the patella was removed. The trial for the patella was placed and patellar tracking was checked and found to be slightly inadequate with the other components in place. Attention was directed back to the tibia. External rotation of the tibial guide was set and the final preparations of the tibia were performed. The components were removed and the knee was copiously irrigated with pulse lavage and then dried. The components were then cemented in place and excess cement was removed. Once the cement was dried the trial insert was trialed. A size 8,8mm tibial insert was confirmed to be the correct size. The final polyethylene component was placed and the tourniquet was let down. Any bleeders were cauterized and then the knee was irrigated. An irrisept wash was performed and then the knee was once again copiously irrigated. Marcaine, morphine, and toradol was injected periarticularly throughout the case. We rechecked patellar tracking which was still slightly inadequate. A lateral release was performed. Patellar tracking was checked and found to be adequate. Superficial and deep drains were placed. The arthrotomy was closed with vicryl suture. The skin and subcutaneous tissues were closed with vicryl and then a running monocryl stitch. A sterile dressing was placed. The patient was awakened and transferred to a hospital bed and then to the recovery room in stable condition. I attest that I was present and performed this procedure. Sharath Sheppard PA-C and Alexx Martins ATC were present for the entire procedure and provided essential assistance with limb position, patient prepping, and retraction. A qualified resident physician was not available.     Patient Disposition:  hemodynamically stable            SIGNATURE: Jessica Rao DO  DATE: August 14, 2023  TIME: 2:03 PM

## 2023-08-14 NOTE — ANESTHESIA PROCEDURE NOTES
Peripheral Block    Patient location during procedure: holding area  Start time: 8/14/2023 9:40 AM  Reason for block: at surgeon's request and post-op pain management  Staffing  Performed by: Joyce Taylor MD  Authorized by: Joyce Taylor MD    Preanesthetic Checklist  Completed: patient identified, IV checked, site marked, risks and benefits discussed, surgical consent, monitors and equipment checked, pre-op evaluation and timeout performed  Peripheral Block  Patient position: supine  Prep: ChloraPrep  Patient monitoring: frequent blood pressure checks, continuous pulse oximetry and heart rate  Block type: Adductor Canal  Laterality: left  Injection technique: single-shot  Procedures: ultrasound guided, Ultrasound guidance required for the procedure to increase accuracy and safety of medication placement and decrease risk of complications.   Ultrasound permanent image savedbupivacaine (PF) (MARCAINE) 0.25 % injection 20 mL - Perineural   10 mL - 8/14/2023 10:00:00 AM  fentanyl citrate (PF) 100 MCG/2ML 50 mcg - Intravenous   50 mcg - 8/14/2023 9:50:00 AM  midazolam (VERSED) injection 0.5 mg - Intravenous   2 mg - 8/14/2023 9:50:00 AM  Needle  Needle type: Stimuplex   Needle gauge: 20 G  Needle localization: anatomical landmarks and ultrasound guidance  Assessment  Injection assessment: incremental injection, frequent aspiration, injected with ease, negative aspiration, negative for heart rate change, no paresthesia on injection, no symptoms of intraneural/intravenous injection and needle tip visualized at all times  Paresthesia pain: none  Post-procedure:  site cleaned  patient tolerated the procedure well with no immediate complications

## 2023-08-14 NOTE — INTERVAL H&P NOTE
H&P reviewed. After examining the patient I find no changes in the patients condition since the H&P had been written. All questions answered. Translation provided.   Heart:  regular rate  Lungs:  no audible wheezing  Abd:  nondistended  LLE:  EHL/AT/GS intact, sensation grossly intact L4, L5, S1, palpable pedal pulse      Vitals:    08/14/23 1015   BP: 139/65   Pulse: (!) 50   Resp: 20   Temp:    SpO2: 99%

## 2023-08-15 ENCOUNTER — TELEPHONE (OUTPATIENT)
Dept: OBGYN CLINIC | Facility: HOSPITAL | Age: 73
End: 2023-08-15

## 2023-08-15 LAB
ABO GROUP BLD BPU: NORMAL
ABO GROUP BLD BPU: NORMAL
BPU ID: NORMAL
BPU ID: NORMAL
CROSSMATCH: NORMAL
CROSSMATCH: NORMAL
UNIT DISPENSE STATUS: NORMAL
UNIT DISPENSE STATUS: NORMAL
UNIT PRODUCT CODE: NORMAL
UNIT PRODUCT CODE: NORMAL
UNIT PRODUCT VOLUME: 350 ML
UNIT PRODUCT VOLUME: 350 ML
UNIT RH: NORMAL
UNIT RH: NORMAL

## 2023-08-15 NOTE — PHYSICAL THERAPY NOTE
Physical Therapy Evaluation    Patient's Name: Ammy Hunter    Admitting Diagnosis  Primary osteoarthritis of left knee [M17.12]    Problem List  Patient Active Problem List   Diagnosis    HTN (hypertension)    GERD (gastroesophageal reflux disease)    Prediabetes    Hypocalcemia    BMI 35.0-35.9,adult    Mixed hyperlipidemia    Chronic pain of both knees    Preoperative cardiovascular examination    Osteoarthritis of left knee    Status post total knee replacement, left       Past Medical History  Past Medical History:   Diagnosis Date    Cataract     Colon polyp     Diverticulosis     Gastritis     GERD (gastroesophageal reflux disease)     Hyperlipidemia     Hypertension     Pre-diabetes     Throat ulcer     Visual impairment        Past Surgical History  Past Surgical History:   Procedure Laterality Date    COLONOSCOPY N/A 08/18/2016    Procedure: COLONOSCOPY;  Surgeon: Yarelis Stovall MD;  Location:  GI LAB;   Service:     COLONOSCOPY W/ POLYPECTOMY  2019    repeat 2022    EYE SURGERY Bilateral     [place] ARTHRP KNE CONDYLE&PLATU MEDIAL&LAT COMPARTMENTS Left 8/14/2023    Procedure: ARTHROPLASTY KNEE TOTAL;  Surgeon: Edmundo Whitaker DO;  Location: [address] [place] [address] OR;  Service: Orthopedics    TONSILLECTOMY      UPPER GASTROINTESTINAL ENDOSCOPY  2019       Recent Imaging  No orders to display       Recent Vital Signs  Vitals:    08/14/23 1625 08/14/23 1656 08/14/23 1725 08/14/23 2100   BP: 131/76 136/76 126/73 142/71   BP Location: Left arm Left arm Left arm Left arm   Pulse: (!) 44 56 64 57   Resp: 18 18 18 18   Temp: (!) 96.6 °F (35.9 °C) (!) 96.1 °F (35.6 °C) (!) 96.8 °F (36 °C) 97.6 °F (36.4 °C)   TempSrc: Temporal Temporal Temporal Temporal   SpO2: 93% 94% 95% 98%   Weight:       Height:            08/14/23 1700   PT Last Visit   PT Visit Date 08/14/23   Note Type   Note type Evaluation   Pain Assessment   Pain Assessment Tool 0-10   Pain Score No Pain   Restrictions/Precautions   LLE Weight Bearing Per Order WBAT   Home Living   Type of [facility] One level;Elevator   Bathroom Shower/Tub Tub/shower unit   Bathroom Toilet Standard   Prior Function   Level of [place] Independent with ADLs; Independent with functional mobility; Independent with IADLS   Lives With Friend(s); Significant other   Receives Help From [facility] in the last 6 months 0   General   Family/Caregiver Present No   Cognition   Overall Cognitive Status WFL   Arousal/Participation Alert   Orientation Level Oriented X4   Memory Within functional limits   Following Commands Follows all commands and directions without difficulty   RLE Assessment   RLE Assessment WFL   LLE Assessment   LLE Assessment   (3+/5)   Coordination   Movements are Fluid and Coordinated 0   Coordination and Movement Description mild unsteadiness and decreasd gross LE coordinatiin   Sensation WFL   Light Touch   RLE Light Touch Grossly intact   LLE Light Touch Grossly intact   Bed Mobility   Supine to Sit 6  Modified independent   Additional items Increased time required   Sit to Supine 6  Modified independent   Additional items Increased time required   Transfers   Sit to Stand 6  Modified independent   Additional items Increased time required   Stand to Sit 6  Modified independent   Additional items Increased time required   Ambulation/Elevation   Gait pattern Decreased toe off;Decreased heel strike; Step through pattern;Excessively slow;Decreased foot clearance; Improper Weight shift   Gait Assistance 6  Modified independent   Additional items Verbal cues   Assistive Device Rolling walker   Distance 120ft   Balance   Static Sitting Fair +   Dynamic Sitting Fair +   Static Standing Fair   Dynamic Standing Fair -   Ambulatory Fair -   Endurance Deficit   Endurance Deficit Yes   Endurance Deficit Description knee pain and post op fatigue   Activity Tolerance   Activity Tolerance Patient tolerated treatment well   Medical Staff Made Aware spoke to CM Nurse Made Aware spoke to RN   Assessment   Prognosis Good   Problem List Decreased strength;Decreased range of motion;Decreased endurance; Impaired balance;Decreased mobility; Decreased coordination   Goals   Patient Goals to return home   Recommendation   PT Discharge Recommendation Home with outpatient rehabilitation   Equipment Recommended Walker  (pt requests rollator)   [address] Mobility Inpatient   Turning in Flat Bed Without Bedrails 4   Lying on Back to Sitting on Edge of Flat Bed Without Bedrails 4   Moving Bed to Chair 4   Standing Up From Chair Using Arms 4   Walk in Room 3   Climb 3-5 Stairs With Railing 3   Basic Mobility Inpatient Raw Score 22   Basic Mobility Standardized Score 47.4   Highest Level Of Mobility   JH-HLM Goal 7: Walk 25 feet or more   JH-HLM Achieved 7: Walk 25 feet or more   Exercises   Quad Sets Sitting;10 reps; Left   Heelslides Sitting;10 reps; Left   End of Consult   Patient Position at End of Consult Bedside chair; All needs within reach         [address] is a 67 y.o. male admitted to [facility] on [facility]14[facility]2023 for Status post total knee replacement, left. Pt  has a past medical history of Cataract, Colon polyp, Diverticulosis, Gastritis, GERD (gastroesophageal reflux disease), Hyperlipidemia, Hypertension, Pre-diabetes, Throat ulcer, and Visual impairment. . PT was consulted and pt was seen on 8/15/2023 for mobility assessment and d/c planning. Pt presents supine in bed alert and agreeable to therapy. Impairments limiting pt at this time include decreased ROM, impaired balance, decreased endurance, decreased coordination, new onset of impairment of functional mobility, pain, and decreased strength.  Pt is currently functioning at a modified independent assistance level for bed mobility, modified independent assistance level for transfers, modified independent assistance level for ambulation with Rolling Walker. The patient's AM-PAC Basic Mobility Inpatient Short Form Raw Score is 22. A Raw score of greater than 16 suggests the patient may benefit from discharge to home. Please also refer to the recommendation of the Physical Therapist for safe discharge planning. Recommendations                                                                                                              Pt will benefit from continued skilled IP PT to address the above mentioned impairments in order to maximize recovery and increase functional independence when completing mobility and ADLs. See flow sheet for goals and POC.      DME:  rollator    Discharge Disposition:  Home with Outpatient Physical Therapy       Rehan Officer PT, DPT

## 2023-08-15 NOTE — TELEPHONE ENCOUNTER
Patient contacted for a postoperative follow up assessment with . I spent greater than 15 minutes spent on with patient completing assessment and answering questions. Patient reports doing "well," and ambulating with the RW and doing "good." He states his dressing is dry, denies drainage. He states his swelling is the same and we discussed icing. He reports being "stable" and able to "move around."     We reviewed patients AVS medication list. Patient is taking Tylenol 1000mg every 8 hours, Gabapentin 100mg TID,  Oxycodone 10mg PRN, ASA 325mg BID, Colace 100mg BID, Keflex 500mg BID. He is not using the phenergan. Patient denies nausea, vomiting, abdominal pain, chest pain, shortness of breath, fever, dizziness and calf pain. Patient does not have any other questions or concerns at this time. Pt was encouraged to call with any questions, concerns or issues.

## 2023-08-15 NOTE — NURSING NOTE
Pt was d/c with belongings. IV was taken out, tip in tack. Discharge instructions were given to Pt. Reviewed instructions with Pt and daughter. No signs of distress upon discharge.

## 2023-08-15 NOTE — CASE MANAGEMENT
Case Management Discharge Planning Note    Patient name Logan Prakash  Location [facility] 710/[facility] 710-01 MRN [id number]  : 1950 Date 8/15/2023       Current Admission Date: 2023  Current Admission Diagnosis:Status post total knee replacement, left   Patient Active Problem List    Diagnosis Date Noted   • Status post total knee replacement, left 2023   • Preoperative cardiovascular examination 2023   • Osteoarthritis of left knee 2023   • Hypocalcemia 2021   • BMI 35.0-35.9,adult 2021   • Mixed hyperlipidemia 2021   • Chronic pain of both knees 2021   • HTN (hypertension) 2019   • GERD (gastroesophageal reflux disease) 2019   • Prediabetes 2019      LOS (days): 0  Geometric Mean LOS (GMLOS) (days):   Days to GMLOS:     OBJECTIVE:      Current admission status: Outpatient Surgery   Preferred Pharmacy:   [address] [place] [address]  Phone: [phone] Fax: [phone]    Primary Care Provider: Miguel Arvizu MD    [facility] [id number] [facility]  Secondary Insurance: Emily Marroquin    DISCHARGE DETAILS:      Wilfrid Mcmullen will reflect as canceled, as the patient received a rolling walker through insurances on 2023 and is not eligible for another at this time. The previous order was sent by Phylicia Garnica at your office.

## 2023-08-15 NOTE — PLAN OF CARE
Problem: PAIN - ADULT  Goal: Verbalizes/displays adequate comfort level or baseline comfort level  Description: Interventions:  - Encourage patient to monitor pain and request assistance  - Assess pain using appropriate pain scale  - Administer analgesics based on type and severity of pain and evaluate response  - Implement non-pharmacological measures as appropriate and evaluate response  - Consider cultural and social influences on pain and pain management  - Notify physician/advanced practitioner if interventions unsuccessful or patient reports new pain  Outcome: Adequate for Discharge     Problem: INFECTION - ADULT  Goal: Absence or prevention of progression during hospitalization  Description: INTERVENTIONS:  - Assess and monitor for signs and symptoms of infection  - Monitor lab/diagnostic results  - Monitor all insertion sites, i.e. indwelling lines, tubes, and drains  - Monitor endotracheal if appropriate and nasal secretions for changes in amount and color  - [place] appropriate cooling/warming therapies per order  - Administer medications as ordered  - Instruct and encourage patient and family to use good hand hygiene technique  - Identify and instruct in appropriate isolation precautions for identified infection/condition  Outcome: Adequate for Discharge  Goal: Absence of fever/infection during neutropenic period  Description: INTERVENTIONS:  - Monitor WBC    Outcome: Adequate for Discharge     Problem: SAFETY ADULT  Goal: Patient will remain free of falls  Description: INTERVENTIONS:  - Educate patient/family on patient safety including physical limitations  - Instruct patient to call for assistance with activity   - Consult OT/PT to assist with strengthening/mobility   - Keep Call bell within reach  - Keep bed low and locked with side rails adjusted as appropriate  - Keep care items and personal belongings within reach  - Initiate and maintain comfort rounds  - Make Fall Risk Sign visible to staff  - Offer Toileting every  Hours, in advance of need  - Initiate/Maintain alarm  - Obtain necessary fall risk management equipment:   - Apply yellow socks and bracelet for high fall risk patients  - Consider moving patient to room near nurses station  Outcome: Adequate for Discharge  Goal: Maintain or return to baseline ADL function  Description: INTERVENTIONS:  -  Assess patient's ability to carry out ADLs; assess patient's baseline for ADL function and identify physical deficits which impact ability to perform ADLs (bathing, care of mouth/teeth, toileting, grooming, dressing, etc.)  - Assess/evaluate cause of self-care deficits   - Assess range of motion  - Assess patient's mobility; develop plan if impaired  - Assess patient's need for assistive devices and provide as appropriate  - Encourage maximum independence but intervene and supervise when necessary  - Involve family in performance of ADLs  - Assess for home care needs following discharge   - Consider OT consult to assist with ADL evaluation and planning for discharge  - Provide patient education as appropriate  Outcome: Adequate for Discharge  Goal: Maintains/Returns to pre admission functional level  Description: INTERVENTIONS:  - Perform BMAT or MOVE assessment daily.   - Set and communicate daily mobility goal to care team and patient/family/caregiver. - Collaborate with rehabilitation services on mobility goals if consulted  - Perform Range of Motion  times a day. - Reposition patient every  hours.   - Dangle patient  times a day  - Stand patient  times a day  - Ambulate patient  times a day  - Out of bed to chair  times a day   - Out of bed for meals times a day  - Out of bed for toileting  - Record patient progress and toleration of activity level   Outcome: Adequate for Discharge     Problem: DISCHARGE PLANNING  Goal: Discharge to home or other facility with appropriate resources  Description: INTERVENTIONS:  - Identify barriers to discharge w/patient and caregiver  - Arrange for needed discharge resources and transportation as appropriate  - Identify discharge learning needs (meds, wound care, etc.)  - Arrange for interpretive services to assist at discharge as needed  - Refer to Case Management Department for coordinating discharge planning if the patient needs post-hospital services based on physician/advanced practitioner order or complex needs related to functional status, cognitive ability, or social support system  Outcome: Adequate for Discharge     Problem: Knowledge Deficit  Goal: Patient/family/caregiver demonstrates understanding of disease process, treatment plan, medications, and discharge instructions  Description: Complete learning assessment and assess knowledge base.   Interventions:  - Provide teaching at level of understanding  - Provide teaching via preferred learning methods  Outcome: Adequate for Discharge     Problem: RESPIRATORY - ADULT  Goal: Achieves optimal ventilation and oxygenation  Description: INTERVENTIONS:  - Assess for changes in respiratory status  - Assess for changes in mentation and behavior  - Position to facilitate oxygenation and minimize respiratory effort  - Oxygen administered by appropriate delivery if ordered  - Initiate smoking cessation education as indicated  - Encourage broncho-pulmonary hygiene including cough, deep breathe, Incentive Spirometry  - Assess the need for suctioning and aspirate as needed  - Assess and instruct to report SOB or any respiratory difficulty  - Respiratory Therapy support as indicated  Outcome: Adequate for Discharge     Problem: METABOLIC, FLUID AND ELECTROLYTES - ADULT  Goal: Glucose maintained within target range  Description: INTERVENTIONS:  - Monitor Blood Glucose as ordered  - Assess for signs and symptoms of hyperglycemia and hypoglycemia  - Administer ordered medications to maintain glucose within target range  - Assess nutritional intake and initiate nutrition service referral as needed  Outcome: Adequate for Discharge     Problem: SKIN/TISSUE INTEGRITY - ADULT  Goal: Incision(s), wounds(s) or drain site(s) healing without S/S of infection  Description: INTERVENTIONS  - Assess and document dressing, incision, wound bed, drain sites and surrounding tissue  - Provide patient and family education  - Perform skin care/dressing changes every   Outcome: Adequate for Discharge

## 2023-08-17 ENCOUNTER — TELEPHONE (OUTPATIENT)
Dept: OBGYN CLINIC | Facility: MEDICAL CENTER | Age: 73
End: 2023-08-17

## 2023-08-17 ENCOUNTER — OFFICE VISIT (OUTPATIENT)
Dept: PHYSICAL THERAPY | Facility: REHABILITATION | Age: 73
End: 2023-08-17
Payer: COMMERCIAL

## 2023-08-17 DIAGNOSIS — Z96.652 STATUS POST TOTAL KNEE REPLACEMENT, LEFT: Primary | ICD-10-CM

## 2023-08-17 PROCEDURE — 97110 THERAPEUTIC EXERCISES: CPT

## 2023-08-17 PROCEDURE — 97161 PT EVAL LOW COMPLEX 20 MIN: CPT

## 2023-08-17 NOTE — TELEPHONE ENCOUNTER
Called & spoke to patient, per [facility] message. Patient did confirm that the dressing lateral to his incision was removed and is covered with new clean dressing. Primary mepilex dressing is intact. Patient is doing well from pain standpoint. Tells me physical therapy is going well. He will call if he has any issues. We will see him post-operatively on  09/01/2023.

## 2023-08-17 NOTE — PROGRESS NOTES
PT Evaluation     Today's date: 2023  Patient name: Trudy Lassiter  : 1950  MRN: [id number]  Referring provider: Re Del Angel  Dx:   Encounter Diagnosis     ICD-10-CM    1. Status post total knee replacement, left  Z96.652           Start Time: 1401  Stop Time: 1450  Total time in clinic (min): 49 minutes    Assessment  Assessment details: Problem List:  1) Post operative edema  2) Tibiofemoral hypomobility  3) Quadriceps weakness    Trudy Lassiter is a pleasant 67 y.o. male who presents with signs and symptoms consistent with his medical and surgical history. he has post surgical pain, post surgical edema, tibiofemoral hypomobility, and lower extremity weakness. Impairments result in difficulty walking, performing transfers, negotiating stairs. Impairments result in wanting to avoid painkillers, fear of not being able to keep active and future ill health (and wanting to prevent it). No further referral appears necessary at this time based upon examination results. I expect he will improve with skilled physical therapy. Positive prognostic indicators include positive attitude toward recovery, good understanding of diagnosis and treatment plan options and acuity of symptoms. Negative prognostic indicators include high symptom irritability. Impairments: abnormal coordination, abnormal gait, abnormal muscle firing, abnormal or restricted ROM, abnormal movement, activity intolerance, impaired balance, impaired physical strength, lacks appropriate home exercise program, pain with function, weight-bearing intolerance and poor body mechanics    Symptom irritability: moderateUnderstanding of Dx/Px/POC: good   Prognosis: good    Goals  Short Term Goals:   1. Patient will demonstrate independence with HEP by providing return demonstration of exercises  2. Patient will report decreased symptom intensity during activity by 50%  3.  Patient will negotiate full flight of stairs with LRAD  4. Patient will attain 90% of pre-op range of motion  5. Patient will reduce 5xsts by 3 seconds  6. Patient will reduce TUG time by 2 seconds  7. Patient will walk one mile without onset of knee pain    Long Term Goals:   1. Patient will improve FOTO to greater then goal  2. Patient will improve pain with activity to 2/10 or less  3. Patient will continue with HEP to allow for continued progress and function  4. Patient will equalize range of motion to contralateral side  5. Patient will reduce 5x sit to stand time by 9 seconds to demonstrate reduction in falls risk. 6. Patient will walk 2-3 miles without difficulty. Plan  Patient would benefit from: skilled physical therapy and PT eval  Referral necessary: No  Planned modality interventions: biofeedback, cryotherapy, thermotherapy: hydrocollator packs and electrical stimulation/Russian stimulation  Planned therapy interventions: joint mobilization, manual therapy, muscle pump exercises, neuromuscular re-education, patient education, strengthening, stretching, therapeutic activities, therapeutic exercise, home exercise program, graded exercise, graded activity, gait training, functional ROM exercises, flexibility, body mechanics training, balance, IASTM, kinesiology taping, massage, Nieves taping, motor coordination training, balance/weight bearing training and activity modification  Frequency: 2x week  Plan of Care beginning date: 8/17/2023  Plan of Care expiration date: 10/26/2023  Treatment plan discussed with: patient        Subjective Evaluation    History of Present Illness  Mechanism of injury: Kvng Stoddard underwent a left total knee replacement on 8/14 with Dr. Garfield Oneal. He reports 1-2 year of knee pain before his surgery. He says hie left leg gives him a lot of pain especially when he stands up from sitting down, or going up and down stairs. He says with standing or stairs, he gets a painful click in the front of his knee.  He is able to decrease his pain if he takes the stairs slowly or goes down stairs backwards. He says he wants to go up and down stairs easier, walk farther, and get up and down easier. He can currently walk about one mile before he has to stop because of his knee, and wants to walk about 4 miles. His daughter will be coming to stay with him for two weeks after surgery to help him. He lives on the 7th floor, but has an elevator to get to his floor. He  he slipped and fell after his last evaluation. He has his friends walker. Recurrent probem    Quality of life: good    Patient Goals  Patient goals for therapy: increased motion, improved balance, decreased pain, increased strength, independence with ADLs/IADLs and return to sport/leisure activities  Patient's goals regarding treatment: Go up and down stairs easier, travel, walk longer distances (4 miles)   Pain  Current pain ratin  At best pain ratin  At worst pain ratin  Location: Left Knee    Social Support  Steps to enter house: yes  Stairs in house: no   Lives in: condominium  Lives with: alone    Treatments  Previous treatment: injection treatment and medication  Current treatment: medication        Objective    Interprative services provided by:  #[id number]    GAIT: Patient walks with a rolator, and antalgic gait with step to pattern    Observation: Patient has edema throughout his left lower leg, distal to his surgical incision. There is no pitting edema. His wound is covered with post surgical dressing, but is free of discharge and redness. There is ecchymosis around his left medial knee. Palpation: patient skin is warm and dry. He is nontender to palpation throughout his left lower extremity.     Circumferential measruement  R: 43.5 cm at tibial tuberosity  L: 43 cm at tibial tuberosity            AROM          PROM    Hip        L           R      L     R   Knee       Ext (L3) 20 3 14 0   Flex (S2) 89 105 92 112          Patient education Provided education regarding signs and symptoms of infections and DVT.      Movement observation   Patient is unable to attain full standing position without use of upper extremities       Precautions: L TKA on 8/14      Manuals 7/31 8/17                                                               Neuro Re-Ed             Step up                                                                                           Ther Ex             Quad sets HEP            SLR HEP 1x10           Heel slides HEP 1x10 with OP           Long arc quads  1x10 seated           Bike             VG             PROM             Leg ext Hangs             Ther Activity             Sit to stand                          Gait Training                                       Modalities

## 2023-08-22 ENCOUNTER — APPOINTMENT (EMERGENCY)
Dept: NON INVASIVE DIAGNOSTICS | Facility: HOSPITAL | Age: 73
End: 2023-08-22
Payer: COMMERCIAL

## 2023-08-22 ENCOUNTER — HOSPITAL ENCOUNTER (EMERGENCY)
Facility: HOSPITAL | Age: 73
Discharge: HOME | End: 2023-08-22
Attending: EMERGENCY MEDICINE
Payer: COMMERCIAL

## 2023-08-22 ENCOUNTER — APPOINTMENT (EMERGENCY)
Dept: RADIOLOGY | Facility: HOSPITAL | Age: 73
End: 2023-08-22
Payer: COMMERCIAL

## 2023-08-22 ENCOUNTER — OFFICE VISIT (OUTPATIENT)
Dept: PHYSICAL THERAPY | Facility: REHABILITATION | Age: 73
End: 2023-08-22
Payer: COMMERCIAL

## 2023-08-22 VITALS
HEART RATE: 74 BPM | SYSTOLIC BLOOD PRESSURE: 137 MMHG | RESPIRATION RATE: 18 BRPM | OXYGEN SATURATION: 98 % | DIASTOLIC BLOOD PRESSURE: 63 MMHG | TEMPERATURE: 98.5 F

## 2023-08-22 DIAGNOSIS — Z96.652 STATUS POST TOTAL KNEE REPLACEMENT, LEFT: Primary | ICD-10-CM

## 2023-08-22 DIAGNOSIS — I82.409 DVT (DEEP VENOUS THROMBOSIS) (HCC): ICD-10-CM

## 2023-08-22 LAB
ANION GAP SERPL CALCULATED.3IONS-SCNC: 7 MMOL/L
BASOPHILS # BLD AUTO: 0.11 THOUSANDS/ÂΜL (ref 0–0.1)
BASOPHILS NFR BLD AUTO: 1 % (ref 0–1)
BUN SERPL-MCNC: 23 MG/DL (ref 5–25)
CALCIUM SERPL-MCNC: 8.7 MG/DL (ref 8.4–10.2)
CHLORIDE SERPL-SCNC: 103 MMOL/L (ref 96–108)
CO2 SERPL-SCNC: 28 MMOL/L (ref 21–32)
CREAT SERPL-MCNC: 0.74 MG/DL (ref 0.6–1.3)
EOSINOPHIL # BLD AUTO: 0.13 THOUSAND/ÂΜL (ref 0–0.61)
EOSINOPHIL NFR BLD AUTO: 1 % (ref 0–6)
ERYTHROCYTE [DISTWIDTH] IN BLOOD BY AUTOMATED COUNT: 14.8 % (ref 11.6–15.1)
GFR SERPL CREATININE-BSD FRML MDRD: 91 ML/MIN/1.73SQ M
GLUCOSE SERPL-MCNC: 105 MG/DL (ref 65–140)
HCT VFR BLD AUTO: 30.5 % (ref 36.5–49.3)
HGB BLD-MCNC: 10.1 G/DL (ref 12–17)
IMM GRANULOCYTES # BLD AUTO: 0.37 THOUSAND/UL (ref 0–0.2)
IMM GRANULOCYTES NFR BLD AUTO: 2 % (ref 0–2)
LYMPHOCYTES # BLD AUTO: 1.31 THOUSANDS/ÂΜL (ref 0.6–4.47)
LYMPHOCYTES NFR BLD AUTO: 7 % (ref 14–44)
MCH RBC QN AUTO: 31.4 PG (ref 26.8–34.3)
MCHC RBC AUTO-ENTMCNC: 33.1 G/DL (ref 31.4–37.4)
MCV RBC AUTO: 95 FL (ref 82–98)
MONOCYTES # BLD AUTO: 1.17 THOUSAND/ÂΜL (ref 0.17–1.22)
MONOCYTES NFR BLD AUTO: 6 % (ref 4–12)
NEUTROPHILS # BLD AUTO: 15.14 THOUSANDS/ÂΜL (ref 1.85–7.62)
NEUTS SEG NFR BLD AUTO: 83 % (ref 43–75)
NRBC BLD AUTO-RTO: 0 /100 WBCS
PLATELET # BLD AUTO: 339 THOUSANDS/UL (ref 149–390)
PMV BLD AUTO: 9.8 FL (ref 8.9–12.7)
POTASSIUM SERPL-SCNC: 4.6 MMOL/L (ref 3.5–5.3)
RBC # BLD AUTO: 3.22 MILLION/UL (ref 3.88–5.62)
SODIUM SERPL-SCNC: 138 MMOL/L (ref 135–147)
WBC # BLD AUTO: 18.23 THOUSAND/UL (ref 4.31–10.16)

## 2023-08-22 PROCEDURE — 80048 BASIC METABOLIC PNL TOTAL CA: CPT

## 2023-08-22 PROCEDURE — 93971 EXTREMITY STUDY: CPT

## 2023-08-22 PROCEDURE — 99284 EMERGENCY DEPT VISIT MOD MDM: CPT

## 2023-08-22 PROCEDURE — 93971 EXTREMITY STUDY: CPT | Performed by: SURGERY

## 2023-08-22 PROCEDURE — 36415 COLL VENOUS BLD VENIPUNCTURE: CPT

## 2023-08-22 PROCEDURE — 99285 EMERGENCY DEPT VISIT HI MDM: CPT | Performed by: EMERGENCY MEDICINE

## 2023-08-22 PROCEDURE — NC001 PR NO CHARGE: Performed by: ORTHOPAEDIC SURGERY

## 2023-08-22 PROCEDURE — 73560 X-RAY EXAM OF KNEE 1 OR 2: CPT

## 2023-08-22 PROCEDURE — 85025 COMPLETE CBC W/AUTO DIFF WBC: CPT

## 2023-08-22 RX ADMIN — APIXABAN 10 MG: 5 TABLET, FILM COATED ORAL at 15:16

## 2023-08-22 NOTE — CONSULTS
Orthopedics   [address] [facility] 67 y.o. male MRN: [id number]  Unit/Bed#: X ray      Chief Complaint:   left lower leg pain    HPI:   67 y.o.male community ambulator complaining of left lower leg pain POD 8 L TKA with Dr. Jaspreet Pryor. No trauma. On ASA 325mg for DVT PPX. Pain is dull in character, Located in the proximal calf, acute in onset, intermittent in duration, moderate in intensity. Exacerbating factors include movement, remitting factors include rest. nonradiating, no numbness, no tingling, no open wounds noted. Has had no treatment yet. No other complaints at this time. PMH significant for HTN, HLD. Review Of Systems:   · Skin: Mild expected redness around incision site  · Neuro: See HPI  · Musculoskeletal: See HPI  · 14 point review of systems negative except as stated above     Past Medical History:   Past Medical History:   Diagnosis Date   • Cataract    • Colon polyp    • Diverticulosis    • Gastritis    • GERD (gastroesophageal reflux disease)    • Hyperlipidemia    • Hypertension    • Pre-diabetes    • Throat ulcer    • Visual impairment        Past Surgical History:   Past Surgical History:   Procedure Laterality Date   • COLONOSCOPY N/A 08/18/2016    Procedure: COLONOSCOPY;  Surgeon: Monica Vera MD;  Location:  GI LAB;   Service:    • COLONOSCOPY W/ POLYPECTOMY  2019    repeat 2022   • EYE SURGERY Bilateral    • NJ ARTHRP KNE CONDYLE&PLATU MEDIAL&LAT COMPARTMENTS Left 8/14/2023    Procedure: ARTHROPLASTY KNEE TOTAL;  Surgeon: Jose Garcia DO;  Location: [address] [place] [address];  Service: Orthopedics   • TONSILLECTOMY     • UPPER GASTROINTESTINAL ENDOSCOPY  2019       Family History:  Family history reviewed and non-contributory  Family History   Problem Relation Age of Onset   • Hypertension Mother    • Hypertension Father        Social History:  Social History     Socioeconomic History   • Marital status: Single     Spouse name: None   • Number of children: None   • Years of education: None   • Highest education level: None   Occupational History   • None   Tobacco Use   • Smoking status: Former     Types: Cigarettes   • Smokeless tobacco: Never   • Tobacco comments:     Patient quit   Vaping Use   • Vaping Use: Never used   Substance and Sexual Activity   • Alcohol use: Not Currently     Comment: pt "sober for 30+ years"   • Drug use: No   • Sexual activity: Not Currently     Partners: Female     Comment:    Other Topics Concern   • None   Social History Narrative    Lives with family     Social Determinants of Health     Financial Resource Strain: Low Risk  (2/9/2023)    Overall Financial Resource Strain (CARDIA)    • Difficulty of Paying Living Expenses: Not hard at all   Food Insecurity: No Food Insecurity (5/26/2021)    Hunger Vital Sign    • Worried About Running Out of Food in the Last Year: Never true    • Ran Out of Food in the Last Year: Never true   Transportation Needs: No Transportation Needs (2/9/2023)    PRAPARE - Transportation    • Lack of Transportation (Medical): No    • Lack of Transportation (Non-Medical): No   Physical Activity: Inactive (5/26/2021)    Exercise Vital Sign    • Days of Exercise per Week: 0 days    • Minutes of Exercise per Session: 0 min   Stress: Not on file   Social Connections: Not on file   Intimate Partner Violence: Not on file   Housing Stability: Not on file       Allergies: Allergies   Allergen Reactions   • Lactose - Food Allergy Diarrhea     diarrhea           Labs:  0   Lab Value Date/Time    HCT 30.5 (L) 08/22/2023 1103    HCT 39.2 05/09/2022 0856    HCT 38.2 03/15/2022 0845    HGB 10.1 (L) 08/22/2023 1103    HGB 12.7 05/09/2022 0856    HGB 12.4 03/15/2022 0845    WBC 18.23 (H) 08/22/2023 1103    WBC 8.82 05/09/2022 0856    WBC 10.68 (H) 03/15/2022 0845       Meds:  No current facility-administered medications for this encounter.     Current Outpatient Medications:   •  acetaminophen (TYLENOL) 500 mg tablet, Take 2 tablets (1,000 mg total) by mouth every 8 (eight) hours, Disp: , Rfl: 0  •  amLODIPine (NORVASC) 5 mg tablet, Take 1 tablet (5 mg total) by mouth daily, Disp: 90 tablet, Rfl: 3  •  ascorbic acid (VITAMIN C) 500 MG tablet, Take 1 tablet (500 mg total) by mouth daily Begin 30 days prior to surgery. , Disp: 30 tablet, Rfl: 1  •  aspirin 325 mg tablet, Take 1 tablet (325 mg total) by mouth 2 (two) times a day Take with food. , Disp: 84 tablet, Rfl: 0  •  atorvastatin (LIPITOR) 20 mg tablet, Take 1 tablet (20 mg total) by mouth daily at bedtime, Disp: 90 tablet, Rfl: 3  •  docusate sodium (COLACE) 100 mg capsule, Take 1 capsule (100 mg total) by mouth 2 (two) times a day, Disp: 30 capsule, Rfl: 0  •  ferrous sulfate 324 (65 Fe) mg, Take 1 tablet (324 mg total) by mouth daily before breakfast Begin 30 days prior to surgery. , Disp: 30 tablet, Rfl: 1  •  folic acid (FOLVITE) 1 mg tablet, Take 1 tablet (1 mg total) by mouth daily Begin 30 days prior to surgery. , Disp: 30 tablet, Rfl: 1  •  gabapentin (NEURONTIN) 100 mg capsule, Take 1 capsule (100 mg total) by mouth 3 (three) times a day, Disp: 90 capsule, Rfl: 0  •  linaCLOtide (Linzess) 145 MCG CAPS, Take 1 capsule (145 mcg total) by mouth daily, Disp: 30 capsule, Rfl: 5  •  lisinopril-hydrochlorothiazide (PRINZIDE,ZESTORETIC) 20-25 MG per tablet, Take 1 tablet by mouth daily, Disp: 90 tablet, Rfl: 3  •  Multiple Vitamin (multivitamin) tablet, Take 1 tablet by mouth daily Begin 30 days prior to surgery. , Disp: 30 tablet, Rfl: 1  •  oxyCODONE (ROXICODONE) 10 MG TABS, Take 1 tablet (10 mg total) by mouth every 4 (four) hours as needed for severe pain Max Daily Amount: 60 mg, Disp: 42 tablet, Rfl: 0  •  promethazine (PHENERGAN) 12.5 MG tablet, Take 1 tablet (12.5 mg total) by mouth every 6 (six) hours as needed for nausea or vomiting, Disp: 12 tablet, Rfl: 0    Blood Culture:   No results found for: "BLOODCX"    Wound Culture:   No results found for: "WOUNDCULT"    Ins and Outs:  No intake/output data recorded. Physical Exam:   /74   Pulse 74   Temp 98.5 °F (36.9 °C) (Temporal)   Resp 20   SpO2 97%   Gen: No acute distress, resting comfortably in bed  HEENT: Eyes clear, moist mucus membranes, hearing intact  Respiratory: No audible wheezing or stridor  Cardiovascular: Well Perfused peripherally, 2+ distal pulse  Abdomen: nondistended, no peritoneal signs  Musculoskeletal: left lower extremity  · Skin intact, incision intact with minimal erythema, no drainage expressible, mild swelling  · Tender to palpation around proximal lower leg in gastroc  · Pitting edema to the ankle  · AROM to 70 degrees flexion and 0 degrees extension   · Passive ROM to 70 degrees flexion and 0 degrees extension  · Can perform straight leg raise  · No micromotion pain  · Stable to varus/valgus stress  · Sensation intact L3-S1  · Motor intact to hip flexion/extension, knee flexion/extension, ankle dorsi/plantar flexion, EHL/FHL  · 2+ DP/PT pulse  · Musculature is soft and compressible, no pain with passive stretch    Radiology:   I personally reviewed the films. X-rays AP/Lateral views left knee shows no acute fracture or dislocation with intact TKA components    Prelim read from VAS Duplex LLE showed proximal lower leg DVT      Assessment:  67 y.o. male with left lower leg DVT s/p TKA. Low concern for infection being afebrile, no drainage or wound dehiscence and lack of substantial erythema and pain. Recommend therapeutic anticoagulation and follow up with Dr. Lenin Smith as scheduled    Plan:   · Weight bearing as tolerated  left lower extremity  · Anticoagulation per ED  · Follow up with Dr. Lenin Smith as scheduled.   · PT  · Pain control  · Dispo: Ok for discharge from ortho perspective   Case was reviewed and discussed with senior resident and attending  Abdullahi Russell MD  Orthopedic surgery resident   · 08/22/23  ·     Abdullahi Russell MD

## 2023-08-22 NOTE — PROGRESS NOTES
Daily Note     Today's date: 2023  Patient name: Mykel Sheppard  : 1950  MRN: [id number]  Referring provider: Cesia Matthews,*  Dx:   Encounter Diagnosis     ICD-10-CM    1. Status post total knee replacement, left  Z96.652           Start Time: 815  Stop Time: 825  Total time in clinic (min): 10 minutes    Subjective: Armando Smith says his knee hurts on the sides today      Objective: See treatment diary below    Palpation  Patient is non tender to palpation through is left lower leg and thigh. He has pitting edema through is lower leg. Edema  L Calf: 49 cm  R. Calf: 43 cm    Wells criteria: 3      Assessment: Patient presents with signs and symptoms of potential DVT. Patient referred to emergency room with note containing exam findings and concerns. Education provided to patient and daughter regarding exam findings and concerns. No interventions performed today due to clinical findings. Plan: Progress note during next visit.       Precautions: L TKA on       Manuals                                                               Neuro Re-Ed             Step up                                                                                           Ther Ex             Quad sets HEP            SLR HEP 1x10           Heel slides HEP 1x10 with OP           Long arc quads  1x10 seated           Bike             VG             PROM             Leg ext Hangs             Ther Activity             Sit to stand                          Gait Training                                       Modalities

## 2023-08-22 NOTE — DISCHARGE INSTRUCTIONS
You are seen in the emergency department for knee pain. We found that you have a deep vein thrombosis in your leg for which we are going to start you on a medication called apixaban also known as Eliquis. You are to take that 10 mg 2 times a day for a week and then afterwards you just take 5 mg 2 times a day until otherwise instructed. Please follow-up with your primary care doctor in a week.

## 2023-08-22 NOTE — ED PROVIDER NOTES
History  Chief Complaint   Patient presents with   • Knee Swelling     C/o of L knee swelling and "no color return", post op 8/14 from knee replacement. Denies CP or SOB. Yovany Wing is a 66yoM here for 1 day of worsening left leg swelling and pain advised to come to ED by his physical therapist today. He had L knee replacement on 8/14 performed by Dr. Elsi Wen. He has been taking 325mg aspirin for DVT prophylaxis since his surgery. Daughter at bedside notes that his swelling and pain has significantly increased over the past 24 hours, and she notes a color change of the leg and states that it is more pale and yellow. He has been afebrile and has no systemic symptoms. Of note, he has not changed the dressing since his surgery and is unaware of wound care instructions. No history of gout. Prior to Admission Medications   Prescriptions Last Dose Informant Patient Reported? Taking? Multiple Vitamin (multivitamin) tablet   No No   Sig: Take 1 tablet by mouth daily Begin 30 days prior to surgery. acetaminophen (TYLENOL) 500 mg tablet   No No   Sig: Take 2 tablets (1,000 mg total) by mouth every 8 (eight) hours   amLODIPine (NORVASC) 5 mg tablet  Self No No   Sig: Take 1 tablet (5 mg total) by mouth daily   ascorbic acid (VITAMIN C) 500 MG tablet   No No   Sig: Take 1 tablet (500 mg total) by mouth daily Begin 30 days prior to surgery. aspirin 325 mg tablet   No No   Sig: Take 1 tablet (325 mg total) by mouth 2 (two) times a day Take with food. atorvastatin (LIPITOR) 20 mg tablet  Self No No   Sig: Take 1 tablet (20 mg total) by mouth daily at bedtime   docusate sodium (COLACE) 100 mg capsule   No No   Sig: Take 1 capsule (100 mg total) by mouth 2 (two) times a day   ferrous sulfate 324 (65 Fe) mg   No No   Sig: Take 1 tablet (324 mg total) by mouth daily before breakfast Begin 30 days prior to surgery.    folic acid (FOLVITE) 1 mg tablet   No No   Sig: Take 1 tablet (1 mg total) by mouth daily Begin 30 days prior to surgery. gabapentin (NEURONTIN) 100 mg capsule   No No   Sig: Take 1 capsule (100 mg total) by mouth 3 (three) times a day   linaCLOtide (Linzess) 145 MCG CAPS  Self No No   Sig: Take 1 capsule (145 mcg total) by mouth daily   lisinopril-hydrochlorothiazide (PRINZIDE,ZESTORETIC) 20-25 MG per tablet  Self No No   Sig: Take 1 tablet by mouth daily   oxyCODONE (ROXICODONE) 10 MG TABS   No No   Sig: Take 1 tablet (10 mg total) by mouth every 4 (four) hours as needed for severe pain Max Daily Amount: 60 mg   promethazine (PHENERGAN) 12.5 MG tablet   No No   Sig: Take 1 tablet (12.5 mg total) by mouth every 6 (six) hours as needed for nausea or vomiting      Facility-Administered Medications: None       Past Medical History:   Diagnosis Date   • Cataract    • Colon polyp    • Diverticulosis    • Gastritis    • GERD (gastroesophageal reflux disease)    • Hyperlipidemia    • Hypertension    • Pre-diabetes    • Throat ulcer    • Visual impairment        Past Surgical History:   Procedure Laterality Date   • COLONOSCOPY N/A 08/18/2016    Procedure: COLONOSCOPY;  Surgeon: Gila Brady MD;  Location:  GI LAB; Service:    • COLONOSCOPY W/ POLYPECTOMY  2019    repeat 2022   • EYE SURGERY Bilateral    • NV ARTHRP KNE CONDYLE&PLATU MEDIAL&LAT COMPARTMENTS Left 8/14/2023    Procedure: ARTHROPLASTY KNEE TOTAL;  Surgeon: Stephy Vines DO;  Location: [facility] MAIN OR;  Service: Orthopedics   • TONSILLECTOMY     • UPPER GASTROINTESTINAL ENDOSCOPY  2019       Family History   Problem Relation Age of Onset   • Hypertension Mother    • Hypertension Father      I have reviewed and agree with the history as documented.     E-Cigarette/Vaping   • E-Cigarette Use Never User      E-Cigarette/Vaping Substances   • Nicotine No    • THC No    • CBD No    • Flavoring No    • Other No    • Unknown No      Social History     Tobacco Use   • Smoking status: Former     Types: Cigarettes   • Smokeless tobacco: Never   • Tobacco comments:     Patient quit   Vaping Use   • Vaping Use: Never used   Substance Use Topics   • Alcohol use: Not Currently     Comment: pt "sober for 30+ years"   • Drug use: No        Review of Systems   Constitutional: Negative. HENT: Negative. Eyes: Negative. Respiratory: Negative. Cardiovascular: Negative. Gastrointestinal: Negative. Endocrine: Negative. Genitourinary: Negative. Musculoskeletal: Positive for joint swelling. LLE swelling, has been wearing compression sock   Skin: Positive for color change, pallor and wound. L leg has become more pale and yellow over the past day per daughter   Allergic/Immunologic: Negative. Neurological: Negative. Hematological: Negative. Psychiatric/Behavioral: Negative. Physical Exam  ED Triage Vitals   Temperature Pulse Respirations Blood Pressure SpO2   08/22/23 0915 08/22/23 0915 08/22/23 0915 08/22/23 0915 08/22/23 0915   98.5 °F (36.9 °C) 74 18 169/76 98 %      Temp Source Heart Rate Source Patient Position - Orthostatic VS BP Location FiO2 (%)   08/22/23 0915 08/22/23 0915 08/22/23 1130 08/22/23 1130 --   Temporal Monitor Sitting Right arm       Pain Score       08/22/23 0915       10 - Worst Possible Pain             Orthostatic Vital Signs  Vitals:    08/22/23 0915 08/22/23 1130 08/22/23 1200 08/22/23 1441   BP: 169/76 158/74 170/74 137/63   Pulse: 74 68 74 74   Patient Position - Orthostatic VS:  Sitting  Sitting       Physical Exam  Constitutional:       General: He is not in acute distress. Appearance: Normal appearance. He is not ill-appearing, toxic-appearing or diaphoretic. HENT:      Head: Normocephalic and atraumatic. Nose: Nose normal.      Mouth/Throat:      Mouth: Mucous membranes are moist.      Pharynx: Oropharynx is clear. Eyes:      Extraocular Movements: Extraocular movements intact. Pupils: Pupils are equal, round, and reactive to light.    Cardiovascular:      Rate and Rhythm: Normal rate and regular rhythm. Pulmonary:      Effort: Pulmonary effort is normal. No respiratory distress. Abdominal:      General: Abdomen is flat. Palpations: Abdomen is soft. Musculoskeletal:         General: Swelling and tenderness present. Cervical back: Normal range of motion and neck supple. Left lower leg: Edema present. Comments: LLE swelling, tenderness throughout thigh, knee, and proximal shin   Skin:     General: Skin is warm and dry. Capillary Refill: Capillary refill takes less than 2 seconds. Findings: Bruising present. Comments: L knee with dry dressing in place, bruising along L thigh and L shin. L knee warm to touch   Neurological:      General: No focal deficit present. Mental Status: He is alert and oriented to person, place, and time. Mental status is at baseline. Psychiatric:         Mood and Affect: Mood normal.         Behavior: Behavior normal.         Thought Content:  Thought content normal.         Judgment: Judgment normal.         ED Medications  Medications   apixaban (ELIQUIS) tablet 10 mg (10 mg Oral Given 8/22/23 1516)       Diagnostic Studies  Results Reviewed     Procedure Component Value Units Date/Time    Basic metabolic panel [[id number]] Collected: 08/22/23 1103    Lab Status: Final result Specimen: Blood from Arm, Right Updated: 08/22/23 1153     Sodium 138 mmol/L      Potassium 4.6 mmol/L      Chloride 103 mmol/L      CO2 28 mmol/L      ANION GAP 7 mmol/L      BUN 23 mg/dL      Creatinine 0.74 mg/dL      Glucose 105 mg/dL      Calcium 8.7 mg/dL      eGFR 91 ml/min/1.73sq m     Narrative:      [facility] guidelines for Chronic Kidney Disease (CKD):   •  Stage 1 with normal or high GFR (GFR > 90 mL/min/1.73 square meters)  •  Stage 2 Mild CKD (GFR = 60-89 mL/min/1.73 square meters)  •  Stage 3A Moderate CKD (GFR = 45-59 mL/min/1.73 square meters)  •  Stage 3B Moderate CKD (GFR = 30-44 mL/min/1.73 square meters)  •  Stage 4 Severe CKD (GFR = 15-29 mL/min/1.73 square meters)  •  Stage 5 End Stage CKD (GFR <15 mL/min/1.73 square meters)  Note: GFR calculation is accurate only with a steady state creatinine    CBC and differential [[id number]]  (Abnormal) Collected: 08/22/23 1103    Lab Status: Final result Specimen: Blood from Arm, Right Updated: 08/22/23 1114     WBC 18.23 Thousand/uL      RBC 3.22 Million/uL      Hemoglobin 10.1 g/dL      Hematocrit 30.5 %      MCV 95 fL      MCH 31.4 pg      MCHC 33.1 g/dL      RDW 14.8 %      MPV 9.8 fL      Platelets 082 Thousands/uL      nRBC 0 /100 WBCs      Neutrophils Relative 83 %      Immat GRANS % 2 %      Lymphocytes Relative 7 %      Monocytes Relative 6 %      Eosinophils Relative 1 %      Basophils Relative 1 %      Neutrophils Absolute 15.14 Thousands/µL      Immature Grans Absolute 0.37 Thousand/uL      Lymphocytes Absolute 1.31 Thousands/µL      Monocytes Absolute 1.17 Thousand/µL      Eosinophils Absolute 0.13 Thousand/µL      Basophils Absolute 0.11 Thousands/µL                  XR knee 1 or 2 vw left   Final Result by Keesha Beach MD (08/22 1539)      Mild prepatellar swelling with no acute osseous abnormalities. Maintained alignment of total knee arthroplasty without evidence of periprosthetic fracture. Resident: Mindy Bee, the attending radiologist, have reviewed the images and agree with the final report above. Workstation performed: [id number]         VAS lower limb venous duplex study, unilateral/limited   Final Result by Ana Lara MD (08/22 2202)            Procedures  Procedures      ED Course  ED Course as of 08/23/23 0925   Tue Aug 22, 2023   1114 Current w/u for septic knee vs DVT. CBC, BMP and duplex US ordered   1158 Ortho contacted    1202 WBC(!): 18.23  Concern for septic knee   1203 Per Radha Estrella, vascular tech, patient has acute nonocclusive dvt in one of gastrocnemius veins in LLE. Hypoechoic nonvascular structure with internal echoes noted anterior to left popliteal vein/artery measuring 4.5 x 3.0 x 4.8 cm. Medical Decision Making  76yo male with HTN, GERD, and recent L knee replacement (8/14) here for worsening pain, swelling, and LLE color change within the past 24h. Has been taking 325mg aspirin BID since surgery for DVT prophylaxis. Vitals on admission: 98.5F, HR 74, RR 18, /76, SpO2 98% on room air  Physical exam significant for swelling and tenderness of LLE. Given recent history of surgery differential includes septic joint and DVT. We will order LLE duplex US, CBC, BMP for initial workup. Rest of care per ED course. Case signed off at end of shift for Dr. Jonn Arias. Dispo to home with anticoagulation for DVT. Amount and/or Complexity of Data Reviewed  Labs: ordered. Decision-making details documented in ED Course. Risk  Prescription drug management. Disposition  Final diagnoses:   Status post total knee replacement, left   DVT (deep venous thrombosis) ([address])     Time reflects when diagnosis was documented in both MDM as applicable and the Disposition within this note     Time User Action Codes Description Comment    8/22/2023 12:02 PM Meldon Marine Add [A36.647] Status post total knee replacement, left     8/22/2023  1:54 PM Jonn Arias Add [Q67.983] DVT (deep venous thrombosis) [facility])       ED Disposition     ED Disposition   Discharge    Condition   Stable    Date/Time   Tue Aug 22, 2023  1:53 PM    [address] discharge to home/self care.                Follow-up Information    None         Discharge Medication List as of 8/22/2023  1:56 PM      START taking these medications    Details   apixaban (Eliquis) 5 mg Multiple Dosages:Starting Tue 8/22/2023, Until Mon 8/28/2023 at 2359, THEN Starting Tue 8/29/2023, Until Mon 9/18/2023 at 2359Take 2 tablets (10 mg total) by mouth 2 (two) times a day for 7 days, THEN 1 tablet (5 mg total) 2 (two) times a day for 21 d ays., Normal         CONTINUE these medications which have NOT CHANGED    Details   acetaminophen (TYLENOL) 500 mg tablet Take 2 tablets (1,000 mg total) by mouth every 8 (eight) hours, Starting Mon 8/14/2023, No Print      amLODIPine (NORVASC) 5 mg tablet Take 1 tablet (5 mg total) by mouth daily, Starting Thu 1/26/2023, Normal      ascorbic acid (VITAMIN C) 500 MG tablet Take 1 tablet (500 mg total) by mouth daily Begin 30 days prior to surgery. , Starting Tue 7/25/2023, Normal      aspirin 325 mg tablet Take 1 tablet (325 mg total) by mouth 2 (two) times a day Take with food., Starting Mon 8/14/2023, Normal      atorvastatin (LIPITOR) 20 mg tablet Take 1 tablet (20 mg total) by mouth daily at bedtime, Starting Thu 1/26/2023, Normal      docusate sodium (COLACE) 100 mg capsule Take 1 capsule (100 mg total) by mouth 2 (two) times a day, Starting Mon 8/14/2023, Normal      ferrous sulfate 324 (65 Fe) mg Take 1 tablet (324 mg total) by mouth daily before breakfast Begin 30 days prior to surgery. , Starting Tue 3/47/8951, Normal      folic acid (FOLVITE) 1 mg tablet Take 1 tablet (1 mg total) by mouth daily Begin 30 days prior to surgery. , Starting Tue 7/25/2023, Normal      gabapentin (NEURONTIN) 100 mg capsule Take 1 capsule (100 mg total) by mouth 3 (three) times a day, Starting Mon 8/14/2023, Normal      linaCLOtide (Linzess) 145 MCG CAPS Take 1 capsule (145 mcg total) by mouth daily, Starting Thu 1/26/2023, Normal      lisinopril-hydrochlorothiazide (PRINZIDE,ZESTORETIC) 20-25 MG per tablet Take 1 tablet by mouth daily, Starting Thu 1/26/2023, Normal      Multiple Vitamin (multivitamin) tablet Take 1 tablet by mouth daily Begin 30 days prior to surgery. , Starting Tue 7/25/2023, Normal      oxyCODONE (ROXICODONE) 10 MG TABS Take 1 tablet (10 mg total) by mouth every 4 (four) hours as needed for severe pain Max Daily Amount: 60 mg, Starting Mon 8/14/2023, Normal      promethazine (PHENERGAN) 12.5 MG tablet Take 1 tablet (12.5 mg total) by mouth every 6 (six) hours as needed for nausea or vomiting, Starting Mon 8/14/2023, Normal           No discharge procedures on file. PDMP Review       Value Time User    PDMP Reviewed  Yes 8/14/2023  2:04 PM Elvia Hinton PA-C           ED Provider  Attending physically available and evaluated Avtar Rhodes. I managed the patient along with the ED Attending.     Electronically Signed by         Shana Bajwa MD  08/23/23 8978

## 2023-08-22 NOTE — ED ATTENDING ATTESTATION
8/22/2023  IJuan Jose DO, saw and evaluated the patient. I have discussed the patient with the resident/non-physician practitioner and agree with the resident's/non-physician practitioner's findings, Plan of Care, and MDM as documented in the resident's/non-physician practitioner's note, except where noted. All available labs and Radiology studies were reviewed. I was present for key portions of any procedure(s) performed by the resident/non-physician practitioner and I was immediately available to provide assistance. At this point I agree with the current assessment done in the Emergency Department. I have conducted an independent evaluation of this patient a history and physical is as follows:    Chief Complaint   Patient presents with   • Knee Swelling     C/o of L knee swelling and "no color return", post op 8/14 from knee replacement. Denies CP or SOB. 65-year-old male presents with left leg swelling and pain. Patient was advised to come to emergency part by his physical therapist today. Patient had left knee replacement on August 14. He has been taking aspirin for DVT prophylaxis and surgery. Family noted that patient has had increased swelling and pain over the past 24 hours. No fevers, no systemic symptoms. On exam-no acute distress, heart regular, no respiratory distress,dressing intact over left knee with healing ecchymosis and mild swelling, not warm to touch. Sensation intact, min calf tenderness. Plan - concern for DVT so will do duplex. Will also check labs and consider d/w ortho    Found to have acute DVT. Elevated WBC, ortho consulted and eval in ED.   [facility]    ED Course         Critical Care Time  Procedures Additional Progress Note...

## 2023-08-23 NOTE — ED PROVIDER NOTES
History  Chief Complaint   Patient presents with   • Knee Swelling     C/o of L knee swelling and "no color return", post op 8/14 from knee replacement. Denies CP or SOB. HPI    Prior to Admission Medications   Prescriptions Last Dose Informant Patient Reported? Taking? Multiple Vitamin (multivitamin) tablet   No No   Sig: Take 1 tablet by mouth daily Begin 30 days prior to surgery. acetaminophen (TYLENOL) 500 mg tablet   No No   Sig: Take 2 tablets (1,000 mg total) by mouth every 8 (eight) hours   amLODIPine (NORVASC) 5 mg tablet  Self No No   Sig: Take 1 tablet (5 mg total) by mouth daily   ascorbic acid (VITAMIN C) 500 MG tablet   No No   Sig: Take 1 tablet (500 mg total) by mouth daily Begin 30 days prior to surgery. aspirin 325 mg tablet   No No   Sig: Take 1 tablet (325 mg total) by mouth 2 (two) times a day Take with food. atorvastatin (LIPITOR) 20 mg tablet  Self No No   Sig: Take 1 tablet (20 mg total) by mouth daily at bedtime   docusate sodium (COLACE) 100 mg capsule   No No   Sig: Take 1 capsule (100 mg total) by mouth 2 (two) times a day   ferrous sulfate 324 (65 Fe) mg   No No   Sig: Take 1 tablet (324 mg total) by mouth daily before breakfast Begin 30 days prior to surgery. folic acid (FOLVITE) 1 mg tablet   No No   Sig: Take 1 tablet (1 mg total) by mouth daily Begin 30 days prior to surgery.    gabapentin (NEURONTIN) 100 mg capsule   No No   Sig: Take 1 capsule (100 mg total) by mouth 3 (three) times a day   linaCLOtide (Linzess) 145 MCG CAPS  Self No No   Sig: Take 1 capsule (145 mcg total) by mouth daily   lisinopril-hydrochlorothiazide (PRINZIDE,ZESTORETIC) 20-25 MG per tablet  Self No No   Sig: Take 1 tablet by mouth daily   oxyCODONE (ROXICODONE) 10 MG TABS   No No   Sig: Take 1 tablet (10 mg total) by mouth every 4 (four) hours as needed for severe pain Max Daily Amount: 60 mg   promethazine (PHENERGAN) 12.5 MG tablet   No No   Sig: Take 1 tablet (12.5 mg total) by mouth every 6 (six) hours as needed for nausea or vomiting      Facility-Administered Medications: None       Past Medical History:   Diagnosis Date   • Cataract    • Colon polyp    • Diverticulosis    • Gastritis    • GERD (gastroesophageal reflux disease)    • Hyperlipidemia    • Hypertension    • Pre-diabetes    • Throat ulcer    • Visual impairment        Past Surgical History:   Procedure Laterality Date   • COLONOSCOPY N/A 08/18/2016    Procedure: COLONOSCOPY;  Surgeon: Margaret Pillai MD;  Location:  GI LAB; Service:    • COLONOSCOPY W/ POLYPECTOMY  2019    repeat 2022   • EYE SURGERY Bilateral    • MO ARTHRP KNE CONDYLE&PLATU MEDIAL&LAT COMPARTMENTS Left 8/14/2023    Procedure: ARTHROPLASTY KNEE TOTAL;  Surgeon: Cesia Matthews DO;  Location: [address] [place] [address];  Service: Orthopedics   • TONSILLECTOMY     • UPPER GASTROINTESTINAL ENDOSCOPY  2019       Family History   Problem Relation Age of Onset   • Hypertension Mother    • Hypertension Father      I have reviewed and agree with the history as documented.     E-Cigarette/Vaping   • E-Cigarette Use Never User      E-Cigarette/Vaping Substances   • Nicotine No    • THC No    • CBD No    • Flavoring No    • Other No    • Unknown No      Social History     Tobacco Use   • Smoking status: Former     Types: Cigarettes   • Smokeless tobacco: Never   • Tobacco comments:     Patient quit   Vaping Use   • Vaping Use: Never used   Substance Use Topics   • Alcohol use: Not Currently     Comment: pt "sober for 30+ years"   • Drug use: No        Review of Systems    Physical Exam  ED Triage Vitals   Temperature Pulse Respirations Blood Pressure SpO2   08/22/23 0915 08/22/23 0915 08/22/23 0915 08/22/23 0915 08/22/23 0915   98.5 °F (36.9 °C) 74 18 169/76 98 %      Temp Source Heart Rate Source Patient Position - Orthostatic VS BP Location FiO2 (%)   08/22/23 0915 08/22/23 0915 08/22/23 1130 08/22/23 1130 --   Temporal Monitor Sitting Right arm       Pain Score       08/22/23 0915       10 - Worst Possible Pain             Orthostatic Vital Signs  Vitals:    08/22/23 0915 08/22/23 1130 08/22/23 1200 08/22/23 1441   BP: 169/76 158/74 170/74 137/63   Pulse: 74 68 74 74   Patient Position - Orthostatic VS:  Sitting  Sitting       Physical Exam    ED Medications  Medications   apixaban (ELIQUIS) tablet 10 mg (10 mg Oral Given 8/22/23 1516)       Diagnostic Studies  Results Reviewed     Procedure Component Value Units Date/Time    Basic metabolic panel [[id number]] Collected: 08/22/23 1103    Lab Status: Final result Specimen: Blood from Arm, Right Updated: 08/22/23 1153     Sodium 138 mmol/L      Potassium 4.6 mmol/L      Chloride 103 mmol/L      CO2 28 mmol/L      ANION GAP 7 mmol/L      BUN 23 mg/dL      Creatinine 0.74 mg/dL      Glucose 105 mg/dL      Calcium 8.7 mg/dL      eGFR 91 ml/min/1.73sq m     Narrative:      [facility] guidelines for Chronic Kidney Disease (CKD):   •  Stage 1 with normal or high GFR (GFR > 90 mL/min/1.73 square meters)  •  Stage 2 Mild CKD (GFR = 60-89 mL/min/1.73 square meters)  •  Stage 3A Moderate CKD (GFR = 45-59 mL/min/1.73 square meters)  •  Stage 3B Moderate CKD (GFR = 30-44 mL/min/1.73 square meters)  •  Stage 4 Severe CKD (GFR = 15-29 mL/min/1.73 square meters)  •  Stage 5 End Stage CKD (GFR <15 mL/min/1.73 square meters)  Note: GFR calculation is accurate only with a steady state creatinine    CBC and differential [[id number]]  (Abnormal) Collected: 08/22/23 1103    Lab Status: Final result Specimen: Blood from Arm, Right Updated: 08/22/23 1114     WBC 18.23 Thousand/uL      RBC 3.22 Million/uL      Hemoglobin 10.1 g/dL      Hematocrit 30.5 %      MCV 95 fL      MCH 31.4 pg      MCHC 33.1 g/dL      RDW 14.8 %      MPV 9.8 fL      Platelets 202 Thousands/uL      nRBC 0 /100 WBCs      Neutrophils Relative 83 %      Immat GRANS % 2 %      Lymphocytes Relative 7 %      Monocytes Relative 6 %      Eosinophils Relative 1 %      Basophils Relative 1 % Neutrophils Absolute 15.14 Thousands/µL      Immature Grans Absolute 0.37 Thousand/uL      Lymphocytes Absolute 1.31 Thousands/µL      Monocytes Absolute 1.17 Thousand/µL      Eosinophils Absolute 0.13 Thousand/µL      Basophils Absolute 0.11 Thousands/µL                  XR knee 1 or 2 vw left   Final Result by Mary Crowe MD (08/22 0249)      Mild prepatellar swelling with no acute osseous abnormalities. Maintained alignment of total knee arthroplasty without evidence of periprosthetic fracture. Resident: Valerie Marie, the attending radiologist, have reviewed the images and agree with the final report above. Workstation performed: [id number]         VAS lower limb venous duplex study, unilateral/limited   Final Result by Tomás Nguyen MD (08/22 2202)            Procedures  Procedures      ED Course  ED Course as of 08/23/23 0844   Tue Aug 22, 2023   1217 S/o: L Knee replacement on 14th - has DVT and possibly septic arthritis                                       MDM      Disposition  Final diagnoses:   Status post total knee replacement, left   DVT (deep venous thrombosis) ([address])     Time reflects when diagnosis was documented in both MDM as applicable and the Disposition within this note     Time User Action Codes Description Comment    8/22/2023 12:02 PM Beatriz Bach Add [B25.599] Status post total knee replacement, left     8/22/2023  1:54 PM Nupur Sidhu Add [I82.409] DVT (deep venous thrombosis) [facility])       ED Disposition     ED Disposition   Discharge    Condition   Stable    Date/Time   Tue Aug 22, 2023  1:53 PM    [address] discharge to home/self care.                Follow-up Information    None         Discharge Medication List as of 8/22/2023  1:56 PM      START taking these medications    Details   apixaban (Eliquis) 5 mg Multiple Dosages:Starting Tue 8/22/2023, Until Mon 8/28/2023 at 2359, THEN Starting Tue 8/29/2023, Until Mon 9/18/2023 at 2359Take 2 tablets (10 mg total) by mouth 2 (two) times a day for 7 days, THEN 1 tablet (5 mg total) 2 (two) times a day for 21 d ays., Normal         CONTINUE these medications which have NOT CHANGED    Details   acetaminophen (TYLENOL) 500 mg tablet Take 2 tablets (1,000 mg total) by mouth every 8 (eight) hours, Starting Mon 8/14/2023, No Print      amLODIPine (NORVASC) 5 mg tablet Take 1 tablet (5 mg total) by mouth daily, Starting Thu 1/26/2023, Normal      ascorbic acid (VITAMIN C) 500 MG tablet Take 1 tablet (500 mg total) by mouth daily Begin 30 days prior to surgery. , Starting Tue 7/25/2023, Normal      aspirin 325 mg tablet Take 1 tablet (325 mg total) by mouth 2 (two) times a day Take with food., Starting Mon 8/14/2023, Normal      atorvastatin (LIPITOR) 20 mg tablet Take 1 tablet (20 mg total) by mouth daily at bedtime, Starting Thu 1/26/2023, Normal      docusate sodium (COLACE) 100 mg capsule Take 1 capsule (100 mg total) by mouth 2 (two) times a day, Starting Mon 8/14/2023, Normal      ferrous sulfate 324 (65 Fe) mg Take 1 tablet (324 mg total) by mouth daily before breakfast Begin 30 days prior to surgery. , Starting Tue 5/97/8412, Normal      folic acid (FOLVITE) 1 mg tablet Take 1 tablet (1 mg total) by mouth daily Begin 30 days prior to surgery. , Starting Tue 7/25/2023, Normal      gabapentin (NEURONTIN) 100 mg capsule Take 1 capsule (100 mg total) by mouth 3 (three) times a day, Starting Mon 8/14/2023, Normal      linaCLOtide (Linzess) 145 MCG CAPS Take 1 capsule (145 mcg total) by mouth daily, Starting Thu 1/26/2023, Normal      lisinopril-hydrochlorothiazide (PRINZIDE,ZESTORETIC) 20-25 MG per tablet Take 1 tablet by mouth daily, Starting Thu 1/26/2023, Normal      Multiple Vitamin (multivitamin) tablet Take 1 tablet by mouth daily Begin 30 days prior to surgery. , Starting Tue 7/25/2023, Normal      oxyCODONE (ROXICODONE) 10 MG TABS Take 1 tablet (10 mg total) by mouth every 4 (four) hours as needed for severe pain Max Daily Amount: 60 mg, Starting Mon 8/14/2023, Normal      promethazine (PHENERGAN) 12.5 MG tablet Take 1 tablet (12.5 mg total) by mouth every 6 (six) hours as needed for nausea or vomiting, Starting Mon 8/14/2023, Normal           No discharge procedures on file. PDMP Review       Value Time User    PDMP Reviewed  Yes 8/14/2023  2:04 PM Clara Batista PA-C           ED Provider  Attending physically available and evaluated Babita Reaves. I managed the patient along with the ED Attending.     Electronically Signed by

## 2023-08-24 ENCOUNTER — OFFICE VISIT (OUTPATIENT)
Dept: PHYSICAL THERAPY | Facility: REHABILITATION | Age: 73
End: 2023-08-24
Payer: COMMERCIAL

## 2023-08-24 DIAGNOSIS — Z01.818 PREOPERATIVE TESTING: ICD-10-CM

## 2023-08-24 DIAGNOSIS — Z96.652 STATUS POST TOTAL KNEE REPLACEMENT, LEFT: Primary | ICD-10-CM

## 2023-08-24 DIAGNOSIS — M17.12 PRIMARY OSTEOARTHRITIS OF LEFT KNEE: ICD-10-CM

## 2023-08-24 DIAGNOSIS — Z96.652 STATUS POST TOTAL KNEE REPLACEMENT, LEFT: ICD-10-CM

## 2023-08-24 PROCEDURE — 97110 THERAPEUTIC EXERCISES: CPT | Performed by: PHYSICAL THERAPIST

## 2023-08-24 RX ORDER — DOCUSATE SODIUM 100 MG/1
100 CAPSULE, LIQUID FILLED ORAL 2 TIMES DAILY
Qty: 30 CAPSULE | Refills: 0 | OUTPATIENT
Start: 2023-08-24

## 2023-08-24 NOTE — PROGRESS NOTES
Daily Note     Today's date: 2023  Patient name: Karen Guaman  : 1950  MRN: [id number]  Referring provider: Ronit Russell,*  Dx:   Encounter Diagnosis     ICD-10-CM    1. Status post total knee replacement, left  Z96.652       2. Primary osteoarthritis of left knee  M17.12       3. Preoperative testing  Z01.818                      Subjective: Swelling is much reduced today as well as redness and tenderness to L calf. Evaluation PT, Donal Daley, confirmed improvement in patient's presentation today. Objective: See treatment diary below    Palpation  Non-tender to palpation    Edema  L Calf: 42 cm  R. Calf: 41 cm    L knee PROM - 3-95    Assessment: Minor soreness with listed interventions but tolerable per patient. Does not complete full revolutions on bike. Unable to achieve end range knee extension w/ LAQ but does w/ assistance. He would benefit from continued skilled PT services to reduce pain and increase level of function. Plan: Progress note during next visit.       Precautions: L TKA on       Manuals                                                              Neuro Re-Ed             Step up                                                                                           Ther Ex             Quad sets HEP            SLR HEP 1x10  2x5         Heel slides HEP 1x10 with OP  10x         Long arc quads  1x10 seated  2x5x3"         Bike    7' ROM         VG    L6 5'         PROM    MM         DLHR    2x10                      Leg ext Hangs             Ther Activity             Sit to stand                          Gait Training                                       Modalities

## 2023-08-25 ENCOUNTER — APPOINTMENT (EMERGENCY)
Dept: RADIOLOGY | Facility: HOSPITAL | Age: 73
DRG: 683 | End: 2023-08-25
Payer: COMMERCIAL

## 2023-08-25 ENCOUNTER — HOSPITAL ENCOUNTER (EMERGENCY)
Facility: HOSPITAL | Age: 73
Discharge: HOME | DRG: 683 | End: 2023-08-25
Attending: EMERGENCY MEDICINE
Payer: COMMERCIAL

## 2023-08-25 VITALS
DIASTOLIC BLOOD PRESSURE: 84 MMHG | RESPIRATION RATE: 18 BRPM | HEART RATE: 61 BPM | SYSTOLIC BLOOD PRESSURE: 202 MMHG | OXYGEN SATURATION: 98 % | TEMPERATURE: 98.6 F

## 2023-08-25 DIAGNOSIS — R10.9 ABDOMINAL PAIN: Primary | ICD-10-CM

## 2023-08-25 LAB
2HR DELTA HS TROPONIN: 8 NG/L
4HR DELTA HS TROPONIN: 7 NG/L
ALBUMIN SERPL BCP-MCNC: 3.4 G/DL (ref 3.5–5)
ALP SERPL-CCNC: 52 U/L (ref 34–104)
ALT SERPL W P-5'-P-CCNC: 27 U/L (ref 7–52)
ANION GAP SERPL CALCULATED.3IONS-SCNC: 9 MMOL/L
AST SERPL W P-5'-P-CCNC: 31 U/L (ref 13–39)
BACTERIA UR QL AUTO: ABNORMAL /HPF
BASOPHILS # BLD AUTO: 0.05 THOUSANDS/ÂΜL (ref 0–0.1)
BASOPHILS NFR BLD AUTO: 0 % (ref 0–1)
BILIRUB SERPL-MCNC: 0.68 MG/DL (ref 0.2–1)
BILIRUB UR QL STRIP: NEGATIVE
BUN SERPL-MCNC: 21 MG/DL (ref 5–25)
CALCIUM ALBUM COR SERPL-MCNC: 8.4 MG/DL (ref 8.3–10.1)
CALCIUM SERPL-MCNC: 7.9 MG/DL (ref 8.4–10.2)
CARDIAC TROPONIN I PNL SERPL HS: 28 NG/L
CARDIAC TROPONIN I PNL SERPL HS: 35 NG/L
CARDIAC TROPONIN I PNL SERPL HS: 36 NG/L
CHLORIDE SERPL-SCNC: 99 MMOL/L (ref 96–108)
CLARITY UR: CLEAR
CO2 SERPL-SCNC: 28 MMOL/L (ref 21–32)
COLOR UR: YELLOW
CREAT SERPL-MCNC: 0.66 MG/DL (ref 0.6–1.3)
EOSINOPHIL # BLD AUTO: 0 THOUSAND/ÂΜL (ref 0–0.61)
EOSINOPHIL NFR BLD AUTO: 0 % (ref 0–6)
ERYTHROCYTE [DISTWIDTH] IN BLOOD BY AUTOMATED COUNT: 14.4 % (ref 11.6–15.1)
GFR SERPL CREATININE-BSD FRML MDRD: 95 ML/MIN/1.73SQ M
GLUCOSE SERPL-MCNC: 116 MG/DL (ref 65–140)
GLUCOSE UR STRIP-MCNC: NEGATIVE MG/DL
HCT VFR BLD AUTO: 29.5 % (ref 36.5–49.3)
HGB BLD-MCNC: 9.9 G/DL (ref 12–17)
HGB UR QL STRIP.AUTO: ABNORMAL
IMM GRANULOCYTES # BLD AUTO: 0.18 THOUSAND/UL (ref 0–0.2)
IMM GRANULOCYTES NFR BLD AUTO: 1 % (ref 0–2)
KETONES UR STRIP-MCNC: NEGATIVE MG/DL
LEUKOCYTE ESTERASE UR QL STRIP: ABNORMAL
LIPASE SERPL-CCNC: 48 U/L (ref 11–82)
LYMPHOCYTES # BLD AUTO: 0.87 THOUSANDS/ÂΜL (ref 0.6–4.47)
LYMPHOCYTES NFR BLD AUTO: 6 % (ref 14–44)
MCH RBC QN AUTO: 31.5 PG (ref 26.8–34.3)
MCHC RBC AUTO-ENTMCNC: 33.6 G/DL (ref 31.4–37.4)
MCV RBC AUTO: 94 FL (ref 82–98)
MONOCYTES # BLD AUTO: 1.16 THOUSAND/ÂΜL (ref 0.17–1.22)
MONOCYTES NFR BLD AUTO: 8 % (ref 4–12)
MUCOUS THREADS UR QL AUTO: ABNORMAL
NEUTROPHILS # BLD AUTO: 12.37 THOUSANDS/ÂΜL (ref 1.85–7.62)
NEUTS SEG NFR BLD AUTO: 85 % (ref 43–75)
NITRITE UR QL STRIP: NEGATIVE
NON-SQ EPI CELLS URNS QL MICRO: ABNORMAL /HPF
NRBC BLD AUTO-RTO: 0 /100 WBCS
PH UR STRIP.AUTO: 7.5 [PH]
PLATELET # BLD AUTO: 379 THOUSANDS/UL (ref 149–390)
PMV BLD AUTO: 9.8 FL (ref 8.9–12.7)
POTASSIUM SERPL-SCNC: 4 MMOL/L (ref 3.5–5.3)
PROT SERPL-MCNC: 6.6 G/DL (ref 6.4–8.4)
PROT UR STRIP-MCNC: ABNORMAL MG/DL
RBC # BLD AUTO: 3.14 MILLION/UL (ref 3.88–5.62)
RBC #/AREA URNS AUTO: ABNORMAL /HPF
SODIUM SERPL-SCNC: 136 MMOL/L (ref 135–147)
SP GR UR STRIP.AUTO: 1.01 (ref 1–1.03)
UROBILINOGEN UR STRIP-ACNC: <2 MG/DL
WBC # BLD AUTO: 14.63 THOUSAND/UL (ref 4.31–10.16)
WBC #/AREA URNS AUTO: ABNORMAL /HPF

## 2023-08-25 PROCEDURE — 96375 TX/PRO/DX INJ NEW DRUG ADDON: CPT

## 2023-08-25 PROCEDURE — 84484 ASSAY OF TROPONIN QUANT: CPT

## 2023-08-25 PROCEDURE — G1004 CDSM NDSC: HCPCS

## 2023-08-25 PROCEDURE — 99285 EMERGENCY DEPT VISIT HI MDM: CPT

## 2023-08-25 PROCEDURE — 93308 TTE F-UP OR LMTD: CPT | Performed by: EMERGENCY MEDICINE

## 2023-08-25 PROCEDURE — 71045 X-RAY EXAM CHEST 1 VIEW: CPT

## 2023-08-25 PROCEDURE — 74177 CT ABD & PELVIS W/CONTRAST: CPT

## 2023-08-25 PROCEDURE — 99285 EMERGENCY DEPT VISIT HI MDM: CPT | Performed by: EMERGENCY MEDICINE

## 2023-08-25 PROCEDURE — 76705 ECHO EXAM OF ABDOMEN: CPT | Performed by: EMERGENCY MEDICINE

## 2023-08-25 PROCEDURE — 76706 US ABDL AORTA SCREEN AAA: CPT | Performed by: EMERGENCY MEDICINE

## 2023-08-25 PROCEDURE — 81001 URINALYSIS AUTO W/SCOPE: CPT

## 2023-08-25 PROCEDURE — 80053 COMPREHEN METABOLIC PANEL: CPT

## 2023-08-25 PROCEDURE — 71275 CT ANGIOGRAPHY CHEST: CPT

## 2023-08-25 PROCEDURE — 36415 COLL VENOUS BLD VENIPUNCTURE: CPT

## 2023-08-25 PROCEDURE — 83690 ASSAY OF LIPASE: CPT

## 2023-08-25 PROCEDURE — 93005 ELECTROCARDIOGRAM TRACING: CPT

## 2023-08-25 PROCEDURE — 96374 THER/PROPH/DIAG INJ IV PUSH: CPT

## 2023-08-25 PROCEDURE — 85025 COMPLETE CBC W/AUTO DIFF WBC: CPT

## 2023-08-25 RX ORDER — ONDANSETRON 2 MG/ML
4 INJECTION INTRAMUSCULAR; INTRAVENOUS ONCE
Status: COMPLETED | OUTPATIENT
Start: 2023-08-25 | End: 2023-08-25

## 2023-08-25 RX ORDER — FENTANYL CITRATE 50 UG/ML
1 INJECTION, SOLUTION INTRAMUSCULAR; INTRAVENOUS ONCE
Status: COMPLETED | OUTPATIENT
Start: 2023-08-25 | End: 2023-08-25

## 2023-08-25 RX ORDER — ONDANSETRON 4 MG/1
4 TABLET, FILM COATED ORAL EVERY 8 HOURS PRN
Qty: 12 TABLET | Refills: 0 | Status: SHIPPED | OUTPATIENT
Start: 2023-08-25

## 2023-08-25 RX ORDER — MORPHINE SULFATE 10 MG/ML
8 INJECTION, SOLUTION INTRAMUSCULAR; INTRAVENOUS ONCE
Status: COMPLETED | OUTPATIENT
Start: 2023-08-25 | End: 2023-08-25

## 2023-08-25 RX ADMIN — IOHEXOL 100 ML: 350 INJECTION, SOLUTION INTRAVENOUS at 16:28

## 2023-08-25 RX ADMIN — MORPHINE SULFATE 8 MG: 10 INJECTION INTRAVENOUS at 13:51

## 2023-08-25 RX ADMIN — ONDANSETRON 4 MG: 2 INJECTION INTRAMUSCULAR; INTRAVENOUS at 13:51

## 2023-08-25 NOTE — ED PROVIDER NOTES
Final Diagnoses:   No diagnosis found. Nursing Triage:     Chief Complaint   Patient presents with   • Abdominal Pain     Reports recent knee surgery 2 weeks ago. Reports having abdominal pain and chest pain that started 3 days ago. • Chest Pain     HPI:   This is a 67 y.o. male presenting for evaluation of severe abd pain. Pt states he had knee surgery 7 days ago and 5 days ago he fell w/o LOC but did hurt his left leg causing bruising. States starting 3-4 days he began to have subjective fever along with n/v and cannot tolerate food. Patient states that he has 10 out of 10 pain in his abdomen. Denies dysuria or back pain. ASSESSMENT + PLAN:   Given history of recent surgery and recent diagnosis of DVT and a Wells score of 6 we will do a CT PE study as well as a run-through within the abdomen pelvis given his severe abdominal pain. Also clinical concern for ACS stating that he has chest pain and some broad widening of his T waves on EKG. We will also provide medications for nausea and pain. Physical:   Pertinent: Tender abdomen diffusely, swelling to the left leg (patient states that is unchanged from after surgery), 1+ DP in the left 2+ in all other extremities, old appearing ecchymosis on the left foot    General: VS reviewed  Appears in NAD  awake, alert. Well-nourished, well-developed. Appears stated age. Speaking normally in full sentences. Head: Normocephalic, atraumatic  Eyes: EOM-I. No diplopia. No hyphema. No subconjunctival hemorrhages. Symmetrical lids. ENT: Atraumatic external nose and ears. MMM  No malocclusion. No stridor. Normal phonation. No drooling. Normal swallowing. Neck: No JVD. CV: No pallor noted  Lungs:   No tachypnea  No respiratory distress  Abd: soft nd no rebound/guarding  MSK:   FROM spontaneously  Skin: Dry, intact. Neuro: Awake, alert, GCS15, CN II-XII grossly intact. Motor grossly intact.   Psychiatric/Behavioral: interacting normally; appropriate mood/affect.  Exam: deferred    Vitals:    08/25/23 1309   BP: (!) 202/84   Pulse: 61   Resp: 18   Temp: 98.6 °F (37 °C)   TempSrc: Oral   SpO2: 98%     - There are no obvious limitations to social determinants of care. - Nursing note reviewed. - Vitals reviewed. - Orders placed by myself and/or advanced practitioner / resident.    - Previous chart was reviewed  -Required   - History obtained from patient. - There are no limitations to the history obtained:       Critical Care Time:   - Critical care time: *** minutes. - Critical care time was exclusive of seperately bilable procedures and treating other patients as well as teaching time. - Critical care was necessary to treat or prevent imminent or life-threatening deterioration of the following condition: ***  - Critical care time was spent personally by me on the following activities as well as the above as per the ED course and rest of chart: {Blank multiple:73874::"blood draw for specimens","obtaining history from patient / surrogate","developement of a treatment plan","discussions with consultants","evaluation of patient's response to the treatment","examination of the patient, ordering/performing treatements and interventions","re-evaluation of the patient's condition","review of old charts","ordering/reviewing laboratory studies","ordering/reviewing of radiographic studies"}    Past Medical:    has a past medical history of Cataract, Colon polyp, Diverticulosis, Gastritis, GERD (gastroesophageal reflux disease), Hyperlipidemia, Hypertension, Pre-diabetes, Throat ulcer, and Visual impairment. Past Surgical:    has a past surgical history that includes Eye surgery (Bilateral); Colonoscopy (N/A, 08/18/2016); Upper gastrointestinal endoscopy (2019); Colonoscopy w/ polypectomy (2019); Tonsillectomy; and pr arthrp kne condyle&platu medial&lat compartments (Left, 8/14/2023).     Social:     Social History     Substance and Sexual Activity   Alcohol Use Not Currently    Comment: pt "sober for 30+ years"     Social History     Tobacco Use   Smoking Status Former   • Types: Cigarettes   Smokeless Tobacco Never   Tobacco Comments    Patient quit     Social History     Substance and Sexual Activity   Drug Use No       Echo:   No results found for this or any previous visit. No results found for this or any previous visit. Cath:    No results found for this or any previous visit. Code Status: Prior  Advance Directive and Living Will:      Power of :    POLST:    Medications   fentanyl citrate (PF) (FOR EMS ONLY) 100 mcg/2 mL injection 100 mcg (has no administration in time range)     No orders to display     No orders of the defined types were placed in this encounter. Labs Reviewed - No data to display  ED Disposition     None      Follow-up Information    None       Patient's Medications   Discharge Prescriptions    No medications on file     No discharge procedures on file. Prior to Admission Medications   Prescriptions Last Dose Informant Patient Reported? Taking? Multiple Vitamin (multivitamin) tablet   No No   Sig: Take 1 tablet by mouth daily Begin 30 days prior to surgery. acetaminophen (TYLENOL) 500 mg tablet   No No   Sig: Take 2 tablets (1,000 mg total) by mouth every 8 (eight) hours   amLODIPine (NORVASC) 5 mg tablet  Self No No   Sig: Take 1 tablet (5 mg total) by mouth daily   apixaban (Eliquis) 5 mg   No No   Sig: Take 2 tablets (10 mg total) by mouth 2 (two) times a day for 7 days, THEN 1 tablet (5 mg total) 2 (two) times a day for 21 days. ascorbic acid (VITAMIN C) 500 MG tablet   No No   Sig: Take 1 tablet (500 mg total) by mouth daily Begin 30 days prior to surgery. aspirin 325 mg tablet   No No   Sig: Take 1 tablet (325 mg total) by mouth 2 (two) times a day Take with food.    atorvastatin (LIPITOR) 20 mg tablet  Self No No   Sig: Take 1 tablet (20 mg total) by mouth daily at bedtime docusate sodium (COLACE) 100 mg capsule   No No   Sig: Take 1 capsule (100 mg total) by mouth 2 (two) times a day   ferrous sulfate 324 (65 Fe) mg   No No   Sig: Take 1 tablet (324 mg total) by mouth daily before breakfast Begin 30 days prior to surgery. folic acid (FOLVITE) 1 mg tablet   No No   Sig: Take 1 tablet (1 mg total) by mouth daily Begin 30 days prior to surgery. gabapentin (NEURONTIN) 100 mg capsule   No No   Sig: Take 1 capsule (100 mg total) by mouth 3 (three) times a day   linaCLOtide (Linzess) 145 MCG CAPS  Self No No   Sig: Take 1 capsule (145 mcg total) by mouth daily   lisinopril-hydrochlorothiazide (PRINZIDE,ZESTORETIC) 20-25 MG per tablet  Self No No   Sig: Take 1 tablet by mouth daily   oxyCODONE (ROXICODONE) 10 MG TABS   No No   Sig: Take 1 tablet (10 mg total) by mouth every 4 (four) hours as needed for severe pain Max Daily Amount: 60 mg   promethazine (PHENERGAN) 12.5 MG tablet   No No   Sig: Take 1 tablet (12.5 mg total) by mouth every 6 (six) hours as needed for nausea or vomiting      Facility-Administered Medications: None                      Portions of the record may have been created with voice recognition software. Occasional wrong word or "sound a like" substitutions may have occurred due to the inherent limitations of voice recognition software. Read the chart carefully and recognize, using context, where substitutions have occurred. 0.17 - 1.22 Thousand/µL Final    Eosinophils Absolute 0.00  0.00 - 0.61 Thousand/µL Final    Basophils Absolute 0.05  0.00 - 0.10 Thousands/µL Final   COMPREHENSIVE METABOLIC PANEL - Abnormal    Sodium 136  135 - 147 mmol/L Final    Potassium 4.0  3.5 - 5.3 mmol/L Final    Comment: Slightly Hemolyzed:Results may be affected. Chloride 99  96 - 108 mmol/L Final    CO2 28  21 - 32 mmol/L Final    ANION GAP 9  mmol/L Final    BUN 21  5 - 25 mg/dL Final    Creatinine 0.66  0.60 - 1.30 mg/dL Final    Comment: Standardized to IDMS reference method    Glucose 116  65 - 140 mg/dL Final    Comment: If the patient is fasting, the ADA then defines impaired fasting glucose as > 100 mg/dL and diabetes as > or equal to 123 mg/dL. Calcium 7.9 (*) 8.4 - 10.2 mg/dL Final    Corrected Calcium 8.4  8.3 - 10.1 mg/dL Final    AST 31  13 - 39 U/L Final    Comment: Slightly Hemolyzed:Results may be affected. ALT 27  7 - 52 U/L Final    Comment: Specimen collection should occur prior to Sulfasalazine administration due to the potential for falsely depressed results. Alkaline Phosphatase 52  34 - 104 U/L Final    Total Protein 6.6  6.4 - 8.4 g/dL Final    Albumin 3.4 (*) 3.5 - 5.0 g/dL Final    Total Bilirubin 0.68  0.20 - 1.00 mg/dL Final    Comment: Use of this assay is not recommended for patients undergoing treatment with eltrombopag due to the potential for falsely elevated results. N-acetyl-p-benzoquinone imine (metabolite of Acetaminophen) will generate erroneously low results in samples for patients that have taken an overdose of Acetaminophen.     eGFR 95  ml/min/1.73sq m Final    Narrative:     [place] guidelines for Chronic Kidney Disease (CKD):   •  Stage 1 with normal or high GFR (GFR > 90 mL/min/1.73 square meters)  •  Stage 2 Mild CKD (GFR = 60-89 mL/min/1.73 square meters)  •  Stage 3A Moderate CKD (GFR = 45-59 mL/min/1.73 square meters)  •  Stage 3B Moderate CKD (GFR = 30-44 mL/min/1.73 square meters)  •  Stage 4 Severe CKD (GFR = 15-29 mL/min/1.73 square meters)  •  Stage 5 End Stage CKD (GFR <15 mL/min/1.73 square meters)  Note: GFR calculation is accurate only with a steady state creatinine   URINALYSIS WITH REFLEX TO SCOPE - Abnormal    Color, UA Yellow   Final    Clarity, UA Clear   Final    Specific Gravity, UA 1.015  1.005 - 1.030 Final    pH, UA 7.5  4.5, 5.0, 5.5, 6.0, 6.5, 7.0, 7.5, 8.0 Final    Leukocytes, UA Large (*) Negative Final    Nitrite, UA Negative  Negative Final    Protein, UA 30 (1+) (*) Negative mg/dl Final    Glucose, UA Negative  Negative mg/dl Final    Ketones, UA Negative  Negative mg/dl Final    Urobilinogen, UA <2.0  <2.0 mg/dl mg/dl Final    Bilirubin, UA Negative  Negative Final    Occult Blood, UA Moderate (*) Negative Final   URINE MICROSCOPIC - Abnormal    RBC, UA 20-30 (*) None Seen, 1-2 /hpf Final    WBC, UA Innumerable (*) None Seen, 1-2 /hpf Final    Epithelial Cells None Seen  None Seen, Occasional /hpf Final    Bacteria, UA Occasional  None Seen, Occasional /hpf Final    MUCUS THREADS Occasional (*) None Seen Final   LIPASE - Normal    Lipase 48  11 - 82 u/L Final   HS TROPONIN I 0HR - Normal    hs TnI 0hr 28  "Refer to ACS Flowchart"- see link ng/L Final    Comment:                                              Initial (time 0) result  If >=50 ng/L, Myocardial injury suggested ;  Type of myocardial injury and treatment strategy  to be determined. If 5-49 ng/L, a delta result at 2 hours and or 4 hours will be needed to further evaluate. If <4 ng/L, and chest pain has been >3 hours since onset, patient may qualify for discharge based on the HEART score in the ED. If <5 ng/L and <3hours since onset of chest pain, a delta result at 2 hours will be needed to further evaluate. HS Troponin 99th Percentile URL of a Health Population=12 ng/L with a 95% Confidence Interval of 8-18 ng/L.     Second Troponin (time 2 hours)  If calculated delta >= 20 ng/L,  Myocardial injury suggested ; Type of myocardial injury and treatment strategy to be determined. If 5-49 ng/L and the calculated delta is 5-19 ng/L, consult medical service for evaluation. Continue evaluation for ischemia on ecg and other possible etiology and repeat hs troponin at 4 hours. If delta is <5 ng/L at 2 hours, consider discharge based on risk stratification via the HEART score (if in ED), or CLAUDINE risk score in IP/Observation. HS Troponin 99th Percentile URL of a Health Population=12 ng/L with a 95% Confidence Interval of 8-18 ng/L.   HS TROPONIN I 2HR - Normal    hs TnI 2hr 36  "Refer to ACS Flowchart"- see link ng/L Final    Comment:                                              Initial (time 0) result  If >=50 ng/L, Myocardial injury suggested ;  Type of myocardial injury and treatment strategy  to be determined. If 5-49 ng/L, a delta result at 2 hours and or 4 hours will be needed to further evaluate. If <4 ng/L, and chest pain has been >3 hours since onset, patient may qualify for discharge based on the HEART score in the ED. If <5 ng/L and <3hours since onset of chest pain, a delta result at 2 hours will be needed to further evaluate. HS Troponin 99th Percentile URL of a Health Population=12 ng/L with a 95% Confidence Interval of 8-18 ng/L. Second Troponin (time 2 hours)  If calculated delta >= 20 ng/L,  Myocardial injury suggested ; Type of myocardial injury and treatment strategy to be determined. If 5-49 ng/L and the calculated delta is 5-19 ng/L, consult medical service for evaluation. Continue evaluation for ischemia on ecg and other possible etiology and repeat hs troponin at 4 hours. If delta is <5 ng/L at 2 hours, consider discharge based on risk stratification via the HEART score (if in ED), or CLAUDINE risk score in IP/Observation. HS Troponin 99th Percentile URL of a Health Population=12 ng/L with a 95% Confidence Interval of 8-18 ng/L.     Delta 2hr hsTnI 8  <20 ng/L Final   HS TROPONIN I 4HR - Normal    hs TnI 4hr 35  "Refer to ACS Flowchart"- see link ng/L Final    Comment:                                              Initial (time 0) result  If >=50 ng/L, Myocardial injury suggested ;  Type of myocardial injury and treatment strategy  to be determined. If 5-49 ng/L, a delta result at 2 hours and or 4 hours will be needed to further evaluate. If <4 ng/L, and chest pain has been >3 hours since onset, patient may qualify for discharge based on the HEART score in the ED. If <5 ng/L and <3hours since onset of chest pain, a delta result at 2 hours will be needed to further evaluate. HS Troponin 99th Percentile URL of a Health Population=12 ng/L with a 95% Confidence Interval of 8-18 ng/L. Second Troponin (time 2 hours)  If calculated delta >= 20 ng/L,  Myocardial injury suggested ; Type of myocardial injury and treatment strategy to be determined. If 5-49 ng/L and the calculated delta is 5-19 ng/L, consult medical service for evaluation. Continue evaluation for ischemia on ecg and other possible etiology and repeat hs troponin at 4 hours. If delta is <5 ng/L at 2 hours, consider discharge based on risk stratification via the HEART score (if in ED), or CLAUDINE risk score in IP/Observation. HS Troponin 99th Percentile URL of a Health Population=12 ng/L with a 95% Confidence Interval of 8-18 ng/L. Delta 4hr hsTnI 7  <20 ng/L Final     Time reflects when diagnosis was documented in both MDM as applicable and the Disposition within this note     Time User Action Codes Description Comment    8/25/2023  7:01 PM Jonn Arias Add [R10.9] Abdominal pain       ED Disposition     ED Disposition   Discharge    Condition   Stable    Date/Time   Fri Aug 25, 2023 [address] [facility] discharge to home/self care.                Follow-up Information    None       Discharge Medication List as of 8/25/2023  7:09 PM      START taking these medications    Details ondansetron (ZOFRAN) 4 mg tablet Take 1 tablet (4 mg total) by mouth every 8 (eight) hours as needed for nausea or vomiting, Starting Fri 8/25/2023, Normal         CONTINUE these medications which have NOT CHANGED    Details   acetaminophen (TYLENOL) 500 mg tablet Take 2 tablets (1,000 mg total) by mouth every 8 (eight) hours, Starting Mon 8/14/2023, No Print      amLODIPine (NORVASC) 5 mg tablet Take 1 tablet (5 mg total) by mouth daily, Starting Thu 1/26/2023, Normal      apixaban (Eliquis) 5 mg Multiple Dosages:Starting Tue 8/22/2023, Until Mon 8/28/2023 at 2359, THEN Starting Tue 8/29/2023, Until Mon 9/18/2023 at 2359Take 2 tablets (10 mg total) by mouth 2 (two) times a day for 7 days, THEN 1 tablet (5 mg total) 2 (two) times a day for 21 d ays., Normal      ascorbic acid (VITAMIN C) 500 MG tablet Take 1 tablet (500 mg total) by mouth daily Begin 30 days prior to surgery. , Starting Tue 7/25/2023, Normal      aspirin 325 mg tablet Take 1 tablet (325 mg total) by mouth 2 (two) times a day Take with food., Starting Mon 8/14/2023, Normal      atorvastatin (LIPITOR) 20 mg tablet Take 1 tablet (20 mg total) by mouth daily at bedtime, Starting Thu 1/26/2023, Normal      docusate sodium (COLACE) 100 mg capsule Take 1 capsule (100 mg total) by mouth 2 (two) times a day, Starting Mon 8/14/2023, Normal      ferrous sulfate 324 (65 Fe) mg Take 1 tablet (324 mg total) by mouth daily before breakfast Begin 30 days prior to surgery. , Starting Tue 0/57/9666, Normal      folic acid (FOLVITE) 1 mg tablet Take 1 tablet (1 mg total) by mouth daily Begin 30 days prior to surgery. , Starting Tue 7/25/2023, Normal      gabapentin (NEURONTIN) 100 mg capsule Take 1 capsule (100 mg total) by mouth 3 (three) times a day, Starting Mon 8/14/2023, Normal      linaCLOtide (Linzess) 145 MCG CAPS Take 1 capsule (145 mcg total) by mouth daily, Starting Thu 1/26/2023, Normal      lisinopril-hydrochlorothiazide (PRINZIDE,ZESTORETIC) 20-25 MG per tablet Take 1 tablet by mouth daily, Starting Thu 1/26/2023, Normal      Multiple Vitamin (multivitamin) tablet Take 1 tablet by mouth daily Begin 30 days prior to surgery. , Starting Tue 7/25/2023, Normal      oxyCODONE (ROXICODONE) 10 MG TABS Take 1 tablet (10 mg total) by mouth every 4 (four) hours as needed for severe pain Max Daily Amount: 60 mg, Starting Mon 8/14/2023, Normal      promethazine (PHENERGAN) 12.5 MG tablet Take 1 tablet (12.5 mg total) by mouth every 6 (six) hours as needed for nausea or vomiting, Starting Mon 8/14/2023, Normal             Prior to Admission Medications   Prescriptions Last Dose Informant Patient Reported? Taking? Multiple Vitamin (multivitamin) tablet   No No   Sig: Take 1 tablet by mouth daily Begin 30 days prior to surgery. acetaminophen (TYLENOL) 500 mg tablet   No No   Sig: Take 2 tablets (1,000 mg total) by mouth every 8 (eight) hours   amLODIPine (NORVASC) 5 mg tablet  Self No No   Sig: Take 1 tablet (5 mg total) by mouth daily   apixaban (Eliquis) 5 mg   No No   Sig: Take 2 tablets (10 mg total) by mouth 2 (two) times a day for 7 days, THEN 1 tablet (5 mg total) 2 (two) times a day for 21 days. ascorbic acid (VITAMIN C) 500 MG tablet   No No   Sig: Take 1 tablet (500 mg total) by mouth daily Begin 30 days prior to surgery. aspirin 325 mg tablet   No No   Sig: Take 1 tablet (325 mg total) by mouth 2 (two) times a day Take with food. atorvastatin (LIPITOR) 20 mg tablet  Self No No   Sig: Take 1 tablet (20 mg total) by mouth daily at bedtime   docusate sodium (COLACE) 100 mg capsule   No No   Sig: Take 1 capsule (100 mg total) by mouth 2 (two) times a day   ferrous sulfate 324 (65 Fe) mg   No No   Sig: Take 1 tablet (324 mg total) by mouth daily before breakfast Begin 30 days prior to surgery. folic acid (FOLVITE) 1 mg tablet   No No   Sig: Take 1 tablet (1 mg total) by mouth daily Begin 30 days prior to surgery.    gabapentin (NEURONTIN) 100 mg capsule   No No Sig: Take 1 capsule (100 mg total) by mouth 3 (three) times a day   linaCLOtide (Linzess) 145 MCG CAPS  Self No No   Sig: Take 1 capsule (145 mcg total) by mouth daily   lisinopril-hydrochlorothiazide (PRINZIDE,ZESTORETIC) 20-25 MG per tablet  Self No No   Sig: Take 1 tablet by mouth daily   oxyCODONE (ROXICODONE) 10 MG TABS   No No   Sig: Take 1 tablet (10 mg total) by mouth every 4 (four) hours as needed for severe pain Max Daily Amount: 60 mg   promethazine (PHENERGAN) 12.5 MG tablet   No No   Sig: Take 1 tablet (12.5 mg total) by mouth every 6 (six) hours as needed for nausea or vomiting      Facility-Administered Medications: None        Wells' Criteria for PE    Flowsheet Row Most Recent Value   Wells' Criteria for PE    Clinical signs and symptoms of DVT 3 Filed at: 08/25/2023 1413   PE is primary diagnosis or equally likely 0 Filed at: 08/25/2023 1413   HR >100 0 Filed at: 08/25/2023 1413   Immobilization at least 3 days or Surgery in the previous 4 weeks 1.5 Filed at: 08/25/2023 1413   Previous, objectively diagnosed PE or DVT 1.5 Filed at: 08/25/2023 1413   Hemoptysis 0 Filed at: 08/25/2023 1413   Malignancy with treatment within 6 months or palliative 0 Filed at: 08/25/2023 1413   Wells' Criteria Total 6 Filed at: 08/25/2023 1413                    Portions of the record may have been created with voice recognition software. Occasional wrong word or "sound a like" substitutions may have occurred due to the inherent limitations of voice recognition software. Read the chart carefully and recognize, using context, where substitutions have occurred.            Eliecer Greer MD  08/28/23 9827

## 2023-08-25 NOTE — ED PROCEDURE NOTE
Procedure  POC FAST US    Date/Time: 8/25/2023 1:59 PM    Performed by: Kelsey Sandhu MD  Authorized by: Kelsey Sandhu MD    Patient location:  ED  Other Assisting Provider: Yes (comment)    Procedure details:     Exam Type:  Diagnostic    Assess for:  Intra-abdominal fluid and pericardial effusion    Technique: FAST      Views obtained:  Heart - Pericardial sac, RUQ - Huffman's Pouch, LUQ - Splenorenal space and Suprapubic - Pouch of Naresh    Image quality: diagnostic      Image availability:  Images available in PACS  FAST Findings:     RUQ (Hepatorenal) free fluid: absent      LUQ (Splenorenal) free fluid: absent      Suprapubic free fluid: absent      Cardiac wall motion: identified      Pericardial effusion: absent    Interpretation:     Impressions: negative                       Kelsey Sandhu MD  08/25/23 1400

## 2023-08-25 NOTE — DISCHARGE INSTRUCTIONS
You are seen in the emergency department for abdominal pain. He states that you are feeling better and you are able to tolerate food. You need to follow-up with your primary care physician regarding a pulmonary nodule and a pancreatic nodule. It is very important that you do this as soon as possible. You are also being sent a referral to gastroenterology please make sure that you have your appointment with them they should call you if they do not call you please call them. Should you have any more abdominal pain or worsening or new symptoms please return to the emergency department.

## 2023-08-25 NOTE — ED ATTENDING ATTESTATION
Final Diagnoses:     1. Abdominal pain      ED Course as of 08/25/23 2039   Fri Aug 25, 2023   1414 WBC(!): 14.63   1414 Hemoglobin(!): 9.9   1414 Platelet Count: 423   2188 Procedure Note: EKG  Date/Time: 08/25/23 2:18 PM   Interpreted by: MADELINE Craft   Indications / Diagnosis: CP  ECG reviewed by me, the ED Provider: yes   The EKG demonstrates:  Rhythm: HR 57 SB   Intervals: normal intervals  Axis: normal axis  QRS/Blocks: normal QRS  ST Changes: No acute ST Changes, no STD/JEISON. Broad based T-waves which are new. 1647 Leukocytes, UA(!): Large   1647 Nitrite, UA: Negative   1714 CT reviewed by myself:  - LLL atelectasis  - No obvious PE  - bright stool; on iron supplements   1801 WBC, UA(!): Innumerable   1801 Leukocytes, UA(!): Large   1801 Nitrite, UA: Negative   2001 Delta 4hr hsTnI: 7     I, Vivi Soto MD, saw and evaluated the patient. All available labs and X-rays were ordered by me or the resident / non-physician and have been reviewed by myself. I discussed the patient with the resident / non-physician and agree with the resident's / non-physician practitioner's findings and plan as documented in the resident's / non-physician practicitioner's note, except where noted. At this point, I agree with the current assessment done in the ED. I was present during key portions of all procedures performed unless otherwise stated. Nursing Triage:     Chief Complaint   Patient presents with   • Abdominal Pain     Reports recent knee surgery 2 weeks ago. Reports having abdominal pain and chest pain that started 3 days ago. • Chest Pain       HPI:   This is a 67 y.o. male presenting for evaluation of central abdominal pain, epigastric, 10/10, drinking makes it worse ? going to the chest, but not to the back. +nausea  +vomiting (NBNB) multiple times, just water. No fevers  Subjective chills  Feels better with oxygen. Recent LEFT knee surgery 5 days ago.    Recent duplex with acute, non-occlusive DVT  LEFT LOWER LIMB:  There is evidence of acute non-occlusive deep vein thrombosis in one of the gastrocnemius veins. PMH: GERD, HLD, HTN, pDM, gastritis    ASSESSMENT + PLAN:   - Given patient's concerns, will do a cardiac workup. - Will do an EKG for arrythmia, strain; troponin for same as per protocol for evaluation of ACS. - CBC for anemia; CMP for kidney function and electrolytes. - Will check CXR for pneumonia, PTX, fluid overload  - Will do POCUS Cardiac to evaluate for pericardial effusion. HEART score:  History 0=Slightly or non-suspicious   ECG 1=Nonspecific repolarization disturbance   Age 2= > 65 years   Risk Factors 1= 1 or 2 risk factors   Troponin 1= Between 13-35 ng/L   Total 5   - PE?  PE risk, Wells score = [6]   (3) clinically suspected DVT - 3 points   (0) alternative diagnosis is less likely than PE - 3.0 points   (0) tachycardia - 1.5 points   (1.5) immobilization/surgery in previous four weeks - 1.5 points   (1.5) history of DVT or PE - 1.5 points   (0) hemoptysis - 1.0 points   (0) malignancy (treatment for within 6 months, palliative) - 1.0 points   Interpretation Natasha Baker et al. 2007 Radiology 242:[id number]):   Score >6.0 - High (probability 59% based on pooled data)   - Disposition per workup. Physical:   General: VS reviewed  Appears in NAD  awake, alert. Well-nourished, well-developed. Appears stated age. Speaking normally in full sentences. Head: Normocephalic, atraumatic  Eyes: EOM-I. No diplopia. No hyphema. No subconjunctival hemorrhages. Symmetrical lids. ENT: Atraumatic external nose and ears. MMM  No malocclusion. No stridor. Normal phonation. No drooling. Normal swallowing. Neck: No JVD.   CV: No pallor noted  No tachycardia  Lungs:   No tachypnea  No respiratory distress  Abd: soft focal epigastric tenderness  Lower abdomen is non-tender  nd   no rebound/guarding  MSK:   FROM spontaneously  Bilateral legs are swollen (since surgery)  Abrasions on the medial aspect of LEFT thigh/leg. Skin: Dry, intact. Neuro: Awake, alert, GCS15, CN II-XII grossly intact. Motor grossly intact. Psychiatric/Behavioral: interacting normally; appropriate mood/affect.  Exam: deferred    Vitals:    08/25/23 1309   BP: (!) 202/84   Pulse: 61   Resp: 18   Temp: 98.6 °F (37 °C)   TempSrc: Oral   SpO2: 98%     - There are no obvious limitations to social determinants of care. - Nursing note reviewed. - Vitals reviewed. - Orders placed by myself and/or advanced practitioner / resident.    - Previous chart was reviewed  - No language barrier.   - History obtained from patient. - There are no limitations to the history obtained:     Past Medical:    has a past medical history of Cataract, Colon polyp, Diverticulosis, Gastritis, GERD (gastroesophageal reflux disease), Hyperlipidemia, Hypertension, Pre-diabetes, Throat ulcer, and Visual impairment. Past Surgical:    has a past surgical history that includes Eye surgery (Bilateral); Colonoscopy (N/A, 08/18/2016); Upper gastrointestinal endoscopy (2019); Colonoscopy w/ polypectomy (2019); Tonsillectomy; and pr arthrp kne condyle&platu medial&lat compartments (Left, 8/14/2023). Social:     Social History     Substance and Sexual Activity   Alcohol Use Not Currently    Comment: pt "sober for 30+ years"     Social History     Tobacco Use   Smoking Status Former   • Types: Cigarettes   Smokeless Tobacco Never   Tobacco Comments    Patient quit     Social History     Substance and Sexual Activity   Drug Use No       Echo:   No results found for this or any previous visit. No results found for this or any previous visit. Cath:    No results found for this or any previous visit.       Code Status: Prior  Advance Directive and Living Will:      Power of :    POLST:    Medications   fentanyl citrate (PF) (FOR EMS ONLY) 100 mcg/2 mL injection 100 mcg (0 mcg Does not apply Given to EMS 8/25/23 1330)   ondansetron (ZOFRAN) injection 4 mg (4 mg Intravenous Given 8/25/23 1351)   morphine injection 8 mg (8 mg Intravenous Given 8/25/23 1351)   iohexol (OMNIPAQUE) 350 MG/ML injection (MULTI-DOSE) 100 mL (100 mL Intravenous Given 8/25/23 1628)     CT pe study w abdomen pelvis w contrast   Final Result      CTA CHEST:      1) No central, lobar, segmental, or proximal subsegmental pulmonary embolism. Evaluation of the distal subsegmental pulmonary arteries is limited. 2) No acute pulmonary pathology. 3) Several nonspecific pulmonary nodules measuring up to 3 mm. Based on current Fleischner Society 2017 Guidelines on incidental pulmonary nodules, no routine follow-up is needed if the patient is low risk. If the patient is high risk, optional    follow-up chest CT in 12 months can be considered. CT ABDOMEN/PELVIS:      4) Cystitis and ureteritis. No evidence of pyelonephritis or perinephric abscess. 5) No other acute abdominal or pelvic pathology. 6) 7 mm cystic lesion in the head of the pancreas with no appreciable pancreatic ductal dilatation. Statistically this likely represents an IPMN. Given the size of the lesion, follow-up is recommended in 2 years, preferably with MRI/MRCP. 7) Additional findings as above. The recommendations regarding pancreatic findings assumes that patient does not have family history of pancreatic cancer nor have any symptoms potentially attributable to pancreatic cystic lesions (hyperamylasemia, recent-onset diabetes, severe    epigastric pain, weight loss, steatorrhea, or jaundice.) If these conditions are not true, then management should be deferred to judgement of specialists such as gastroenterologists or oncologic surgeons.  Recommendations are based on recent consensus    statements on management of pancreatic cystic lesions from [address] [facility], [employer] of Radiology, the journal Pancreatology, and our own institutional consensus. The study was marked in [facility] for immediate notification. Workstation performed: [id number]         X-ray chest 1 view portable   ED Interpretation   No comparison available, patient rotated creating some distorted views. X-ray does appear grossly normal although no consolidations noted      Final Result      No active pulmonary disease on examination which is somewhat limited secondary to low lung volumes.                   Workstation performed: [id number]           Orders Placed This Encounter   Procedures   • Fast Ultrasound   • POC AAA US   • X-ray chest 1 view portable   • CT pe study w abdomen pelvis w contrast   • CBC and differential   • CMP   • Lipase   • UA (URINE) with reflex to Scope   • HS Troponin 0hr (reflex protocol)   • HS Troponin I 2hr   • HS Troponin I 4hr   • Urine Microscopic   • Ambulatory Referral to Gastroenterology   • Insert peripheral IV   • Continuous cardiac monitoring   • Continuous pulse oximetry   • ECG 12 lead   • ECG 12 lead repeat Q30 minutes PRN persistent chest pain x 2   • ECG 12 lead repeat in 2hrs   • ECG 12 lead repeat in 4hrs     Labs Reviewed   CBC AND DIFFERENTIAL - Abnormal       Result Value Ref Range Status    WBC 14.63 (*) 4.31 - 10.16 Thousand/uL Final    RBC 3.14 (*) 3.88 - 5.62 Million/uL Final    Hemoglobin 9.9 (*) 12.0 - 17.0 g/dL Final    Hematocrit 29.5 (*) 36.5 - 49.3 % Final    MCV 94  82 - 98 fL Final    MCH 31.5  26.8 - 34.3 pg Final    MCHC 33.6  31.4 - 37.4 g/dL Final    RDW 14.4  11.6 - 15.1 % Final    MPV 9.8  8.9 - 12.7 fL Final    Platelets 016  768 - 390 Thousands/uL Final    nRBC 0  /100 WBCs Final    Neutrophils Relative 85 (*) 43 - 75 % Final    Immat GRANS % 1  0 - 2 % Final    Lymphocytes Relative 6 (*) 14 - 44 % Final    Monocytes Relative 8  4 - 12 % Final    Eosinophils Relative 0  0 - 6 % Final    Basophils Relative 0  0 - 1 % Final    Neutrophils Absolute 12.37 (*) 1.85 - 7.62 Thousands/µL Final Immature Grans Absolute 0.18  0.00 - 0.20 Thousand/uL Final    Lymphocytes Absolute 0.87  0.60 - 4.47 Thousands/µL Final    Monocytes Absolute 1.16  0.17 - 1.22 Thousand/µL Final    Eosinophils Absolute 0.00  0.00 - 0.61 Thousand/µL Final    Basophils Absolute 0.05  0.00 - 0.10 Thousands/µL Final   COMPREHENSIVE METABOLIC PANEL - Abnormal    Sodium 136  135 - 147 mmol/L Final    Potassium 4.0  3.5 - 5.3 mmol/L Final    Comment: Slightly Hemolyzed:Results may be affected. Chloride 99  96 - 108 mmol/L Final    CO2 28  21 - 32 mmol/L Final    ANION GAP 9  mmol/L Final    BUN 21  5 - 25 mg/dL Final    Creatinine 0.66  0.60 - 1.30 mg/dL Final    Comment: Standardized to IDMS reference method    Glucose 116  65 - 140 mg/dL Final    Comment: If the patient is fasting, the ADA then defines impaired fasting glucose as > 100 mg/dL and diabetes as > or equal to 123 mg/dL. Calcium 7.9 (*) 8.4 - 10.2 mg/dL Final    Corrected Calcium 8.4  8.3 - 10.1 mg/dL Final    AST 31  13 - 39 U/L Final    Comment: Slightly Hemolyzed:Results may be affected. ALT 27  7 - 52 U/L Final    Comment: Specimen collection should occur prior to Sulfasalazine administration due to the potential for falsely depressed results. Alkaline Phosphatase 52  34 - 104 U/L Final    Total Protein 6.6  6.4 - 8.4 g/dL Final    Albumin 3.4 (*) 3.5 - 5.0 g/dL Final    Total Bilirubin 0.68  0.20 - 1.00 mg/dL Final    Comment: Use of this assay is not recommended for patients undergoing treatment with eltrombopag due to the potential for falsely elevated results. N-acetyl-p-benzoquinone imine (metabolite of Acetaminophen) will generate erroneously low results in samples for patients that have taken an overdose of Acetaminophen.     eGFR 95  ml/min/1.73sq m Final    Narrative:     [place] guidelines for Chronic Kidney Disease (CKD):   •  Stage 1 with normal or high GFR (GFR > 90 mL/min/1.73 square meters)  •  Stage 2 Mild CKD (GFR = 60-89 mL/min/1.73 square meters)  •  Stage 3A Moderate CKD (GFR = 45-59 mL/min/1.73 square meters)  •  Stage 3B Moderate CKD (GFR = 30-44 mL/min/1.73 square meters)  •  Stage 4 Severe CKD (GFR = 15-29 mL/min/1.73 square meters)  •  Stage 5 End Stage CKD (GFR <15 mL/min/1.73 square meters)  Note: GFR calculation is accurate only with a steady state creatinine   URINALYSIS WITH REFLEX TO SCOPE - Abnormal    Color, UA Yellow   Final    Clarity, UA Clear   Final    Specific Gravity, UA 1.015  1.005 - 1.030 Final    pH, UA 7.5  4.5, 5.0, 5.5, 6.0, 6.5, 7.0, 7.5, 8.0 Final    Leukocytes, UA Large (*) Negative Final    Nitrite, UA Negative  Negative Final    Protein, UA 30 (1+) (*) Negative mg/dl Final    Glucose, UA Negative  Negative mg/dl Final    Ketones, UA Negative  Negative mg/dl Final    Urobilinogen, UA <2.0  <2.0 mg/dl mg/dl Final    Bilirubin, UA Negative  Negative Final    Occult Blood, UA Moderate (*) Negative Final   URINE MICROSCOPIC - Abnormal    RBC, UA 20-30 (*) None Seen, 1-2 /hpf Final    WBC, UA Innumerable (*) None Seen, 1-2 /hpf Final    Epithelial Cells None Seen  None Seen, Occasional /hpf Final    Bacteria, UA Occasional  None Seen, Occasional /hpf Final    MUCUS THREADS Occasional (*) None Seen Final   LIPASE - Normal    Lipase 48  11 - 82 u/L Final   HS TROPONIN I 0HR - Normal    hs TnI 0hr 28  "Refer to ACS Flowchart"- see link ng/L Final    Comment:                                              Initial (time 0) result  If >=50 ng/L, Myocardial injury suggested ;  Type of myocardial injury and treatment strategy  to be determined. If 5-49 ng/L, a delta result at 2 hours and or 4 hours will be needed to further evaluate. If <4 ng/L, and chest pain has been >3 hours since onset, patient may qualify for discharge based on the HEART score in the ED. If <5 ng/L and <3hours since onset of chest pain, a delta result at 2 hours will be needed to further evaluate.     HS Troponin 99th Percentile URL of a Health Population=12 ng/L with a 95% Confidence Interval of 8-18 ng/L. Second Troponin (time 2 hours)  If calculated delta >= 20 ng/L,  Myocardial injury suggested ; Type of myocardial injury and treatment strategy to be determined. If 5-49 ng/L and the calculated delta is 5-19 ng/L, consult medical service for evaluation. Continue evaluation for ischemia on ecg and other possible etiology and repeat hs troponin at 4 hours. If delta is <5 ng/L at 2 hours, consider discharge based on risk stratification via the HEART score (if in ED), or CLAUDINE risk score in IP/Observation. HS Troponin 99th Percentile URL of a Health Population=12 ng/L with a 95% Confidence Interval of 8-18 ng/L.   HS TROPONIN I 2HR - Normal    hs TnI 2hr 36  "Refer to ACS Flowchart"- see link ng/L Final    Comment:                                              Initial (time 0) result  If >=50 ng/L, Myocardial injury suggested ;  Type of myocardial injury and treatment strategy  to be determined. If 5-49 ng/L, a delta result at 2 hours and or 4 hours will be needed to further evaluate. If <4 ng/L, and chest pain has been >3 hours since onset, patient may qualify for discharge based on the HEART score in the ED. If <5 ng/L and <3hours since onset of chest pain, a delta result at 2 hours will be needed to further evaluate. HS Troponin 99th Percentile URL of a Health Population=12 ng/L with a 95% Confidence Interval of 8-18 ng/L. Second Troponin (time 2 hours)  If calculated delta >= 20 ng/L,  Myocardial injury suggested ; Type of myocardial injury and treatment strategy to be determined. If 5-49 ng/L and the calculated delta is 5-19 ng/L, consult medical service for evaluation. Continue evaluation for ischemia on ecg and other possible etiology and repeat hs troponin at 4 hours. If delta is <5 ng/L at 2 hours, consider discharge based on risk stratification via the HEART score (if in ED), or CLAUDINE risk score in IP/Observation.     HS Troponin 99th Percentile URL of a Health Population=12 ng/L with a 95% Confidence Interval of 8-18 ng/L. Delta 2hr hsTnI 8  <20 ng/L Final   HS TROPONIN I 4HR - Normal    hs TnI 4hr 35  "Refer to ACS Flowchart"- see link ng/L Final    Comment:                                              Initial (time 0) result  If >=50 ng/L, Myocardial injury suggested ;  Type of myocardial injury and treatment strategy  to be determined. If 5-49 ng/L, a delta result at 2 hours and or 4 hours will be needed to further evaluate. If <4 ng/L, and chest pain has been >3 hours since onset, patient may qualify for discharge based on the HEART score in the ED. If <5 ng/L and <3hours since onset of chest pain, a delta result at 2 hours will be needed to further evaluate. HS Troponin 99th Percentile URL of a Health Population=12 ng/L with a 95% Confidence Interval of 8-18 ng/L. Second Troponin (time 2 hours)  If calculated delta >= 20 ng/L,  Myocardial injury suggested ; Type of myocardial injury and treatment strategy to be determined. If 5-49 ng/L and the calculated delta is 5-19 ng/L, consult medical service for evaluation. Continue evaluation for ischemia on ecg and other possible etiology and repeat hs troponin at 4 hours. If delta is <5 ng/L at 2 hours, consider discharge based on risk stratification via the HEART score (if in ED), or CLAUDINE risk score in IP/Observation. HS Troponin 99th Percentile URL of a Health Population=12 ng/L with a 95% Confidence Interval of 8-18 ng/L. Delta 4hr hsTnI 7  <20 ng/L Final     Time reflects when diagnosis was documented in both MDM as applicable and the Disposition within this note     Time User Action Codes Description Comment    8/25/2023  7:01 PM Imani Kay Add [R10.9] Abdominal pain       ED Disposition     ED Disposition   Discharge    Condition   Stable    Date/Time   Fri Aug 25, 2023 [address] [place] [address] discharge to home/self care. Follow-up Information    None       Discharge Medication List as of 8/25/2023  7:09 PM      START taking these medications    Details   ondansetron (ZOFRAN) 4 mg tablet Take 1 tablet (4 mg total) by mouth every 8 (eight) hours as needed for nausea or vomiting, Starting Fri 8/25/2023, Normal         CONTINUE these medications which have NOT CHANGED    Details   acetaminophen (TYLENOL) 500 mg tablet Take 2 tablets (1,000 mg total) by mouth every 8 (eight) hours, Starting Mon 8/14/2023, No Print      amLODIPine (NORVASC) 5 mg tablet Take 1 tablet (5 mg total) by mouth daily, Starting Thu 1/26/2023, Normal      apixaban (Eliquis) 5 mg Multiple Dosages:Starting Tue 8/22/2023, Until Mon 8/28/2023 at 2359, THEN Starting Tue 8/29/2023, Until Mon 9/18/2023 at 2359Take 2 tablets (10 mg total) by mouth 2 (two) times a day for 7 days, THEN 1 tablet (5 mg total) 2 (two) times a day for 21 d ays., Normal      ascorbic acid (VITAMIN C) 500 MG tablet Take 1 tablet (500 mg total) by mouth daily Begin 30 days prior to surgery. , Starting Tue 7/25/2023, Normal      aspirin 325 mg tablet Take 1 tablet (325 mg total) by mouth 2 (two) times a day Take with food., Starting Mon 8/14/2023, Normal      atorvastatin (LIPITOR) 20 mg tablet Take 1 tablet (20 mg total) by mouth daily at bedtime, Starting Thu 1/26/2023, Normal      docusate sodium (COLACE) 100 mg capsule Take 1 capsule (100 mg total) by mouth 2 (two) times a day, Starting Mon 8/14/2023, Normal      ferrous sulfate 324 (65 Fe) mg Take 1 tablet (324 mg total) by mouth daily before breakfast Begin 30 days prior to surgery. , Starting Tue 7/18/3951, Normal      folic acid (FOLVITE) 1 mg tablet Take 1 tablet (1 mg total) by mouth daily Begin 30 days prior to surgery. , Starting Tue 7/25/2023, Normal      gabapentin (NEURONTIN) 100 mg capsule Take 1 capsule (100 mg total) by mouth 3 (three) times a day, Starting Mon 8/14/2023, Normal      linaCLOtide (Linzess) 145 MCG CAPS Take 1 capsule (145 mcg total) by mouth daily, Starting Thu 1/26/2023, Normal      lisinopril-hydrochlorothiazide (PRINZIDE,ZESTORETIC) 20-25 MG per tablet Take 1 tablet by mouth daily, Starting Thu 1/26/2023, Normal      Multiple Vitamin (multivitamin) tablet Take 1 tablet by mouth daily Begin 30 days prior to surgery. , Starting Tue 7/25/2023, Normal      oxyCODONE (ROXICODONE) 10 MG TABS Take 1 tablet (10 mg total) by mouth every 4 (four) hours as needed for severe pain Max Daily Amount: 60 mg, Starting Mon 8/14/2023, Normal      promethazine (PHENERGAN) 12.5 MG tablet Take 1 tablet (12.5 mg total) by mouth every 6 (six) hours as needed for nausea or vomiting, Starting Mon 8/14/2023, Normal             Prior to Admission Medications   Prescriptions Last Dose Informant Patient Reported? Taking? Multiple Vitamin (multivitamin) tablet   No No   Sig: Take 1 tablet by mouth daily Begin 30 days prior to surgery. acetaminophen (TYLENOL) 500 mg tablet   No No   Sig: Take 2 tablets (1,000 mg total) by mouth every 8 (eight) hours   amLODIPine (NORVASC) 5 mg tablet  Self No No   Sig: Take 1 tablet (5 mg total) by mouth daily   apixaban (Eliquis) 5 mg   No No   Sig: Take 2 tablets (10 mg total) by mouth 2 (two) times a day for 7 days, THEN 1 tablet (5 mg total) 2 (two) times a day for 21 days. ascorbic acid (VITAMIN C) 500 MG tablet   No No   Sig: Take 1 tablet (500 mg total) by mouth daily Begin 30 days prior to surgery. aspirin 325 mg tablet   No No   Sig: Take 1 tablet (325 mg total) by mouth 2 (two) times a day Take with food. atorvastatin (LIPITOR) 20 mg tablet  Self No No   Sig: Take 1 tablet (20 mg total) by mouth daily at bedtime   docusate sodium (COLACE) 100 mg capsule   No No   Sig: Take 1 capsule (100 mg total) by mouth 2 (two) times a day   ferrous sulfate 324 (65 Fe) mg   No No   Sig: Take 1 tablet (324 mg total) by mouth daily before breakfast Begin 30 days prior to surgery.    folic acid (FOLVITE) 1 mg tablet   No No   Sig: Take 1 tablet (1 mg total) by mouth daily Begin 30 days prior to surgery. gabapentin (NEURONTIN) 100 mg capsule   No No   Sig: Take 1 capsule (100 mg total) by mouth 3 (three) times a day   linaCLOtide (Linzess) 145 MCG CAPS  Self No No   Sig: Take 1 capsule (145 mcg total) by mouth daily   lisinopril-hydrochlorothiazide (PRINZIDE,ZESTORETIC) 20-25 MG per tablet  Self No No   Sig: Take 1 tablet by mouth daily   oxyCODONE (ROXICODONE) 10 MG TABS   No No   Sig: Take 1 tablet (10 mg total) by mouth every 4 (four) hours as needed for severe pain Max Daily Amount: 60 mg   promethazine (PHENERGAN) 12.5 MG tablet   No No   Sig: Take 1 tablet (12.5 mg total) by mouth every 6 (six) hours as needed for nausea or vomiting      Facility-Administered Medications: None        Wells' Criteria for PE    Flowsheet Row Most Recent Value   Wells' Criteria for PE    Clinical signs and symptoms of DVT 3 Filed at: 08/25/2023 1413   PE is primary diagnosis or equally likely 0 Filed at: 08/25/2023 1413   HR >100 0 Filed at: 08/25/2023 1413   Immobilization at least 3 days or Surgery in the previous 4 weeks 1.5 Filed at: 08/25/2023 1413   Previous, objectively diagnosed PE or DVT 1.5 Filed at: 08/25/2023 1413   Hemoptysis 0 Filed at: 08/25/2023 1413   Malignancy with treatment within 6 months or palliative 0 Filed at: 08/25/2023 1413   Wells' Criteria Total 6 Filed at: 08/25/2023 1413                      Portions of the record may have been created with voice recognition software. Occasional wrong word or "sound a like" substitutions may have occurred due to the inherent limitations of voice recognition software. Read the chart carefully and recognize, using context, where substitutions have occurred.     Electronically signed by:  Meena Alarcon

## 2023-08-25 NOTE — ED PROCEDURE NOTE
Procedure  POC AAA US    Date/Time: 8/25/2023 2:00 PM    Performed by: Abigail Heaton MD  Authorized by: Abigail Heaton MD    Patient location:  ED  Performing Provider:  Resident  Other Assisting Provider: Yes (comment)    Procedure details:     Exam Type:  Diagnostic    Indications: abdominal pain      Views Obtained:  Transverse proximal, transverse mid view, transverse distal view and sagittal (longitudinal) view    Image quality: diagnostic      Image availability:  Images available in PACS  Findings:     Abdominal Aorta Findings: normal      Intra-abdominal fluid: not identified    Interpretation:     Aortic ultrasound impression: aorta normal                       Abigail Heaton MD  08/25/23 140

## 2023-08-28 ENCOUNTER — HOSPITAL ENCOUNTER (INPATIENT)
Facility: HOSPITAL | Age: 73
LOS: 2 days | Discharge: HOME HEALTH | DRG: 683 | End: 2023-08-30
Attending: EMERGENCY MEDICINE | Admitting: INTERNAL MEDICINE
Payer: COMMERCIAL

## 2023-08-28 ENCOUNTER — APPOINTMENT (EMERGENCY)
Dept: RADIOLOGY | Facility: HOSPITAL | Age: 73
DRG: 683 | End: 2023-08-28
Payer: COMMERCIAL

## 2023-08-28 DIAGNOSIS — N39.0 UTI (URINARY TRACT INFECTION): ICD-10-CM

## 2023-08-28 DIAGNOSIS — E87.6 HYPOKALEMIA: ICD-10-CM

## 2023-08-28 DIAGNOSIS — N17.9 AKI (ACUTE KIDNEY INJURY) (HCC): Primary | ICD-10-CM

## 2023-08-28 DIAGNOSIS — R40.4 TRANSIENT ALTERATION OF AWARENESS: ICD-10-CM

## 2023-08-28 PROBLEM — R55 SYNCOPAL EPISODES: Status: ACTIVE | Noted: 2023-08-28

## 2023-08-28 PROBLEM — N30.00 ACUTE CYSTITIS: Status: ACTIVE | Noted: 2023-08-28

## 2023-08-28 PROBLEM — D64.9 ANEMIA: Status: ACTIVE | Noted: 2023-08-28

## 2023-08-28 PROBLEM — I82.409 DVT (DEEP VENOUS THROMBOSIS) (HCC): Status: ACTIVE | Noted: 2023-08-28

## 2023-08-28 LAB
ANION GAP SERPL CALCULATED.3IONS-SCNC: 7 MMOL/L
ANISOCYTOSIS BLD QL SMEAR: PRESENT
ATRIAL RATE: 57 BPM
ATRIAL RATE: 60 BPM
ATRIAL RATE: 61 BPM
BACTERIA UR QL AUTO: ABNORMAL /HPF
BASOPHILS # BLD MANUAL: 0 THOUSAND/UL (ref 0–0.1)
BASOPHILS NFR MAR MANUAL: 0 % (ref 0–1)
BILIRUB UR QL STRIP: NEGATIVE
BUN SERPL-MCNC: 25 MG/DL (ref 5–25)
BURR CELLS BLD QL SMEAR: PRESENT
CALCIUM SERPL-MCNC: 7.5 MG/DL (ref 8.4–10.2)
CHLORIDE SERPL-SCNC: 102 MMOL/L (ref 96–108)
CLARITY UR: ABNORMAL
CO2 SERPL-SCNC: 29 MMOL/L (ref 21–32)
COLOR UR: YELLOW
CREAT SERPL-MCNC: 1.24 MG/DL (ref 0.6–1.3)
EOSINOPHIL # BLD MANUAL: 0.12 THOUSAND/UL (ref 0–0.4)
EOSINOPHIL NFR BLD MANUAL: 1 % (ref 0–6)
ERYTHROCYTE [DISTWIDTH] IN BLOOD BY AUTOMATED COUNT: 14.6 % (ref 11.6–15.1)
GFR SERPL CREATININE-BSD FRML MDRD: 57 ML/MIN/1.73SQ M
GLUCOSE SERPL-MCNC: 103 MG/DL (ref 65–140)
GLUCOSE SERPL-MCNC: 91 MG/DL (ref 65–140)
GLUCOSE UR STRIP-MCNC: NEGATIVE MG/DL
GRAN CASTS #/AREA URNS LPF: ABNORMAL /[LPF]
HCT VFR BLD AUTO: 31.4 % (ref 36.5–49.3)
HELMET CELLS BLD QL SMEAR: PRESENT
HGB BLD-MCNC: 10.3 G/DL (ref 12–17)
HGB UR QL STRIP.AUTO: ABNORMAL
KETONES UR STRIP-MCNC: NEGATIVE MG/DL
LEUKOCYTE ESTERASE UR QL STRIP: ABNORMAL
LYMPHOCYTES # BLD AUTO: 1.66 THOUSAND/UL (ref 0.6–4.47)
LYMPHOCYTES # BLD AUTO: 14 % (ref 14–44)
MAGNESIUM SERPL-MCNC: 1.9 MG/DL (ref 1.9–2.7)
MCH RBC QN AUTO: 30.9 PG (ref 26.8–34.3)
MCHC RBC AUTO-ENTMCNC: 32.8 G/DL (ref 31.4–37.4)
MCV RBC AUTO: 94 FL (ref 82–98)
MONOCYTES # BLD AUTO: 1.19 THOUSAND/UL (ref 0–1.22)
MONOCYTES NFR BLD: 10 % (ref 4–12)
MUCOUS THREADS UR QL AUTO: ABNORMAL
NEUTROPHILS # BLD MANUAL: 8.92 THOUSAND/UL (ref 1.85–7.62)
NEUTS SEG NFR BLD AUTO: 75 % (ref 43–75)
NITRITE UR QL STRIP: NEGATIVE
NON-SQ EPI CELLS URNS QL MICRO: ABNORMAL /HPF
P AXIS: 43 DEGREES
P AXIS: 68 DEGREES
P AXIS: 69 DEGREES
PH UR STRIP.AUTO: 5.5 [PH]
PLATELET # BLD AUTO: 504 THOUSANDS/UL (ref 149–390)
PLATELET BLD QL SMEAR: ABNORMAL
PMV BLD AUTO: 9.7 FL (ref 8.9–12.7)
POIKILOCYTOSIS BLD QL SMEAR: PRESENT
POTASSIUM SERPL-SCNC: 3 MMOL/L (ref 3.5–5.3)
PR INTERVAL: 144 MS
PR INTERVAL: 152 MS
PR INTERVAL: 158 MS
PROT UR STRIP-MCNC: ABNORMAL MG/DL
QRS AXIS: 34 DEGREES
QRS AXIS: 34 DEGREES
QRS AXIS: 59 DEGREES
QRSD INTERVAL: 82 MS
QRSD INTERVAL: 86 MS
QRSD INTERVAL: 88 MS
QT INTERVAL: 468 MS
QT INTERVAL: 484 MS
QT INTERVAL: 502 MS
QTC INTERVAL: 471 MS
QTC INTERVAL: 471 MS
QTC INTERVAL: 502 MS
RBC # BLD AUTO: 3.33 MILLION/UL (ref 3.88–5.62)
RBC #/AREA URNS AUTO: ABNORMAL /HPF
SODIUM SERPL-SCNC: 138 MMOL/L (ref 135–147)
SP GR UR STRIP.AUTO: 1.01 (ref 1–1.03)
T WAVE AXIS: 23 DEGREES
T WAVE AXIS: 52 DEGREES
T WAVE AXIS: 60 DEGREES
UROBILINOGEN UR STRIP-ACNC: <2 MG/DL
VENTRICULAR RATE: 57 BPM
VENTRICULAR RATE: 60 BPM
VENTRICULAR RATE: 61 BPM
WBC # BLD AUTO: 11.89 THOUSAND/UL (ref 4.31–10.16)
WBC #/AREA URNS AUTO: ABNORMAL /HPF
WBC CLUMPS # UR AUTO: PRESENT /UL

## 2023-08-28 PROCEDURE — 96366 THER/PROPH/DIAG IV INF ADDON: CPT

## 2023-08-28 PROCEDURE — 99285 EMERGENCY DEPT VISIT HI MDM: CPT | Performed by: EMERGENCY MEDICINE

## 2023-08-28 PROCEDURE — 99285 EMERGENCY DEPT VISIT HI MDM: CPT

## 2023-08-28 PROCEDURE — G1004 CDSM NDSC: HCPCS

## 2023-08-28 PROCEDURE — 85027 COMPLETE CBC AUTOMATED: CPT

## 2023-08-28 PROCEDURE — 80048 BASIC METABOLIC PNL TOTAL CA: CPT

## 2023-08-28 PROCEDURE — 96365 THER/PROPH/DIAG IV INF INIT: CPT

## 2023-08-28 PROCEDURE — 85007 BL SMEAR W/DIFF WBC COUNT: CPT

## 2023-08-28 PROCEDURE — 93010 ELECTROCARDIOGRAM REPORT: CPT | Performed by: INTERNAL MEDICINE

## 2023-08-28 PROCEDURE — 87186 SC STD MICRODIL/AGAR DIL: CPT

## 2023-08-28 PROCEDURE — 87077 CULTURE AEROBIC IDENTIFY: CPT

## 2023-08-28 PROCEDURE — 82948 REAGENT STRIP/BLOOD GLUCOSE: CPT

## 2023-08-28 PROCEDURE — 70450 CT HEAD/BRAIN W/O DYE: CPT

## 2023-08-28 PROCEDURE — 87086 URINE CULTURE/COLONY COUNT: CPT

## 2023-08-28 PROCEDURE — 99223 1ST HOSP IP/OBS HIGH 75: CPT | Performed by: INTERNAL MEDICINE

## 2023-08-28 PROCEDURE — 83735 ASSAY OF MAGNESIUM: CPT

## 2023-08-28 PROCEDURE — 93005 ELECTROCARDIOGRAM TRACING: CPT

## 2023-08-28 PROCEDURE — 96367 TX/PROPH/DG ADDL SEQ IV INF: CPT

## 2023-08-28 PROCEDURE — 36415 COLL VENOUS BLD VENIPUNCTURE: CPT

## 2023-08-28 PROCEDURE — 81001 URINALYSIS AUTO W/SCOPE: CPT

## 2023-08-28 RX ORDER — ACETAMINOPHEN 325 MG/1
975 TABLET ORAL EVERY 8 HOURS SCHEDULED
Status: DISCONTINUED | OUTPATIENT
Start: 2023-08-28 | End: 2023-08-30 | Stop reason: HOSPADM

## 2023-08-28 RX ORDER — FERROUS SULFATE 325(65) MG
325 TABLET ORAL
Status: DISCONTINUED | OUTPATIENT
Start: 2023-08-29 | End: 2023-08-30 | Stop reason: HOSPADM

## 2023-08-28 RX ORDER — AMLODIPINE BESYLATE 5 MG/1
5 TABLET ORAL DAILY
Status: DISCONTINUED | OUTPATIENT
Start: 2023-08-29 | End: 2023-08-30 | Stop reason: HOSPADM

## 2023-08-28 RX ORDER — ONDANSETRON 2 MG/ML
4 INJECTION INTRAMUSCULAR; INTRAVENOUS EVERY 6 HOURS PRN
Status: DISCONTINUED | OUTPATIENT
Start: 2023-08-28 | End: 2023-08-30 | Stop reason: HOSPADM

## 2023-08-28 RX ORDER — SODIUM CHLORIDE, SODIUM GLUCONATE, SODIUM ACETATE, POTASSIUM CHLORIDE, MAGNESIUM CHLORIDE, SODIUM PHOSPHATE, DIBASIC, AND POTASSIUM PHOSPHATE .53; .5; .37; .037; .03; .012; .00082 G/100ML; G/100ML; G/100ML; G/100ML; G/100ML; G/100ML; G/100ML
1000 INJECTION, SOLUTION INTRAVENOUS ONCE
Status: COMPLETED | OUTPATIENT
Start: 2023-08-28 | End: 2023-08-28

## 2023-08-28 RX ORDER — ATORVASTATIN CALCIUM 20 MG/1
20 TABLET, FILM COATED ORAL
Status: DISCONTINUED | OUTPATIENT
Start: 2023-08-28 | End: 2023-08-30 | Stop reason: HOSPADM

## 2023-08-28 RX ORDER — SODIUM CHLORIDE, SODIUM GLUCONATE, SODIUM ACETATE, POTASSIUM CHLORIDE, MAGNESIUM CHLORIDE, SODIUM PHOSPHATE, DIBASIC, AND POTASSIUM PHOSPHATE .53; .5; .37; .037; .03; .012; .00082 G/100ML; G/100ML; G/100ML; G/100ML; G/100ML; G/100ML; G/100ML
75 INJECTION, SOLUTION INTRAVENOUS CONTINUOUS
Status: DISCONTINUED | OUTPATIENT
Start: 2023-08-28 | End: 2023-08-29

## 2023-08-28 RX ORDER — POTASSIUM CHLORIDE 20 MEQ/1
40 TABLET, EXTENDED RELEASE ORAL ONCE
Status: COMPLETED | OUTPATIENT
Start: 2023-08-28 | End: 2023-08-28

## 2023-08-28 RX ORDER — FOLIC ACID 1 MG/1
1 TABLET ORAL DAILY
Status: DISCONTINUED | OUTPATIENT
Start: 2023-08-29 | End: 2023-08-30 | Stop reason: HOSPADM

## 2023-08-28 RX ORDER — GABAPENTIN 100 MG/1
100 CAPSULE ORAL 3 TIMES DAILY
Status: DISCONTINUED | OUTPATIENT
Start: 2023-08-28 | End: 2023-08-30 | Stop reason: HOSPADM

## 2023-08-28 RX ORDER — DOCUSATE SODIUM 100 MG/1
100 CAPSULE, LIQUID FILLED ORAL 2 TIMES DAILY
Status: DISCONTINUED | OUTPATIENT
Start: 2023-08-28 | End: 2023-08-30 | Stop reason: HOSPADM

## 2023-08-28 RX ADMIN — ATORVASTATIN CALCIUM 20 MG: 20 TABLET, FILM COATED ORAL at 21:17

## 2023-08-28 RX ADMIN — ACETAMINOPHEN 975 MG: 325 TABLET, FILM COATED ORAL at 21:17

## 2023-08-28 RX ADMIN — GABAPENTIN 100 MG: 100 CAPSULE ORAL at 21:17

## 2023-08-28 RX ADMIN — APIXABAN 5 MG: 5 TABLET, FILM COATED ORAL at 19:25

## 2023-08-28 RX ADMIN — DOCUSATE SODIUM 100 MG: 100 CAPSULE, LIQUID FILLED ORAL at 19:25

## 2023-08-28 RX ADMIN — SODIUM CHLORIDE, SODIUM GLUCONATE, SODIUM ACETATE, POTASSIUM CHLORIDE, MAGNESIUM CHLORIDE, SODIUM PHOSPHATE, DIBASIC, AND POTASSIUM PHOSPHATE 1000 ML: .53; .5; .37; .037; .03; .012; .00082 INJECTION, SOLUTION INTRAVENOUS at 14:17

## 2023-08-28 RX ADMIN — CEFTRIAXONE SODIUM 1000 MG: 10 INJECTION, POWDER, FOR SOLUTION INTRAVENOUS at 16:40

## 2023-08-28 RX ADMIN — SODIUM CHLORIDE, SODIUM GLUCONATE, SODIUM ACETATE, POTASSIUM CHLORIDE, MAGNESIUM CHLORIDE, SODIUM PHOSPHATE, DIBASIC, AND POTASSIUM PHOSPHATE 75 ML/HR: .53; .5; .37; .037; .03; .012; .00082 INJECTION, SOLUTION INTRAVENOUS at 19:06

## 2023-08-28 RX ADMIN — POTASSIUM CHLORIDE 40 MEQ: 1500 TABLET, EXTENDED RELEASE ORAL at 14:19

## 2023-08-28 NOTE — ASSESSMENT & PLAN NOTE
Likely prerenal secondary to UTI and decreased oral intake  Start IV fluid for hydration  Continue with IV antibiotic  Potassium was replaced  Monitor

## 2023-08-28 NOTE — ASSESSMENT & PLAN NOTE
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   Continue Eliquis

## 2023-08-28 NOTE — ASSESSMENT & PLAN NOTE
Patient presented with dysuria and transient syncopal episode  Found to have abnormal urinalysis with leukocytosis  Recent abdomen pelvis CT scan on 8/25/2023 with cystitis and ureteritis.   Start IV ceftriaxone  Follow on urine culture  Continue with supportive care

## 2023-08-28 NOTE — ED PROVIDER NOTES
History  Chief Complaint   Patient presents with   • Altered Mental Status     Pt's family member reports pt became diaphoretic and had near syncope episode while sitting in chair after shower; episode lasted around 10 min per family member. EMS reports pt was hypotensive initially. Pt c/o chronic L knee pain on arrival. VSS on arrival.     79-year-old Ukrainian-speaking male presents to the emergency room after an episode of unresponsiveness noted by his daughter. Daughter serves as  for interview. Patient was sitting in a chair while his daughter was preparing him some food. She noticed that he began talking slower and then progressed to not talking at all and then being unresponsive. She thinks that he even stopped breathing for a period of time. She called EMS and by the time they got there, the patient had returned to his baseline level of alertness. The patient does not have memory of this happening. Patient does admit to some dysuria. In the emergency room, patient has returned to baseline and is not complaining of any symptoms other than being hungry because he did not get to eat. Of note, the patient did have a fall without head strike a few days ago and did not seek care at that time. Also of note, the patient was seen a few days ago for abdominal pain and poor oral intake and nausea. A PE was ruled out at that time and all of his symptoms have resolved. 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 Prior to Admission Medications   Prescriptions Last Dose Informant Patient Reported? Taking? Multiple Vitamin (multivitamin) tablet   No No   Sig: Take 1 tablet by mouth daily Begin 30 days prior to surgery.    acetaminophen (TYLENOL) 500 mg tablet   No No   Sig: Take 2 tablets (1,000 mg total) by mouth every 8 (eight) hours   amLODIPine (NORVASC) 5 mg tablet  Self No No   Sig: Take 1 tablet (5 mg total) by mouth daily   apixaban (Eliquis) 5 mg   No No   Sig: Take 2 tablets (10 mg total) by mouth 2 (two) times a day for 7 days, THEN 1 tablet (5 mg total) 2 (two) times a day for 21 days. ascorbic acid (VITAMIN C) 500 MG tablet   No No   Sig: Take 1 tablet (500 mg total) by mouth daily Begin 30 days prior to surgery. aspirin 325 mg tablet   No No   Sig: Take 1 tablet (325 mg total) by mouth 2 (two) times a day Take with food. atorvastatin (LIPITOR) 20 mg tablet  Self No No   Sig: Take 1 tablet (20 mg total) by mouth daily at bedtime   docusate sodium (COLACE) 100 mg capsule   No No   Sig: Take 1 capsule (100 mg total) by mouth 2 (two) times a day   ferrous sulfate 324 (65 Fe) mg   No No   Sig: Take 1 tablet (324 mg total) by mouth daily before breakfast Begin 30 days prior to surgery. folic acid (FOLVITE) 1 mg tablet   No No   Sig: Take 1 tablet (1 mg total) by mouth daily Begin 30 days prior to surgery.    gabapentin (NEURONTIN) 100 mg capsule   No No   Sig: Take 1 capsule (100 mg total) by mouth 3 (three) times a day   linaCLOtide (Linzess) 145 MCG CAPS  Self No No   Sig: Take 1 capsule (145 mcg total) by mouth daily   lisinopril-hydrochlorothiazide (PRINZIDE,ZESTORETIC) 20-25 MG per tablet  Self No No   Sig: Take 1 tablet by mouth daily   ondansetron (ZOFRAN) 4 mg tablet   No No   Sig: Take 1 tablet (4 mg total) by mouth every 8 (eight) hours as needed for nausea or vomiting   oxyCODONE (ROXICODONE) 10 MG TABS   No No   Sig: Take 1 tablet (10 mg total) by mouth every 4 (four) hours as needed for severe pain Max Daily Amount: 60 mg   promethazine (PHENERGAN) 12.5 MG tablet   No No   Sig: Take 1 tablet (12.5 mg total) by mouth every 6 (six) hours as needed for nausea or vomiting      Facility-Administered Medications: None       Past Medical History:   Diagnosis Date   • Cataract    • Colon polyp    • Diverticulosis    • Gastritis    • GERD (gastroesophageal reflux disease)    • Hyperlipidemia    • Hypertension    • Pre-diabetes    • Throat ulcer    • Visual impairment        Past Surgical History:   Procedure Laterality Date   • COLONOSCOPY N/A 08/18/2016    Procedure: COLONOSCOPY;  Surgeon: Claudia Ferro MD;  Location: [facility]; Service:    • COLONOSCOPY W/ POLYPECTOMY  2019    repeat 2022   • EYE SURGERY Bilateral    • ME ARTHRP KNE CONDYLE&PLATU MEDIAL&LAT COMPARTMENTS Left 8/14/2023    Procedure: ARTHROPLASTY KNEE TOTAL;  Surgeon: Flory Alvarez DO;  Location: [address] [place] [address] MAIN OR;  Service: Orthopedics   • TONSILLECTOMY     • UPPER GASTROINTESTINAL ENDOSCOPY  2019       Family History   Problem Relation Age of Onset   • Hypertension Mother    • Hypertension Father      I have reviewed and agree with the history as documented. E-Cigarette/Vaping   • E-Cigarette Use Never User      E-Cigarette/Vaping Substances   • Nicotine No    • THC No    • CBD No    • Flavoring No    • Other No    • Unknown No      Social History     Tobacco Use   • Smoking status: Former     Types: Cigarettes   • Smokeless tobacco: Never   • Tobacco comments:     Patient quit   Vaping Use   • Vaping Use: Never used   Substance Use Topics   • Alcohol use: Not Currently     Comment: pt "sober for 30+ years"   • Drug use: No        Review of Systems   Constitutional: Negative for appetite change, chills and fever. Eyes: Negative for visual disturbance. Respiratory: Negative for shortness of breath. Cardiovascular: Negative for chest pain. Gastrointestinal: Negative for abdominal pain, diarrhea, nausea and vomiting. Genitourinary: Positive for dysuria. Negative for frequency and hematuria. Musculoskeletal: Negative for gait problem. Neurological: Positive for syncope. Negative for light-headedness and headaches.        Physical Exam  ED Triage Vitals [08/28/23 1159]   Temperature Pulse Respirations Blood Pressure SpO2   98.4 °F (36.9 °C) 62 20 125/58 97 %      Temp Source Heart Rate Source Patient Position - Orthostatic VS BP Location FiO2 (%)   Oral -- -- -- --      Pain Score       --             Orthostatic Vital Signs  Vitals:    08/28/23 1300 08/28/23 1415 08/28/23 1530 08/28/23 1700   BP: 118/58  116/54    Pulse: 60 64 64 66       Physical Exam  Constitutional:       Appearance: Normal appearance. HENT:      Head: Normocephalic and atraumatic. Mouth/Throat:      Mouth: Mucous membranes are moist.      Pharynx: Oropharynx is clear. Eyes:      Extraocular Movements: Extraocular movements intact. Conjunctiva/sclera: Conjunctivae normal.   Cardiovascular:      Rate and Rhythm: Normal rate and regular rhythm. Pulses: Normal pulses. Heart sounds: Normal heart sounds. Pulmonary:      Effort: Pulmonary effort is normal.      Breath sounds: Normal breath sounds. Abdominal:      General: There is no distension. Palpations: Abdomen is soft. Tenderness: There is no abdominal tenderness. Musculoskeletal:      Cervical back: Neck supple. Right lower leg: No edema. Left lower leg: No edema. Skin:     General: Skin is warm and dry. Capillary Refill: Capillary refill takes less than 2 seconds. Neurological:      General: No focal deficit present. Mental Status: He is alert and oriented to person, place, and time. Sensory: No sensory deficit. Motor: No weakness. Psychiatric:         Mood and Affect: Mood normal.         Behavior: Behavior normal.         Thought Content:  Thought content normal.         ED Medications  Medications   ceftriaxone (ROCEPHIN) 1 g/50 mL in dextrose IVPB (1,000 mg Intravenous New Bag 8/28/23 1640)   potassium chloride (K-DUR,KLOR-CON) CR tablet 40 mEq (40 mEq Oral Given 8/28/23 1419)   multi-electrolyte (ISOLYTE-S PH 7.4) bolus 1,000 mL (0 mL Intravenous Stopped 8/28/23 1633)       Diagnostic Studies  Results Reviewed     Procedure Component Value Units Date/Time    RBC Morphology Reflex Test [[id number]] Collected: 08/28/23 1316    Lab Status: Final result Specimen: Blood from Arm, Right Updated: 08/28/23 1702    CBC and differential [[id number]]  (Abnormal) Collected: 08/28/23 1316    Lab Status: Final result Specimen: Blood from Arm, Right Updated: 08/28/23 1650     WBC 11.89 Thousand/uL      RBC 3.33 Million/uL      Hemoglobin 10.3 g/dL      Hematocrit 31.4 %      MCV 94 fL      MCH 30.9 pg      MCHC 32.8 g/dL      RDW 14.6 %      MPV 9.7 fL      Platelets 058 Thousands/uL     Narrative: This is an appended report. These results have been appended to a previously verified report. Manual Differential(PHLEBS Do Not Order) [[id number]]  (Abnormal) Collected: 08/28/23 1316    Lab Status: Final result Specimen: Blood from Arm, Right Updated: 08/28/23 1650     Segmented % 75 %      Lymphocytes % 14 %      Monocytes % 10 %      Eosinophils, % 1 %      Basophils % 0 %      Absolute Neutrophils 8.92 Thousand/uL      Lymphocytes Absolute 1.66 Thousand/uL      Monocytes Absolute 1.19 Thousand/uL      Eosinophils Absolute 0.12 Thousand/uL      Basophils Absolute 0.00 Thousand/uL      Total Counted --     Platelet Estimate Increased     Anisocytosis Present     Sabina Cells Present     Helmet Cells Present     Poikilocytes Present    Urine Microscopic [[id number]]  (Abnormal) Collected: 08/28/23 1547    Lab Status: Final result Specimen: Urine, Clean Catch Updated: 08/28/23 1626     RBC, UA Innumerable /hpf      WBC, UA Innumerable /hpf      Epithelial Cells Occasional /hpf      Bacteria, UA Innumerable /hpf      MUCUS THREADS Moderate     Granular Casts, UA 3-5     WBC Clumps Present    Urine culture [[id number]] Collected: 08/28/23 1547    Lab Status:  In process Specimen: Urine, Clean Catch Updated: 08/28/23 1626    UA w Reflex to Microscopic w Reflex to Culture [[id number]]  (Abnormal) Collected: 08/28/23 1547    Lab Status: Final result Specimen: Urine, Clean Catch Updated: 08/28/23 1616     Color, UA Yellow     Clarity, UA Turbid     Specific Gravity, UA 1.015     pH, UA 5.5     Leukocytes, UA Large     Nitrite, UA Negative     Protein, UA 50 (1+) mg/dl      Glucose, UA Negative mg/dl      Ketones, UA Negative mg/dl      Urobilinogen, UA <2.0 mg/dl      Bilirubin, UA Negative     Occult Blood, UA Large    Magnesium [[id number]]  (Normal) Collected: 08/28/23 1316    Lab Status: Final result Specimen: Blood from Arm, Right Updated: 08/28/23 1430     Magnesium 1.9 mg/dL     Basic metabolic panel [[id number]]  (Abnormal) Collected: 08/28/23 1316    Lab Status: Final result Specimen: Blood from Arm, Right Updated: 08/28/23 1358     Sodium 138 mmol/L      Potassium 3.0 mmol/L      Chloride 102 mmol/L      CO2 29 mmol/L      ANION GAP 7 mmol/L      BUN 25 mg/dL      Creatinine 1.24 mg/dL      Glucose 91 mg/dL      Calcium 7.5 mg/dL      eGFR 57 ml/min/1.73sq m     Narrative:      [place] guidelines for Chronic Kidney Disease (CKD):   •  Stage 1 with normal or high GFR (GFR > 90 mL/min/1.73 square meters)  •  Stage 2 Mild CKD (GFR = 60-89 mL/min/1.73 square meters)  •  Stage 3A Moderate CKD (GFR = 45-59 mL/min/1.73 square meters)  •  Stage 3B Moderate CKD (GFR = 30-44 mL/min/1.73 square meters)  •  Stage 4 Severe CKD (GFR = 15-29 mL/min/1.73 square meters)  •  Stage 5 End Stage CKD (GFR <15 mL/min/1.73 square meters)  Note: GFR calculation is accurate only with a steady state creatinine    Fingerstick Glucose (POCT) [[id number]]  (Normal) Collected: 08/28/23 1152    Lab Status: Final result Updated: 08/28/23 1153     POC Glucose 103 mg/dl                  CT head without contrast   Final Result by Ryley Coburn MD (08/28 1417)      No acute intracranial abnormality.                   Workstation performed: [id number]               Procedures  ECG 12 Lead Documentation Only    Date/Time: 8/28/2023 4:57 PM    Performed by: Rene Sharp DO  Authorized by: Rene Sharp DO    ECG reviewed by me, the ED Provider: yes    Patient location:  ED  Interpretation: Interpretation: normal    Rate:     ECG rate:  60  Rhythm:     Rhythm: sinus rhythm    Ectopy:     Ectopy: none    QRS:     QRS axis:  Normal  Conduction:     Conduction: normal    ST segments:     ST segments:  Normal  T waves:     T waves: normal            ED Course  ED Course as of 08/28/23 1706   Mon Aug 28, 2023   1353 Thrombocytosis. Stable normocytic anemia. Improved leukocytosis. 5434 Basic metabolic panel(!)  Hypokalemia and MIKE. Will give fluids and replete potassium and check a magnesium. 1433 Magnesium  Within normal limits   1458 CT head without contrast  No acute findings. 1632 UA w Reflex to Microscopic w Reflex to Culture(!)  Consistent with UTI. Will treat with ceftriaxone. 1704 Discussed case with hospitalist who agrees for inpatient admission. SBIRT 22yo+    Flowsheet Row Most Recent Value   Initial Alcohol Screen: US AUDIT-C     1. How often do you have a drink containing alcohol? 0 Filed at: 08/28/2023 1201   2. How many drinks containing alcohol do you have on a typical day you are drinking? 0 Filed at: 08/28/2023 1201   3a. Male UNDER 65: How often do you have five or more drinks on one occasion? 0 Filed at: 08/28/2023 1201   3b. FEMALE Any Age, or MALE 65+: How often do you have 4 or more drinks on one occassion? 0 Filed at: 08/28/2023 1201   Audit-C Score 0 Filed at: 08/28/2023 1201   [place]: How many times in the past year have you. .. Used an illegal drug or used a prescription medication for non-medical reasons? Never Filed at: 08/28/2023 1201                Medical Decision Making  40-year-old male with episode of unresponsiveness. Patient does not appear to be postictal, but does have some memory loss of the event. Patient had no abnormal movements during episode or loss of bowel or bladder control. Patient did not fall, but did have a fall a few days ago without head strike.   Patient did have recent GI illness during which she was not eating and drinking well for several days. Patient also admitting to some dysuria. Vital signs normal on presentation. Patient appears well. Patient is neurologically intact and alert and oriented x3. Physical exam of heart, lungs, abdomen is unremarkable. Concern for dehydration, UTI, electrolyte abnormality. There is also a possibility for intracranial bleed given that patient is on blood thinners and did have a fall a few days ago. Low concern for seizures at this time. I will get CBC, BMP, UA, EKG, CT head. Please see ED course for additional MDM. Amount and/or Complexity of Data Reviewed  Labs: ordered. Decision-making details documented in ED Course. Radiology: ordered. Decision-making details documented in ED Course. Risk  Prescription drug management. Disposition  Final diagnoses:   MIKE (acute kidney injury) ([address])   Transient alteration of awareness   Hypokalemia   UTI (urinary tract infection)     Time reflects when diagnosis was documented in both MDM as applicable and the Disposition within this note     Time User Action Codes Description Comment    8/28/2023  2:06 PM Pandelidis, Leonora Mew Add [N17.9] MIKE (acute kidney injury) ([address])     8/28/2023  2:06 PM Pandelidis, Leonora Mew Add [R40.4] Transient alteration of awareness     8/28/2023  2:06 PM Pandelidis, Leonora Mew Add [E87.6] Hypokalemia     8/28/2023  5:06 PM Pandelidis, Leonora Mew Add [N39.0] UTI (urinary tract infection)       ED Disposition     ED Disposition   Admit    Condition   Stable    Date/Time   Mon Aug 28, 2023  5:05 PM    Comment   Case was discussed with Dr. Paola Lutz and the patient's admission status was agreed to be Admission Status: inpatient status to the service of Dr. Paola Lutz . Follow-up Information    None         Patient's Medications   Discharge Prescriptions    No medications on file     No discharge procedures on file.     PDMP Review       Value Time User    PDMP Reviewed  Yes 8/14/2023  2:04 PM Mindy So ISHAN           ED Provider  Attending physically available and evaluated Monika Gil. I managed the patient along with the ED Attending.     Electronically Signed by         Ray Owens DO  08/28/23 5716

## 2023-08-28 NOTE — ASSESSMENT & PLAN NOTE
Patient presented with episode of unresponsiveness while sitting down  He had some slow speech then slowly just passed out while sitting down  No tonic-clonic movements no loss of bowel or bladder control patient had recent GI illness with associated poor oral intake    Head CT scan without acute abnormality  Likely  vasovagal secondary to UTI/MIKE and poor oral intake with dehydration  Continue with above treatment, IV fluid and IV antibiotic  Check orthostatic blood pressure  PT OT eval  Monitor on telemetry  Continue with supportive care

## 2023-08-28 NOTE — H&P
[address] [facility]  H&P  Name: Amanda Son 67 y.o. male I MRN: [id number]  Unit/Bed#: ED 08 I Date of Admission: 8/28/2023   Date of Service: 8/28/2023 I Hospital Day: 0      Assessment/Plan   * Acute cystitis  Assessment & Plan  Patient presented with dysuria and transient syncopal episode  Found to have abnormal urinalysis with leukocytosis  Recent abdomen pelvis CT scan on 8/25/2023 with cystitis and ureteritis. Start IV ceftriaxone  Follow on urine culture  Continue with supportive care    MIKE (acute kidney injury) ([address])  Assessment & Plan  Likely prerenal secondary to UTI and decreased oral intake  Start IV fluid for hydration  Continue with IV antibiotic  Potassium was replaced  Monitor    Syncope and collapse  Assessment & Plan  Patient presented with episode of unresponsiveness while sitting down  He had some slow speech then slowly just passed out while sitting down  No tonic-clonic movements no loss of bowel or bladder control patient had recent GI illness with associated poor oral intake    Head CT scan without acute abnormality  Likely  vasovagal secondary to UTI/MIKE and poor oral intake with dehydration  Continue with above treatment, IV fluid and IV antibiotic  Check orthostatic blood pressure  PT OT eval  Monitor on telemetry  Continue with supportive care    Anemia  Assessment & Plan  Check anemia panel  Continue iron supplement    DVT (deep venous thrombosis) ([address])  Assessment & Plan  Patient was recently started on Eliquis 8/22/2023 due to acute non-occlusive deep vein thrombosis in one of the gastrocnemius veins.   Continue Eliquis    Osteoarthritis of left knee  Assessment & Plan  Status post recent total knee replacement on 8/14/2023  PT OT eval    Mixed hyperlipidemia  Assessment & Plan  Continue statin    HTN (hypertension)  Assessment & Plan  Acceptable BP  Continue Norvasc  Hold lisinopril/HCTZ due to MIKE  Monitor        VTE Pharmacologic Prophylaxis: VTE Score: 11 High Risk (Score >/= 5) - Pharmacological DVT Prophylaxis Ordered: apixaban (Eliquis). Sequential Compression Devices Ordered. Code Status: Full code  Discussion with family: Updated  (daughter) over the phone    Anticipated Length of Stay: Patient will be admitted on an inpatient basis with an anticipated length of stay of greater than 2 midnights secondary to Management of syncope, UTI and acute renal failure. Total Time Spent on Date of Encounter in care of patient: 65 minutes This time was spent on one or more of the following: performing physical exam; counseling and coordination of care; obtaining or reviewing history; documenting in the medical record; reviewing/ordering tests, medications or procedures; communicating with other healthcare professionals and discussing with patient's family/caregivers. Chief Complaint:   Syncopal episode    History of Present Illness:  Regino Miller is a 67 y.o. male with a PMH of knees osteoarthritis status post left total knee arthroplasty on 8/14/2025, hypertension, hyperlipidemia, BPH, recent DVT on Eliquis who presents after an episode of unresponsiveness while sitting down. He had some slurred speech and then slowly just passed out while sitting down and patient daughter called EMS  He was back to baseline by the time EMS got there.   Daughter did not note any tonic-clonic movements or loss of bowel or bladder control  He did have recent GI illness with associated poor oral intake, he had 2 recent ER visit on 8/ 22 for left lower extremity DVT and on 8/25 due to lower abdominal pain  He also did have a fall a few days ago without head strike  He is completely well on exam    Evaluated in the emergency room found to have hypokalemia, abnormal urinalysis and positive MIKE  Head CT scan without acute abnormality    Discussed in detail with ED physician  Discussed with patient's daughter    Review of Systems:  Review of Systems   Constitutional: Positive for appetite change. Negative for chills and fever. Respiratory: Negative for cough, chest tightness and shortness of breath. Cardiovascular: Positive for leg swelling. Negative for chest pain and palpitations. Gastrointestinal: Positive for abdominal pain. Negative for blood in stool, diarrhea, nausea and vomiting. Genitourinary: Positive for difficulty urinating and dysuria. Neurological: Positive for syncope. Negative for dizziness, speech difficulty and headaches. All other systems reviewed and are negative. Past Medical and Surgical History:   Past Medical History:   Diagnosis Date   • Cataract    • Colon polyp    • Diverticulosis    • Gastritis    • GERD (gastroesophageal reflux disease)    • Hyperlipidemia    • Hypertension    • Pre-diabetes    • Throat ulcer    • Visual impairment        Past Surgical History:   Procedure Laterality Date   • COLONOSCOPY N/A 08/18/2016    Procedure: COLONOSCOPY;  Surgeon: Pierre Lester MD;  Location:  GI LAB; Service:    • COLONOSCOPY W/ POLYPECTOMY  2019    repeat 2022   • EYE SURGERY Bilateral    • MD ARTHRP KNE CONDYLE&PLATU MEDIAL&LAT COMPARTMENTS Left 8/14/2023    Procedure: ARTHROPLASTY KNEE TOTAL;  Surgeon: Kranthi Medeiros DO;  Location: [facility] MAIN OR;  Service: Orthopedics   • TONSILLECTOMY     • UPPER GASTROINTESTINAL ENDOSCOPY  2019       Meds/Allergies:  Prior to Admission medications    Medication Sig Start Date End Date Taking? Authorizing Provider   acetaminophen (TYLENOL) 500 mg tablet Take 2 tablets (1,000 mg total) by mouth every 8 (eight) hours 8/14/23   Kita Pierre PA-C   amLODIPine (NORVASC) 5 mg tablet Take 1 tablet (5 mg total) by mouth daily 1/26/23   Ada Evangelista MD   apixaban (Eliquis) 5 mg Take 2 tablets (10 mg total) by mouth 2 (two) times a day for 7 days, THEN 1 tablet (5 mg total) 2 (two) times a day for 21 days.  8/22/23 9/19/23  Shy Clay MD   ascorbic acid (VITAMIN C) 500 MG tablet Take 1 tablet (500 mg total) by mouth daily Begin 30 days prior to surgery. 7/25/23   Leila Nunez PA-C   aspirin 325 mg tablet Take 1 tablet (325 mg total) by mouth 2 (two) times a day Take with food. 8/14/23   Leila Nunez PA-C   atorvastatin (LIPITOR) 20 mg tablet Take 1 tablet (20 mg total) by mouth daily at bedtime 1/26/23   Kelly Fenton MD   docusate sodium (COLACE) 100 mg capsule Take 1 capsule (100 mg total) by mouth 2 (two) times a day 8/14/23   Leila Nunez PA-C   ferrous sulfate 324 (65 Fe) mg Take 1 tablet (324 mg total) by mouth daily before breakfast Begin 30 days prior to surgery. 7/25/23   Leila Nunez PA-C   folic acid (FOLVITE) 1 mg tablet Take 1 tablet (1 mg total) by mouth daily Begin 30 days prior to surgery. 7/25/23   Leila Nunez PA-C   gabapentin (NEURONTIN) 100 mg capsule Take 1 capsule (100 mg total) by mouth 3 (three) times a day 8/14/23   Leila Nunez PA-C   linaCLOtide (Linzess) 145 MCG CAPS Take 1 capsule (145 mcg total) by mouth daily 1/26/23   Kelly Fenton MD   lisinopril-hydrochlorothiazide (PRINZIDE,ZESTORETIC) 20-25 MG per tablet Take 1 tablet by mouth daily 1/26/23   Kelly Fenton MD   Multiple Vitamin (multivitamin) tablet Take 1 tablet by mouth daily Begin 30 days prior to surgery. 7/25/23   Leila Nunez PA-C   ondansetron (ZOFRAN) 4 mg tablet Take 1 tablet (4 mg total) by mouth every 8 (eight) hours as needed for nausea or vomiting 8/25/23   Varun Cosme MD   oxyCODONE (ROXICODONE) 10 MG TABS Take 1 tablet (10 mg total) by mouth every 4 (four) hours as needed for severe pain Max Daily Amount: 60 mg 8/14/23   Leila Nunez PA-C   promethazine (PHENERGAN) 12.5 MG tablet Take 1 tablet (12.5 mg total) by mouth every 6 (six) hours as needed for nausea or vomiting 8/14/23   Leila Nunez PA-C     I have reviewed home medications with patient family member. Allergies:    Allergies   Allergen Reactions   • Lactose - Food Allergy Diarrhea diarrhea       Social History:  Marital Status: Single     Substance Use History:   Social History     Substance and Sexual Activity   Alcohol Use Not Currently    Comment: pt "sober for 30+ years"     Social History     Tobacco Use   Smoking Status Former   • Types: Cigarettes   Smokeless Tobacco Never   Tobacco Comments    Patient quit     Social History     Substance and Sexual Activity   Drug Use No       Family History:    Family History   Problem Relation Age of Onset   • Hypertension Mother    • Hypertension Father      Physical Exam:     Vitals:   Blood Pressure: 116/54 (08/28/23 1530)  Pulse: 66 (08/28/23 1700)  Temperature: 98.4 °F (36.9 °C) (08/28/23 1159)  Temp Source: Oral (08/28/23 1159)  Respirations: 18 (08/28/23 1700)  SpO2: 95 % (08/28/23 1700)    Physical Exam  Vitals reviewed. Constitutional:       Appearance: Normal appearance. He is not ill-appearing. HENT:      Head: Normocephalic and atraumatic. Mouth/Throat:      Mouth: Mucous membranes are moist.      Pharynx: No oropharyngeal exudate. Eyes:      General: No scleral icterus. Extraocular Movements: Extraocular movements intact. Cardiovascular:      Rate and Rhythm: Normal rate and regular rhythm. Pulses: Normal pulses. Heart sounds: Normal heart sounds. No murmur heard. Pulmonary:      Effort: Pulmonary effort is normal. No respiratory distress. Breath sounds: Normal breath sounds. No wheezing. Abdominal:      General: Bowel sounds are normal. There is no distension. Palpations: Abdomen is soft. Tenderness: There is no abdominal tenderness. Musculoskeletal:         General: Normal range of motion. Cervical back: Normal range of motion and neck supple. Right lower leg: Edema present. Left lower leg: Edema present. Skin:     General: Skin is warm and dry. Neurological:      General: No focal deficit present.       Mental Status: He is alert and oriented to person, place, and time.      Cranial Nerves: No cranial nerve deficit. Psychiatric:         Mood and Affect: Mood normal.         Additional Data:     Lab Results:  Results from last 7 days   Lab Units 08/28/23  1316 08/25/23  1345   WBC Thousand/uL 11.89* 14.63*   HEMOGLOBIN g/dL 10.3* 9.9*   HEMATOCRIT % 31.4* 29.5*   PLATELETS Thousands/uL 504* 379   NEUTROS PCT %  --  85*   LYMPHS PCT %  --  6*   LYMPHO PCT % 14  --    MONOS PCT %  --  8   MONO PCT % 10  --    EOS PCT % 1 0     Results from last 7 days   Lab Units 08/28/23  1316 08/25/23  1345   SODIUM mmol/L 138 136   POTASSIUM mmol/L 3.0* 4.0   CHLORIDE mmol/L 102 99   CO2 mmol/L 29 28   BUN mg/dL 25 21   CREATININE mg/dL 1.24 0.66   ANION GAP mmol/L 7 9   CALCIUM mg/dL 7.5* 7.9*   ALBUMIN g/dL  --  3.4*   TOTAL BILIRUBIN mg/dL  --  0.68   ALK PHOS U/L  --  52   ALT U/L  --  27   AST U/L  --  31   GLUCOSE RANDOM mg/dL 91 116         Results from last 7 days   Lab Units 08/28/23  1152   POC GLUCOSE mg/dl 103               Lines/Drains:  Invasive Devices     Peripheral Intravenous Line  Duration           Peripheral IV 08/28/23 Right Arm <1 day                    Imaging: CT scan reviewed without acute abnormality  CT head without contrast   Final Result by Venancio Chaudhry MD (08/28 1417)      No acute intracranial abnormality. Workstation performed: [id number]             EKG and Other Studies Reviewed on Admission:   · EKG: Personally reviewed showed normal sinus rhythm at 61 bpm, nonspecific ST segment abnormality    ** Please Note: This note has been constructed using a voice recognition system.  **

## 2023-08-29 ENCOUNTER — APPOINTMENT (OUTPATIENT)
Dept: PHYSICAL THERAPY | Facility: REHABILITATION | Age: 73
End: 2023-08-29
Payer: COMMERCIAL

## 2023-08-29 PROBLEM — R10.13 EPIGASTRIC PAIN: Status: ACTIVE | Noted: 2023-08-29

## 2023-08-29 PROBLEM — R93.89 ABNORMAL CT SCAN: Status: ACTIVE | Noted: 2023-08-29

## 2023-08-29 LAB
2HR DELTA HS TROPONIN: 3 NG/L
4HR DELTA HS TROPONIN: 9 NG/L
ANION GAP SERPL CALCULATED.3IONS-SCNC: 7 MMOL/L
ATRIAL RATE: 55 BPM
ATRIAL RATE: 56 BPM
ATRIAL RATE: 69 BPM
ATRIAL RATE: 70 BPM
BASOPHILS # BLD AUTO: 0.05 THOUSANDS/ÂΜL (ref 0–0.1)
BASOPHILS NFR BLD AUTO: 0 % (ref 0–1)
BUN SERPL-MCNC: 29 MG/DL (ref 5–25)
CALCIUM SERPL-MCNC: 8.2 MG/DL (ref 8.4–10.2)
CARDIAC TROPONIN I PNL SERPL HS: 23 NG/L
CARDIAC TROPONIN I PNL SERPL HS: 26 NG/L
CARDIAC TROPONIN I PNL SERPL HS: 32 NG/L
CHLORIDE SERPL-SCNC: 98 MMOL/L (ref 96–108)
CO2 SERPL-SCNC: 32 MMOL/L (ref 21–32)
CREAT SERPL-MCNC: 1.01 MG/DL (ref 0.6–1.3)
D DIMER PPP FEU-MCNC: 4.79 UG/ML FEU
EOSINOPHIL # BLD AUTO: 0.01 THOUSAND/ÂΜL (ref 0–0.61)
EOSINOPHIL NFR BLD AUTO: 0 % (ref 0–6)
ERYTHROCYTE [DISTWIDTH] IN BLOOD BY AUTOMATED COUNT: 14.5 % (ref 11.6–15.1)
FERRITIN SERPL-MCNC: 340 NG/ML (ref 24–336)
FOLATE SERPL-MCNC: >22.3 NG/ML
GFR SERPL CREATININE-BSD FRML MDRD: 73 ML/MIN/1.73SQ M
GLUCOSE SERPL-MCNC: 153 MG/DL (ref 65–140)
HCT VFR BLD AUTO: 30.1 % (ref 36.5–49.3)
HGB BLD-MCNC: 9.7 G/DL (ref 12–17)
IMM GRANULOCYTES # BLD AUTO: 0.25 THOUSAND/UL (ref 0–0.2)
IMM GRANULOCYTES NFR BLD AUTO: 2 % (ref 0–2)
IRON SATN MFR SERPL: 13 % (ref 15–50)
IRON SERPL-MCNC: 25 UG/DL (ref 50–212)
LIPASE SERPL-CCNC: 26 U/L (ref 11–82)
LYMPHOCYTES # BLD AUTO: 0.67 THOUSANDS/ÂΜL (ref 0.6–4.47)
LYMPHOCYTES NFR BLD AUTO: 5 % (ref 14–44)
MAGNESIUM SERPL-MCNC: 1.9 MG/DL (ref 1.9–2.7)
MCH RBC QN AUTO: 30.2 PG (ref 26.8–34.3)
MCHC RBC AUTO-ENTMCNC: 32.2 G/DL (ref 31.4–37.4)
MCV RBC AUTO: 94 FL (ref 82–98)
MONOCYTES # BLD AUTO: 0.49 THOUSAND/ÂΜL (ref 0.17–1.22)
MONOCYTES NFR BLD AUTO: 4 % (ref 4–12)
NEUTROPHILS # BLD AUTO: 11.47 THOUSANDS/ÂΜL (ref 1.85–7.62)
NEUTS SEG NFR BLD AUTO: 89 % (ref 43–75)
NRBC BLD AUTO-RTO: 0 /100 WBCS
P AXIS: 52 DEGREES
P AXIS: 58 DEGREES
P AXIS: 61 DEGREES
P AXIS: 66 DEGREES
PLATELET # BLD AUTO: 517 THOUSANDS/UL (ref 149–390)
PMV BLD AUTO: 9.3 FL (ref 8.9–12.7)
POTASSIUM SERPL-SCNC: 3.7 MMOL/L (ref 3.5–5.3)
PR INTERVAL: 148 MS
PR INTERVAL: 150 MS
PR INTERVAL: 164 MS
PR INTERVAL: 168 MS
QRS AXIS: 32 DEGREES
QRS AXIS: 36 DEGREES
QRS AXIS: 39 DEGREES
QRS AXIS: 45 DEGREES
QRSD INTERVAL: 100 MS
QRSD INTERVAL: 90 MS
QRSD INTERVAL: 92 MS
QRSD INTERVAL: 96 MS
QT INTERVAL: 454 MS
QT INTERVAL: 466 MS
QT INTERVAL: 528 MS
QT INTERVAL: 530 MS
QTC INTERVAL: 490 MS
QTC INTERVAL: 499 MS
QTC INTERVAL: 507 MS
QTC INTERVAL: 509 MS
RBC # BLD AUTO: 3.21 MILLION/UL (ref 3.88–5.62)
SODIUM SERPL-SCNC: 137 MMOL/L (ref 135–147)
T WAVE AXIS: 24 DEGREES
T WAVE AXIS: 49 DEGREES
T WAVE AXIS: 52 DEGREES
T WAVE AXIS: 97 DEGREES
TIBC SERPL-MCNC: 188 UG/DL (ref 250–450)
UIBC SERPL-MCNC: 163 UG/DL (ref 155–355)
VENTRICULAR RATE: 55 BPM
VENTRICULAR RATE: 56 BPM
VENTRICULAR RATE: 69 BPM
VENTRICULAR RATE: 70 BPM
VIT B12 SERPL-MCNC: 689 PG/ML (ref 180–914)
WBC # BLD AUTO: 12.94 THOUSAND/UL (ref 4.31–10.16)

## 2023-08-29 PROCEDURE — 99232 SBSQ HOSP IP/OBS MODERATE 35: CPT | Performed by: NURSE PRACTITIONER

## 2023-08-29 PROCEDURE — 85379 FIBRIN DEGRADATION QUANT: CPT | Performed by: STUDENT IN AN ORGANIZED HEALTH CARE EDUCATION/TRAINING PROGRAM

## 2023-08-29 PROCEDURE — 83550 IRON BINDING TEST: CPT | Performed by: STUDENT IN AN ORGANIZED HEALTH CARE EDUCATION/TRAINING PROGRAM

## 2023-08-29 PROCEDURE — 93010 ELECTROCARDIOGRAM REPORT: CPT | Performed by: INTERNAL MEDICINE

## 2023-08-29 PROCEDURE — 84484 ASSAY OF TROPONIN QUANT: CPT | Performed by: STUDENT IN AN ORGANIZED HEALTH CARE EDUCATION/TRAINING PROGRAM

## 2023-08-29 PROCEDURE — 83540 ASSAY OF IRON: CPT | Performed by: STUDENT IN AN ORGANIZED HEALTH CARE EDUCATION/TRAINING PROGRAM

## 2023-08-29 PROCEDURE — 93005 ELECTROCARDIOGRAM TRACING: CPT

## 2023-08-29 PROCEDURE — 82607 VITAMIN B-12: CPT | Performed by: STUDENT IN AN ORGANIZED HEALTH CARE EDUCATION/TRAINING PROGRAM

## 2023-08-29 PROCEDURE — 97163 PT EVAL HIGH COMPLEX 45 MIN: CPT

## 2023-08-29 PROCEDURE — 82728 ASSAY OF FERRITIN: CPT | Performed by: STUDENT IN AN ORGANIZED HEALTH CARE EDUCATION/TRAINING PROGRAM

## 2023-08-29 PROCEDURE — 97167 OT EVAL HIGH COMPLEX 60 MIN: CPT

## 2023-08-29 PROCEDURE — 80048 BASIC METABOLIC PNL TOTAL CA: CPT | Performed by: STUDENT IN AN ORGANIZED HEALTH CARE EDUCATION/TRAINING PROGRAM

## 2023-08-29 PROCEDURE — 83690 ASSAY OF LIPASE: CPT | Performed by: NURSE PRACTITIONER

## 2023-08-29 PROCEDURE — 85025 COMPLETE CBC W/AUTO DIFF WBC: CPT | Performed by: STUDENT IN AN ORGANIZED HEALTH CARE EDUCATION/TRAINING PROGRAM

## 2023-08-29 PROCEDURE — 82746 ASSAY OF FOLIC ACID SERUM: CPT | Performed by: STUDENT IN AN ORGANIZED HEALTH CARE EDUCATION/TRAINING PROGRAM

## 2023-08-29 PROCEDURE — 83735 ASSAY OF MAGNESIUM: CPT | Performed by: STUDENT IN AN ORGANIZED HEALTH CARE EDUCATION/TRAINING PROGRAM

## 2023-08-29 RX ORDER — CALCIUM CARBONATE 500 MG/1
500 TABLET, CHEWABLE ORAL 3 TIMES DAILY PRN
Status: DISCONTINUED | OUTPATIENT
Start: 2023-08-29 | End: 2023-08-30 | Stop reason: HOSPADM

## 2023-08-29 RX ORDER — SUCRALFATE 1 G/1
1 TABLET ORAL
Status: DISCONTINUED | OUTPATIENT
Start: 2023-08-29 | End: 2023-08-30 | Stop reason: HOSPADM

## 2023-08-29 RX ORDER — MAGNESIUM HYDROXIDE/ALUMINUM HYDROXICE/SIMETHICONE 120; 1200; 1200 MG/30ML; MG/30ML; MG/30ML
30 SUSPENSION ORAL EVERY 4 HOURS PRN
Status: DISCONTINUED | OUTPATIENT
Start: 2023-08-29 | End: 2023-08-30 | Stop reason: HOSPADM

## 2023-08-29 RX ORDER — METOCLOPRAMIDE HYDROCHLORIDE 5 MG/ML
10 INJECTION INTRAMUSCULAR; INTRAVENOUS ONCE
Status: COMPLETED | OUTPATIENT
Start: 2023-08-29 | End: 2023-08-29

## 2023-08-29 RX ORDER — NITROGLYCERIN 0.4 MG/1
0.4 TABLET SUBLINGUAL
Status: DISCONTINUED | OUTPATIENT
Start: 2023-08-29 | End: 2023-08-30 | Stop reason: HOSPADM

## 2023-08-29 RX ORDER — PANTOPRAZOLE SODIUM 40 MG/1
40 TABLET, DELAYED RELEASE ORAL
Status: DISCONTINUED | OUTPATIENT
Start: 2023-08-29 | End: 2023-08-30 | Stop reason: HOSPADM

## 2023-08-29 RX ADMIN — SUCRALFATE 1 G: 1 TABLET ORAL at 12:13

## 2023-08-29 RX ADMIN — ATORVASTATIN CALCIUM 20 MG: 20 TABLET, FILM COATED ORAL at 21:10

## 2023-08-29 RX ADMIN — ONDANSETRON 4 MG: 2 INJECTION INTRAMUSCULAR; INTRAVENOUS at 01:35

## 2023-08-29 RX ADMIN — ACETAMINOPHEN 975 MG: 325 TABLET, FILM COATED ORAL at 21:10

## 2023-08-29 RX ADMIN — ONDANSETRON 4 MG: 2 INJECTION INTRAMUSCULAR; INTRAVENOUS at 09:01

## 2023-08-29 RX ADMIN — SUCRALFATE 1 G: 1 TABLET ORAL at 21:10

## 2023-08-29 RX ADMIN — ALUMINUM HYDROXIDE, MAGNESIUM HYDROXIDE, AND SIMETHICONE 30 ML: 200; 200; 20 SUSPENSION ORAL at 04:07

## 2023-08-29 RX ADMIN — DOCUSATE SODIUM 100 MG: 100 CAPSULE, LIQUID FILLED ORAL at 19:35

## 2023-08-29 RX ADMIN — CEFTRIAXONE SODIUM 1000 MG: 10 INJECTION, POWDER, FOR SOLUTION INTRAVENOUS at 16:00

## 2023-08-29 RX ADMIN — APIXABAN 5 MG: 5 TABLET, FILM COATED ORAL at 09:01

## 2023-08-29 RX ADMIN — AMLODIPINE BESYLATE 5 MG: 5 TABLET ORAL at 09:01

## 2023-08-29 RX ADMIN — SUCRALFATE 1 G: 1 TABLET ORAL at 16:00

## 2023-08-29 RX ADMIN — GABAPENTIN 100 MG: 100 CAPSULE ORAL at 09:01

## 2023-08-29 RX ADMIN — FOLIC ACID 1 MG: 1 TABLET ORAL at 09:01

## 2023-08-29 RX ADMIN — GABAPENTIN 100 MG: 100 CAPSULE ORAL at 21:10

## 2023-08-29 RX ADMIN — NITROGLYCERIN 0.4 MG: 0.4 TABLET SUBLINGUAL at 03:47

## 2023-08-29 RX ADMIN — FERROUS SULFATE TAB 325 MG (65 MG ELEMENTAL FE) 325 MG: 325 (65 FE) TAB at 09:01

## 2023-08-29 RX ADMIN — PANTOPRAZOLE SODIUM 40 MG: 40 TABLET, DELAYED RELEASE ORAL at 09:15

## 2023-08-29 RX ADMIN — APIXABAN 5 MG: 5 TABLET, FILM COATED ORAL at 19:35

## 2023-08-29 RX ADMIN — GABAPENTIN 100 MG: 100 CAPSULE ORAL at 16:00

## 2023-08-29 RX ADMIN — ACETAMINOPHEN 975 MG: 325 TABLET, FILM COATED ORAL at 15:59

## 2023-08-29 RX ADMIN — DOCUSATE SODIUM 100 MG: 100 CAPSULE, LIQUID FILLED ORAL at 09:01

## 2023-08-29 RX ADMIN — METOCLOPRAMIDE HYDROCHLORIDE 10 MG: 5 INJECTION INTRAMUSCULAR; INTRAVENOUS at 04:07

## 2023-08-29 RX ADMIN — ACETAMINOPHEN 975 MG: 325 TABLET, FILM COATED ORAL at 06:51

## 2023-08-29 NOTE — ASSESSMENT & PLAN NOTE
Patient presented with dysuria and transient syncopal episode. Found to have abnormal UA and recent CT findings of cystitis and ureteritis 8/25.   · Continue IV Ceftriaxone pending urine culture  · Supportive cares  · Urinary complaints improving  · PT/OT Evals  · Rest of plan as below

## 2023-08-29 NOTE — ASSESSMENT & PLAN NOTE
Mild, possibly in setting of recent orthopedic surgery.   · No s/s of acute blood loss  · Continue iron supplement  · Monitor

## 2023-08-29 NOTE — ASSESSMENT & PLAN NOTE
Complains of chest pain however upon further discussion, seems more epigastric. Reproducible on exam.  Troponins negative, EKG nonischemic. No events on telemetry.   · Associated with nausea and 1 episode of nonbloody vomiting  · Check lipase   · Start Protonix/Carafate  · As needed antiemetics  · DC telemetry

## 2023-08-29 NOTE — UTILIZATION REVIEW
Initial Clinical Review    Admission: Date/Time/Statement:   Admission Orders (From admission, onward)     Ordered        08/28/23 1705  INPATIENT ADMISSION  Once                      Orders Placed This Encounter   Procedures   • INPATIENT ADMISSION     Standing Status:   Standing     Number of Occurrences:   1     Order Specific Question:   Level of Care     Answer:   Med Surg [16]     Order Specific Question:   Estimated length of stay     Answer:   More than 2 Midnights     Order Specific Question:   Certification     Answer:   I certify that inpatient services are medically necessary for this patient for a duration of greater than two midnights. See H&P and MD Progress Notes for additional information about the patient's course of treatment. ED Arrival Information     Expected   -    Arrival   8/28/2023 11:46    Acuity   Urgent            Means of arrival   Ambulance    Escorted by   [facility]    Service   Hospitalist    Admission type   Emergency            Arrival complaint   Low Blood Pressure           Chief Complaint   Patient presents with   • Altered Mental Status     Pt's family member reports pt became diaphoretic and had near syncope episode while sitting in chair after shower; episode lasted around 10 min per family member. EMS reports pt was hypotensive initially. Pt c/o chronic L knee pain on arrival. VSS on arrival.       Initial Presentation: 67 y.o. male presents to ed from home for evaluation and treatment of syncopal episode after at shower that lasted 10 minute associated with diaphoresis. Ems reports pt was found hypotensive. VS improved by arrival.  Daughter reports patient became unresponsive which prompted the call to ems. He had a fall a few days go without head injury. He did not seek medical evaluation. On Eliquis for DVT due to TKR. Recent evaluation for abdominal pain with nausea and poor po intake. PMHX: HTN  Clinical assessment : patient returned to baseline.   WBC 11.89, US: innumerable rbc, wbc, bacteria, large leukocytes. K 3.0. EKG: nsr   Initially treated with Kdur 40 meq, iv multi electrolyte 1L bolus, iv ceftriaxone, po tylenol  and iv fluids 75/hr. Admit to inpatient med surg for acute cystitis     Date: 8-29-23 Day 2: inpatient med surg  Received iv zofran for nausea x1. Temp 100.2, O2 sat 93%. Continue iv ceftriaxone. Urine culture shows e coli. IV fluids completed at 1204 pm.      Date: 8-30-23   Day 3: Has surpassed a 2nd midnight with active treatments and services, which include iv antibiotic, monitoring vital signs, following up cultures. .    ED Triage Vitals   08/28/23 1159 08/28/23 1159 08/28/23 1159 08/28/23 1159 08/28/23 1159   98.4 °F (36.9 °C) 62 20 125/58 97 %      Oral Monitor         Pain Score       8          08/29/23 88.7 kg (195 lb 8.8 oz)     Additional Vital Signs:      Patient Vitals for the past 24 hrs:   BP Temp Pulse Resp SpO2   08/29/23 1004 134/80 -- 93 -- 93 %   08/29/23 1002 148/70 -- 77 -- 94 %   08/29/23 1000 147/70 -- 78 -- 92 %   08/29/23 0731 143/71 100.2 °F (37.9 °C) 78 20 93 %   08/29/23 0419 142/70 -- 78 -- 91 %   08/29/23 0417 -- 98.6 °F (37 °C) -- 16 --   08/29/23 0416 -- -- 77 -- 92 %   08/29/23 0000 155/64 -- 72 16 94 %   08/28/23 2300 169/73 -- 68 20 94 %   08/28/23 2200 139/67 -- 82 19 92 %   08/28/23 2124 140/63 -- 70 21 93 %   08/28/23 1900 -- -- 66 20 93 %   08/28/23 1700 -- -- 66 18 95 %   08/28/23 1530 116/54 -- 64 20 97 %     Pertinent Labs/Diagnostic Test Results:       CT head without contrast   Final  (08/28 1417)      No acute intracranial abnormality.             Results from last 7 days   Lab Units 08/29/23  0353 08/28/23  1316 08/25/23  1345   WBC Thousand/uL 12.94* 11.89* 14.63*   HEMOGLOBIN g/dL 9.7* 10.3* 9.9*   HEMATOCRIT % 30.1* 31.4* 29.5*   PLATELETS Thousands/uL 517* 504* 379   NEUTROS ABS Thousands/µL 11.47*  --  12.37*         Results from last 7 days   Lab Units 08/29/23  0353 08/28/23  1316 08/25/23  1345   SODIUM mmol/L 137 138 136   POTASSIUM mmol/L 3.7 3.0* 4.0   CHLORIDE mmol/L 98 102 99   CO2 mmol/L 32 29 28   ANION GAP mmol/L 7 7 9   BUN mg/dL 29* 25 21   CREATININE mg/dL 1.01 1.24 0.66   EGFR ml/min/1.73sq m 73 57 95   CALCIUM mg/dL 8.2* 7.5* 7.9*   MAGNESIUM mg/dL 1.9 1.9  --      Results from last 7 days   Lab Units 08/25/23  1345   AST U/L 31   ALT U/L 27   ALK PHOS U/L 52   TOTAL PROTEIN g/dL 6.6   ALBUMIN g/dL 3.4*   TOTAL BILIRUBIN mg/dL 0.68     Results from last 7 days   Lab Units 08/28/23  1152   POC GLUCOSE mg/dl 103     Results from last 7 days   Lab Units 08/29/23  0353 08/28/23  1316 08/25/23  1345   GLUCOSE RANDOM mg/dL 153* 91 116       Results from last 7 days   Lab Units 08/29/23  0818 08/29/23  0649 08/29/23  0353 08/25/23  1757 08/25/23  1556 08/25/23  1345   HS TNI 0HR ng/L  --   --  23  --   --  28   HS TNI 2HR ng/L  --  26  --   --  36  --    HSTNI D2 ng/L  --  3  --   --  8  --    HS TNI 4HR ng/L 32  --   --  35  --   --    HSTNI D4 ng/L 9  --   --  7  --   --      Results from last 7 days   Lab Units 08/29/23  0353   D-DIMER QUANTITATIVE ug/ml FEU 4.79*       Results from last 7 days   Lab Units 08/29/23  0353   FERRITIN ng/mL 340*   IRON SATURATION % 13*   IRON ug/dL 25*   TIBC ug/dL 188*     Results from last 7 days   Lab Units 08/29/23  0353 08/25/23  1345   LIPASE u/L 26 48       Results from last 7 days   Lab Units 08/28/23  1547 08/25/23  1558   CLARITY UA  Turbid Clear   COLOR UA  Yellow Yellow   SPEC GRAV UA  1.015 1.015   PH UA  5.5 7.5   GLUCOSE UA mg/dl Negative Negative   KETONES UA mg/dl Negative Negative   BLOOD UA  Large* Moderate*   PROTEIN UA mg/dl 50 (1+)* 30 (1+)*   NITRITE UA  Negative Negative   BILIRUBIN UA  Negative Negative   UROBILINOGEN UA (BE) mg/dl <2.0 <2.0   LEUKOCYTES UA  Large* Large*   WBC UA /hpf Innumerable* Innumerable*   RBC UA /hpf Innumerable* 20-30*   BACTERIA UA /hpf Innumerable* Occasional   EPITHELIAL CELLS WET PREP /hpf Occasional None Seen   MUCUS THREADS  Moderate* Occasional*       Results from last 7 days   Lab Units 08/28/23  1547   URINE CULTURE  >100,000 cfu/ml Escherichia coli*       ED Treatment:   Medication Administration from 08/28/2023 1146 to 08/29/2023 0250       Date/Time Order Dose Route Action     08/28/2023 1419 EDT potassium chloride (K-DUR,KLOR-CON) CR tablet 40 mEq 40 mEq Oral Given     08/28/2023 1417 EDT multi-electrolyte (ISOLYTE-S PH 7.4) bolus 1,000 mL 1,000 mL Intravenous New Bag     08/28/2023 1640 EDT ceftriaxone (ROCEPHIN) 1 g/50 mL in dextrose IVPB 1,000 mg Intravenous New Bag     08/28/2023 2117 EDT acetaminophen (TYLENOL) tablet 975 mg 975 mg Oral Given     08/28/2023 1925 EDT apixaban (ELIQUIS) tablet 5 mg 5 mg Oral Given     08/28/2023 2117 EDT atorvastatin (LIPITOR) tablet 20 mg 20 mg Oral Given     08/28/2023 1925 EDT docusate sodium (COLACE) capsule 100 mg 100 mg Oral Given     08/28/2023 2117 EDT gabapentin (NEURONTIN) capsule 100 mg 100 mg Oral Given     08/29/2023 0135 EDT ondansetron (ZOFRAN) injection 4 mg 4 mg Intravenous Given     08/28/2023 1906 EDT multi-electrolyte (PLASMALYTE-A/ISOLYTE-S PH 7.4) IV solution 75 mL/hr Intravenous New Bag        Past Medical History:   Diagnosis   • Cataract   • Colon polyp   • Diverticulosis   • Gastritis   • GERD (gastroesophageal reflux disease)   • Hyperlipidemia   • Hypertension   • Pre-diabetes   • Throat ulcer   • Visual impairment     Present on Admission:  • HTN (hypertension)  • Osteoarthritis of left knee  • Mixed hyperlipidemia      Admitting Diagnosis:     Transient alteration of awareness [R40.4]  Hypokalemia [E87.6]  UTI (urinary tract infection) [N39.0]  Altered mental status [R41.82]  MIKE (acute kidney injury) ([address]) [N17.9]    Age/Sex: 67 y.o. male    Scheduled Medications:    acetaminophen, 975 mg, Oral, Q8H [facility]  amLODIPine, 5 mg, Oral, Daily  apixaban, 5 mg, Oral, BID  atorvastatin, 20 mg, Oral, HS  cefTRIAXone, 1,000 mg, Intravenous, Q24H  docusate sodium, 100 mg, Oral, BID  ferrous sulfate, 325 mg, Oral, Daily With Breakfast  folic acid, 1 mg, Oral, Daily  gabapentin, 100 mg, Oral, TID  pantoprazole, 40 mg, Oral, Early Morning  sucralfate, 1 g, Oral, 4x Daily (AC & HS)      Continuous IV Infusions:     PRN Meds:  aluminum-magnesium hydroxide-simethicone, 30 mL, Oral, Q4H PRN  calcium carbonate, 500 mg, Oral, TID PRN  nitroglycerin, 0.4 mg, Sublingual, Q5 Min PRN  ondansetron, 4 mg, Intravenous, Q6H PRN        None    Network Utilization Review Department  ATTENTION: Please call with any questions or concerns to [phone] and carefully listen to the prompts so that you are directed to the right person. All voicemails are confidential.  Perri Macias all requests for admission clinical reviews, approved or denied determinations and any other requests to dedicated fax number below belonging to the campus where the patient is receiving treatment.  List of dedicated fax numbers for the Facilities:  [place] DENIALS (Administrative/Medical Necessity) [phone] [address] [facility] (Maternity/NICU/Pediatrics) [phone]   [address] [place] [address] Drive [phone]   [facility] [address] [facility] [phone]   [address] [facility] [address] [facility] [address] [address] [facility] [phone]   [address] [facility] [address] [place] [address] [address] [phone]

## 2023-08-29 NOTE — PLAN OF CARE
Problem: SAFETY ADULT  Goal: Patient will remain free of falls  Description: INTERVENTIONS:  - Educate patient/family on patient safety including physical limitations  - Instruct patient to call for assistance with activity   - Consult OT/PT to assist with strengthening/mobility   - Keep Call bell within reach  - Keep bed low and locked with side rails adjusted as appropriate  - Keep care items and personal belongings within reach  - Apply yellow socks and bracelet for high fall risk patients  - Consider moving patient to room near nurses station  Outcome: Progressing     Problem: Knowledge Deficit  Goal: Patient/family/caregiver demonstrates understanding of disease process, treatment plan, medications, and discharge instructions  Description: Complete learning assessment and assess knowledge base.   Interventions:  - Provide teaching at level of understanding  - Provide teaching via preferred learning methods  Outcome: Progressing

## 2023-08-29 NOTE — ASSESSMENT & PLAN NOTE
· Status post recent total knee replacement on 8/14/2023  · Weightbearing as tolerated  · Outpatient orthopedic follow-up  · PT PORFIRIO cook

## 2023-08-29 NOTE — PROGRESS NOTES
[address] [facility]  Progress Note  Name: Maribel Brooks  MRN: [id number]  Unit/Bed#: [facility] 771-13 I Date of Admission: 8/28/2023   Date of Service: 8/29/2023 I Hospital Day: 1    Assessment/Plan   * Acute cystitis  Assessment & Plan  Patient presented with dysuria and transient syncopal episode. Found to have abnormal UA and recent CT findings of cystitis and ureteritis 8/25. · Continue IV Ceftriaxone pending urine culture  · Supportive cares  · Urinary complaints improving  · PT/OT Evals  · Rest of plan as below    Syncope and collapse  Assessment & Plan  Patient presented with episode of unresponsiveness while sitting down. He had some slow speech then slowly just passed out while sitting down  · No tonic-clonic movements no loss of bowel or bladder control patient had recent GI illness with associated poor oral intake  · Head CT scan without acute abnormality  · Likely  vasovagal secondary to UTI/MIKE and poor oral intake with dehydration  · Continue with IV antibiotics for treatment of UTI/IVF  · Orthostatics pending  · Telemetry unremarkable, will discontinue. · Supportive care      MIKE (acute kidney injury) ([address])  Assessment & Plan  Likely prerenal secondary to UTI and decreased oral intake, now improved. · Continue IVF for now given poor appetite and nausea  · Avoid nephrotoxins and hypotension  · BMP in AM    Epigastric pain  Assessment & Plan  Complains of chest pain however upon further discussion, seems more epigastric. Reproducible on exam.  Troponins negative, EKG nonischemic. No events on telemetry. · Associated with nausea and 1 episode of nonbloody vomiting  · Check lipase   · Start Protonix/Carafate  · As needed antiemetics  · DC telemetry    Anemia  Assessment & Plan  Mild, possibly in setting of recent orthopedic surgery.   · No s/s of acute blood loss  · Continue iron supplement  · Monitor     DVT (deep venous thrombosis) ([facility])  Assessment & Plan  Patient was recently started on Eliquis 8/22/2023 due to acute non-occlusive deep vein thrombosis in one of the gastrocnemius veins. · Continue Eliquis    Abnormal CT scan  Assessment & Plan  CT C/A/P with incidental findings as below:  · Several nonspecific pulmonary nodules measuring up to 3 mm. Based on current Fleischner Society 2017 Guidelines on incidental pulmonary nodules, no routine follow-up is needed if the patient is low risk. If the patient is high risk, optional follow-up chest CT in 12 months can be considered. · 7 mm cystic lesion in the head of the pancreas with no appreciable pancreatic ductal dilatation. Statistically this likely represents an IPMN. Given the size of the lesion, follow-up is recommended in 2 years, preferably with MRI/MRCP. · Patient aware. Will need to d/w daughter. · Defer f/u to PCP in outpatient setting    HTN (hypertension)  Assessment & Plan  Acceptable BP  · Continue Norvasc  · Hold lisinopril/HCTZ for now  · Monitor    Mixed hyperlipidemia  Assessment & Plan  · Continue statin    Osteoarthritis of left knee  Assessment & Plan  · Status post recent total knee replacement on 8/14/2023  · Weightbearing as tolerated  · Outpatient orthopedic follow-up  · PT OT eval         VTE Pharmacologic Prophylaxis: VTE Score: 11 Moderate Risk (Score 3-4) - Pharmacological DVT Prophylaxis Ordered: apixaban (Eliquis). Patient Centered Rounds: I performed bedside rounds with nursing staff today. Discussions with Specialists or Other Care Team Provider: nursing, case management     Education and Discussions with Family / Patient: Attempted to update  (daughter) via phone. Unable to contact.     Total Time Spent on Date of Encounter in care of patient: 35 minutes This time was spent on one or more of the following: performing physical exam; counseling and coordination of care; obtaining or reviewing history; documenting in the medical record; reviewing/ordering tests, medications or procedures; communicating with other healthcare professionals and discussing with patient's family/caregivers. Current Length of Stay: 1 day(s)  Current Patient Status: Inpatient   Certification Statement: The patient will continue to require additional inpatient hospital stay due to IV abx, monitor symptoms  Discharge Plan: Anticipate discharge in 24-48 hrs to discharge location to be determined pending rehab evaluations. Code Status: Level 1 - Full Code    Subjective:   Patient complains of epigastric pain which is reproducible, bandlike. Also having some intermittent nausea with one episode of nonbloody vomiting. Denies dizziness, lightheadedness. Urinary complaints have resolved. No fevers, chills. Overall poor appetite. Objective:     Vitals:   Temp (24hrs), Av.1 °F (37.3 °C), Min:98.4 °F (36.9 °C), Max:100.2 °F (37.9 °C)    Temp:  [98.4 °F (36.9 °C)-100.2 °F (37.9 °C)] 100.2 °F (37.9 °C)  HR:  [60-82] 78  Resp:  [16-21] 20  BP: (116-169)/(54-73) 143/71  SpO2:  [91 %-99 %] 93 %  Body mass index is 33.57 kg/m². Input and Output Summary (last 24 hours): Intake/Output Summary (Last 24 hours) at 2023 4140  Last data filed at 2023 0601  Gross per 24 hour   Intake --   Output 425 ml   Net -425 ml       Physical Exam:   Physical Exam  Vitals and nursing note reviewed. Constitutional:       General: He is not in acute distress. Appearance: He is obese. Cardiovascular:      Rate and Rhythm: Normal rate. Pulmonary:      Breath sounds: Decreased breath sounds present. Abdominal:      General: Bowel sounds are normal.      Palpations: Abdomen is soft. Tenderness: There is abdominal tenderness (epigastric area). Musculoskeletal:         General: Swelling (left knee/ankle) present. Skin:     General: Skin is warm.       Comments: Healing left knee incision, no drainage/erythema    Neurological:      Mental Status: He is alert and oriented to person, place, and time. Mental status is at baseline. Psychiatric:         Mood and Affect: Mood normal.          Additional Data:     Labs:  Results from last 7 days   Lab Units 08/29/23  0353   WBC Thousand/uL 12.94*   HEMOGLOBIN g/dL 9.7*   HEMATOCRIT % 30.1*   PLATELETS Thousands/uL 517*   NEUTROS PCT % 89*   LYMPHS PCT % 5*   MONOS PCT % 4   EOS PCT % 0     Results from last 7 days   Lab Units 08/29/23  0353 08/28/23  1316 08/25/23  1345   SODIUM mmol/L 137   < > 136   POTASSIUM mmol/L 3.7   < > 4.0   CHLORIDE mmol/L 98   < > 99   CO2 mmol/L 32   < > 28   BUN mg/dL 29*   < > 21   CREATININE mg/dL 1.01   < > 0.66   ANION GAP mmol/L 7   < > 9   CALCIUM mg/dL 8.2*   < > 7.9*   ALBUMIN g/dL  --   --  3.4*   TOTAL BILIRUBIN mg/dL  --   --  0.68   ALK PHOS U/L  --   --  52   ALT U/L  --   --  27   AST U/L  --   --  31   GLUCOSE RANDOM mg/dL 153*   < > 116    < > = values in this interval not displayed.          Results from last 7 days   Lab Units 08/28/23  1152   POC GLUCOSE mg/dl 103               Lines/Drains:  Invasive Devices     Peripheral Intravenous Line  Duration           Peripheral IV 08/29/23 Distal;Right;Ventral (anterior) Forearm <1 day                      Imaging: Reviewed radiology reports from this admission including: abdominal/pelvic CT    Recent Cultures (last 7 days):         Last 24 Hours Medication List:   Current Facility-Administered Medications   Medication Dose Route Frequency Provider Last Rate   • acetaminophen  975 mg Oral [facility] Lanre Turcios, DO     • aluminum-magnesium hydroxide-simethicone  30 mL Oral Q4H PRN Gonzalez RaceISHAN alves     • amLODIPine  5 mg Oral Daily Lanre Turcios, DO     • apixaban  5 mg Oral BID Lanre Turcios DO     • atorvastatin  20 mg Oral HS Lanre Turcios, DO     • calcium carbonate  500 mg Oral TID PRN Symone Shailesh Cinnamon, CRNP     • cefTRIAXone  1,000 mg Intravenous Q24H Lanre Turcios, DO     • docusate sodium  100 mg Oral BID Lanre Turcios DO     • ferrous sulfate 325 mg Oral Daily With Breakfast Alex Trina, DO     • folic acid  1 mg Oral Daily Alex Trina, DO     • gabapentin  100 mg Oral TID Alex Trina, DO     • multi-electrolyte  75 mL/hr Intravenous Continuous Alex Trina, DO 75 mL/hr (08/28/23 1906)   • nitroglycerin  0.4 mg Sublingual Q5 Min PRN Dilma Yancey PA-C     • ondansetron  4 mg Intravenous Q6H PRN Alex Trina, DO     • pantoprazole  40 mg Oral Early Morning GUERLINE Casiano     • sucralfate  1 g Oral 4x Daily (AC & HS) GUERLINE Lara          Today, Patient Was Seen By: GUERLINE Aguilar    **Please Note: This note may have been constructed using a voice recognition system. **

## 2023-08-29 NOTE — PROGRESS NOTES
Pt was brought to room 902 from ED at approx 0300. At 0320 during admission assessment, patient stated through translating service that his chest was hurting and had been hurting since 5pm 8/28. Pt stated pain was 8/10 and felt like an elephant was sitting on his chest, but denied SOB. Pt also complained of nausea that had persisted since 5pm. An EKG was done, and pt was put on telemetry per order. KE was notified, who ordered one dose of SL nitro and labs. Pt stated that his chest pain was   "a little better," but the nausea was what was bothering him. This RN noted that patient was belching frequently. Upon further assessment, pt stated that his chest pain felt similar to acid reflux. One time dose of reglan was given and mylanta oral solution was given and ordered PRN.

## 2023-08-29 NOTE — CASE MANAGEMENT
Case Management Assessment & Discharge Planning Note    Patient name Regino Miller  Location [facility] 902/[facility] 267-14 MRN [id number]  : 1950 Date 2023       Current Admission Date: 2023  Current Admission Diagnosis:Acute cystitis   Patient Active Problem List    Diagnosis Date Noted   • Epigastric pain 2023   • Abnormal CT scan 2023   • Acute cystitis 2023   • MIKE (acute kidney injury) ([address]) 2023   • Syncope and collapse 2023   • DVT (deep venous thrombosis) ([address]) 2023   • Anemia 2023   • Status post total knee replacement, left 2023   • Preoperative cardiovascular examination 2023   • Osteoarthritis of left knee 2023   • Heat syncope 2021   • Hypocalcemia 2021   • BMI 35.0-35.9,adult 2021   • Mixed hyperlipidemia 2021   • Chronic pain of both knees 2021   • HTN (hypertension) 2019   • GERD (gastroesophageal reflux disease) 2019   • Prediabetes 2019      LOS (days): 1  Geometric Mean LOS (GMLOS) (days): 3.10  Days to GMLOS:2.2     OBJECTIVE:    Risk of Unplanned Readmission Score: 20.59         Current admission status: Inpatient       Preferred Pharmacy:   [facility], [address] [place] [address]  Phone: [phone] Fax: [phone]    Primary Care Provider: Leatha Isaac MD    Primary Insurance: [address] REP  Secondary Insurance: [id number]:  [facility], [address] [facility] Representative - Daughter   Primary Phone: [phone] [facility])  Home Phone: [phone]               Advance Directives  Does patient have a [address]?: No  Was patient offered paperwork?: Yes (provided information in Upper sorbian)  Does patient currently have a Health Care decision maker?: Yes, please see Health Care Proxy section  Does patient have Advance Directives?: No  Was patient offered paperwork?: Yes (provided information in Rwandan)  Primary Contact: daughterKishan See [phone]         Readmission Root Cause  30 Day Readmission: No    Patient Information  Admitted from[de-identified] Home  Mental Status: Alert  During Assessment patient was accompanied by: Daughter  Assessment information provided by[de-identified] Daughter  Primary Caregiver: Self  Support Systems: Daughter, Family members, [address] of Residence: [facility] [address] [place] [address] do you live in?: BOARDZ entry access options. Select all that apply.: Elevator  Type of Current Residence: Apartment  Upon entering residence, is there a bedroom on the main floor (no further steps)?: Yes  Upon entering residence, is there a bathroom on the main floor (no further steps)?: Yes  In the last 12 months, was there a time when you were not able to pay the mortgage or rent on time?: No  In the last 12 months, how many places have you lived?: 1  In the last 12 months, was there a time when you did not have a steady place to sleep or slept in a shelter (including now)?: No  Homeless/housing insecurity resource given?: N/A  Living Arrangements: Lives Alone    Activities of Daily Living Prior to Admission  Functional Status: Independent  Completes ADLs independently?: No  Level of ADL dependence: Assistance  Ambulates independently?: Yes  Does patient use assisted devices?: Yes  Assisted Devices (DME) used:  Shower Shantell Simpson  Does patient have a history of Outpatient Therapy (PT/OT)?: No  Does the patient have a history of Short-Term Rehab?: No  Does patient have a history of HHC?: No  Does patient currently have [address]?: No         Patient Information Continued  Income Source: Pension/longterm  Does patient have prescription coverage?: Yes  Within the past 12 months, you worried that your food would run out before you got the money to buy more.: Never true  Within the past 12 months, the food you bought just didn't last and you didn't have money to get more.: Never true  Food insecurity resource given?: N/A  Does patient receive dialysis treatments?: No  Does patient have a history of substance abuse?: No  Does patient have a history of Mental Health Diagnosis?: No         Means of Transportation  Means of Transport to Appts[de-identified] Friends  In the past 12 months, has lack of transportation kept you from medical appointments or from getting medications?: No  In the past 12 months, has lack of transportation kept you from meetings, work, or from getting things needed for daily living?: No  Was application for public transport provided?: N/A        DISCHARGE DETAILS:    Discharge planning discussed with[de-identified] daughterRoberto in room with pt. Pt is Wolof-speaking. He understands some [place]  Freedom of Choice: Yes  Comments - Freedom of Choice: Therapy is recommending home PT/OT. Discussed same and VN f/u w/ [facility] upon d/c. Daughter is agreeable to blanket referrals. She reports pt had left knee replaced a few weeks ago and is struggling to perform ADLs since then.   CM contacted family/caregiver?: Yes  Were Treatment Team discharge recommendations reviewed with patient/caregiver?: Yes  Did patient/caregiver verbalize understanding of patient care needs?: Yes  Were patient/caregiver advised of the risks associated with not following Treatment Team discharge recommendations?: Yes    Contacts  Patient Contacts: daughterRoberto [phone]  Relationship to Patient[de-identified] Family  Contact Method: Phone  Reason/Outcome: Continuity of Care, Emergency Contact, Referral, Discharge [facility]         Is the patient interested in [address] [facility] at discharge?: Yes  [facility] requested[de-identified] Nursing, Physical Therapy, Occupational Therapy, [address], Medical Social Work ([facility] community resources)  [facility] Provider[de-identified] PCP  [place] Needed[de-identified] Strengthening/Theraputic Exercises to Improve Function, Evaluate Functional Status and Safety, Other (comment) (assess gu status. medication teaching. MSW for [employer] resources)  Homebound Criteria Met[de-identified] Requires the Assistance of Another Person for Safe Ambulation or to Leave the Home, Uses an Assist Device (i.e. cane, walker, etc)  Supporting Clincal Findings[de-identified] Limited Endurance, Fatigues Easliy in [employer]    DME Referral Provided  Referral made for DME?: No    Other Referral/Resources/Interventions Provided:  Interventions: [facility]    Would you like to participate in our [address] service program?  : No - Declined    Treatment Team Recommendation: Home with [address]  Discharge Destination Plan[de-identified] Home with [address]           Additional Comments: dtr, in room is Reyes Garcia who lives locally. Dtr, Mary Hale lives in [place]. She is a nurse.  Pt does not drive but friends take him to appts as dtr works [facility] ordered to be delivered to patient's room p/t d/c

## 2023-08-29 NOTE — ASSESSMENT & PLAN NOTE
Patient presented with episode of unresponsiveness while sitting down. He had some slow speech then slowly just passed out while sitting down  · No tonic-clonic movements no loss of bowel or bladder control patient had recent GI illness with associated poor oral intake  · Head CT scan without acute abnormality  · Likely  vasovagal secondary to UTI/MIKE and poor oral intake with dehydration  · Continue with IV antibiotics for treatment of UTI/IVF  · Orthostatics pending  · Telemetry unremarkable, will discontinue.   · Supportive care

## 2023-08-29 NOTE — ASSESSMENT & PLAN NOTE
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   · Continue Eliquis

## 2023-08-29 NOTE — OCCUPATIONAL THERAPY NOTE
Occupational Therapy Evaluation     Patient Name: Ulyses Frankel  [id number] Date: 8/29/2023  Problem List  Principal Problem:    Acute cystitis  Active Problems:    HTN (hypertension)    Mixed hyperlipidemia    Osteoarthritis of left knee    MIKE (acute kidney injury) ([address])    Syncope and collapse    DVT (deep venous thrombosis) (HCC)    Anemia    Epigastric pain    Abnormal CT scan    Past Medical History  Past Medical History:   Diagnosis Date    Cataract     Colon polyp     Diverticulosis     Gastritis     GERD (gastroesophageal reflux disease)     Hyperlipidemia     Hypertension     Pre-diabetes     Throat ulcer     Visual impairment      Past Surgical History  Past Surgical History:   Procedure Laterality Date    COLONOSCOPY N/A 08/18/2016    Procedure: COLONOSCOPY;  Surgeon: Monica Vera MD;  Location:  GI LAB; Service:     COLONOSCOPY W/ POLYPECTOMY  2019    repeat 2022    EYE SURGERY Bilateral     [place] ARTHRP KNE CONDYLE&PLATU MEDIAL&LAT COMPARTMENTS Left 8/14/2023    Procedure: ARTHROPLASTY KNEE TOTAL;  Surgeon: Jose Garcia DO;  Location: [address] [place] [address] MAIN OR;  Service: Orthopedics    TONSILLECTOMY      UPPER GASTROINTESTINAL ENDOSCOPY  2019 08/29/23 1300   OT Last Visit   OT Visit Date 10/11/23   Note Type   Note type Evaluation   Pain Assessment   Pain Assessment Tool 0-10   Pain Score 8   Pain Location/Orientation Location: Chest   Hospital Pain Intervention(s) Repositioned; Ambulation/increased activity   Restrictions/Precautions   Weight Bearing Precautions Per Order No   Other Precautions Chair Alarm; Bed Alarm;Multiple lines;Pain; Fall Risk  (Sinhala speaking)   Home Living   Type of Home Apartment   Home Layout One level;Elevator   Bathroom Shower/Tub Tub/shower unit   Bathroom Toilet Standard   Bathroom Equipment Grab bars in shower; Shower chair   Home Equipment Walker   Prior Function   Level of [place] Independent with ADLs; Independent with IADLS   Lives With (S)  Alone Receives Help From Family   IADLs Independent with meal prep; Independent with medication management   Falls in the last 6 months 1 to 4  (1 per pt report)   Vocational Retired   Lifestyle   Autonomy PTA, pt reports being I with ADLs, IADLs, fnxl mobility with RW, (-)    Reciprocal Relationships Supportive dtr and friends   Service to Others Retired   Intrinsic Gratification Musician - plays [employer]   General   Additional Pertinent History Pt s/p L TKR on 8/14, subsequent DVT post sx   ADL   Where Assessed Edge of bed   Eating Assistance 7  Independent   Grooming Assistance 7  Independent   UB Bathing Assistance 5  Supervision/Setup    [address] 3  Moderate Assistance   [address] 5  Supervision/Setup   LB Dressing Assistance 3  Moderate [address] [place]  4  Minimal Assistance   Additional Comments BP as follows: lying - 147/10, sitting - 148/70, standing - 134/80   Bed Mobility   Supine to Sit 4  Minimal assistance   Additional items Assist x 1;HOB elevated; Bedrails; Increased time required   Sit to Supine Unable to assess   Transfers   Sit to Stand 4  Minimal assistance   Additional items Assist x 1; Increased time required   Stand to Sit 4  Minimal assistance   Additional items Assist x 1; Increased time required   Additional Comments transfers with RW   Functional Mobility   Functional Mobility 5  Supervision   Additional items Rolling walker   Balance   Static Sitting Fair   Dynamic Sitting Fair   Static Standing Fair -   Dynamic Standing Poor +   Ambulatory Poor +   Activity Tolerance   Activity Tolerance Patient tolerated treatment well  (does c/o some nausea)   Medical Staff Made Aware PT Dominique Cabrera   Nurse Made Aware RN clearance for session   RUE Assessment   RUE Assessment WFL   LUE Assessment   LUE Assessment WFL   Cognition   Overall Cognitive Status WFL   Arousal/Participation Responsive; Cooperative   Attention Attends with cues to redirect   Orientation Level Oriented X4   Memory Within functional limits   Following Commands Follows one step commands without difficulty   Comments Pt pleasant and cooperative t/o session. Pt primarily [address] speaking. Able to communicate some english + utilize translation yoni   Assessment   Limitation Decreased ADL status; Decreased Safe judgement during ADL;Decreased endurance;Decreased self-care trans;Decreased high-level ADLs   Prognosis Good   Assessment Pt is a 67 y.o. male [facility] w/ Acute cystitis, syncope, MIKE. Pt recent L TKA (8/14) + DVT. has a past medical history of Cataract, Colon polyp, Anemia, OA, Diverticulosis, Gastritis, GERD, Hyperlipidemia, Hypertension, Pre-diabetes, Throat ulcer, and Visual impairment. Pt with active OT orders and up and OOB as tolerated orders. Pt seen as a co-evaluation with PT due to the patient's co-morbidities, clinically unstable presentation/clinical complexity, and present impairments. As per pt report, pta, resides alone in a 1STA, 0STE. Pt was I w/  ADLS and IADLS, (-) drove. Upon evaluation, pt currently requires MIN A with RW for transfers and S for mobility. Pt currently requires I eating, I grooming, S UB ADLs, MOD A LB ADLs, and MIN A toileting. Pt is limited at this time 2*: pain, endurance, activity tolerance, functional mobility, balance, functional standing tolerance, unsupportive home environment, decreased I w/ ADLS/IADLS and strength. The following Occupational Performance Areas to address include: bathing/shower, toilet hygiene, dressing, health maintenance, functional mobility, community mobility and clothing management. Pt to benefit from immediate acute skilled OT to address above deficits, improve overall functional independence, maximize fnxl mobility and reduce caregiver burden. From OT standpoint, recommendation at time of d/c would be home with skilled therapy - pending progress and social support.  Pt does report dtr is able to stay with him upon d/c.  Pt was left after session with all current needs met. The patient's raw score on the AM-PAC Daily Activity Inpatient Short Form is 18. A raw score of less than 19 suggests the patient may benefit from discharge to post-acute rehabilitation services. Please refer to the recommendation of the Occupational Therapist for safe discharge planning. Goals   LTG Time Frame 10-14   Plan   Treatment Interventions ADL retraining;Functional transfer training;UE strengthening/ROM; Endurance training;Patient/family training;Equipment evaluation/education; Compensatory technique education;Continued evaluation; Energy conservation; Activityengagement   Goal Expiration Date 09/12/23   OT Frequency 2-3x/wk   Recommendation   OT Discharge Recommendation Home with home health rehabilitation   [facility] Daily Activity Inpatient   Lower Body Dressing 2   Bathing 2   Toileting 3   Upper Body Dressing 3   Grooming 4   Eating 4   Daily Activity Raw Score 18   Daily Activity Standardized Score (Calc for Raw Score >=11) 38.66   AM-PAC Applied Cognition Inpatient   Following a Speech/Presentation 3   Understanding Ordinary Conversation 4   Taking Medications 4   Remembering Where Things Are Placed or Put Away 4   Remembering List of 4-5 Errands 4   Taking Care of Complicated Tasks 3   Applied Cognition Raw Score 22   Applied Cognition Standardized Score 47.83     GOALS    - Pt will complete UB dressing/self care/bathing w/ mod I using adaptive device and DME as needed    - Pt will complete LB dressing/self care/bathing w/ mod I using adaptive device and DME as needed    - Pt will complete toileting w/ mod I w/ G hygiene/thoroughness using DME as needed    - Pt will improve functional transfers to Mod I on/off all surfaces using DME as needed w/ G balance/safety     - Pt will improve functional mobility during ADL/IADL/leisure tasks to Mod I using DME as needed w/ G balance/safety     - Pt will demonstrate G carryover of pt/caregiver education and training as appropriate.     - Pt will demonstrate 100% carryover of energy conservation techniques t/o functional I/ADL/leisure tasks w/o cues s/p skilled education    - Pt will engage in ongoing cognitive assessment w/ G participation to assist w/ safe d/c planning/recommendations    - Pt will increase static and dynamic standing/sitting balance to F+  using AD/DME as needed to increase safety and I during fnxl transfers and ADLs    - Pt will maintain upright sitting position for at least 20 min during dynamic fnxl activity with F+  balance and endurance to improve ADL performance and independence, as well as reduce risk of falls     - Pt will participate in simulated IADL management task with DME as needed to increase independence to  w/ G safety and endurance    Devon Botello MS, OTR/L

## 2023-08-29 NOTE — PLAN OF CARE
Problem: OCCUPATIONAL THERAPY ADULT  Goal: Performs self-care activities at highest level of function for planned discharge setting. See evaluation for individualized goals. Description: Treatment Interventions: ADL retraining, Functional transfer training, UE strengthening/ROM, Endurance training, Patient/family training, Equipment evaluation/education, Compensatory technique education, Continued evaluation, Energy conservation, Activityengagement          See flowsheet documentation for full assessment, interventions and recommendations. Note: Limitation: Decreased ADL status, Decreased Safe judgement during ADL, Decreased endurance, Decreased self-care trans, Decreased high-level ADLs  Prognosis: Good  Assessment: Pt is a 67 y.o. male admitted to [facility] on 8/28/2023 w/ Acute cystitis, syncope, MIKE. Pt recent L TKA (8/14) + DVT. has a past medical history of Cataract, Colon polyp, Anemia, OA, Diverticulosis, Gastritis, GERD, Hyperlipidemia, Hypertension, Pre-diabetes, Throat ulcer, and Visual impairment. Pt with active OT orders and up and OOB as tolerated orders. Pt seen as a co-evaluation with PT due to the patient's co-morbidities, clinically unstable presentation/clinical complexity, and present impairments. As per pt report, pta, resides alone in a 1STA, 0STE. Pt was I w/  ADLS and IADLS, (-) drove. Upon evaluation, pt currently requires MIN A with RW for transfers and S for mobility. Pt currently requires I eating, I grooming, S UB ADLs, MOD A LB ADLs, and MIN A toileting. Pt is limited at this time 2*: pain, endurance, activity tolerance, functional mobility, balance, functional standing tolerance, unsupportive home environment, decreased I w/ ADLS/IADLS and strength. The following Occupational Performance Areas to address include: bathing/shower, toilet hygiene, dressing, health maintenance, functional mobility, community mobility and clothing management.  Pt to benefit from immediate acute skilled OT to address above deficits, improve overall functional independence, maximize fnxl mobility and reduce caregiver burden. From OT standpoint, recommendation at time of d/c would be home with skilled therapy - pending progress and social support. Pt does report dtr is able to stay with him upon d/c. Pt was left after session with all current needs met. The patient's raw score on the AM-PAC Daily Activity Inpatient Short Form is 18. A raw score of less than 19 suggests the patient may benefit from discharge to post-acute rehabilitation services. Please refer to the recommendation of the Occupational Therapist for safe discharge planning.      OT Discharge Recommendation: Home with home health rehabilitation

## 2023-08-29 NOTE — RESTORATIVE TECHNICIAN NOTE
Restorative Technician Note      Patient Name: Elsi Cantu     Restorative Tech Visit Date: 08/29/23  Note Type: Mobility  Patient Position Upon Consult: Bedside chair  Activity Performed: Ambulated  Assistive Device: Roller walker  Patient Position at End of Consult: Bedside chair; All needs within reach;  Chair alarm    Beryl Daniel Restorative Technician

## 2023-08-29 NOTE — ASSESSMENT & PLAN NOTE
CT C/A/P with incidental findings as below:  · Several nonspecific pulmonary nodules measuring up to 3 mm. Based on current Fleischner Society 2017 Guidelines on incidental pulmonary nodules, no routine follow-up is needed if the patient is low risk. If the patient is high risk, optional follow-up chest CT in 12 months can be considered. · 7 mm cystic lesion in the head of the pancreas with no appreciable pancreatic ductal dilatation. Statistically this likely represents an IPMN. Given the size of the lesion, follow-up is recommended in 2 years, preferably with MRI/MRCP. · Patient aware. Will need to d/w daughter.    · Defer f/u to PCP in outpatient setting

## 2023-08-29 NOTE — ASSESSMENT & PLAN NOTE
CT C/A/P with incidental findings as below:  · Several nonspecific pulmonary nodules measuring up to 3 mm. Based on current Fleischner Society 2017 Guidelines on incidental pulmonary nodules, no routine follow-up is needed if the patient is low risk. If the patient is high risk, optional follow-up chest CT in 12 months can be considered. · 7 mm cystic lesion in the head of the pancreas with no appreciable pancreatic ductal dilatation. Statistically this likely represents an IPMN.  Given the size of the lesion, follow-up is recommended in 2 years, preferably with MRI/MRCP.  · D/w patient/daughter  · Defer f/u to PCP in outpatient setting

## 2023-08-29 NOTE — PHYSICAL THERAPY NOTE
Physical Therapy Evaluation     Patient's Name: Elsi Cantu    Admitting Diagnosis  Transient alteration of awareness [R40.4]  Hypokalemia [E87.6]  UTI (urinary tract infection) [N39.0]  Altered mental status [R41.82]  MIKE (acute kidney injury) ([address]) [N17.9]    Problem List  Patient Active Problem List   Diagnosis    HTN (hypertension)    GERD (gastroesophageal reflux disease)    Prediabetes    Hypocalcemia    BMI 35.0-35.9,adult    Mixed hyperlipidemia    Chronic pain of both knees    Heat syncope    Preoperative cardiovascular examination    Osteoarthritis of left knee    Status post total knee replacement, left    Acute cystitis    MIKE (acute kidney injury) ([address])    Syncope and collapse    DVT (deep venous thrombosis) ([facility])    Anemia    Epigastric pain    Abnormal CT scan       Past Medical History  Past Medical History:   Diagnosis Date    Cataract     Colon polyp     Diverticulosis     Gastritis     GERD (gastroesophageal reflux disease)     Hyperlipidemia     Hypertension     Pre-diabetes     Throat ulcer     Visual impairment        Past Surgical History  Past Surgical History:   Procedure Laterality Date    COLONOSCOPY N/A 08/18/2016    Procedure: COLONOSCOPY;  Surgeon: Aviva Martinez MD;  Location: [facility]; Service:     COLONOSCOPY W/ POLYPECTOMY  2019    repeat 2022    EYE SURGERY Bilateral     [place] ARTHRP KNE CONDYLE&PLATU MEDIAL&LAT COMPARTMENTS Left 8/14/2023    Procedure: ARTHROPLASTY KNEE TOTAL;  Surgeon: An Roberts DO;  Location: [facility] MAIN OR;  Service: Orthopedics    TONSILLECTOMY      UPPER GASTROINTESTINAL ENDOSCOPY  2019 08/29/23 1010   PT Last Visit   PT Visit Date 08/29/23   Note Type   Note type Evaluation   Pain Assessment   Pain Assessment Tool 0-10   Pain Score 8   Pain Location/Orientation Location: Chest   Patient's Stated Pain Goal No pain   Hospital Pain Intervention(s) Repositioned; Ambulation/increased activity   Restrictions/Precautions   Weight Bearing Precautions Per Order No   Other Precautions Fall Risk;Pain;Multiple lines;Telemetry; Chair Alarm; Bed Alarm;Cognitive  (Malian speaking)   Home Living   Type of [facility]; One level   Bathroom Shower/Tub Tub/shower unit   Bathroom Toilet Standard   Bathroom Equipment Shower chair;Grab bars in shower   [address]   Prior Function   Level of [facility] with ADLs; Independent with functional mobility; Independent with IADLS   Lives With (S)  Alone   Receives Help From   (daughter can stay with pt if required after acute stay)   IADLs Independent with meal prep; Independent with medication management; Family/Friend/Other provides transportation   Falls in the last 6 months 1 to 4   Vocational Retired   General   Family/Caregiver Present No   Cognition   Overall Cognitive Status Impaired   Arousal/Participation Alert   Orientation Level Oriented X4   Memory Within functional limits   Following Commands Follows one step commands without difficulty   Subjective   Subjective pt pleasant and cooperative throughout therapy session. pt received supine in bed   RLE Assessment   RLE Assessment X  (4-/5 grossly)   LLE Assessment   LLE Assessment X  (3+/5 grossly)   Bed Mobility   Supine to Sit 4  Minimal assistance   Additional items Assist x 1; Increased time required;LE management;Verbal cues;HOB elevated   Sit to Supine Unable to assess   Transfers   Sit to Stand 4  Minimal assistance   Additional items Assist x 1; Increased time required;Verbal cues   Stand to Sit 4  Minimal assistance   Additional items Assist x 1;Verbal cues; Increased time required   Additional Comments transfers w RW   Ambulation/Elevation   Gait pattern Improper Weight shift; Forward Flexion;Narrow ABDIEL;Shuffling; Short stride; Excessively slow; Step through pattern   Gait Assistance 5  Supervision   Additional items Verbal cues   Assistive Device Rolling walker   Distance 80' Stair Management Assistance Not tested   Balance   Static Sitting Fair +   Dynamic Sitting Fair   Static Standing Fair -   Dynamic Standing Fair -   Ambulatory Fair -   Endurance Deficit   Endurance Deficit Yes   Endurance Deficit Description generalized weakness, decreased exercise tolerance   Activity Tolerance   Activity Tolerance Patient tolerated treatment well   Medical Staff Made Aware marlee Haji due to medical complexity and multiple comorbidities   Nurse Made Aware RN cleared and updated   Assessment   Prognosis Fair   Problem List Decreased strength;Decreased range of motion;Decreased endurance; Impaired balance;Decreased mobility; Impaired judgement;Obesity;Pain   Assessment PT orders received and acknowledged. Patient was seen today for high complexity PT evaluation. High complexity evaluation due to Ongoing medical management for primary dx, Decreased activity tolerance compared to baseline, Fall risk, Ongoing telemetry monitoring, Continuous pulse oximetry monitoring  Patient is a 67 y.o. male  who was admitted to [address] [place] [address] on 8/28/2023  with Acute cystitis . Pt presents to [facility] after a period of unresponsiveness witnessed by daughter . Patient performed functional mobility as described above. At baseline, pt resides alone in apartment and was independent prior to hospital admission. Currently, upon initial examination, pt  is requiring min A for bed mobility skills; Carl for functional transfers and supervision for ambulation with RW. Patient currently presents below baseline with limitations in gait, balance, and transfers. Patient will benefit from continued PT services while in hospital in order to address remaining limitations. The patients AM-PAC Basic Mobility Inpatient Short From Raw Score is 17 . A Raw score of 17  suggests that the patient may benefit from discharge to home .  PT recommendation at this time is for home with HHPT services pending sufficient social support at home . Please also refer to the recommendation of the Physical Therapist for safe discharge planning. Barriers to Discharge Decreased caregiver support   Goals   Patient Goals to go home   STG Expiration Date 09/12/23   Short Term Goal #1 Patient will be able to perform bed mobility tasks with modified independence in order to improve overall functional mobility and assist in safe d/c. STG 2 Patient will sit EOB for at least 25 minutes at modified independence level in order to strengthen abdominal musculature and assist in future transfers and ambulation. STG 3 Patient will be able to perform functional transfer with modified independence in order to improve overall functional mobility and assist in safe discharge. STG 4 Patient will be able to ambulate at least 300 feet with least restrictive device with modified independence assist in order to improve overall functional mobility and assist in safe discharge. STG 5 Patient will improve sitting/standing static/dynamic balance 1/2 grade in order to improve functional mobility and assist in safe discharge. STG 6 Patient will improve LE strength by 1/2 grade in order to improve functional mobility and assist in safe discharge. PT Treatment Day 0   Plan   Treatment/Interventions ADL retraining;Functional transfer training;LE strengthening/ROM; Elevations; Therapeutic exercise; Endurance training;Bed mobility;Gait training;Spoke to nursing;OT   PT Frequency 2-3x/wk   Recommendation   PT Discharge Recommendation Home with home health rehabilitation  (pending sufficient social support at home)   AM-PAC Basic Mobility Inpatient   Turning in Flat Bed Without Bedrails 3   Lying on Back to Sitting on Edge of Flat Bed Without Bedrails 3   Moving Bed to Chair 3   Standing Up From Chair Using Arms 3   Walk in Room 3   Climb 3-5 Stairs With Railing 2   Basic Mobility Inpatient Raw Score 17   Basic Mobility Standardized Score 39.67   Highest Level Of Mobility   [facility] Goal 5: Stand one or more mins   JH-HLM Achieved 7: Walk 25 feet or more   End of Consult   Patient Position at End of Consult Bedside chair; All needs within reach;Bed/Chair alarm activated         Gricel Irwin, PT

## 2023-08-29 NOTE — ASSESSMENT & PLAN NOTE
Likely prerenal secondary to UTI and decreased oral intake, now improved.    · Continue IVF for now given poor appetite and nausea  · Avoid nephrotoxins and hypotension  · BMP in AM

## 2023-08-29 NOTE — PLAN OF CARE
Problem: PHYSICAL THERAPY ADULT  Goal: Performs mobility at highest level of function for planned discharge setting. See evaluation for individualized goals. Description: Treatment/Interventions: ADL retraining, Functional transfer training, LE strengthening/ROM, Elevations, Therapeutic exercise, Endurance training, Bed mobility, Gait training, Spoke to nursing, OT          See flowsheet documentation for full assessment, interventions and recommendations. Note: Prognosis: Fair  Problem List: Decreased strength, Decreased range of motion, Decreased endurance, Impaired balance, Decreased mobility, Impaired judgement, Obesity, Pain  Assessment: PT orders received and acknowledged. Patient was seen today for high complexity PT evaluation. High complexity evaluation due to Ongoing medical management for primary dx, Decreased activity tolerance compared to baseline, Fall risk, Ongoing telemetry monitoring, Continuous pulse oximetry monitoring  Patient is a 67 y.o. male  who was admitted to [address] [place] [address] on 8/28/2023  with Acute cystitis . Pt presents to [facility] after a period of unresponsiveness witnessed by daughter . Patient performed functional mobility as described above. At baseline, pt resides alone in apartment and was independent prior to hospital admission. Currently, upon initial examination, pt  is requiring min A for bed mobility skills; Carl for functional transfers and supervision for ambulation with RW. Patient currently presents below baseline with limitations in gait, balance, and transfers. Patient will benefit from continued PT services while in hospital in order to address remaining limitations. The patients AM-PAC Basic Mobility Inpatient Short From Raw Score is 17 . A Raw score of 17  suggests that the patient may benefit from discharge to home . PT recommendation at this time is for home with HHPT services pending sufficient social support at home .  Please also refer to the recommendation of the Physical Therapist for safe discharge planning. Barriers to Discharge: Decreased caregiver support     PT Discharge Recommendation: Home with home health rehabilitation (pending sufficient social support at home)    See flowsheet documentation for full assessment.

## 2023-08-30 VITALS
TEMPERATURE: 97.6 F | DIASTOLIC BLOOD PRESSURE: 78 MMHG | BODY MASS INDEX: 33.38 KG/M2 | SYSTOLIC BLOOD PRESSURE: 137 MMHG | RESPIRATION RATE: 18 BRPM | WEIGHT: 195.55 LBS | HEIGHT: 64 IN | HEART RATE: 81 BPM | OXYGEN SATURATION: 94 %

## 2023-08-30 PROBLEM — N17.9 AKI (ACUTE KIDNEY INJURY) (HCC): Status: RESOLVED | Noted: 2023-08-28 | Resolved: 2023-08-30

## 2023-08-30 PROBLEM — N30.00 ACUTE CYSTITIS: Status: RESOLVED | Noted: 2023-08-28 | Resolved: 2023-08-30

## 2023-08-30 PROBLEM — R10.13 EPIGASTRIC PAIN: Status: RESOLVED | Noted: 2023-08-29 | Resolved: 2023-08-30

## 2023-08-30 PROBLEM — R55 SYNCOPE AND COLLAPSE: Status: RESOLVED | Noted: 2023-08-28 | Resolved: 2023-08-30

## 2023-08-30 LAB
ALBUMIN SERPL BCP-MCNC: 3.3 G/DL (ref 3.5–5)
ALP SERPL-CCNC: 52 U/L (ref 34–104)
ALT SERPL W P-5'-P-CCNC: 17 U/L (ref 7–52)
ANION GAP SERPL CALCULATED.3IONS-SCNC: 7 MMOL/L
AST SERPL W P-5'-P-CCNC: 21 U/L (ref 13–39)
BACTERIA UR CULT: ABNORMAL
BASOPHILS # BLD AUTO: 0.09 THOUSANDS/ÂΜL (ref 0–0.1)
BASOPHILS NFR BLD AUTO: 1 % (ref 0–1)
BILIRUB SERPL-MCNC: 0.61 MG/DL (ref 0.2–1)
BUN SERPL-MCNC: 19 MG/DL (ref 5–25)
CALCIUM ALBUM COR SERPL-MCNC: 9.3 MG/DL (ref 8.3–10.1)
CALCIUM SERPL-MCNC: 8.7 MG/DL (ref 8.4–10.2)
CHLORIDE SERPL-SCNC: 99 MMOL/L (ref 96–108)
CO2 SERPL-SCNC: 32 MMOL/L (ref 21–32)
CREAT SERPL-MCNC: 0.82 MG/DL (ref 0.6–1.3)
DME PARACHUTE DELIVERY DATE ACTUAL: NORMAL
DME PARACHUTE DELIVERY DATE REQUESTED: NORMAL
DME PARACHUTE ITEM DESCRIPTION: NORMAL
DME PARACHUTE ORDER STATUS: NORMAL
DME PARACHUTE SUPPLIER NAME: NORMAL
DME PARACHUTE SUPPLIER PHONE: NORMAL
EOSINOPHIL # BLD AUTO: 0.51 THOUSAND/ÂΜL (ref 0–0.61)
EOSINOPHIL NFR BLD AUTO: 6 % (ref 0–6)
ERYTHROCYTE [DISTWIDTH] IN BLOOD BY AUTOMATED COUNT: 14.4 % (ref 11.6–15.1)
GFR SERPL CREATININE-BSD FRML MDRD: 87 ML/MIN/1.73SQ M
GLUCOSE SERPL-MCNC: 94 MG/DL (ref 65–140)
HCT VFR BLD AUTO: 29.5 % (ref 36.5–49.3)
HGB BLD-MCNC: 9.8 G/DL (ref 12–17)
IMM GRANULOCYTES # BLD AUTO: 0.13 THOUSAND/UL (ref 0–0.2)
IMM GRANULOCYTES NFR BLD AUTO: 2 % (ref 0–2)
LYMPHOCYTES # BLD AUTO: 2.05 THOUSANDS/ÂΜL (ref 0.6–4.47)
LYMPHOCYTES NFR BLD AUTO: 23 % (ref 14–44)
MCH RBC QN AUTO: 31.4 PG (ref 26.8–34.3)
MCHC RBC AUTO-ENTMCNC: 33.2 G/DL (ref 31.4–37.4)
MCV RBC AUTO: 95 FL (ref 82–98)
MONOCYTES # BLD AUTO: 0.93 THOUSAND/ÂΜL (ref 0.17–1.22)
MONOCYTES NFR BLD AUTO: 10 % (ref 4–12)
NEUTROPHILS # BLD AUTO: 5.19 THOUSANDS/ÂΜL (ref 1.85–7.62)
NEUTS SEG NFR BLD AUTO: 58 % (ref 43–75)
NRBC BLD AUTO-RTO: 0 /100 WBCS
PLATELET # BLD AUTO: 538 THOUSANDS/UL (ref 149–390)
PMV BLD AUTO: 9.9 FL (ref 8.9–12.7)
POTASSIUM SERPL-SCNC: 3.9 MMOL/L (ref 3.5–5.3)
PROT SERPL-MCNC: 6.1 G/DL (ref 6.4–8.4)
RBC # BLD AUTO: 3.12 MILLION/UL (ref 3.88–5.62)
SODIUM SERPL-SCNC: 138 MMOL/L (ref 135–147)
WBC # BLD AUTO: 8.9 THOUSAND/UL (ref 4.31–10.16)

## 2023-08-30 PROCEDURE — 85025 COMPLETE CBC W/AUTO DIFF WBC: CPT | Performed by: NURSE PRACTITIONER

## 2023-08-30 PROCEDURE — 80053 COMPREHEN METABOLIC PANEL: CPT | Performed by: NURSE PRACTITIONER

## 2023-08-30 PROCEDURE — 99239 HOSP IP/OBS DSCHRG MGMT >30: CPT | Performed by: NURSE PRACTITIONER

## 2023-08-30 RX ORDER — ASPIRIN 325 MG
325 TABLET ORAL 2 TIMES DAILY
COMMUNITY
End: 2023-08-30

## 2023-08-30 RX ADMIN — AMLODIPINE BESYLATE 5 MG: 5 TABLET ORAL at 07:26

## 2023-08-30 RX ADMIN — FERROUS SULFATE TAB 325 MG (65 MG ELEMENTAL FE) 325 MG: 325 (65 FE) TAB at 07:26

## 2023-08-30 RX ADMIN — ACETAMINOPHEN 975 MG: 325 TABLET, FILM COATED ORAL at 05:09

## 2023-08-30 RX ADMIN — CEFTRIAXONE SODIUM 1000 MG: 10 INJECTION, POWDER, FOR SOLUTION INTRAVENOUS at 13:30

## 2023-08-30 RX ADMIN — SUCRALFATE 1 G: 1 TABLET ORAL at 07:26

## 2023-08-30 RX ADMIN — GABAPENTIN 100 MG: 100 CAPSULE ORAL at 07:27

## 2023-08-30 RX ADMIN — FOLIC ACID 1 MG: 1 TABLET ORAL at 07:26

## 2023-08-30 RX ADMIN — APIXABAN 5 MG: 5 TABLET, FILM COATED ORAL at 07:26

## 2023-08-30 RX ADMIN — DOCUSATE SODIUM 100 MG: 100 CAPSULE, LIQUID FILLED ORAL at 07:27

## 2023-08-30 RX ADMIN — PANTOPRAZOLE SODIUM 40 MG: 40 TABLET, DELAYED RELEASE ORAL at 05:09

## 2023-08-30 RX ADMIN — SUCRALFATE 1 G: 1 TABLET ORAL at 12:26

## 2023-08-30 NOTE — RESTORATIVE TECHNICIAN NOTE
Restorative Technician Note      Patient Name: Anabella Pate     Restorative Tech Visit Date: 08/30/23  Note Type: Mobility  Patient Position Upon Consult: Other (Comment) (Walking in leong w/ RN permission)  Activity Performed: Ambulated  Assistive Device: Roller walker  Patient Position at End of Consult: Bedside chair;  All needs within reach    Chevy Amos, Restorative Technician

## 2023-08-30 NOTE — UTILIZATION REVIEW
NOTIFICATION OF INPATIENT ADMISSION   AUTHORIZATION REQUEST   SERVICING FACILITY:   [address] [facility]  Address: [address] [facility], [address] [address] [address]  Tax ID: [id number]  NPI: [id number] ATTENDING PROVIDER:  Attending Name and NPI#: Juaquin Arredondo Md [[id number]]  Address: [address]  Phone: [phone]   ADMISSION INFORMATION:  Place of Service: Inpatient [address]  Place of Service Code: 21  Inpatient Admission Date/Time: 8/28/23  5:05 PM  Discharge Date/Time: No discharge date for patient encounter. Admitting Diagnosis Code/Description:  Transient alteration of awareness [R40.4]  Hypokalemia [E87.6]  UTI (urinary tract infection) [N39.0]  Altered mental status [R41.82]  MIKE (acute kidney injury) ([address] [facility]) [N17.9]     UTILIZATION REVIEW CONTACT:  Leti Li Utilization   Network Utilization Review Department  Phone: [phone]  Fax: [phone]  Email: [email or web address]  Contact for approvals/pending authorizations, clinical reviews, and discharge. PHYSICIAN ADVISORY SERVICES:  Medical Necessity Denial & Xxyv-ts-Bsam Review  Phone: [phone]  Fax: [phone]  Email: [email or web address]

## 2023-08-30 NOTE — CASE MANAGEMENT
Case Management Discharge Planning Note    Patient name Esau Romo  Location [facility] 9[facility] 588-08 MRN [id number]  : 1950 Date 2023       Current Admission Date: 2023  Current Admission Diagnosis:HTN (hypertension)   Patient Active Problem List    Diagnosis Date Noted   • Abnormal CT scan 2023   • DVT (deep venous thrombosis) ([address]) 2023   • Anemia 2023   • Status post total knee replacement, left 2023   • Preoperative cardiovascular examination 2023   • Osteoarthritis of left knee 2023   • Heat syncope 2021   • Hypocalcemia 2021   • BMI 35.0-35.9,adult 2021   • Mixed hyperlipidemia 2021   • Chronic pain of both knees 2021   • HTN (hypertension) 2019   • GERD (gastroesophageal reflux disease) 2019   • Prediabetes 2019      LOS (days): 2  Geometric Mean LOS (GMLOS) (days): 3.10  Days to GMLOS:1.4     OBJECTIVE:  Risk of Unplanned Readmission Score: 15.35         Current admission status: Inpatient   Preferred Pharmacy:   [address] [facility], [address] [place] [address]  Phone: [phone] Fax: [phone]    Primary Care Provider: Eusebia Bailey MD    Primary Insurance: [facility] HOSPITAL REP  Secondary Insurance: [address] [place] [address] Avenue:                                [address] [place] [address] Agency Name<MARICRUZJORGEFARRAH> Shantal Marroquin         Other Referral/Resources/Interventions Provided:  Referral Comments: [facility] reserved & updated DCI. Needs bsc delivered. TC to Claudia at [facility] he will delivery to room today. Pt & RN updated. 1048 bedside commode was delivered by Claudia to room.

## 2023-08-30 NOTE — DISCHARGE SUMMARY
Discharge Summary - [facility] Internal Medicine    Patient Information: Esau Ludwig 67 y.o. male MRN: [id number]  Unit/Bed#: [facility] 902-01 Encounter: [id number]    Discharging Physician / Practitioner: GUERLINE Lal  PCP: Alee Menjivar MD  Admission Date: 8/28/2023  Discharge Date: 08/30/23    Reason for Admission: cystitis    Discharge Diagnoses:     Principal Problem (Resolved):    Acute cystitis  Active Problems:    Anemia    DVT (deep venous thrombosis) (HCC)    HTN (hypertension)    Abnormal CT scan    Mixed hyperlipidemia    Osteoarthritis of left knee  Resolved Problems:    MIKE (acute kidney injury) ([address])    Syncope and collapse    Epigastric pain      Consultations During Hospital Stay:  · PT/OT    Procedures Performed:     · none    Significant Findings / Test Results:     · Urine culture with Ecoli  · CTH negative     Incidental Findings: (images obtained prior to admission)  · Several nonspecific pulmonary nodules measuring up to 3 mm. Based on current Fleischner Society 2017 Guidelines on incidental pulmonary nodules, no routine follow-up is needed if the patient is low risk. If the patient is high risk, optional follow-up chest CT in 12 months can be considered. · 7 mm cystic lesion in the head of the pancreas with no appreciable pancreatic ductal dilatation. Statistically this likely represents an IPMN. Given the size of the lesion, follow-up is recommended in 2 years, preferably with MRI/MRCP  · Discussed with daughter Anabella Liner Results Pending at Discharge (will require follow up): · None      Outpatient Tests Requested:  · Outpatient f/u with PCP    Complications:  None     Hospital Course:     Esau Ludwig is a 67 y.o. male patient past medical history of on Eliquis, hypertension, hyperlipidemia, osteoarthritis status post recent left knee replacement 8/14 who originally presented to the hospital on 8/28/2023 due to dysuria and transient syncopal episode. Found to have abnormal UA and recent CT findings of cystitis and ureteritis 8/25 (prior to admission.)  Patient was started on IV ceftriaxone, urinary symptoms improved. Urine culture grew E. coli sensitive to cephalosporin. Will complete third dose of IV antibiotic prior to discharge. In regards to his syncopal episode, likely vasovagal secondary to UTI/MIKE and poor oral intake with dehydration. Received IV fluids. Orthostatics were negative. CT head negative. Telemetry unremarkable. Patient reports feeling much better. He is ambulating independently around the room. Incidental findings noted on recent CT imaging discussed with daughter over the phone. He is stable for discharge home today. Condition at Discharge: stable     Discharge Day Visit / Exam:     Subjective: No complaints. No urinary symptoms. No fevers, chills. Ambulating independently. Lightheadedness, dizziness. Vitals: Blood Pressure: 137/78 (08/30/23 0801)  Pulse: 81 (08/30/23 0801)  Temperature: 97.6 °F (36.4 °C) (08/30/23 0801)  Temp Source: Oral (08/30/23 0801)  Respirations: 18 (08/30/23 0801)  Height: 5' 4" (162.6 cm) (08/29/23 0000)  Weight - Scale: 88.7 kg (195 lb 8.8 oz) (08/29/23 0000)  SpO2: 94 % (08/30/23 0801)  Exam:   Physical Exam  Vitals and nursing note reviewed. Constitutional:       General: He is not in acute distress. Cardiovascular:      Rate and Rhythm: Normal rate. Heart sounds: No murmur heard. Pulmonary:      Breath sounds: Normal breath sounds. Abdominal:      Tenderness: There is no abdominal tenderness. Musculoskeletal:         General: Swelling (left knee) and tenderness (left knee ) present. Skin:     General: Skin is warm. Comments: Left knee healing incision, no erythema or drainage   Neurological:      Mental Status: He is alert and oriented to person, place, and time. Mental status is at baseline.    Psychiatric:         Mood and Affect: Mood normal.         Discussion with Family: Patient and daughter Darron Fenton over the phone    Discharge instructions/Information to patient and family:   See after visit summary for information provided to patient and family. Provisions for Follow-Up Care:  See after visit summary for information related to follow-up care and any pertinent home health orders. Disposition:     Home    For Discharges to [facility] SNF:   · Not Applicable to this Patient - Not Applicable to this Patient    Planned Readmission: no     Discharge Statement:  I spent 40 minutes discharging the patient. This time was spent on the day of discharge. I had direct contact with the patient on the day of discharge. Greater than 50% of the total time was spent examining patient, answering all patient questions, arranging and discussing plan of care with patient as well as directly providing post-discharge instructions. Additional time then spent on discharge activities. Discharge Medications:  See after visit summary for reconciled discharge medications provided to patient and family.       ** Please Note: This note has been constructed using a voice recognition system **

## 2023-08-31 ENCOUNTER — OFFICE VISIT (OUTPATIENT)
Dept: PHYSICAL THERAPY | Facility: REHABILITATION | Age: 73
End: 2023-08-31
Payer: COMMERCIAL

## 2023-08-31 ENCOUNTER — TRANSITIONAL CARE MANAGEMENT (OUTPATIENT)
Dept: FAMILY MEDICINE CLINIC | Facility: CLINIC | Age: 73
End: 2023-08-31

## 2023-08-31 DIAGNOSIS — Z96.652 STATUS POST TOTAL KNEE REPLACEMENT, LEFT: Primary | ICD-10-CM

## 2023-08-31 PROCEDURE — 97110 THERAPEUTIC EXERCISES: CPT

## 2023-08-31 PROCEDURE — 97140 MANUAL THERAPY 1/> REGIONS: CPT

## 2023-08-31 NOTE — ED ATTENDING ATTESTATION
8/28/2023  I, Noam Hays MD, saw and evaluated the patient. I have discussed the patient with the resident/non-physician practitioner and agree with the resident's/non-physician practitioner's findings, Plan of Care, and MDM as documented in the resident's/non-physician practitioner's note, except where noted. All available labs and Radiology studies were reviewed. I was present for key portions of any procedure(s) performed by the resident/non-physician practitioner and I was immediately available to provide assistance. At this point I agree with the current assessment done in the Emergency Department. I have conducted an independent evaluation of this patient a history and physical is as follows:    ED Course        65-year-old male presents with altered mental status noted to have episode of diaphoresis near syncopal event sitting in chair after shower episode episode last approximate 10 minutes per family member Sarah Victor reports recent falls denies head strike. Family reports period of confusion lasting between 10 to 30 minutes EMS reports transient hypotensive initially. Patient with history of DVT on Eliquis. Patient denies tongue biting and has incontinence at baseline    Vitals reviewed  Patient is neurologically back at baseline following commands. Cranial is grossly intact strength out of 5 bilateral normal speech normal gait  Heart regular rate and rhythm without murmurs. Lungs clear auscultation bilateral.  Ab soft nontender nondistended normal bowel sounds. Extremities no edema. No external signs of trauma. Impression: Near syncope versus seizure  Differential diagnosis: Symptomatic anemia, electrolyte abnormality, at risk for urinary tract infection, arrhythmia, occult head injury, seizure    Plan check CT brain ECG BMP UA CBC reassess    Labs reviewed CBC remarkable for anemia elevated platelets.  Urinalysis abnormal remarkable for large leukocyte esterase occult blood with innumerable bacteria normal white cells and red cells. BMP remarkable for hypokalemia mild MIKE    CT brain reviewed report no acute cranial abnormality    ECG independently interpreted by me: Normal sinus rhythm normal rate normal axis normal intervals no acute ST-T changes. Decision regarding hospitalization patient presents with transient altered mental status evidence of urinary tract infection, acute kidney injury and hypokalemia plan to admit patient to medicine service for further evaluation management we will start patient on potassium repletion as well as IV antibiotics for UTI.           Critical Care Time  Procedures

## 2023-08-31 NOTE — PROGRESS NOTES
Daily Note     Today's date: 2023  Patient name: Mykel Sheppard  : 1950  MRN: [id number]  Referring provider: Cesia Matthews  Dx:   Encounter Diagnosis     ICD-10-CM    1. Status post total knee replacement, left  Z96.652           Start Time: 6600  Stop Time: 4944  Total time in clinic (min): 50 minutes    Subjective: Pt reports feeling some soreness following his last sessions. States he has been walking w/ his daughter. Objective: See treatment diary below. Pt vitals were taken: HR 97, O2 96%, /60. Pt states he was took a lot of BP medications at the hospital which is why his BP may be lower. Knee flexion 110, knee ext 8. Assessment: Tolerated treatment well. Tolerated manuals well w/ an increase of 5 degrees of knee flexion following. Pt performed AROM on bike but was hesitant at end ranges due to tightness and pain. Demonstrates some quad lag with SLR, may be due to language barrier. Pt continues to improve TKE w/ LAQ. Pt leans back to achieve full extension w/o assistance. Pt progressed on VG with good tolerance. Pt demonstrated good mechanics with no cueing. Reported no higher than 3/10 pain throughout session. Continue progressing pt w/ exercises. Patient demonstrated fatigue post treatment and would benefit from continued PT to work on ROM, pain modulation, and strength. Plan: Continue per plan of care. Add in balance/sanddune stepping NV.      Precautions: L TKA on       Manuals         Knee assessment     3'        Knee PROM     5'                                  Neuro Re-Ed             Step up                                                                                           Ther Ex             Quad sets HEP            SLR HEP 1x10  2x5 2x10        Heel slides HEP 1x10 with OP  10x 2x10        Long arc quads  1x10 seated  2x5x3" 2x10        Bike    7' ROM 7' ROM        VG    L6 5' L7 3x10        PROM    MM DLHR    2x10                      Leg ext Hangs             Ther Activity             Sit to stand                          Gait Training                                       Modalities

## 2023-08-31 NOTE — UTILIZATION REVIEW
NOTIFICATION OF ADMISSION DISCHARGE   This is a Notification of Discharge from [facility]. Please be advised that this patient has been discharge from our facility. Below you will find the admission and discharge date and time including the patient’s disposition. UTILIZATION REVIEW CONTACT:  Florentina Mohs  Utilization   Network Utilization Review Department  Phone: [phone] x carefully listen to the prompts. All voicemails are confidential.  Email: [email or web address]     ADMISSION INFORMATION  PRESENTATION DATE: 8/28/2023 11:46 AM  OBERVATION ADMISSION DATE:   INPATIENT ADMISSION DATE: 8/28/23  5:05 PM   DISCHARGE DATE: 8/30/2023  5:57 PM   DISPOSITION:Home/Self Care    IMPORTANT INFORMATION:  Send all requests for admission clinical reviews, approved or denied determinations and any other requests to dedicated fax number below belonging to the campus where the patient is receiving treatment.  List of dedicated fax numbers:  [place] DENIALS (Administrative/Medical Necessity) [phone] [address] [facility] (Maternity/NICU/Pediatrics) [phone]   [facility] [phone]   [facility] [phone] [address] [facility] [phone] 401 [facility] [phone]   [facility] [phone]   [address] [place], [place] [address]e [address] [facility] [phone]   [address] [facility] [phone]   [address] [facility] [phone]   [address] [facility] [address] [facility] [phone]

## 2023-09-01 ENCOUNTER — APPOINTMENT (OUTPATIENT)
Dept: RADIOLOGY | Facility: MEDICAL CENTER | Age: 73
End: 2023-09-01
Payer: COMMERCIAL

## 2023-09-01 ENCOUNTER — OFFICE VISIT (OUTPATIENT)
Dept: OBGYN CLINIC | Facility: MEDICAL CENTER | Age: 73
End: 2023-09-01

## 2023-09-01 VITALS
WEIGHT: 200.8 LBS | SYSTOLIC BLOOD PRESSURE: 103 MMHG | DIASTOLIC BLOOD PRESSURE: 58 MMHG | HEIGHT: 64 IN | HEART RATE: 87 BPM | BODY MASS INDEX: 34.28 KG/M2

## 2023-09-01 DIAGNOSIS — Z96.652 STATUS POST TOTAL KNEE REPLACEMENT, LEFT: ICD-10-CM

## 2023-09-01 DIAGNOSIS — Z96.652 STATUS POST TOTAL KNEE REPLACEMENT, LEFT: Primary | ICD-10-CM

## 2023-09-01 DIAGNOSIS — N30.90 CYSTITIS: ICD-10-CM

## 2023-09-01 PROCEDURE — 99024 POSTOP FOLLOW-UP VISIT: CPT | Performed by: ORTHOPAEDIC SURGERY

## 2023-09-01 PROCEDURE — 73562 X-RAY EXAM OF KNEE 3: CPT

## 2023-09-01 NOTE — PROGRESS NOTES
Assessment/Plan:  1. Status post total knee replacement, left    2. Cystitis      Orders Placed This Encounter   Procedures   • XR knee 3 vw left non injury   • Ambulatory Referral to [facility]       Patient is s/p left total knee replacement 8/14/23  Instruction for oral medications placed in wrap up for patient  Continue with eliquis for DVT prophylaxis  REGINALD stockings as needed for swelling  May shower and let water run over incision, do not submerge incision  Patient aware to call ahead for abx prior to dental appts. Retained suture was trimmed in office, small retained suture  proximal incision w/o evidence of infection, if this area becomes red, hot, swollen call for office for instructions or go to ED. Re enforced continuing PT focusing on range of motion, also daily stretching. Ice knee daily for swelling. Recommended patient follow up with PCP for continued care due to DVT, cystitis, syncopic event. See back in 4 weeks. Return in about 4 weeks (around 9/29/2023) for Recheck. I answered all of the patient's questions during the visit and provided education of the patient's condition during the visit. The patient verbalized understanding of the information given and agrees with the plan. This note was dictated using [employer] software. It may contain errors including improperly dictated words. Please contact physician directly for any questions. Subjective   Chief Complaint:   Chief Complaint   Patient presents with   • Left Knee - Post-op       Ulyses Frankel is a 67 y.o. male who presents for 2 week  follow up s/p left TKA. DVT 8/22/23 on eliquis and ASA. Also diagnosed with Syncopal event 8/28/23 & acute cystitis and urine culture grew E coli sensitive to cephalosporin. Completed IV antibiotics prior to discharge 8/28/23. He was discharged with oral keflex 500mg QID 7 days. He is using walker to ambulate. Pain is well controlled. He has started physical therapy.  He has continued eliquis 5mg for DVT prophylaxis. Denies any fever,chills. Currently taking these medications as pictured on phone  Keflex 500mg QID  Oxycodone 5 mg  neurontin  Colace   Promethazine 12.5mg  Post operative vitamins       Review of Systems  ROS:    See HPI for musculoskeletal review. All other systems reviewed are negative     History:  Past Medical History:   Diagnosis Date   • Cataract    • Colon polyp    • Diverticulosis    • Gastritis    • GERD (gastroesophageal reflux disease)    • Hyperlipidemia    • Hypertension    • Pre-diabetes    • Throat ulcer    • Visual impairment      Past Surgical History:   Procedure Laterality Date   • COLONOSCOPY N/A 08/18/2016    Procedure: COLONOSCOPY;  Surgeon: Bernadette Jolly MD;  Location:  GI LAB;   Service:    • COLONOSCOPY W/ POLYPECTOMY  2019    repeat 2022   • EYE SURGERY Bilateral    • DC ARTHRP KNE CONDYLE&PLATU MEDIAL&LAT COMPARTMENTS Left 8/14/2023    Procedure: ARTHROPLASTY KNEE TOTAL;  Surgeon: Lashawn Byrne DO;  Location: [address] [place] [address] OR;  Service: Orthopedics   • TONSILLECTOMY     • UPPER GASTROINTESTINAL ENDOSCOPY  2019     Social History   Social History     Substance and Sexual Activity   Alcohol Use Not Currently    Comment: pt "sober for 30+ years"     Social History     Substance and Sexual Activity   Drug Use No     Social History     Tobacco Use   Smoking Status Former   • Types: Cigarettes   Smokeless Tobacco Never   Tobacco Comments    Patient quit     Family History:   Family History   Problem Relation Age of Onset   • Hypertension Mother    • Hypertension Father        Meds/Allergies   (Not in a hospital admission)    Allergies   Allergen Reactions   • Lactose - Food Allergy Diarrhea     diarrhea          Objective     /58   Pulse 87   Ht 5' 4" (1.626 m)   Wt 91.1 kg (200 lb 12.8 oz)   BMI 34.47 kg/m²      PE:  AAOx 3  WDWN  Hearing intact, no drainage from eyes  no audible wheezing  no abdominal distension  LE compartments soft, AT/GS intact    Ortho Exam:  left Knee:   INC: C/D/I, No erythema, mild swelling  AROM:15-90 degrees, PROM 8-95  Small retained suture proximal incision without evidence of infection    Imaging Studies: I have personally reviewed pertinent films in PACS   XR left knee:  S/p TKA, adequate alignment    Scribe Attestation    I,:  Ngoc Baron am acting as a scribe while in the presence of the attending physician.:       I,:  Cesia Matthews DO personally performed the services described in this documentation    as scribed in my presence.:

## 2023-09-01 NOTE — PATIENT INSTRUCTIONS
Finish anti biotics cephalexin(keflex) as prescribed until finished   Oxycodone 5mg as needed for pain, wean off this medication to oral tylenol(acetaminophen) take 1000 mg at once three times a day  Promethazine is for nausea as needed   Colace as needed only for constipation   Gabapentin(neurontin) take as directed until medication is finished which should be in 2 weeks  Post op vitamins STOP

## 2023-09-05 ENCOUNTER — OFFICE VISIT (OUTPATIENT)
Dept: PHYSICAL THERAPY | Facility: REHABILITATION | Age: 73
End: 2023-09-05
Payer: COMMERCIAL

## 2023-09-05 DIAGNOSIS — Z96.652 STATUS POST TOTAL KNEE REPLACEMENT, LEFT: Primary | ICD-10-CM

## 2023-09-05 LAB
DME PARACHUTE DELIVERY DATE ACTUAL: NORMAL
DME PARACHUTE DELIVERY DATE REQUESTED: NORMAL
DME PARACHUTE ITEM DESCRIPTION: NORMAL
DME PARACHUTE ORDER STATUS: NORMAL
DME PARACHUTE SUPPLIER NAME: NORMAL
DME PARACHUTE SUPPLIER PHONE: NORMAL

## 2023-09-05 PROCEDURE — 97112 NEUROMUSCULAR REEDUCATION: CPT

## 2023-09-05 PROCEDURE — 97110 THERAPEUTIC EXERCISES: CPT

## 2023-09-05 NOTE — PROGRESS NOTES
Daily Note     Today's date: 2023  Patient name: Sherryle Beams  : 1950  MRN: [id number]  Referring provider: Deloris Kirkland  Dx:   Encounter Diagnosis     ICD-10-CM    1. Status post total knee replacement, left  Z96.652           Start Time: 1445  Stop Time: 1530  Total time in clinic (min): 45 minutes    Subjective: Pt reports feeling well stating no soreness from his last session. Reports no pain currently. States that he has been walking 3 laps around his house daily. Objective: See treatment diary below. Knee flexion: 100 knee extension: +5      Assessment: Tolerated treatment well. Patient continues to demonstrate an increase in ROM. He is able to complete revolutions on the bike now w/ minimal pain. SLR was performed with greater quad activation and improved knee extension. LAQ was performed w/ less posterior trunk lean. Still demonstrates a lack of TKE and requires assistance to achieve. Trialed w/ 1.5 lb ankle weight and d/c'd due to decrease in ROM. Added in mini squats on physioball which pt tolerated well. VC/TC to correct forward trunk lean. Pt was instructed to continue performing HEP and to progress volume and frequency. Pt demonstrated fatigue post treatment, exhibited good technique with therapeutic exercises and would benefit from continued PT      Plan: Continue per plan of care. Trial SPC NV.       Precautions: L TKA on       Manuals  9/5       Knee assessment     3' 3'       Knee PROM     5'                                  Neuro Re-Ed             Step up                                                                                           Ther Ex             Quad sets HEP            SLR HEP 1x10  2x5 2x10 2x10       Heel slides HEP 1x10 with OP  10x 2x10 W/ strap 3x10       Long arc quads  1x10 seated  2x5x3" 2x10 2x10  Trialed w/ 1.5 lb       Bike    7' ROM 7' ROM 8' ROM       VG    L6 5' L7 3x10 L7 5'       PROM    MM  Knee Ext       DLHR    2x10                      Leg ext Hangs             Ther Activity             Sit to stand      Mini squat on wall 3x10                    Gait Training                                       Modalities

## 2023-09-06 ENCOUNTER — OFFICE VISIT (OUTPATIENT)
Dept: FAMILY MEDICINE CLINIC | Facility: CLINIC | Age: 73
End: 2023-09-06
Payer: COMMERCIAL

## 2023-09-06 VITALS
SYSTOLIC BLOOD PRESSURE: 120 MMHG | HEIGHT: 64 IN | OXYGEN SATURATION: 97 % | TEMPERATURE: 97.2 F | RESPIRATION RATE: 17 BRPM | WEIGHT: 194.8 LBS | DIASTOLIC BLOOD PRESSURE: 54 MMHG | HEART RATE: 70 BPM | BODY MASS INDEX: 33.26 KG/M2

## 2023-09-06 DIAGNOSIS — R93.89 ABNORMAL CT SCAN: ICD-10-CM

## 2023-09-06 DIAGNOSIS — I10 PRIMARY HYPERTENSION: ICD-10-CM

## 2023-09-06 DIAGNOSIS — D64.9 ANEMIA, UNSPECIFIED TYPE: ICD-10-CM

## 2023-09-06 DIAGNOSIS — I82.462 ACUTE DEEP VEIN THROMBOSIS (DVT) OF CALF MUSCLE VEIN OF LEFT LOWER EXTREMITY (HCC): ICD-10-CM

## 2023-09-06 DIAGNOSIS — Z96.652 STATUS POST TOTAL KNEE REPLACEMENT, LEFT: Primary | ICD-10-CM

## 2023-09-06 DIAGNOSIS — N30.90 CYSTITIS: ICD-10-CM

## 2023-09-06 PROBLEM — M17.12 OSTEOARTHRITIS OF LEFT KNEE: Status: RESOLVED | Noted: 2023-06-07 | Resolved: 2023-09-06

## 2023-09-06 PROBLEM — Z01.810 PREOPERATIVE CARDIOVASCULAR EXAMINATION: Status: RESOLVED | Noted: 2023-06-07 | Resolved: 2023-09-06

## 2023-09-06 PROCEDURE — 99496 TRANSJ CARE MGMT HIGH F2F 7D: CPT | Performed by: PHYSICIAN ASSISTANT

## 2023-09-06 NOTE — ASSESSMENT & PLAN NOTE
S/p L TKR on 8/14/23. Ambulating with cane due to difficulty with walker, ordered Rollator today, continue PT as scheduled. Follow up with ortho on 9/29/23.

## 2023-09-06 NOTE — ASSESSMENT & PLAN NOTE
Provoked s/p L total knee replacement. Continue Eliquis BID x 3 months. Reviewed US venous duplex from 8/22/23 with following:     RIGHT LOWER LIMB LIMITED:  Evaluation shows no evidence of thrombus in the common femoral vein. Doppler evaluation shows a normal response to augmentation maneuvers. LEFT LOWER LIMB:  There is evidence of acute non-occlusive deep vein thrombosis in one of the  gastrocnemius veins. No evidence of superficial thrombophlebitis noted. Doppler evaluation shows a normal response to augmentation maneuvers. Popliteal, posterior tibial and anterior tibial arterial Doppler waveform's are  triphasic. Note: There is a well defined hypoechoic non-vascularized structure with  internal echoes noted anterior to the left popliteal vein and artery measuring  4.5 x 3.0 x 4.8 cm. There are two echogenic structures located in the left inguinal region  measuring approximately 1.8 cm and 1.1 cm suggestive of enlarged lymphatic  channels.

## 2023-09-06 NOTE — PROGRESS NOTES
Assessment & Plan     1. Status post total knee replacement, left  Assessment & Plan:  S/p L TKR on 8/14/23. Ambulating with cane due to difficulty with walker, ordered Rollator today, continue PT as scheduled. Follow up with ortho on 9/29/23.       2. Anemia, unspecified type  Assessment & Plan:  Lab Results   Component Value Date    WBC 8.90 08/30/2023    HGB 9.8 (L) 08/30/2023    HCT 29.5 (L) 08/30/2023    MCV 95 08/30/2023     (H) 08/30/2023     Continue iron supplement, last colonoscopy in 6/2022. Repeat labs. No significant blood loss, recent TKR of L knee. Orders:  -     CBC and differential; Future  -     Iron Panel (Includes Ferritin, Iron Sat%, Iron, and TIBC); Future  -     Vitamin B12; Future    3. Cystitis  Comments:  urine culture positive for e. coli, completed IV abx, resolved, repeat UA, complicated by MIKE resolved, reviewed hospital admission 8/28 to 8/30  Orders:  -     UA w Reflex to Microscopic w Reflex to Culture -Lab Collect; Future; Expected date: 09/06/2023    4. Acute deep vein thrombosis (DVT) of calf muscle vein of left lower extremity (HCC)  Assessment & Plan:  Provoked s/p L total knee replacement. Continue Eliquis BID x 3 months. Reviewed US venous duplex from 8/22/23 with following:     RIGHT LOWER LIMB LIMITED:  Evaluation shows no evidence of thrombus in the common femoral vein. Doppler evaluation shows a normal response to augmentation maneuvers. LEFT LOWER LIMB:  There is evidence of acute non-occlusive deep vein thrombosis in one of the  gastrocnemius veins. No evidence of superficial thrombophlebitis noted. Doppler evaluation shows a normal response to augmentation maneuvers. Popliteal, posterior tibial and anterior tibial arterial Doppler waveform's are  triphasic. Note: There is a well defined hypoechoic non-vascularized structure with  internal echoes noted anterior to the left popliteal vein and artery measuring  4.5 x 3.0 x 4.8 cm.   There are two echogenic structures located in the left inguinal region  measuring approximately 1.8 cm and 1.1 cm suggestive of enlarged lymphatic  channels. 5. Abnormal CT scan  Assessment & Plan:  Incidental findings of pulmonary nodules, no follow-up needed as low risk and pancreatic lesion. Reviewed CT chest/abd/pelvis done on 23 with followin mm cystic lesion in the head of the pancreas with no appreciable pancreatic ductal dilatation. Statistically this likely represents an IPMN. Given the size of the lesion, follow-up is recommended in 2 years, preferably with MRI/MRCP      6. Primary hypertension  Assessment & Plan:  Stable, controlled on amlodipine 5mg and lisinopril/HCTZ 20/25mg. Continue same dose. Recommend weight loss. Recommend DASH and low salt diet. Discussed importance of diet/exercise. Orders:  -     Basic metabolic panel; Future         Subjective     Transitional Care Management Review:   Anabella Pate is a 67 y.o. male here for TCM follow up. During the TCM phone call patient stated:  TCM Call     Date and time call was made  2023 10:56 AM    Patient was hospitialized at  [address] [place] [address]    Date of Admission  23    Date of discharge  23    Disposition  Home    Current Symptoms  None      TCM Call     Post hospital issues  None    Scheduled for follow up?   Yes    Did you obtain your prescribed medications  Yes    Do you need help managing your prescriptions or medications  No    Is transportation to your appointment needed  No    I have advised the patient to call PCP with any new or worsening symptoms  Ethel Pablo MA    Living Arrangements  Family members    Support System  None    Have you fallen in the last 12 months  No    Interperter language line needed  No        Ivonne Crisostomo is a 67 y.o. male with a h/o HTN, recent TKR with multiple complications after discharge including left leg DVT and UTI who presents for transition of care management appointment. Since discharge from hospital, he has improved significantly with symptoms. He was admitted for suspected vasovagal syncope in setting of UTI and MIKE from 8/28 to 8/30. MIKE resolved with normal Cr at discharge. UTI symptoms resolved as well with culture showing E. Coli. The UTI was diagnosed when he went to the ER on 8/25. He was discharged from his left total knee replacement on 8/14. He was discovered to have DVT when he presented to the ER on 8/22. He has been attending physical therapy. Some left knee soreness after most recent PT session yesterday. Otherwise feeling well, no fevers or chills. No longer with dysuria or urinary symptoms. No lightheadedness or dizziness. Having some difficulty ambulating with walker. Would like four wheeled walker instead. History was conducted in [place] without the use of . Review of Systems   Constitutional: Negative for fever and unexpected weight change. Respiratory: Negative for shortness of breath. Cardiovascular: Negative for chest pain. Gastrointestinal: Negative for abdominal pain, constipation, diarrhea, nausea and vomiting. Genitourinary: Negative for dysuria (resolved), flank pain, frequency, hematuria, scrotal swelling and urgency. Musculoskeletal: Positive for arthralgias, gait problem and joint swelling (L knee). Neurological: Negative for light-headedness (resolved). Objective     /54 (BP Location: Left arm, Patient Position: Sitting, Cuff Size: Standard)   Pulse 70   Temp (!) 97.2 °F (36.2 °C) (Tympanic)   Resp 17   Ht 5' 4" (1.626 m)   Wt 88.4 kg (194 lb 12.8 oz)   SpO2 97%   BMI 33.44 kg/m²      Physical Exam  Vitals reviewed. Constitutional:       Appearance: Normal appearance. Comments: Ambulating with wooden cane, single prong   HENT:      Head: Normocephalic and atraumatic. Cardiovascular:      Rate and Rhythm: Normal rate and regular rhythm.    Pulmonary:      Effort: Pulmonary effort is normal.      Breath sounds: Normal breath sounds. Musculoskeletal:         General: Swelling (L knee ) present. No tenderness. Right lower leg: No edema. Left lower leg: No edema. Neurological:      Mental Status: He is alert and oriented to person, place, and time. Mental status is at baseline.                 Medications have been reviewed by provider in current encounter    Helena Syed PA-C

## 2023-09-06 NOTE — ASSESSMENT & PLAN NOTE
Stable, controlled on amlodipine 5mg and lisinopril/HCTZ 20/25mg. Continue same dose. Recommend weight loss. Recommend DASH and low salt diet. Discussed importance of diet/exercise.

## 2023-09-07 ENCOUNTER — OFFICE VISIT (OUTPATIENT)
Dept: PHYSICAL THERAPY | Facility: REHABILITATION | Age: 73
End: 2023-09-07
Payer: COMMERCIAL

## 2023-09-07 DIAGNOSIS — Z96.652 STATUS POST TOTAL KNEE REPLACEMENT, LEFT: Primary | ICD-10-CM

## 2023-09-07 PROBLEM — E83.51 HYPOCALCEMIA: Status: RESOLVED | Noted: 2021-09-09 | Resolved: 2023-09-07

## 2023-09-07 PROCEDURE — 97116 GAIT TRAINING THERAPY: CPT

## 2023-09-07 PROCEDURE — 97110 THERAPEUTIC EXERCISES: CPT

## 2023-09-07 PROCEDURE — 97112 NEUROMUSCULAR REEDUCATION: CPT

## 2023-09-07 NOTE — PROGRESS NOTES
Daily Note     Today's date: 2023  Patient name: Ricarda Klein  : 1950  MRN: [id number]  Referring provider: Alexandra Monsivais,*  Dx:   Encounter Diagnosis     ICD-10-CM    1. Status post total knee replacement, left  Z96.652           Start Time: 1230  Stop Time: 1320  Total time in clinic (min): 50 minutes    PT session performed by Jaden Mathis, SPT - supervised by Jemima Mathew PT, DPT and case discussed in detail throughout session. Subjective: Pt reports feeling good with no issues with his knee since the last session. He reports he has not increased his HEP volume as discussed last time but will do so after this session. Objective: See treatment diary below. Left knee ext: +10, left knee flex: 110      Assessment: Tolerated treatment well. Pt continues to demonstrate pitted edema on LLE. Otherwise progressing as expected. Added gait training w/ SPC. Pt ambulated and negotiated 1 step both w/ and w/o railing. Pt demonstrated some circumduction when going forward and backward on 6 in step, this was reduced when regressed to 4in step. Pt still needed VC/TC to reduce circumduction and perform w/ appropriate sequence. Pt was performed LAQ and was progressed to 2lb w/ minimal hip compensation compared to previous sessions. Pt progressed to 6 min on VG which the pt was adequately fatigued with. Required VC/TC to fully extend knee. Patient demonstrated fatigue post treatment, exhibited good technique with therapeutic exercises and would benefit from continued PT      Plan: Continue per plan of care.       Precautions: L TKA on       Manuals       Knee assessment     3' 3' 3'      Knee PROM     5'  5'                                Neuro Re-Ed             Step up       1x10 w/ 6 in  2x10 4 in w/o eccentric  1x10 4in w/ eccentric                                                                                    Ther Ex             Alvaro Mccall sets HEP            SLR HEP 1x10  2x5 2x10 2x10       Heel slides HEP 1x10 with OP  10x 2x10 W/ strap 3x10       Long arc quads  1x10 seated  2x5x3" 2x10 2x10  Trialed w/ 1.5 lb 2x10 1lb 2x10 2 lb      Bike    7' ROM 7' ROM 8' ROM 6' ROM      VG    L6 5' L7 3x10 L7 5' L7 6'      PROM    MM  Knee Ext       DLHR    2x10                      Leg ext Hangs             Ther Activity             Sit to stand      Mini squat on wall 3x10                    Gait Training                                       Modalities

## 2023-09-07 NOTE — ASSESSMENT & PLAN NOTE
Lab Results   Component Value Date    WBC 8.90 08/30/2023    HGB 9.8 (L) 08/30/2023    HCT 29.5 (L) 08/30/2023    MCV 95 08/30/2023     (H) 08/30/2023     Continue iron supplement, last colonoscopy in 6/2022. Repeat labs. No significant blood loss, recent TKR of L knee.

## 2023-09-07 NOTE — ASSESSMENT & PLAN NOTE
Lab Results   Component Value Date     10/28/2015    SODIUM 138 08/30/2023    K 3.9 08/30/2023    CL 99 08/30/2023    CO2 32 08/30/2023    ANIONGAP 7 10/28/2015    AGAP 7 08/30/2023    BUN 19 08/30/2023    CREATININE 0.82 08/30/2023    GLUC 94 08/30/2023    GLUF 96 05/09/2022    CALCIUM 8.7 08/30/2023    AST 21 08/30/2023    ALT 17 08/30/2023    ALKPHOS 52 08/30/2023    TP 6.1 (L) 08/30/2023    TBILI 0.61 08/30/2023    EGFR 87 08/30/2023

## 2023-09-07 NOTE — ASSESSMENT & PLAN NOTE
Incidental findings of pulmonary nodules, no follow-up needed as low risk and pancreatic lesion. Reviewed CT chest/abd/pelvis done on 23 with followin mm cystic lesion in the head of the pancreas with no appreciable pancreatic ductal dilatation. Statistically this likely represents an IPMN.  Given the size of the lesion, follow-up is recommended in 2 years, preferably with MRI/MRCP

## 2023-09-08 DIAGNOSIS — Z96.652 STATUS POST TOTAL KNEE REPLACEMENT, LEFT: ICD-10-CM

## 2023-09-08 LAB
DME PARACHUTE DELIVERY DATE REQUESTED: NORMAL
DME PARACHUTE ITEM DESCRIPTION: NORMAL
DME PARACHUTE ITEM DESCRIPTION: NORMAL
DME PARACHUTE ORDER STATUS: NORMAL
DME PARACHUTE SUPPLIER NAME: NORMAL
DME PARACHUTE SUPPLIER PHONE: NORMAL

## 2023-09-08 RX ORDER — GABAPENTIN 100 MG/1
100 CAPSULE ORAL 3 TIMES DAILY
Qty: 90 CAPSULE | Refills: 0 | Status: SHIPPED | OUTPATIENT
Start: 2023-09-08

## 2023-09-12 ENCOUNTER — OFFICE VISIT (OUTPATIENT)
Dept: PHYSICAL THERAPY | Facility: REHABILITATION | Age: 73
End: 2023-09-12
Payer: COMMERCIAL

## 2023-09-12 DIAGNOSIS — Z96.652 STATUS POST TOTAL KNEE REPLACEMENT, LEFT: Primary | ICD-10-CM

## 2023-09-12 PROCEDURE — 97110 THERAPEUTIC EXERCISES: CPT

## 2023-09-12 PROCEDURE — 97530 THERAPEUTIC ACTIVITIES: CPT

## 2023-09-12 NOTE — PROGRESS NOTES
Daily Note     Today's date: 2023  Patient name: Venancio Valenzuela  : 1950  MRN: [id number]  Referring provider: Mirna Atkinson,*  Dx:   Encounter Diagnosis     ICD-10-CM    1. Status post total knee replacement, left  Z96.652           Start Time: 1215  Stop Time: 1300  Total time in clinic (min): 45 minutes    Subjective: Kailash Rosario says he is feeling okay today. Objective: See treatment diary below  AROM: 103  PROM: 108    Assessment: Kailash Rosario is making gradual improvement on his current plan of care reflected by increased volume and intensity of interventions performed today. Progressed closed chain exercises to improve patient's functional mobility, and increase muscular strength. Wade's left lower extremity strength results in pain and difficulty when performing transfers, and her requires the use of his upper extremities to complete the activity. Kailash Rosario continues to demonstrate flexion range of motion deficits, however the difference between active and passive range of motion suggest further gains are possible. Encouraged patient to continue with prior HEP, and incorporate sit to stands to improve strength. Plan: Continue per plan of care.       Precautions: L TKA on       Manuals      Knee assessment     3' 3' 3'      Knee PROM     5'  5' 5' LM                               Neuro Re-Ed             Step up       1x10 w/ 6 in  2x10 4 in w/o eccentric  1x10 4in w/ eccentric 2x10 6" 8# RUE                                                                                   Ther Ex             Quad sets HEP            SLR HEP 1x10  2x5 2x10 2x10       Heel slides HEP 1x10 with OP  10x 2x10 W/ strap 3x10       Long arc quads  1x10 seated  2x5x3" 2x10 2x10  Trialed w/ 1.5 lb 2x10 1lb 2x10 2 lb 2x10 2lb     Bike    7' ROM 7' ROM 8' ROM 6' ROM L2 9'     VG    L6 5' L7 3x10 L7 5' L7 6' L7 6'     PROM    MM  Knee Ext       DLHR    2x10 Leg ext Hangs             Ther Activity             Sit to stand      Mini squat on wall 3x10  2x8 UE assist                  Gait Training             SPC walking        SPC 4 clinic laps                  Modalities

## 2023-09-13 DIAGNOSIS — M17.12 PRIMARY OSTEOARTHRITIS OF LEFT KNEE: ICD-10-CM

## 2023-09-13 RX ORDER — MULTIVITAMIN WITH FOLIC ACID 400 MCG
TABLET ORAL
Qty: 30 TABLET | Refills: 1 | Status: SHIPPED | OUTPATIENT
Start: 2023-09-13

## 2023-09-13 RX ORDER — FOLIC ACID 1 MG/1
1 TABLET ORAL DAILY
Qty: 30 TABLET | Refills: 1 | Status: SHIPPED | OUTPATIENT
Start: 2023-09-13

## 2023-09-13 RX ORDER — FERROUS SULFATE TAB EC 324 MG (65 MG FE EQUIVALENT) 324 (65 FE) MG
324 TABLET DELAYED RESPONSE ORAL
Qty: 30 TABLET | Refills: 1 | Status: SHIPPED | OUTPATIENT
Start: 2023-09-13

## 2023-09-13 RX ORDER — ASCORBIC ACID 500 MG
500 TABLET ORAL DAILY
Qty: 30 TABLET | Refills: 1 | Status: SHIPPED | OUTPATIENT
Start: 2023-09-13

## 2023-09-14 ENCOUNTER — OFFICE VISIT (OUTPATIENT)
Dept: PHYSICAL THERAPY | Facility: REHABILITATION | Age: 73
End: 2023-09-14
Payer: COMMERCIAL

## 2023-09-14 DIAGNOSIS — Z96.652 STATUS POST TOTAL KNEE REPLACEMENT, LEFT: Primary | ICD-10-CM

## 2023-09-14 PROCEDURE — 97110 THERAPEUTIC EXERCISES: CPT

## 2023-09-14 PROCEDURE — 97530 THERAPEUTIC ACTIVITIES: CPT

## 2023-09-14 NOTE — PROGRESS NOTES
Daily Note     Today's date: 2023  Patient name: Troy Hamilton  : 1950  MRN: [id number]  Referring provider: Lashawn Byrne,*  Dx:   Encounter Diagnosis     ICD-10-CM    1. Status post total knee replacement, left  Z96.652           Start Time: 1300  Stop Time: 1346  Total time in clinic (min): 46 minutes    Subjective: German Narvaez has no new complaints today. Objective: See treatment diary below  Patient presents to clinic with SPC      Warmup vitals  HR: 86  SPo2: 97%    Assessment: German Narvaez responded appropriately to interventions performed today. Progression from walker to cane are indicative of improved mobility and confidence. Despite improvements in mobility, patient continues to experienced balance difficulties due to longstanding mobility impairments, and underlying strength deficits. German Narvaez is making progress with his quadriceps strength, reflected by increases made in intensity with open chain quadriceps exercises, however he demonstrates muscular endurance deficits and cannot consistently attain terminal knee extension with increases in intensity. German Narvaez would benefit from continued skilled physical therapy. Plan: Continue per plan of care.       Precautions: L TKA on       Manuals     Knee assessment     3' 3' 3'      Knee PROM     5'  5' 5' LM                               Neuro Re-Ed             Step up       1x10 w/ 6 in  2x10 4 in w/o eccentric  1x10 4in w/ eccentric 2x10 6" 8# RUE 2x10 6" 8# 2x10                                                                                        Ther Ex             Quad sets HEP            SLR HEP 1x10  2x5 2x10 2x10       Heel slides HEP 1x10 with OP  10x 2x10 W/ strap 3x10       Long arc quads  1x10 seated  2x5x3" 2x10 2x10  Trialed w/ 1.5 lb 2x10 1lb 2x10 2 lb 2x10 2lb 1x10 10# 1x10  15#  2x10  20# 2x5     Bike    7' ROM 7' ROM 8' ROM 6' ROM L2 9' L2 9'    VG    L6 5' L7 3x10 L7 5' L7 6' L7 6' L7 5'    PROM    MM  Knee Ext       DLHR    2x10         Sand dune         march 2x1'    Leg ext Hangs             Ther Activity             Sit to stand      Mini squat on wall 3x10  2x8 UE assist 2x8 UE assist concentric                 Gait Training             SPC walking        SPC 4 clinic laps                  Modalities

## 2023-09-19 ENCOUNTER — OFFICE VISIT (OUTPATIENT)
Dept: PHYSICAL THERAPY | Facility: REHABILITATION | Age: 73
End: 2023-09-19
Payer: COMMERCIAL

## 2023-09-19 DIAGNOSIS — Z96.652 STATUS POST TOTAL KNEE REPLACEMENT, LEFT: Primary | ICD-10-CM

## 2023-09-19 PROCEDURE — 97140 MANUAL THERAPY 1/> REGIONS: CPT

## 2023-09-19 PROCEDURE — 97110 THERAPEUTIC EXERCISES: CPT

## 2023-09-19 NOTE — PROGRESS NOTES
Daily Note     Today's date: 2023  Patient name: Thelma Suarez  : 1950  MRN: [id number]  Referring provider: Fara Suarez,*  Dx:   Encounter Diagnosis     ICD-10-CM    1. Status post total knee replacement, left  Z96.652           Start Time: 1215  Stop Time: 1300  Total time in clinic (min): 45 minutes    Subjective: Faby Eldridge says he is moving more, and feels like he is moving better than he was before the surgery. Overall, he is doing his exercises at home, walking more, going down stairs, and moving more. At worst, he gets 3/10 knee pain when he moves his leg. Overall, he wants to walk faster and walk better      Objective: See treatment diary below    80% improved since IE    Interpretive services provided by Rosa: # [id number]    TU seconds  5xSTS:18 seconds    Short Term Goals:   1. Patient will demonstrate independence with HEP by providing return demonstration of exercises- goal met  2. Patient will report decreased symptom intensity during activity by 50%- goal met  3. Patient will negotiate full flight of stairs with LRAD- goal 50% met  4. Patient will attain 90% of pre-op range of motion- goal met  5. Patient will reduce 5xsts by 3 seconds- Goal met  6. Patient will reduce TUG time by 2 seconds- Goal 10% met (previous TUG was with RW, currently ambulating with SPC)  7. Patient will walk one mile without onset of knee pain- goal met    Long Term Goals:   1. Patient will improve FOTO to greater then goal- goal ongoing  2. Patient will improve pain with activity to 2/10 or less- goal 90% met  3. Patient will continue with HEP to allow for continued progress and function- goal 90% met  4. Patient will equalize range of motion to contralateral side- Goal ongoing  5. Patient will reduce 5x sit to stand time by 9 seconds to demonstrate reduction in falls risk. - goal 40% met  6. Patient will walk 2-3 miles without difficulty. - goal 80% met    Pain  C:   W: 3 when he moves  W: 7 when it is cold. B: 0        Assessment: Patient reports 80% improvement at this time, with most improvement in strength, endurance, functional mobility, and gait. However, patient continues to express difficulties with transfers, stairs negotiation and walking. Patient has made progress towards their goals and has shown improvement throughout PT by demonstrating decreased pain, increased range of motion, increased strength, improved tolerance to activity and improved gait/balance. Patient continues to present with pain, decreased ROM, decreased strength, and decreased tolerance to activity. Brady Ventura would benefit from continued physical therapy to further improve their strength, range of motion and activity tolerance to maximize function. Thank you. Plan: Continue per plan of care.       Precautions: L TKA on 8/14      Manuals 7/31 8/17 8/22 8/24 8/31 9/5 9/7 9/12 9/14 9/19   Knee assessment     3' 3' 3'      Knee PROM     5'  5' 5' LM     Re-eval          LM 25'                Neuro Re-Ed             Step up       1x10 w/ 6 in  2x10 4 in w/o eccentric  1x10 4in w/ eccentric 2x10 6" 8# RUE 2x10 6" 8# 2x10                                                                                        Ther Ex             Quad sets HEP            SLR HEP 1x10  2x5 2x10 2x10       Heel slides HEP 1x10 with OP  10x 2x10 W/ strap 3x10       Long arc quads  1x10 seated  2x5x3" 2x10 2x10  Trialed w/ 1.5 lb 2x10 1lb 2x10 2 lb 2x10 2lb 1x10 10# 1x10  15#  2x10  20# 2x5     Bike    7' ROM 7' ROM 8' ROM 6' ROM L2 9' L2 9' L3 9'   VG    L6 5' L7 3x10 L7 5' L7 6' L7 6' L7 5'    PROM    MM  Knee Ext       DLHR    2x10         Sand dune         march 2x1'    Leg ext Hangs             Ther Activity             Sit to stand      Mini squat on wall 3x10  2x8 UE assist 2x8 UE assist concentric 3x10     no UE assist                Gait Training             SPC walking        SPC 4 clinic laps                  Modalities

## 2023-09-21 ENCOUNTER — OFFICE VISIT (OUTPATIENT)
Dept: PHYSICAL THERAPY | Facility: REHABILITATION | Age: 73
End: 2023-09-21
Payer: COMMERCIAL

## 2023-09-21 DIAGNOSIS — Z96.652 STATUS POST TOTAL KNEE REPLACEMENT, LEFT: Primary | ICD-10-CM

## 2023-09-21 PROCEDURE — 97116 GAIT TRAINING THERAPY: CPT

## 2023-09-21 PROCEDURE — 97110 THERAPEUTIC EXERCISES: CPT

## 2023-09-21 NOTE — PROGRESS NOTES
Daily Note     Today's date: 2023  Patient name: Billy Toth  : 1950  MRN: [id number]  Referring provider: Mumtaz Daniel  Dx:   Encounter Diagnosis     ICD-10-CM    1. Status post total knee replacement, left  Z96.652           Start Time: 1300  Stop Time: 8894  Total time in clinic (min): 47 minutes    Subjective: Pt is feeling well. He states he is feeling a bit tired. No issues following the previous session. Reported feeling more tired after walking laps around his apartment complex compared to usual.       Objective: See treatment diary below      Assessment: Tolerated treatment well. Pt said that he needed help walking faster. Pt demonstrated difficulty varying speeds especially when moving faster. Added in hurdles as the pt demonstrated shuffling and low foot clearance. Pt utilized a cane and required mod A to complete the exercise due to balance and difficulty clearing his LLE w/o compensation. Added marching to HEP to improve LE clearance. Patient demonstrated fatigue post treatment, exhibited good technique with therapeutic exercises and would benefit from continued PT      Plan: Continue per plan of care.       Precautions: L TKA on       Manuals    Knee assessment     3' 3' 3'      Knee PROM     5'  5' 5' LM     Re-eval          LM 25'                Neuro Re-Ed             Step up       1x10 w/ 6 in  2x10 4 in w/o eccentric  1x10 4in w/ eccentric 2x10 6" 8# RUE 2x10 6" 8# 2x10                                                                                        Ther Ex             Quad sets HEP            SLR HEP 1x10  2x5 2x10 2x10       Heel slides HEP 1x10 with OP  10x 2x10 W/ strap 3x10       Long arc quads  1x10 seated  2x5x3" 2x10 2x10  Trialed w/ 1.5 lb 2x10 1lb 2x10 2 lb 2x10 2lb 1x10 10# 1x10  15#  2x10  20# 2x5     Bike L3 9'   7' ROM 7' ROM 8' ROM 6' ROM L2 9' L2 9' L3 9'   VG    L6 5' L7 3x10 L7 5' L7 6' L7 6' L7 5'    PROM    MM  Knee Ext       DLHR    2x10         Sand dune         march 2x1'    Leg ext Hangs             Ther Activity             Sit to stand      Mini squat on wall 3x10  2x8 UE assist 2x8 UE assist concentric 3x10     no UE assist                Gait Training             SPC walking        SPC 4 clinic laps     Walking Changes in speed6 laps x 40 feet  Stepping over hurdles 4 laps x40 feet  Marching over hurdles 6 hurdles x4 laps            Modalities

## 2023-09-26 ENCOUNTER — OFFICE VISIT (OUTPATIENT)
Dept: PHYSICAL THERAPY | Facility: REHABILITATION | Age: 73
End: 2023-09-26
Payer: COMMERCIAL

## 2023-09-26 DIAGNOSIS — Z96.652 STATUS POST TOTAL KNEE REPLACEMENT, LEFT: Primary | ICD-10-CM

## 2023-09-26 PROCEDURE — 97140 MANUAL THERAPY 1/> REGIONS: CPT

## 2023-09-26 PROCEDURE — 97110 THERAPEUTIC EXERCISES: CPT

## 2023-09-26 NOTE — PROGRESS NOTES
Daily Note     Today's date: 2023  Patient name: Jhon Shipley  : 1950  MRN: [id number]  Referring provider: Isaiah Kendrick  Dx:   Encounter Diagnosis     ICD-10-CM    1. Status post total knee replacement, left  Z96.652           Start Time: 1230  Stop Time: 3305  Total time in clinic (min): 42 minutes    Subjective: Mira Kilpatrick says his left knee is more stiff and painful today. He has been walking around his building and he hurts today. Objective: See treatment diary below  Knee flexion: 100 degrees active flexion with pain in left quadriceps at end range flexion  Knee extension: 5 degrees  Palpation: patient is tender to palpation over left quadriceps musccle    Assessment: Increase in stiffness and pain likely secondary to increased activity demands. Regressed session intensity for patient tolerance and focused on maintaining range of motion without significant symptom exacerbation. Discussed temporarily reducing activity, walking distance, and HEP intensity and volume over next several days to reduce stiffness and pain. Plan: Continue per plan of care.       Precautions: L TKA on       Manuals    Knee assessment  ROM & palpation   3' 3' 3'      Knee PROM     5'  5' 5' LM     Re-eval          LM 25'   Roller   left quadriceps 5'           Neuro Re-Ed             Step up       1x10 w/ 6 in  2x10 4 in w/o eccentric  1x10 4in w/ eccentric 2x10 6" 8# RUE 2x10 6" 8# 2x10                                                                                        Ther Ex             Quad sets HEP            SLR HEP   2x5 2x10 2x10       Heel slides HEP   10x 2x10 W/ strap 3x10       Long arc quads    2x5x3" 2x10 2x10  Trialed w/ 1.5 lb 2x10 1lb 2x10 2 lb 2x10 2lb 1x10 10# 1x10  15#  2x10  20# 2x5     Bike L3 9' 10' arcs  7' ROM 7' ROM 8' ROM 6' ROM L2 9' L2 9' L3 9'   VG  L6 ROM 5'  L6 5' L7 3x10 L7 5' L7 6' L7 6' L7 5' PROM  LM 10'  MM  Knee Ext       DLHR    2x10         Sand dune         march 2x1'    Leg ext Hangs             Hamstring ball curls  4'           Ther Activity             Sit to stand      Mini squat on wall 3x10  2x8 UE assist 2x8 UE assist concentric 3x10     no UE assist                Gait Training             SPC walking        SPC 4 clinic laps     Walking Changes in speed6 laps x 40 feet  Stepping over hurdles 4 laps x40 feet  Marching over hurdles 6 hurdles x4 laps            Modalities

## 2023-09-28 ENCOUNTER — OFFICE VISIT (OUTPATIENT)
Dept: PHYSICAL THERAPY | Facility: REHABILITATION | Age: 73
End: 2023-09-28
Payer: COMMERCIAL

## 2023-09-28 ENCOUNTER — RA CDI HCC (OUTPATIENT)
Dept: OTHER | Facility: HOSPITAL | Age: 73
End: 2023-09-28

## 2023-09-28 DIAGNOSIS — Z96.652 STATUS POST TOTAL KNEE REPLACEMENT, LEFT: Primary | ICD-10-CM

## 2023-09-28 PROCEDURE — 97112 NEUROMUSCULAR REEDUCATION: CPT

## 2023-09-28 NOTE — PROGRESS NOTES
Daily Note     Today's date: 2023  Patient name: Mykel Sheppard  : 1950  MRN: [id number]  Referring provider: Cesia Matthews  Dx:   Encounter Diagnosis     ICD-10-CM    1. Status post total knee replacement, left  Z96.652           Start Time: 1345  Stop Time: 1435  Total time in clinic (min): 50 minutes    Subjective: Pt reports feeling good today. He reports still having issues with his balance on his right side. Objective: See treatment diary below      Assessment: Tolerated treatment well. Performed steps today as pt reports having difficulty balancing and mobility on his left leg. VC/TC'd pt to go up with his left and down with his right LE. Pt demonstrated increased postural sway and hesitancy initially but was improved w/ repetition due to increased motor control. Pt required min assist during shellie marching to keep upright posture. Performed NBOS on airex which the pt tolerated well. Patient demonstrated fatigue post treatment, exhibited good technique with therapeutic exercises and would benefit from continued PT      Plan: Continue per plan of care.       Precautions: L TKA on       Manuals    Knee assessment  ROM & palpation   3' 3' 3'      Knee PROM     5'  5' 5' LM     Re-eval          LM 25'   Roller   left quadriceps 5'           Neuro Re-Ed             Step up   2x10 w/ 6in step w/ eccentric    1x10 w/ 6 in  2x10 4 in w/o eccentric  1x10 4in w/ eccentric 2x10 6" 8# RUE 2x10 6" 8# 2x10                                                                                        Ther Ex             Quad sets HEP            SLR HEP   2x5 2x10 2x10       Heel slides HEP   10x 2x10 W/ strap 3x10       Long arc quads    2x5x3" 2x10 2x10  Trialed w/ 1.5 lb 2x10 1lb 2x10 2 lb 2x10 2lb 1x10 10# 1x10  15#  2x10  20# 2x5     Bike L3 9' 10' arcs 7' Lvl 3  7' ROM 7' ROM 8' ROM 6' ROM L2 9' L2 9' L3 9'   VG  L6 ROM 5' L7 5' L6 5' L7 3x10 L7 5' L7 6' L7 6' L7 5'    PROM  LM 10'  MM  Knee Ext       DLHR    2x10         Sand dune         march 2x1'    Leg ext Hangs             Hamstring ball curls  4'           Ther Activity             Sit to stand      Mini squat on wall 3x10  2x8 UE assist 2x8 UE assist concentric 3x10     no UE assist                Gait Training             SPC walking        SPC 4 clinic laps     Walking Changes in speed6 laps x 40 feet  Stepping over hurdles 4 laps x40 feet  Marching over hurdles 6 hurdles x4 laps  Marches over hurdles 4 lapsx 30'           Modalities

## 2023-09-28 NOTE — PROGRESS NOTES
[address] [facility] coding opportunities       Chart reviewed, no opportunity found: [address]        Patients Insurance     Medicare Insurance: [employer]

## 2023-09-29 ENCOUNTER — OFFICE VISIT (OUTPATIENT)
Dept: OBGYN CLINIC | Facility: MEDICAL CENTER | Age: 73
End: 2023-09-29

## 2023-09-29 VITALS
SYSTOLIC BLOOD PRESSURE: 105 MMHG | WEIGHT: 196 LBS | DIASTOLIC BLOOD PRESSURE: 63 MMHG | HEIGHT: 64 IN | BODY MASS INDEX: 33.46 KG/M2 | HEART RATE: 66 BPM

## 2023-09-29 DIAGNOSIS — Z96.652 STATUS POST TOTAL KNEE REPLACEMENT, LEFT: Primary | ICD-10-CM

## 2023-09-29 PROCEDURE — 99024 POSTOP FOLLOW-UP VISIT: CPT | Performed by: ORTHOPAEDIC SURGERY

## 2023-09-29 NOTE — PROGRESS NOTES
Assessment/Plan:  1. Status post total knee replacement, left      No orders of the defined types were placed in this encounter. · Patient presents 6 weeks s/p L TKA, DOS: 8/14/23. · Continue PT. We discussed working on his motion. We discussed that HEATHER may be considered at next visit if his motion does not improve. · Pain control prn- tylenol 1000 mg TID. · Encouraged patient to ice and elevate for swelling. · Patient will continue eliquis 5 mg BID. Per PCP note this will be for 3 months. · Patient should call ahead for abx prior to dental appts. Return in about 2 weeks (around 10/13/2023) for recheck left knee motion. I answered all of the patient's questions during the visit and provided education of the patient's condition during the visit. The patient verbalized understanding of the information given and agrees with the plan. This note was dictated using Steel[employer] software. It may contain errors including improperly dictated words. Please contact physician directly for any questions. Subjective   Chief Complaint:   Chief Complaint   Patient presents with   • Left Knee - Post-op       Dinah Zavala is a 67 y.o. male who presents for 6 week follow up s/p left TKA. Guinean translation provided by Lm Hull. Patient states his knee pain is intermittent but he does get sharp pain at times. He cannot determine when these pains occur. Patient is taking tylenol occasionally for pain. He reports being compliant with eliquis 5 mg BID. He states he completed oral antibiotic. Patient continues to attend PT and states he does exercises the other days including going up and down the stairs. Patient denies fevers, chills, distal numbness. Review of Systems  ROS:    See HPI for musculoskeletal review.    All other systems reviewed are negative     History:  Past Medical History:   Diagnosis Date   • Cataract    • Colon polyp    • Diverticulosis    • Gastritis    • GERD (gastroesophageal reflux disease)    • Hyperlipidemia    • Hypertension    • Osteoarthritis of left knee 2023   • Pre-diabetes    • Throat ulcer    • Visual impairment      Past Surgical History:   Procedure Laterality Date   • COLONOSCOPY N/A 2016    Procedure: COLONOSCOPY;  Surgeon: Judith Armenta MD;  Location:  GI LAB; Service:    • COLONOSCOPY W/ POLYPECTOMY      repeat    • EYE SURGERY Bilateral    • NC ARTHRP KNE CONDYLE&PLATU MEDIAL&LAT COMPARTMENTS Left 2023    Procedure: ARTHROPLASTY KNEE TOTAL;  Surgeon: Waldo Bledsoe DO;  Location: [address] [place] [address] MAIN OR;  Service: Orthopedics   • TONSILLECTOMY     • UPPER GASTROINTESTINAL ENDOSCOPY  2019     Social History   Social History     Substance and Sexual Activity   Alcohol Use Not Currently    Comment: pt "sober for 30+ years"     Social History     Substance and Sexual Activity   Drug Use No     Social History     Tobacco Use   Smoking Status Former   • Types: Cigarettes   • Quit date:    • Years since quittin.7   Smokeless Tobacco Never   Tobacco Comments    Patient quit     Family History:   Family History   Problem Relation Age of Onset   • Hypertension Mother    • Hypertension Father        Current Outpatient Medications on File Prior to Visit   Medication Sig Dispense Refill   • acetaminophen (TYLENOL) 500 mg tablet Take 2 tablets (1,000 mg total) by mouth every 8 (eight) hours  0   • amLODIPine (NORVASC) 5 mg tablet Take 1 tablet (5 mg total) by mouth daily 90 tablet 3   • apixaban (Eliquis) 5 mg Take 5 mg by mouth 2 (two) times a day     • ascorbic acid (VITAMIN C) 500 MG tablet TAKE 1 TABLET (500 MG TOTAL) BY MOUTH DAILY BEGIN 30 DAYS PRIOR TO SURGERY.  30 tablet 1   • atorvastatin (LIPITOR) 20 mg tablet Take 1 tablet (20 mg total) by mouth daily at bedtime 90 tablet 3   • docusate sodium (COLACE) 100 mg capsule Take 1 capsule (100 mg total) by mouth 2 (two) times a day 30 capsule 0   • ferrous sulfate 324 (65 Fe) mg TAKE 1 TABLET (324 MG TOTAL) BY MOUTH DAILY BEFORE BREAKFAST BEGIN 30 DAYS PRIOR TO SURGERY. 30 tablet 1   • folic acid (FOLVITE) 1 mg tablet TAKE 1 TABLET (1 MG TOTAL) BY MOUTH DAILY BEGIN 30 DAYS PRIOR TO SURGERY. 30 tablet 1   • gabapentin (NEURONTIN) 100 mg capsule TAKE 1 CAPSULE (100 MG TOTAL) BY MOUTH 3 (THREE) TIMES A DAY 90 capsule 0   • linaCLOtide (Linzess) 145 MCG CAPS Take 1 capsule (145 mcg total) by mouth daily 30 capsule 5   • lisinopril-hydrochlorothiazide (PRINZIDE,ZESTORETIC) 20-25 MG per tablet Take 1 tablet by mouth daily 90 tablet 3   • Multiple Vitamin (Tab-A-Lashonda) TABS TAKE 1 TABLET BY MOUTH DAILY BEGIN 30 DAYS PRIOR TO SURGERY. 30 tablet 1   • ondansetron (ZOFRAN) 4 mg tablet Take 1 tablet (4 mg total) by mouth every 8 (eight) hours as needed for nausea or vomiting 12 tablet 0   • oxyCODONE (ROXICODONE) 10 MG TABS Take 1 tablet (10 mg total) by mouth every 4 (four) hours as needed for severe pain Max Daily Amount: 60 mg 42 tablet 0   • promethazine (PHENERGAN) 12.5 MG tablet Take 1 tablet (12.5 mg total) by mouth every 6 (six) hours as needed for nausea or vomiting 12 tablet 0     No current facility-administered medications on file prior to visit.      Allergies   Allergen Reactions   • Lactose - Food Allergy Diarrhea     diarrhea        Objective     /63   Pulse 66   Ht 5' 4" (1.626 m)   Wt 88.9 kg (196 lb)   BMI 33.64 kg/m²      PE:  AAOx 3  WDWN  Hearing intact, no drainage from eyes  no audible wheezing  no abdominal distension  LE compartments soft, AT/GS intact    Ortho Exam:  left Knee:   INC: healed, No erythema, moderate swelling  AROM:3 - 95 degrees  PROM: 3- 105 degrees

## 2023-10-03 ENCOUNTER — APPOINTMENT (OUTPATIENT)
Dept: PHYSICAL THERAPY | Facility: REHABILITATION | Age: 73
End: 2023-10-03
Payer: COMMERCIAL

## 2023-10-03 DIAGNOSIS — Z96.652 STATUS POST TOTAL KNEE REPLACEMENT, LEFT: ICD-10-CM

## 2023-10-03 RX ORDER — GABAPENTIN 100 MG/1
100 CAPSULE ORAL 3 TIMES DAILY
Qty: 90 CAPSULE | Refills: 0 | Status: SHIPPED | OUTPATIENT
Start: 2023-10-03 | End: 2023-10-04 | Stop reason: ALTCHOICE

## 2023-10-04 ENCOUNTER — OFFICE VISIT (OUTPATIENT)
Dept: PHYSICAL THERAPY | Facility: REHABILITATION | Age: 73
End: 2023-10-04
Payer: COMMERCIAL

## 2023-10-04 ENCOUNTER — OFFICE VISIT (OUTPATIENT)
Dept: FAMILY MEDICINE CLINIC | Facility: CLINIC | Age: 73
End: 2023-10-04
Payer: COMMERCIAL

## 2023-10-04 ENCOUNTER — LAB (OUTPATIENT)
Dept: LAB | Facility: HOSPITAL | Age: 73
End: 2023-10-04
Payer: COMMERCIAL

## 2023-10-04 VITALS
TEMPERATURE: 98.4 F | WEIGHT: 196 LBS | BODY MASS INDEX: 33.46 KG/M2 | OXYGEN SATURATION: 98 % | DIASTOLIC BLOOD PRESSURE: 70 MMHG | RESPIRATION RATE: 16 BRPM | SYSTOLIC BLOOD PRESSURE: 120 MMHG | HEIGHT: 64 IN | HEART RATE: 68 BPM

## 2023-10-04 DIAGNOSIS — Z96.652 STATUS POST TOTAL KNEE REPLACEMENT, LEFT: Primary | ICD-10-CM

## 2023-10-04 DIAGNOSIS — D64.9 ANEMIA, UNSPECIFIED TYPE: ICD-10-CM

## 2023-10-04 DIAGNOSIS — D64.9 ANEMIA, UNSPECIFIED TYPE: Primary | ICD-10-CM

## 2023-10-04 DIAGNOSIS — I10 HYPERTENSION, UNSPECIFIED TYPE: ICD-10-CM

## 2023-10-04 DIAGNOSIS — I82.462 ACUTE DEEP VEIN THROMBOSIS (DVT) OF CALF MUSCLE VEIN OF LEFT LOWER EXTREMITY (HCC): ICD-10-CM

## 2023-10-04 DIAGNOSIS — E78.5 HYPERLIPIDEMIA, UNSPECIFIED HYPERLIPIDEMIA TYPE: ICD-10-CM

## 2023-10-04 PROBLEM — K21.9 GERD (GASTROESOPHAGEAL REFLUX DISEASE): Status: RESOLVED | Noted: 2019-05-31 | Resolved: 2023-10-04

## 2023-10-04 LAB
BASOPHILS # BLD AUTO: 0.08 THOUSANDS/ÂΜL (ref 0–0.1)
BASOPHILS NFR BLD AUTO: 1 % (ref 0–1)
EOSINOPHIL # BLD AUTO: 0.23 THOUSAND/ÂΜL (ref 0–0.61)
EOSINOPHIL NFR BLD AUTO: 3 % (ref 0–6)
ERYTHROCYTE [DISTWIDTH] IN BLOOD BY AUTOMATED COUNT: 13.5 % (ref 11.6–15.1)
HCT VFR BLD AUTO: 33.3 % (ref 36.5–49.3)
HGB BLD-MCNC: 10.6 G/DL (ref 12–17)
IMM GRANULOCYTES # BLD AUTO: 0.03 THOUSAND/UL (ref 0–0.2)
IMM GRANULOCYTES NFR BLD AUTO: 0 % (ref 0–2)
LYMPHOCYTES # BLD AUTO: 1.89 THOUSANDS/ÂΜL (ref 0.6–4.47)
LYMPHOCYTES NFR BLD AUTO: 26 % (ref 14–44)
MCH RBC QN AUTO: 29.5 PG (ref 26.8–34.3)
MCHC RBC AUTO-ENTMCNC: 31.8 G/DL (ref 31.4–37.4)
MCV RBC AUTO: 93 FL (ref 82–98)
MONOCYTES # BLD AUTO: 0.86 THOUSAND/ÂΜL (ref 0.17–1.22)
MONOCYTES NFR BLD AUTO: 12 % (ref 4–12)
NEUTROPHILS # BLD AUTO: 4.07 THOUSANDS/ÂΜL (ref 1.85–7.62)
NEUTS SEG NFR BLD AUTO: 58 % (ref 43–75)
NRBC BLD AUTO-RTO: 0 /100 WBCS
PLATELET # BLD AUTO: 321 THOUSANDS/UL (ref 149–390)
PMV BLD AUTO: 10 FL (ref 8.9–12.7)
RBC # BLD AUTO: 3.59 MILLION/UL (ref 3.88–5.62)
WBC # BLD AUTO: 7.16 THOUSAND/UL (ref 4.31–10.16)

## 2023-10-04 PROCEDURE — 97116 GAIT TRAINING THERAPY: CPT

## 2023-10-04 PROCEDURE — 36415 COLL VENOUS BLD VENIPUNCTURE: CPT

## 2023-10-04 PROCEDURE — 99214 OFFICE O/P EST MOD 30 MIN: CPT | Performed by: FAMILY MEDICINE

## 2023-10-04 PROCEDURE — 85025 COMPLETE CBC W/AUTO DIFF WBC: CPT

## 2023-10-04 PROCEDURE — 97110 THERAPEUTIC EXERCISES: CPT

## 2023-10-04 PROCEDURE — 97530 THERAPEUTIC ACTIVITIES: CPT

## 2023-10-04 RX ORDER — AMLODIPINE BESYLATE 5 MG/1
5 TABLET ORAL DAILY
Qty: 90 TABLET | Refills: 3 | Status: SHIPPED | OUTPATIENT
Start: 2023-10-04

## 2023-10-04 RX ORDER — LISINOPRIL AND HYDROCHLOROTHIAZIDE 25; 20 MG/1; MG/1
1 TABLET ORAL DAILY
Qty: 90 TABLET | Refills: 3 | Status: SHIPPED | OUTPATIENT
Start: 2023-10-04

## 2023-10-04 RX ORDER — ATORVASTATIN CALCIUM 20 MG/1
20 TABLET, FILM COATED ORAL
Qty: 90 TABLET | Refills: 3 | Status: SHIPPED | OUTPATIENT
Start: 2023-10-04

## 2023-10-04 NOTE — PROGRESS NOTES
Daily Note     Today's date: 10/4/2023  Patient name: Regino Miller  : 1950  MRN: [id number]  Referring provider: Aaron Barrera,*  Dx:   Encounter Diagnosis     ICD-10-CM    1. Status post total knee replacement, left  Z96.652           Start Time: 1215  Stop Time: 1300  Total time in clinic (min): 45 minutes    Subjective: Thuy Zambrano says his knee feels good today. He gets dizzy when he turns around or moves his head sometimes. 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 Objective: See treatment diary below      Assessment: Thuy Zambrano responded appropriately to interventions performed today. Despite improvements in mobility, patient continues to experienced balance difficulties. Difficulties with dynamic balance may be visual in nature as opposed to being strength based. Increased volume of leg extensions to improve quadriceps strength, however he continues to fatigue quickly and cannot maintain terminal open chain knee extension due to strength deficit. Thuy Zambrano would benefit from continued skilled physical therapy. Plan: Continue per plan of care.       Precautions: L TKA on       Manuals 9/21 9/26 9/28 10/4 8/31 9/5 9/7 9/12 9/14 9/19   Knee assessment  ROM & palpation   3' 3' 3'      Knee PROM     5'  5' 5' LM     Re-eval          LM 25'   Roller   left quadriceps 5'           Neuro Re-Ed             Step up   2x10 w/ 6in step w/ eccentric 2x10 6" step   1x10 w/ 6 in  2x10 4 in w/o eccentric  1x10 4in w/ eccentric 2x10 6" 8# RUE 2x10 6" 8# 2x10                                                                                        Ther Ex             Quad sets HEP            SLR HEP    2x10 2x10       Heel slides HEP    2x10 W/ strap 3x10       Long arc quads    5x5 20# 2x10 2x10  Trialed w/ 1.5 lb 2x10 1lb 2x10 2 lb 2x10 2lb 1x10 10# 1x10  15#  2x10  20# 2x5     Bike L3 9' 10' arcs 7' Lvl 3  10' lvl 3 7' ROM 8' ROM 6' ROM L2 9' L2 9' L3 9'   VG  L6 ROM 5' L7 5' L7 5' L7 3x10 L7 5' L7 6' L7 6' L7 5'    PROM  LM 10'    Knee Ext       [facility]             Sand dune         march 2x1'    Leg ext Hangs             Hamstring ball curls  4'           Ther Activity             Sit to stand      Mini squat on wall 3x10  2x8 UE assist 2x8 UE assist concentric 3x10     no UE assist                Gait Training             SPC walking    vc for speed changes  & stopping x 5'    SPC 4 clinic laps     Walking Changes in speed6 laps x 40 feet  Stepping over hurdles 4 laps x40 feet  Marching over hurdles 6 hurdles x4 laps  Marches over hurdles 4 lapsx 30'  Mrches over shellie 4 laps 5 hurdles vc for sequence         Modalities

## 2023-10-04 NOTE — PROGRESS NOTES
Michael Shipley      : 1950      MRN: [id number]  Encounter Provider: Kelly Fenton MD  Encounter Date: 10/4/2023   Encounter department: [address] [place] [address]   Assessment:   Mira Kilpatrick has a history of balance issues, frequent urination, and a pancreatic cyst. His blood pressure is well-controlled and his weight is stable. His left knee is healing well after surgery. His is prone to walk with trunk forward looking at the floor. Plan:   1. Continue monitoring balance issues , use a cane for balance and try to erect posture. 2. Investigate the cause of frequent urination. Consider sleep apnea as a potential cause. 3. Monitor the pancreatic cyst. Plan to repeat imaging in two years, unless symptoms worsen. 4. Continue current medications for blood pressure control. 5. Discontinue iron supplementation until blood test results are reviewed. 6. Order blood tests to check hemoglobin levels and assess for anemia. 7. Schedule a follow-up visit in three months. 1. Anemia, unspecified type  -     CBC and differential; Future    2. Hypertension, unspecified type  -     amLODIPine (NORVASC) 5 mg tablet; Take 1 tablet (5 mg total) by mouth daily  -     lisinopril-hydrochlorothiazide (PRINZIDE,ZESTORETIC) 20-25 MG per tablet; Take 1 tablet by mouth daily    3. Acute deep vein thrombosis (DVT) of calf muscle vein of left lower extremity (HCC)  -     apixaban (Eliquis) 5 mg; Take 1 tablet (5 mg total) by mouth 2 (two) times a day    4. Hyperlipidemia, unspecified hyperlipidemia type  -     atorvastatin (LIPITOR) 20 mg tablet; Take 1 tablet (20 mg total) by mouth daily at bedtime      Subjective:   Mira Kilpatrick, a 67-year-old male, presents for a follow-up visit. He reports feeling well overall, but has some concerns about his balance. He also mentions occasional stomach pain and frequent urination, especially at night.  He states that he has to urinate every two hours, and this frequency increases if he sleeps on his left side. He denies any chest pain or shortness of breath. He also mentions a previous diagnosis of a cyst in his pancreas. patient's history were reviewed and updated as appropriate: allergies, current medications, past family history, past medical history, past social history, past surgical history and problem list.    Objective:     /70 (BP Location: Left arm, Patient Position: Sitting, Cuff Size: Standard)   Pulse 68   Temp 98.4 °F (36.9 °C) (Tympanic)   Resp 16   Ht 5' 4" (1.626 m)   Wt 88.9 kg (196 lb)   SpO2 98%   BMI 33.64 kg/m²     Physical examination reveals a well-nourished and well-developed male in no apparent distress. His blood pressure is 120/70 and his weight has remained stable. His heart sounds are regular without any murmurs, and his lungs are clear to auscultation. His abdomen is soft and non-tender. His left knee appears more swollen than the right, but the wound is healing well.      Eusebia Bailey MD

## 2023-10-05 ENCOUNTER — OFFICE VISIT (OUTPATIENT)
Dept: PHYSICAL THERAPY | Facility: REHABILITATION | Age: 73
End: 2023-10-05
Payer: COMMERCIAL

## 2023-10-05 DIAGNOSIS — Z96.652 STATUS POST TOTAL KNEE REPLACEMENT, LEFT: Primary | ICD-10-CM

## 2023-10-05 DIAGNOSIS — D64.9 ANEMIA, UNSPECIFIED TYPE: Primary | ICD-10-CM

## 2023-10-05 PROCEDURE — 97110 THERAPEUTIC EXERCISES: CPT

## 2023-10-05 PROCEDURE — 97116 GAIT TRAINING THERAPY: CPT

## 2023-10-05 PROCEDURE — 97112 NEUROMUSCULAR REEDUCATION: CPT

## 2023-10-05 NOTE — PROGRESS NOTES
Daily Note     Today's date: 10/5/2023  Patient name: Anibal Miller  : 1950  MRN: [id number]  Referring provider: Gregory Syed,*  Dx:   Encounter Diagnosis     ICD-10-CM    1. Status post total knee replacement, left  Z96.652           Start Time: 1300  Stop Time: 1345  Total time in clinic (min): 45 minutes  PT session performed by Ashlee Porter, SPT - supervised by Maggie Martinez PT, DPT and case discussed in detail throughout session. Subjective: Pt reports feeling well. He had no pain following the last session. Continues to report some balance and strength issues on his right side. Objective: See treatment diary below      Assessment: Tolerated treatment well. Pt continues to demonstrate strength and balance deficits when ascending w/ his right and descending w/ his left. Pt required frequent rests due to pain during step ups. Pt required mod assist for balance and stability during step ups and VC for sequence. Pt required mod assist for marches over hurdles too. Pt would occasionally not clear the shellie w/ either right or left LE. Focused session on leading w/ his right LE since he primarily uses his left due to balance and strength deficits. Patient demonstrated fatigue post treatment, exhibited good technique with therapeutic exercises and would benefit from continued PT      Plan: Continue per plan of care.       Precautions: L TKA on       Manuals 9/21 9/26 9/28 10/4 10/5 9/5 9/7 9/12 9/14 9/19   Knee assessment  ROM & palpation    3' 3'      Knee PROM       5' 5' LM     Re-eval          LM 25'   Roller   left quadriceps 5'           Neuro Re-Ed             Step up   2x10 w/ 6in step w/ eccentric 2x10 6" step 3x10 8" step  1x10 w/ 6 in  2x10 4 in w/o eccentric  1x10 4in w/ eccentric 2x10 6" 8# RUE 2x10 6" 8# 2x10                                                                                        Ther Ex             Quad sets HEP            SLR HEP     2x10       Heel slides HEP     W/ strap 3x10       Long arc quads    5x5 20#  2x10  Trialed w/ 1.5 lb 2x10 1lb 2x10 2 lb 2x10 2lb 1x10 10# 1x10  15#  2x10  20# 2x5     Bike L3 9' 10' arcs 7' Lvl 3  10' lvl 3 10' lvl 4 8' ROM 6' ROM L2 9' L2 9' L3 9'   VG  L6 ROM 5' L7 5' L7 5'  L7 5' L7 6' L7 6' L7 5'    PROM  LM 10'    Knee Ext       [facility]             Sand dune         march 2x1'    Leg ext Hangs             Hamstring ball curls  4'           Ther Activity             Sit to stand      Mini squat on wall 3x10  2x8 UE assist 2x8 UE assist concentric 3x10     no UE assist                Gait Training             SPC walking    vc for speed changes  & stopping x 5' VC for head turns 3 laps   SPC 4 clinic laps     Walking Changes in speed6 laps x 40 feet  Stepping over hurdles 4 laps x40 feet  Marching over hurdles 6 hurdles x4 laps  Marches over hurdles 4 lapsx 30'  Mrches over shellie 4 laps 5 hurdles vc for sequence Marches over hurdles 4 laps        Modalities

## 2023-10-05 NOTE — PROGRESS NOTES
Spoke with patient about results, requesting reticulocyte reticulocyte count, folic acid, vitamin W85 and fit  to assess anemia.

## 2023-10-10 ENCOUNTER — OFFICE VISIT (OUTPATIENT)
Dept: PHYSICAL THERAPY | Facility: REHABILITATION | Age: 73
End: 2023-10-10
Payer: COMMERCIAL

## 2023-10-10 DIAGNOSIS — Z96.652 STATUS POST TOTAL KNEE REPLACEMENT, LEFT: Primary | ICD-10-CM

## 2023-10-10 PROCEDURE — 97110 THERAPEUTIC EXERCISES: CPT

## 2023-10-10 PROCEDURE — 97112 NEUROMUSCULAR REEDUCATION: CPT

## 2023-10-10 NOTE — PROGRESS NOTES
Daily Note     Today's date: 10/10/2023  Patient name: Troy Hamilton  : 1950  MRN: [id number]  Referring provider: Lashawn Byrne,*  Dx:   Encounter Diagnosis     ICD-10-CM    1. Status post total knee replacement, left  Z96.652           Start Time: 3176  Stop Time: 6547  Total time in clinic (min): 45 minutes    Subjective: Madeline says he has been walking more. He has a little bit of pain when he walks, but has more pain when he starts moving after sitting. Objective: See treatment diary below  Flex: 110   Ext: equal bilaterally    Assessment: Madeline continues to demonstrate force production deficits and balance deficits reducing his functional mobility. Patient has difficulty maintaining single limb stance during balance interventions performed today. Patient's pain is likely start up pain, and will improve with time and improved strength. Continued with interventions to improve overall balance and lower extremity strength. Madeline would benefit from continued skilled physical therapy. Plan: Continue per plan of care.       Precautions: L TKA on       Manuals 9/21 9/26 9/28 10/4 10/5 10/10 9/7 9/12 9/14 9/19   Knee assessment  ROM & palpation    3' 3'      Knee PROM       5' 5' LM     Re-eval          LM 25'   Roller   left quadriceps 5'           Neuro Re-Ed             Step up   2x10 w/ 6in step w/ eccentric 2x10 6" step 3x10 8" step  1x10 w/ 6 in  2x10 4 in w/o eccentric  1x10 4in w/ eccentric 2x10 6" 8# RUE 2x10 6" 8# 2x10                                                                                        Ther Ex             Quad sets HEP            SLR HEP            Heel slides HEP            Long arc quads    5x5 20#  3x10 20# 2x10 1lb 2x10 2 lb 2x10 2lb 1x10 10# 1x10  15#  2x10  20# 2x5     Bike L3 9' 10' arcs 7' Lvl 3  10' lvl 3 10' lvl 4 10' lvl 5 6' ROM L2 9' L2 9' L3 9'   VG  L6 ROM 5' L7 5' L7 5'   L7 6' L7 6' L7 5'    PROM  LM 10'           [facility] Sand dune         march 2x1'    Leg ext Hangs             Hamstring ball curls  4'           Ther Activity             Sit to stand        2x8 UE assist 2x8 UE assist concentric 3x10     no UE assist                Gait Training             SPC walking    vc for speed changes  & stopping x 5' VC for head turns 3 laps   SPC 4 clinic laps     Walking Changes in speed6 laps x 40 feet  Stepping over hurdles 4 laps x40 feet  Marching over hurdles 6 hurdles x4 laps  Marches over hurdles 4 lapsx 30'  Mrches over shellie 4 laps 5 hurdles vc for sequence Marches over hurdles 4 laps Hurdles with spc x 4 laps    Step over 1 shellie x20 no SPC       Modalities

## 2023-10-12 ENCOUNTER — OFFICE VISIT (OUTPATIENT)
Dept: PHYSICAL THERAPY | Facility: REHABILITATION | Age: 73
End: 2023-10-12
Payer: COMMERCIAL

## 2023-10-12 DIAGNOSIS — Z96.652 STATUS POST TOTAL KNEE REPLACEMENT, LEFT: Primary | ICD-10-CM

## 2023-10-12 PROCEDURE — 97112 NEUROMUSCULAR REEDUCATION: CPT

## 2023-10-12 NOTE — PROGRESS NOTES
Daily Note     Today's date: 10/12/2023  Patient name: Khalida Newberry  : 1950  MRN: [id number]  Referring provider: Tico Rodriguez,*  Dx:   Encounter Diagnosis     ICD-10-CM    1. Status post total knee replacement, left  Z96.652           Start Time: 1300  Stop Time: 1345  Total time in clinic (min): 45 minutes  PT session performed by Milagros Damico, SPT - supervised by Danielle Bonilla PT, DPT and case discussed in detail throughout session. Subjective: Pt reports feeling well. He says he only leads with his left when negotiating steps and elevated surfaces. Objective: See treatment diary below      Assessment: Tolerated treatment well. Pt continues to demonstrate initial difficulty and hesitancy when performing step ups and step downs due to deficits in balance, strength, and pain. As the exercise progresses, his motor control improves and is able to complete the exercise w/o hesitation. When performing shellie marching, pt demonstrates difficulty w/ obstacle clearance, balance, and decrease in gait speed. Attempted reciprocal gait w/ marching today but was unsuccessful. Required CG-min A during step ups and shellie marching to maintain upright posture. Pt will continue to benefit from PT to improve on balance, motor control, and confidence when negotiating uneven terrain. Patient demonstrated fatigue post treatment, exhibited good technique with therapeutic exercises, and would benefit from continued PT      Plan: Continue per plan of care.       Precautions: L TKA on       Manuals 9/21 9/26 9/28 10/4 10/5 10/10 10/12 9/12 9/14 9/19   Knee assessment  ROM & palpation    3'       Knee PROM        5' LM     Re-eval          LM 25'   Roller   left quadriceps 5'           Neuro Re-Ed             Step up   2x10 w/ 6in step w/ eccentric 2x10 6" step 3x10 8" step  2x10 w/ 8" step w/ HR fwd  1x10 w/o HR assist  fwd 2x10 6" 8# RUE 2x10 6" 8# 2x10 Ther Ex             Quad sets HEP            SLR HEP            Heel slides HEP            Long arc quads    5x5 20#  3x10 20# 3x  20 lbs 2x10 2lb 1x10 10# 1x10  15#  2x10  20# 2x5     Bike L3 9' 10' arcs 7' Lvl 3  10' lvl 3 10' lvl 4 10' lvl 5 10' lvl 5 L2 9' L2 9' L3 9'   VG  L6 ROM 5' L7 5' L7 5'    L7 6' L7 5'    PROM  LM 10'           [facility]             Sand dune march 2x1'    Leg ext Hangs             Hamstring ball curls  4'           Ther Activity             Sit to stand        2x8 UE assist 2x8 UE assist concentric 3x10     no UE assist                Gait Training             SPC walking    vc for speed changes  & stopping x 5' VC for head turns 3 laps   SPC 4 clinic laps     Walking Changes in speed6 laps x 40 feet  Stepping over hurdles 4 laps x40 feet  Marching over hurdles 6 hurdles x4 laps  Marches over hurdles 4 lapsx 30'  Mrches over shellie 4 laps 5 hurdles vc for sequence Marches over hurdles 4 laps Hurdles with spc x 4 laps    Step over 1 shellie x20 no SPC Marches over shellie no SPC 4 laps of 6 hurdles CG-min A      Modalities

## 2023-10-13 ENCOUNTER — OFFICE VISIT (OUTPATIENT)
Dept: OBGYN CLINIC | Facility: MEDICAL CENTER | Age: 73
End: 2023-10-13
Payer: COMMERCIAL

## 2023-10-13 VITALS
HEART RATE: 66 BPM | DIASTOLIC BLOOD PRESSURE: 72 MMHG | HEIGHT: 64 IN | BODY MASS INDEX: 33.12 KG/M2 | WEIGHT: 194 LBS | SYSTOLIC BLOOD PRESSURE: 134 MMHG

## 2023-10-13 DIAGNOSIS — M17.11 PRIMARY OSTEOARTHRITIS OF RIGHT KNEE: ICD-10-CM

## 2023-10-13 DIAGNOSIS — Z96.652 STATUS POST TOTAL KNEE REPLACEMENT, LEFT: Primary | ICD-10-CM

## 2023-10-13 PROCEDURE — 20610 DRAIN/INJ JOINT/BURSA W/O US: CPT | Performed by: PHYSICIAN ASSISTANT

## 2023-10-13 PROCEDURE — 99024 POSTOP FOLLOW-UP VISIT: CPT | Performed by: PHYSICIAN ASSISTANT

## 2023-10-13 RX ORDER — TRIAMCINOLONE ACETONIDE 40 MG/ML
40 INJECTION, SUSPENSION INTRA-ARTICULAR; INTRAMUSCULAR
Status: COMPLETED | OUTPATIENT
Start: 2023-10-13 | End: 2023-10-13

## 2023-10-13 RX ORDER — BUPIVACAINE HYDROCHLORIDE 2.5 MG/ML
2 INJECTION, SOLUTION INFILTRATION; PERINEURAL
Status: COMPLETED | OUTPATIENT
Start: 2023-10-13 | End: 2023-10-13

## 2023-10-13 RX ADMIN — BUPIVACAINE HYDROCHLORIDE 2 ML: 2.5 INJECTION, SOLUTION INFILTRATION; PERINEURAL at 10:45

## 2023-10-13 RX ADMIN — TRIAMCINOLONE ACETONIDE 40 MG: 40 INJECTION, SUSPENSION INTRA-ARTICULAR; INTRAMUSCULAR at 10:45

## 2023-10-13 NOTE — PROGRESS NOTES
Assessment/Plan:  1. Status post total knee replacement, left    2. Primary osteoarthritis of right knee      Orders Placed This Encounter   Procedures    Large joint arthrocentesis: R knee       Patient is 8 weeks status post L TKA on 8/14/23. He also has severe right knee osteoarthritis. Continue PT. continue to work on right knee motion. Pain control prn-Tylenol as needed. No NSAIDs due to being on Eliquis. Patient should call ahead for abx prior to dental appts. Patient received right knee steroid injection today. Tolerated the procedure well. Post injection instructions reviewed. Patient does not want to discuss R TKA planning yet. Return in about 3 weeks (around 11/3/2023) for L TKA post op 3. I answered all of the patient's questions during the visit and provided education of the patient's condition during the visit. The patient verbalized understanding of the information given and agrees with the plan. This note was dictated using NASOFORM software. It may contain errors including improperly dictated words. Please contact physician directly for any questions. Subjective   Chief Complaint:   Chief Complaint   Patient presents with    Left Knee - Post-op, Follow-up       Carole Menard is a 67 y.o. male who presents for 8 week follow up s/p left TKA. Kenyan translation provided by Claude Oh, [facility]. Patient states overall he is doing well. He notes that his left knee has been improving over time. He states he has minimal to no pain in the left knee. He will take Tylenol occasionally as needed for pain. He continues to attend physical therapy and feels that his motion has improved. Overall patient is happy with his left total knee replacement. Patient reports right knee pain located diffusely about the right knee. He would like right knee steroid injection today. Review of Systems  ROS:    See HPI for musculoskeletal review.    All other systems reviewed are negative History:  Past Medical History:   Diagnosis Date    Anemia     Cataract     Colon polyp     Diverticulosis     Gastritis     GERD (gastroesophageal reflux disease)     Hyperlipidemia     Hypertension     Osteoarthritis of left knee 2023    Pre-diabetes     Throat ulcer     Visual impairment      Past Surgical History:   Procedure Laterality Date    COLONOSCOPY N/A 2016    Procedure: COLONOSCOPY;  Surgeon: Ana Schaefer MD;  Location:  GI LAB;   Service:     COLONOSCOPY W/ POLYPECTOMY  2019    repeat     EYE SURGERY Bilateral     [place] ARTHRP KNE CONDYLE&PLATU MEDIAL&LAT COMPARTMENTS Left 2023    Procedure: ARTHROPLASTY KNEE TOTAL;  Surgeon: Jody Gary DO;  Location: [address] [place] [address];  Service: Orthopedics    TONSILLECTOMY      UPPER GASTROINTESTINAL ENDOSCOPY  2019     Social History   Social History     Substance and Sexual Activity   Alcohol Use Not Currently    Comment: pt "sober for 30+ years"     Social History     Substance and Sexual Activity   Drug Use No     Social History     Tobacco Use   Smoking Status Former    Types: Cigarettes    Quit date:     Years since quittin.7   Smokeless Tobacco Never   Tobacco Comments    Patient quit     Family History:   Family History   Problem Relation Age of Onset    Hypertension Mother     Hypertension Father        Current Outpatient Medications on File Prior to Visit   Medication Sig Dispense Refill    amLODIPine (NORVASC) 5 mg tablet Take 1 tablet (5 mg total) by mouth daily 90 tablet 3    apixaban (Eliquis) 5 mg Take 1 tablet (5 mg total) by mouth 2 (two) times a day 60 tablet 4    atorvastatin (LIPITOR) 20 mg tablet Take 1 tablet (20 mg total) by mouth daily at bedtime 90 tablet 3    linaCLOtide (Linzess) 145 MCG CAPS Take 1 capsule (145 mcg total) by mouth daily 30 capsule 5    lisinopril-hydrochlorothiazide (PRINZIDE,ZESTORETIC) 20-25 MG per tablet Take 1 tablet by mouth daily 90 tablet 3     No current facility-administered medications on file prior to visit. Allergies   Allergen Reactions    Lactose - Food Allergy Diarrhea     diarrhea        Objective     /72   Pulse 66   Ht 5' 4" (1.626 m)   Wt 88 kg (194 lb)   BMI 33.30 kg/m²      PE:  AAOx 3  WDWN  Hearing intact, no drainage from eyes  no audible wheezing  no abdominal distension  LE compartments soft, AT/GS intact    Ortho Exam:  left Knee:   INC: healed, No erythema, mild swelling  AROM: 3 - 110 degrees      Large joint arthrocentesis: R knee  Universal Protocol:  Consent: Verbal consent obtained.   Risks and benefits: risks, benefits and alternatives were discussed  Consent given by: patient  Site marked: the operative site was marked  Supporting Documentation  Indications: pain   Procedure Details  Location: knee - R knee  Preparation: Patient was prepped and draped in the usual sterile fashion  Needle size: 22 G  Ultrasound guidance: no  Approach: anterolateral  Medications administered: 2 mL bupivacaine 0.25 %; 40 mg triamcinolone acetonide 40 mg/mL    Patient tolerance: patient tolerated the procedure well with no immediate complications  Dressing:  Sterile dressing applied

## 2023-10-17 ENCOUNTER — OFFICE VISIT (OUTPATIENT)
Dept: PHYSICAL THERAPY | Facility: REHABILITATION | Age: 73
End: 2023-10-17
Payer: COMMERCIAL

## 2023-10-17 DIAGNOSIS — Z96.652 STATUS POST TOTAL KNEE REPLACEMENT, LEFT: Primary | ICD-10-CM

## 2023-10-17 PROCEDURE — 97110 THERAPEUTIC EXERCISES: CPT

## 2023-10-17 PROCEDURE — 97530 THERAPEUTIC ACTIVITIES: CPT

## 2023-10-17 PROCEDURE — 97116 GAIT TRAINING THERAPY: CPT

## 2023-10-17 NOTE — PROGRESS NOTES
Daily Note     Today's date: 10/17/2023  Patient name: Suraj Mathias  : 1950  MRN: [id number]  Referring provider: Abida Cueva,*  Dx:   Encounter Diagnosis     ICD-10-CM    1. Status post total knee replacement, left  Z96.652           Start Time: 1215  Stop Time: 1300  Total time in clinic (min): 45 minutes    Subjective: Valentin Cheney says he feels good, he is walking around well at home. Objective: See treatment diary below    Valentin Cheney ascends stairs with reciprocal gait pattern, he descends with step to gait pattern. Assessment: Valentin Cheney responded appropriately to interventions performed today. Continued with interventions to improve functional strength. During sit to stands, gigi utilizes momentum to overcome static position suggesting further lower extremity weakness. Continued with shellie walking to improve step height and improve single limb stance for normal gait. Incorporated lateral steps to further challenge dynamic balance. Valentin Cheney would benefit from continued skilled physical therapy. Plan: Potential discharge next visit.      Precautions: L TKA on       Manuals 9/21 9/26 9/28 10/4 10/5 10/10 10/12 10/17 9/14 9/19   Knee assessment  ROM & palpation    3'       Knee PROM             Re-eval          LM 25'   Roller   left quadriceps 5'           Neuro Re-Ed             Step up   2x10 w/ 6in step w/ eccentric 2x10 6" step 3x10 8" step  2x10 w/ 8" step w/ HR fwd  1x10 w/o HR assist  fwd  2x10 6" 8# 2x10                                                                                        Ther Ex             Quad sets HEP            SLR HEP            Heel slides HEP            Long arc quads    5x5 20#  3x10 20# 3x  20 lbs 3x10    B/l   35 lbs 1x10 10# 1x10  15#  2x10  20# 2x5     Bike L3 9' 10' arcs 7' Lvl 3  10' lvl 3 10' lvl 4 10' lvl 5 10' lvl 5 10' lvl 4 L2 9' L3 9'   VG  L6 ROM 5' L7 5' L7 5'    L7 5' end L7 5'    PROM  LM 10'           [facility] Sand dune         march 2x1'    Leg ext Hangs             Hamstring ball curls  4'           Ther Activity             Sit to stand        3x10 10# vc for control ecc 2x8 UE assist concentric 3x10     no UE assist                Gait Training             SPC walking    vc for speed changes  & stopping x 5' VC for head turns 3 laps        Stairs negotiation        Assessed     Walking Changes in speed6 laps x 40 feet  Stepping over hurdles 4 laps x40 feet  Marching over hurdles 6 hurdles x4 laps  Marches over hurdles 4 lapsx 30'  Mrches over shellie 4 laps 5 hurdles vc for sequence Marches over hurdles 4 laps Hurdles with spc x 4 laps    Step over 1 shellie x20 no SPC Marches over shellie no SPC 4 laps of 6 hurdles CG-min A Hurdles with SPC x2 laps    No spc x2 laps    Lateral hurdles x10 total     Modalities

## 2023-10-19 ENCOUNTER — OFFICE VISIT (OUTPATIENT)
Dept: PHYSICAL THERAPY | Facility: REHABILITATION | Age: 73
End: 2023-10-19
Payer: COMMERCIAL

## 2023-10-19 DIAGNOSIS — Z96.652 STATUS POST TOTAL KNEE REPLACEMENT, LEFT: Primary | ICD-10-CM

## 2023-10-19 PROCEDURE — 97140 MANUAL THERAPY 1/> REGIONS: CPT

## 2023-10-19 NOTE — PROGRESS NOTES
Discharge Note     Today's date: 10/19/2023  Patient name: Ricarda Klein  : 1950  MRN: [id number]  Referring provider: Alexandra Monsivais,*  Dx:   Encounter Diagnosis     ICD-10-CM    1. Status post total knee replacement, left  Z96.652           Start Time: 1300  Stop Time: 1345  Total time in clinic (min): 45 minutes  PT session performed by Jaden Mathis, SPT - supervised by Jemima Mathew PT, DPT and case discussed in detail throughout session. Subjective: Pt reports feeling well today. He states he continues to have pain when walking, especially longer distances. Objective: See treatment diary below. Current pain 6/10  Worst: 8/10  Best: 0/10  GROC: 80%. Feels he continues to need to work on his balance, strength, and endurance. L knee flexion: 105 R knee flexion: 110  L knee extension: -3 R knee extension: -3  5x STS: 21 sec  TU sec    Goals  Short Term Goals:   1. Patient will demonstrate independence with HEP by providing return demonstration of exercises-MET  2. Patient will report decreased symptom intensity during activity by 50%- MET  3. Patient will negotiate full flight of stairs with LRAD- MET  4. Patient will attain 90% of pre-op range of motion- MET  5. Patient will reduce 5xsts by 3 seconds- Not met  6. Patient will reduce TUG time by 2 seconds- MET  7. Patient will walk one mile without onset of knee pain- 8/10 pain at 1/2 mile    Long Term Goals:   1. Patient will improve FOTO to greater then goal- not met  2. Patient will improve pain with activity to 2/10 or less- 6/10 pain still  3. Patient will continue with HEP to allow for continued progress and function- MET  4. Patient will equalize range of motion to contralateral side- MET  5. Patient will reduce 5x sit to stand time by 9 seconds to demonstrate reduction in falls risk.- Not met  6. Patient will walk 2-3 miles without difficulty. - Not met    Assessment: Pt has made good progress on his current plan of care, noting increased ROM, function, and a decrease in symptom irritability. Pt has been able to return to performing his ADLs and leisurely activities w/o restriction aside from increased pain which he states is manageable. He has met a majority of his goals set out at evaluation and demonstrates independence w/ his HEP. He continues to demonstrate functional impairments such as dynamic balance and negotiating uneven terrain as well as pain w/ prolonged activity. Reviewed HEP and pt gave good verbal understanding of his HEP. At this time, pt has demonstrated a return to his baseline premorbid state for function, pain, and ROM. Pt is being discharged to an [facility] due to progress and independence w/ HEP.        Plan: Pt discharged to HEP     Precautions: L TKA on 8/14      Manuals 9/21 9/26 9/28 10/4 10/5 10/10 10/12 10/17 10/19 9/19   Knee assessment  ROM & palpation    3'       Knee PROM             Re-eval          LM 25'   Roller   left quadriceps 5'           Assessment         45'    Neuro Re-Ed             Step up   2x10 w/ 6in step w/ eccentric 2x10 6" step 3x10 8" step  2x10 w/ 8" step w/ HR fwd  1x10 w/o HR assist  fwd                                                                                          Ther Ex             Quad sets HEP            SLR HEP            Heel slides HEP            Long arc quads    5x5 20#  3x10 20# 3x  20 lbs 3x10    B/l   35 lbs     Bike L3 9' 10' arcs 7' Lvl 3  10' lvl 3 10' lvl 4 10' lvl 5 10' lvl 5 10' lvl 4  L3 9'   VG  L6 ROM 5' L7 5' L7 5'    L7 5' end     PROM  LM 10'           [facility]             Sand dune             Leg ext Hangs             Hamstring ball curls  4'           Ther Activity             Sit to stand        3x10 10# vc for control ecc  3x10     no UE assist                Gait Training             SPC walking    vc for speed changes  & stopping x 5' VC for head turns 3 laps        Stairs negotiation        Assessed     Walking Changes in speed6 laps x 40 feet  Stepping over hurdles 4 laps x40 feet  Marching over hurdles 6 hurdles x4 laps  Marches over hurdles 4 lapsx 30'  Mrches over shellie 4 laps 5 hurdles vc for sequence Marches over hurdles 4 laps Hurdles with spc x 4 laps    Step over 1 shellie x20 no SPC Marches over shellie no SPC 4 laps of 6 hurdles CG-min A Hurdles with SPC x2 laps    No spc x2 laps    Lateral hurdles x10 total     Modalities

## 2023-11-03 ENCOUNTER — OFFICE VISIT (OUTPATIENT)
Dept: OBGYN CLINIC | Facility: MEDICAL CENTER | Age: 73
End: 2023-11-03

## 2023-11-03 VITALS
HEART RATE: 62 BPM | SYSTOLIC BLOOD PRESSURE: 138 MMHG | WEIGHT: 195 LBS | BODY MASS INDEX: 33.29 KG/M2 | HEIGHT: 64 IN | DIASTOLIC BLOOD PRESSURE: 65 MMHG

## 2023-11-03 DIAGNOSIS — Z96.652 STATUS POST TOTAL KNEE REPLACEMENT, LEFT: Primary | ICD-10-CM

## 2023-11-03 PROCEDURE — 99024 POSTOP FOLLOW-UP VISIT: CPT | Performed by: ORTHOPAEDIC SURGERY

## 2023-11-03 NOTE — PROGRESS NOTES
Assessment/Plan:  1. Status post total knee replacement, left      No orders of the defined types were placed in this encounter. Patient is doing well 11 weeks status post L TKA on 8/14/23. Transition to home exercises. Encouraged patient to continue to work on his strength and motion. Pain control prn-OTC pain medication   Patient should call ahead for abx prior to dental appts. Patient would like to continue with non operative treatment for the right knee. Return in about 3 months (around 2/3/2024) for right knee CSI. I answered all of the patient's questions during the visit and provided education of the patient's condition during the visit. The patient verbalized understanding of the information given and agrees with the plan. This note was dictated using Weemba software. It may contain errors including improperly dictated words. Please contact physician directly for any questions. Subjective   Chief Complaint:   Chief Complaint   Patient presents with    Left Knee - Post-op, Follow-up       Marielle Packer is a 67 y.o. male who presents for 11 week follow up s/p left TKA. Patient states he is doing well overall. He notes occasional 5/10 pain in the knee but overall feels it is improving. Patient is not taking anything for pain currently. He reports a cream or gel that his friend used to use that he tried previously but he cannot remember the name. Patient was discharged from outpatient PT. He is able to navigate stairs. He continues to use a cane but states this is more for his right knee and balance. Patient is happy with his left total knee. Review of Systems  ROS:    See HPI for musculoskeletal review.    All other systems reviewed are negative     History:  Past Medical History:   Diagnosis Date    Anemia     Cataract     Colon polyp     Diverticulosis     Gastritis     GERD (gastroesophageal reflux disease)     Hyperlipidemia     Hypertension     Osteoarthritis of left knee 2023    Pre-diabetes     Throat ulcer     Visual impairment      Past Surgical History:   Procedure Laterality Date    COLONOSCOPY N/A 2016    Procedure: COLONOSCOPY;  Surgeon: Veronica Cheema MD;  Location:  GI LAB; Service:     COLONOSCOPY W/ POLYPECTOMY  2019    repeat     EYE SURGERY Bilateral     [place] ARTHRP KNE CONDYLE&PLATU MEDIAL&LAT COMPARTMENTS Left 2023    Procedure: ARTHROPLASTY KNEE TOTAL;  Surgeon: Onur Mansfield DO;  Location: [facility] MAIN OR;  Service: Orthopedics    TONSILLECTOMY      UPPER GASTROINTESTINAL ENDOSCOPY  2019     Social History   Social History     Substance and Sexual Activity   Alcohol Use Not Currently    Comment: pt "sober for 30+ years"     Social History     Substance and Sexual Activity   Drug Use No     Social History     Tobacco Use   Smoking Status Former    Types: Cigarettes    Quit date:     Years since quittin.8   Smokeless Tobacco Never   Tobacco Comments    Patient quit     Family History:   Family History   Problem Relation Age of Onset    Hypertension Mother     Hypertension Father        Current Outpatient Medications on File Prior to Visit   Medication Sig Dispense Refill    amLODIPine (NORVASC) 5 mg tablet Take 1 tablet (5 mg total) by mouth daily 90 tablet 3    apixaban (Eliquis) 5 mg Take 1 tablet (5 mg total) by mouth 2 (two) times a day 60 tablet 4    atorvastatin (LIPITOR) 20 mg tablet Take 1 tablet (20 mg total) by mouth daily at bedtime 90 tablet 3    linaCLOtide (Linzess) 145 MCG CAPS Take 1 capsule (145 mcg total) by mouth daily 30 capsule 5    lisinopril-hydrochlorothiazide (PRINZIDE,ZESTORETIC) 20-25 MG per tablet Take 1 tablet by mouth daily 90 tablet 3     No current facility-administered medications on file prior to visit.      Allergies   Allergen Reactions    Lactose - Food Allergy Diarrhea     diarrhea        Objective     /65   Pulse 62   Ht 5' 4" (1.626 m)   Wt 88.5 kg (195 lb)   BMI 33.47 kg/m² PE:  AAOx 3  WDWN  Hearing intact, no drainage from eyes  no audible wheezing  no abdominal distension  LE compartments soft, AT/GS intact    Ortho Exam:  left Knee:   INC: healed, No erythema, mild swelling  AROM: 0 - 105 degrees

## 2023-11-11 ENCOUNTER — HOSPITAL ENCOUNTER (INPATIENT)
Facility: HOSPITAL | Age: 73
LOS: 3 days | Discharge: NURSING, REHAB OR LONG-TERM CARE | DRG: 552 | End: 2023-11-14
Attending: EMERGENCY MEDICINE | Admitting: INTERNAL MEDICINE
Payer: COMMERCIAL

## 2023-11-11 DIAGNOSIS — M54.31 SCIATICA OF RIGHT SIDE: Primary | ICD-10-CM

## 2023-11-11 DIAGNOSIS — R26.2 AMBULATORY DYSFUNCTION: ICD-10-CM

## 2023-11-11 PROBLEM — M54.50 ACUTE LOW BACK PAIN: Status: ACTIVE | Noted: 2023-11-11

## 2023-11-11 PROCEDURE — 99223 1ST HOSP IP/OBS HIGH 75: CPT | Performed by: INTERNAL MEDICINE

## 2023-11-11 PROCEDURE — 99285 EMERGENCY DEPT VISIT HI MDM: CPT | Performed by: EMERGENCY MEDICINE

## 2023-11-11 PROCEDURE — 99283 EMERGENCY DEPT VISIT LOW MDM: CPT

## 2023-11-11 RX ORDER — OXYCODONE HYDROCHLORIDE 5 MG/1
5 TABLET ORAL EVERY 6 HOURS PRN
Status: DISCONTINUED | OUTPATIENT
Start: 2023-11-11 | End: 2023-11-14 | Stop reason: HOSPADM

## 2023-11-11 RX ORDER — MAGNESIUM HYDROXIDE/ALUMINUM HYDROXICE/SIMETHICONE 120; 1200; 1200 MG/30ML; MG/30ML; MG/30ML
30 SUSPENSION ORAL EVERY 6 HOURS PRN
Status: DISCONTINUED | OUTPATIENT
Start: 2023-11-11 | End: 2023-11-14 | Stop reason: HOSPADM

## 2023-11-11 RX ORDER — HYDROCHLOROTHIAZIDE 25 MG/1
25 TABLET ORAL DAILY
Status: DISCONTINUED | OUTPATIENT
Start: 2023-11-12 | End: 2023-11-14 | Stop reason: HOSPADM

## 2023-11-11 RX ORDER — ONDANSETRON 2 MG/ML
4 INJECTION INTRAMUSCULAR; INTRAVENOUS EVERY 6 HOURS PRN
Status: DISCONTINUED | OUTPATIENT
Start: 2023-11-11 | End: 2023-11-14 | Stop reason: HOSPADM

## 2023-11-11 RX ORDER — LIDOCAINE 50 MG/G
1 PATCH TOPICAL ONCE
Status: COMPLETED | OUTPATIENT
Start: 2023-11-11 | End: 2023-11-12

## 2023-11-11 RX ORDER — LIDOCAINE 50 MG/G
2 PATCH TOPICAL ONCE
Status: DISCONTINUED | OUTPATIENT
Start: 2023-11-11 | End: 2023-11-13

## 2023-11-11 RX ORDER — METHOCARBAMOL 500 MG/1
500 TABLET, FILM COATED ORAL ONCE
Status: COMPLETED | OUTPATIENT
Start: 2023-11-11 | End: 2023-11-11

## 2023-11-11 RX ORDER — AMLODIPINE BESYLATE 5 MG/1
5 TABLET ORAL DAILY
Status: DISCONTINUED | OUTPATIENT
Start: 2023-11-12 | End: 2023-11-14 | Stop reason: HOSPADM

## 2023-11-11 RX ORDER — IBUPROFEN 400 MG/1
400 TABLET ORAL ONCE
Status: COMPLETED | OUTPATIENT
Start: 2023-11-11 | End: 2023-11-11

## 2023-11-11 RX ORDER — DOCUSATE SODIUM 100 MG/1
100 CAPSULE, LIQUID FILLED ORAL 2 TIMES DAILY
Status: DISCONTINUED | OUTPATIENT
Start: 2023-11-11 | End: 2023-11-14 | Stop reason: HOSPADM

## 2023-11-11 RX ORDER — ATORVASTATIN CALCIUM 20 MG/1
20 TABLET, FILM COATED ORAL
Status: DISCONTINUED | OUTPATIENT
Start: 2023-11-11 | End: 2023-11-14 | Stop reason: HOSPADM

## 2023-11-11 RX ORDER — POLYETHYLENE GLYCOL 3350 17 G/17G
17 POWDER, FOR SOLUTION ORAL DAILY
Status: DISCONTINUED | OUTPATIENT
Start: 2023-11-12 | End: 2023-11-14 | Stop reason: HOSPADM

## 2023-11-11 RX ORDER — ACETAMINOPHEN 325 MG/1
975 TABLET ORAL EVERY 8 HOURS SCHEDULED
Status: DISCONTINUED | OUTPATIENT
Start: 2023-11-11 | End: 2023-11-14 | Stop reason: HOSPADM

## 2023-11-11 RX ORDER — LUBIPROSTONE 8 UG/1
8 CAPSULE ORAL 2 TIMES DAILY
Status: DISCONTINUED | OUTPATIENT
Start: 2023-11-11 | End: 2023-11-14 | Stop reason: HOSPADM

## 2023-11-11 RX ORDER — ACETAMINOPHEN 325 MG/1
650 TABLET ORAL ONCE
Status: COMPLETED | OUTPATIENT
Start: 2023-11-11 | End: 2023-11-11

## 2023-11-11 RX ORDER — METHOCARBAMOL 750 MG/1
750 TABLET, FILM COATED ORAL EVERY 6 HOURS SCHEDULED
Status: DISCONTINUED | OUTPATIENT
Start: 2023-11-11 | End: 2023-11-14 | Stop reason: HOSPADM

## 2023-11-11 RX ORDER — LISINOPRIL 20 MG/1
20 TABLET ORAL DAILY
Status: DISCONTINUED | OUTPATIENT
Start: 2023-11-12 | End: 2023-11-14 | Stop reason: HOSPADM

## 2023-11-11 RX ADMIN — LIDOCAINE 1 PATCH: 700 PATCH TOPICAL at 15:41

## 2023-11-11 RX ADMIN — METHOCARBAMOL 500 MG: 500 TABLET ORAL at 15:41

## 2023-11-11 RX ADMIN — ACETAMINOPHEN 650 MG: 325 TABLET, FILM COATED ORAL at 15:41

## 2023-11-11 RX ADMIN — IBUPROFEN 400 MG: 400 TABLET, FILM COATED ORAL at 15:41

## 2023-11-11 RX ADMIN — APIXABAN 5 MG: 5 TABLET, FILM COATED ORAL at 18:57

## 2023-11-11 RX ADMIN — DICLOFENAC SODIUM TOPICAL GEL, 1%, 2 G: 10 GEL TOPICAL at 18:46

## 2023-11-11 RX ADMIN — METHOCARBAMOL 750 MG: 750 TABLET ORAL at 18:46

## 2023-11-11 NOTE — H&P
[address] [facility]  History and physical  Name: Regino Miller I  MRN: [id number]  Unit/Bed#: [facility] Date of Admission: 11/11/2023   Date of Service: 11/11/2023 I Hospital Day: 0     Assessment/Plan   * Acute low back pain  Assessment & Plan  Acute low back pain, paraspinal spasm  Follow-up on lumbar x-ray  Analgesics, muscle relaxants  Aqua K-pad, Voltaren gel  Physical therapy     DVT (deep venous thrombosis) (HCC)  Assessment & Plan  Continue Eliquis for anticoagulation     Mixed hyperlipidemia  Assessment & Plan  Continue atorvastatin     BMI 33.0-33.9,adult  Assessment & Plan  Therapeutic lifestyle modification encouraged        Prediabetes  Assessment & Plan  Therapeutic lifestyle modification encouraged     HTN (hypertension)  Assessment & Plan  Continue amlodipine 5 mg daily, lisinopril 20 g daily  Monitor blood pressures  Avoid hypotension              VTE Pharmacologic Prophylaxis: VTE Score: 5 High Risk (Score >/= 5) - Pharmacological DVT Prophylaxis Ordered: apixaban (Eliquis). Sequential Compression Devices Ordered. Code Status: Level 1 - Full Code         Discussion with family:  Discussed with the patient, daughter at bedside updated, questions answered. Anticipated Length of Stay: Patient will be admitted on an inpatient basis with an anticipated length of stay of greater than 2 midnights secondary to acute low back pain ambulatory dysfunction. Chief Complaint:      Acute low back pain        History of Present Illness:  Regino Miller is a 75 y.o. male with a PMH of history of hypertension, prediabetes, history of DVT on Eliquis, s/p left knee replacement, dyslipidemia who presents with low back pain. History is obtained from the patient and daughter at bedside. Patient reports he woke up today morning and noticed low back pain, he reports he is unable to bear weight, and feels pain radiating down his right lower extremity.   Denies sensory symptoms. Denies right lower extremity weakness denies sphincter symptoms. He is unable to bear weight or ambulate due to low back pain. Denies chest pain shortness of breath palpitations presyncope syncope. Denies cough chest tightness wheezing. Denies fever chills sweats constitutional symptoms. Denies urinary symptoms. Denies abdominal pain nausea vomiting or diarrhea. His prior hospitalization, outpatient notes reviewed in epic     Review of Systems:  Review of Systems   All other systems reviewed and are negative. Past Medical and Surgical History:   Medical History        Past Medical History:   Diagnosis Date    Anemia      Cataract      Colon polyp      Diverticulosis      Gastritis      GERD (gastroesophageal reflux disease)      Hyperlipidemia      Hypertension      Osteoarthritis of left knee 06/07/2023    Pre-diabetes      Throat ulcer      Visual impairment              Surgical History         Past Surgical History:   Procedure Laterality Date    COLONOSCOPY N/A 08/18/2016     Procedure: COLONOSCOPY;  Surgeon: Herbert Goldberg MD;  Location:  GI LAB; Service:     COLONOSCOPY W/ POLYPECTOMY   2019     repeat 2022    EYE SURGERY Bilateral      SC ARTHRP KNE CONDYLE&PLATU MEDIAL&LAT COMPARTMENTS Left 8/14/2023     Procedure: ARTHROPLASTY KNEE TOTAL;  Surgeon: Nba Angel DO;  Location: [facility] MAIN OR;  Service: Orthopedics    TONSILLECTOMY        UPPER GASTROINTESTINAL ENDOSCOPY   2019            Meds/Allergies:          Prior to Admission medications    Medication Sig Start Date End Date Taking?  Authorizing Provider   amLODIPine (NORVASC) 5 mg tablet Take 1 tablet (5 mg total) by mouth daily 10/4/23     Arturo Gramajo MD   apixaban (Eliquis) 5 mg Take 1 tablet (5 mg total) by mouth 2 (two) times a day 10/4/23 3/2/24   Arturo Gramajo MD   atorvastatin (LIPITOR) 20 mg tablet Take 1 tablet (20 mg total) by mouth daily at bedtime 10/4/23     Arturo Gramajo MD   linaCLOtide (Linzess) 145 MCG CAPS Take 1 capsule (145 mcg total) by mouth daily 23     Emilie Rojas MD   lisinopril-hydrochlorothiazide [facility]) 20-25 MG per tablet Take 1 tablet by mouth daily 10/4/23     Emilie Rojas MD      I have reviewed home medications with patient family member. Allergies:          Allergies   Allergen Reactions    Lactose - Food Allergy Diarrhea       diarrhea         Social History:  Marital Status: Single   Occupation:   Patient Pre-hospital Living Situation: Home  Patient Pre-hospital Level of Mobility: walks  Patient Pre-hospital Diet Restrictions: no  Substance Use History:   Social History           Substance and Sexual Activity   Alcohol Use Not Currently     Comment: pt "sober for 30+ years"      Social History           Tobacco Use   Smoking Status Former    Types: Cigarettes    Quit date:     Years since quittin.8   Smokeless Tobacco Never   Tobacco Comments     Patient quit      Social History          Substance and Sexual Activity   Drug Use No         Family History:  Family History         Family History   Problem Relation Age of Onset    Hypertension Mother      Hypertension Father              Physical Exam:      Vitals:   Blood Pressure: 164/84 (23 1512)  Pulse: 65 (23 151)  Temperature: 97.5 °F (36.4 °C) (23 151)  Temp Source: Oral (23 151)  Respirations: 18 (23 151)  SpO2: 99 % (23)     Physical Exam            Comfortably in bed  Obese  Short thick neck  Lungs diminished breath sounds at bases  Vesicular breath sounds  Heart sounds S1 and S2 noted  Abdomen soft nontender  Abdominal obesity noted  Awake obey simple commands  Low back tenderness  Paraspinal muscle spasm  Unable to lift right lower extremity  No pedal Ouma  No rash     Additional Data:      Lab Results:                             Lines/Drains:  Invasive Devices         None                               Imaging: Reviewed radiology reports from this admission including: Prior imaging reviewed  XR spine lumbar minimum 4 views non injury    (Results Pending)         EKG and Other Studies Reviewed on Admission:   [id number] reported sinus rhythm     ** Please Note: This note has been constructed using a voice recognition system.  **

## 2023-11-11 NOTE — ASSESSMENT & PLAN NOTE
Acute low back pain, paraspinal spasm  Follow-up on lumbar x-ray  Analgesics, muscle relaxants  Aqua K-pad, Voltaren gel  Physical therapy

## 2023-11-11 NOTE — PROGRESS NOTES
[facility]  Progress Note  Name: Eliel Juarez I  MRN: [id number]  Unit/Bed#: Parisa Caputo I Date of Admission: 11/11/2023   Date of Service: 11/11/2023 I Hospital Day: 0    Assessment/Plan   * Acute low back pain  Assessment & Plan  Acute low back pain, paraspinal spasm  Follow-up on lumbar x-ray  Analgesics, muscle relaxants  Aqua K-pad, Voltaren gel  Physical therapy    DVT (deep venous thrombosis) (HCC)  Assessment & Plan  Continue Eliquis for anticoagulation    Mixed hyperlipidemia  Assessment & Plan  Continue atorvastatin    BMI 33.0-33.9,adult  Assessment & Plan  Therapeutic lifestyle modification encouraged      Prediabetes  Assessment & Plan  Therapeutic lifestyle modification encouraged    HTN (hypertension)  Assessment & Plan  Continue amlodipine 5 mg daily, lisinopril 20 g daily  Monitor blood pressures  Avoid hypotension             VTE Pharmacologic Prophylaxis: VTE Score: 5 High Risk (Score >/= 5) - Pharmacological DVT Prophylaxis Ordered: apixaban (Eliquis). Sequential Compression Devices Ordered. Code Status: Level 1 - Full Code       Discussion with family:  Discussed with the patient, daughter at bedside updated, questions answered. Anticipated Length of Stay: Patient will be admitted on an inpatient basis with an anticipated length of stay of greater than 2 midnights secondary to acute low back pain ambulatory dysfunction. Chief Complaint:     Acute low back pain      History of Present Illness:  Eliel Juarez is a 75 y.o. male with a PMH of history of hypertension, prediabetes, history of DVT on Eliquis, s/p left knee replacement, dyslipidemia who presents with low back pain. History is obtained from the patient and daughter at bedside. Patient reports he woke up today morning and noticed low back pain, he reports he is unable to bear weight, and feels pain radiating down his right lower extremity. Denies sensory symptoms.   Denies right lower extremity weakness denies sphincter symptoms. He is unable to bear weight or ambulate due to low back pain. Denies chest pain shortness of breath palpitations presyncope syncope. Denies cough chest tightness wheezing. Denies fever chills sweats constitutional symptoms. Denies urinary symptoms. Denies abdominal pain nausea vomiting or diarrhea. His prior hospitalization, outpatient notes reviewed in epic    Review of Systems:  Review of Systems   All other systems reviewed and are negative. Past Medical and Surgical History:   Past Medical History:   Diagnosis Date    Anemia     Cataract     Colon polyp     Diverticulosis     Gastritis     GERD (gastroesophageal reflux disease)     Hyperlipidemia     Hypertension     Osteoarthritis of left knee 06/07/2023    Pre-diabetes     Throat ulcer     Visual impairment        Past Surgical History:   Procedure Laterality Date    COLONOSCOPY N/A 08/18/2016    Procedure: COLONOSCOPY;  Surgeon: Amaya Villalba MD;  Location:  GI LAB; Service:     COLONOSCOPY W/ POLYPECTOMY  2019    repeat 2022    EYE SURGERY Bilateral     WI ARTHRP KNE CONDYLE&PLATU MEDIAL&LAT COMPARTMENTS Left 8/14/2023    Procedure: ARTHROPLASTY KNEE TOTAL;  Surgeon: Neena Martinez DO;  Location: [address] [place] [address];  Service: Orthopedics    TONSILLECTOMY      UPPER GASTROINTESTINAL ENDOSCOPY  2019       Meds/Allergies:  Prior to Admission medications    Medication Sig Start Date End Date Taking?  Authorizing Provider   amLODIPine (NORVASC) 5 mg tablet Take 1 tablet (5 mg total) by mouth daily 10/4/23   Tila Hamilton MD   apixaban (Eliquis) 5 mg Take 1 tablet (5 mg total) by mouth 2 (two) times a day 10/4/23 3/2/24  Tila Hamilton MD   atorvastatin (LIPITOR) 20 mg tablet Take 1 tablet (20 mg total) by mouth daily at bedtime 10/4/23   Tila Hamilton MD   linaCLOtide (Linzess) 145 MCG CAPS Take 1 capsule (145 mcg total) by mouth daily 1/26/23   Tila Hamilton MD lisinopril-hydrochlorothiazide (PRINZIDE,ZESTORETIC) 20-25 MG per tablet Take 1 tablet by mouth daily 10/4/23   Cole Foreman MD     I have reviewed home medications with patient family member. Allergies:    Allergies   Allergen Reactions    Lactose - Food Allergy Diarrhea     diarrhea       Social History:  Marital Status: Single   Occupation:   Patient Pre-hospital Living Situation: Home  Patient Pre-hospital Level of Mobility: walks  Patient Pre-hospital Diet Restrictions: no  Substance Use History:   Social History     Substance and Sexual Activity   Alcohol Use Not Currently    Comment: pt "sober for 30+ years"     Social History     Tobacco Use   Smoking Status Former    Types: Cigarettes    Quit date:     Years since quittin.8   Smokeless Tobacco Never   Tobacco Comments    Patient quit     Social History     Substance and Sexual Activity   Drug Use No       Family History:  Family History   Problem Relation Age of Onset    Hypertension Mother     Hypertension Father        Physical Exam:     Vitals:   Blood Pressure: 164/84 (23 1512)  Pulse: 65 (23 151)  Temperature: 97.5 °F (36.4 °C) (23 151)  Temp Source: Oral (23 151)  Respirations: 18 (23 151)  SpO2: 99 % (23)    Physical Exam         Comfortably in bed  Obese  Short thick neck  Lungs diminished breath sounds at bases  Vesicular breath sounds  Heart sounds S1 and S2 noted  Abdomen soft nontender  Abdominal obesity noted  Awake obey simple commands  Low back tenderness  Paraspinal muscle spasm  Unable to lift right lower extremity  No pedal Ouma  No rash    Additional Data:     Lab Results:                            Lines/Drains:  Invasive Devices       None                       Imaging: Reviewed radiology reports from this admission including: Prior imaging reviewed  XR spine lumbar minimum 4 views non injury    (Results Pending)       EKG and Other Studies Reviewed on Admission:   EKG: 8/29/2023 reported sinus rhythm    ** Please Note: This note has been constructed using a voice recognition system.  **

## 2023-11-11 NOTE — ED PROVIDER NOTES
History  Chief Complaint   Patient presents with    Back Pain     Patient having sciatica flare up since yesterday when he went to the bathroom. Patient not having any chest pain or dizziness     26-year-old male presents with right leg pain since 8 AM this morning. Pain is described as originating the right buttock and radiating down the right lateral leg to the foot. Patient has an intermittent history of similar symptoms. Denies any trauma, fever, leg weakness, paresthesias, bowel or bladder incontinence, recent illnesses, or hematuria. Patient has been not been able to ambulate today due to the pain. Prior to Admission Medications   Prescriptions Last Dose Informant Patient Reported? Taking? amLODIPine (NORVASC) 5 mg tablet   No No   Sig: Take 1 tablet (5 mg total) by mouth daily   apixaban (Eliquis) 5 mg   No No   Sig: Take 1 tablet (5 mg total) by mouth 2 (two) times a day   atorvastatin (LIPITOR) 20 mg tablet   No No   Sig: Take 1 tablet (20 mg total) by mouth daily at bedtime   linaCLOtide (Linzess) 145 MCG CAPS Not Taking Self No No   Sig: Take 1 capsule (145 mcg total) by mouth daily   Patient not taking: Reported on 11/12/2023   lisinopril-hydrochlorothiazide (PRINZIDE,ZESTORETIC) 20-25 MG per tablet   No No   Sig: Take 1 tablet by mouth daily      Facility-Administered Medications: None       Past Medical History:   Diagnosis Date    Anemia     Cataract     Colon polyp     Diverticulosis     Gastritis     GERD (gastroesophageal reflux disease)     Hyperlipidemia     Hypertension     Osteoarthritis of left knee 06/07/2023    Pre-diabetes     Throat ulcer     Visual impairment        Past Surgical History:   Procedure Laterality Date    COLONOSCOPY N/A 08/18/2016    Procedure: COLONOSCOPY;  Surgeon: Rober Delgado MD;  Location: [facility];   Service:     COLONOSCOPY W/ POLYPECTOMY  2019    repeat 2022    EYE SURGERY Bilateral     SC ARTHRP KNE CONDYLE&PLATU MEDIAL&LAT COMPARTMENTS Left 8/14/2023 Procedure: ARTHROPLASTY KNEE TOTAL;  Surgeon: Bard Rafael DO;  Location: [address] [place] [address] MAIN OR;  Service: Orthopedics    TONSILLECTOMY      UPPER GASTROINTESTINAL ENDOSCOPY  2019       Family History   Problem Relation Age of Onset    Hypertension Mother     Hypertension Father      I have reviewed and agree with the history as documented. E-Cigarette/Vaping    E-Cigarette Use Never User      E-Cigarette/Vaping Substances    Nicotine No     THC No     CBD No     Flavoring No     Other No     Unknown No      Social History     Tobacco Use    Smoking status: Former     Types: Cigarettes     Quit date:      Years since quittin.8    Smokeless tobacco: Never    Tobacco comments:     Patient quit   Vaping Use    Vaping Use: Never used   Substance Use Topics    Alcohol use: Never     Comment: pt "sober for 30+ years"    Drug use: No        Review of Systems   Constitutional:  Negative for chills and fever. HENT:  Negative for ear pain and sore throat. Eyes:  Negative for pain and visual disturbance. Respiratory:  Negative for cough and shortness of breath. Cardiovascular:  Negative for chest pain and palpitations. Gastrointestinal:  Negative for abdominal pain and vomiting. Genitourinary:  Negative for dysuria and hematuria. Musculoskeletal:  Positive for back pain. Negative for arthralgias. Skin:  Negative for color change and rash. Neurological:  Negative for seizures and syncope. All other systems reviewed and are negative.       Physical Exam  ED Triage Vitals [23 1512]   Temperature Pulse Respirations Blood Pressure SpO2   97.5 °F (36.4 °C) 65 18 164/84 99 %      Temp Source Heart Rate Source Patient Position - Orthostatic VS BP Location FiO2 (%)   Oral Monitor Lying Left arm --      Pain Score       10 - Worst Possible Pain             Orthostatic Vital Signs  Vitals:    23 0009 23 0607 23 0918 23 1455   BP: 117/57 127/68 129/69 119/56   Pulse: (!) 53 55 (!) 54 55   Patient Position - Orthostatic VS:           Physical Exam  Vitals and nursing note reviewed. Constitutional:       General: He is in acute distress. Appearance: Normal appearance. He is well-developed and normal weight. He is not ill-appearing, toxic-appearing or diaphoretic. HENT:      Head: Normocephalic and atraumatic. Right Ear: External ear normal.      Left Ear: External ear normal.      Nose: Nose normal.      Mouth/Throat:      Mouth: Mucous membranes are moist.   Eyes:      General:         Right eye: No discharge. Left eye: No discharge. Conjunctiva/sclera: Conjunctivae normal.   Cardiovascular:      Rate and Rhythm: Normal rate and regular rhythm. Pulses: Normal pulses. Heart sounds: No murmur heard. Pulmonary:      Effort: Pulmonary effort is normal. No respiratory distress. Breath sounds: Normal breath sounds. Abdominal:      General: Abdomen is flat. There is no distension. Palpations: Abdomen is soft. There is no mass. Tenderness: There is no abdominal tenderness. There is no guarding. Musculoskeletal:         General: No swelling. Cervical back: Normal range of motion and neck supple. No rigidity or tenderness. Comments: Mild tenderness right proximal gluteus. No midline lumbar tenderness. Skin:     General: Skin is warm and dry. Capillary Refill: Capillary refill takes less than 2 seconds. Neurological:      Mental Status: He is alert and oriented to person, place, and time. Sensory: No sensory deficit. Motor: No weakness.    Psychiatric:         Mood and Affect: Mood normal.         Behavior: Behavior normal.         ED Medications  Medications   amLODIPine (NORVASC) tablet 5 mg (5 mg Oral Given 11/13/23 0921)   apixaban (ELIQUIS) tablet 5 mg (5 mg Oral Given 11/13/23 0921)   atorvastatin (LIPITOR) tablet 20 mg (20 mg Oral Given 11/12/23 2227)   lubiprostone (AMITIZA) capsule 8 mcg (8 mcg Oral Given 11/13/23 0923)   lisinopril (ZESTRIL) tablet 20 mg (20 mg Oral Given 11/13/23 0921)     And   hydrochlorothiazide (HYDRODIURIL) tablet 25 mg (25 mg Oral Given 11/13/23 0921)   acetaminophen (TYLENOL) tablet 975 mg (975 mg Oral Given 11/13/23 1312)   docusate sodium (COLACE) capsule 100 mg (100 mg Oral Given 11/13/23 0921)   polyethylene glycol (MIRALAX) packet 17 g (17 g Oral Given 11/13/23 0921)   ondansetron (ZOFRAN) injection 4 mg (has no administration in time range)   aluminum-magnesium hydroxide-simethicone (MAALOX) oral suspension 30 mL (has no administration in time range)   methocarbamol (ROBAXIN) tablet 750 mg (750 mg Oral Given 11/13/23 1312)   Diclofenac Sodium (VOLTAREN) 1 % topical gel 2 g (2 g Topical Not Given 11/13/23 1247)   oxyCODONE (ROXICODONE) IR tablet 5 mg (5 mg Oral Given 11/13/23 0558)   oxyCODONE (ROXICODONE) split tablet 2.5 mg (has no administration in time range)   lidocaine (LIDODERM) 5 % patch 2 patch (2 patches Topical Medication Applied 11/13/23 1311)   menthol-methyl salicylate (BENGAY) 20-04 % cream (has no administration in time range)   acetaminophen (TYLENOL) tablet 650 mg (650 mg Oral Given 11/11/23 1541)   lidocaine (LIDODERM) 5 % patch 1 patch (1 patch Topical Patch Removed 11/12/23 0607)   ibuprofen (MOTRIN) tablet 400 mg (400 mg Oral Given 11/11/23 1541)   methocarbamol (ROBAXIN) tablet 500 mg (500 mg Oral Given 11/11/23 1541)       Diagnostic Studies  Results Reviewed       Procedure Component Value Units Date/Time    Basic metabolic panel [[id number]] Collected: 11/12/23 0615    Lab Status: Final result Specimen: Blood from Arm, Right Updated: 11/12/23 0727     Sodium 139 mmol/L      Potassium 3.8 mmol/L      Chloride 104 mmol/L      CO2 29 mmol/L      ANION GAP 6 mmol/L      BUN 25 mg/dL      Creatinine 0.75 mg/dL      Glucose 95 mg/dL      Calcium 8.9 mg/dL      eGFR 90 ml/min/1.73sq m     Narrative:      [place] guidelines for Chronic Kidney Disease (CKD):     Stage 1 with normal or high GFR (GFR > 90 mL/min/1.73 square meters)    Stage 2 Mild CKD (GFR = 60-89 mL/min/1.73 square meters)    Stage 3A Moderate CKD (GFR = 45-59 mL/min/1.73 square meters)    Stage 3B Moderate CKD (GFR = 30-44 mL/min/1.73 square meters)    Stage 4 Severe CKD (GFR = 15-29 mL/min/1.73 square meters)    Stage 5 End Stage CKD (GFR <15 mL/min/1.73 square meters)  Note: GFR calculation is accurate only with a steady state creatinine    CBC and differential [[id number]]  (Abnormal) Collected: 11/12/23 0615    Lab Status: Final result Specimen: Blood from Arm, Right Updated: 11/12/23 0703     WBC 7.00 Thousand/uL      RBC 3.85 Million/uL      Hemoglobin 11.4 g/dL      Hematocrit 35.7 %      MCV 93 fL      MCH 29.6 pg      MCHC 31.9 g/dL      RDW 14.1 %      MPV 10.1 fL      Platelets 750 Thousands/uL      nRBC 0 /100 WBCs      Neutrophils Relative 57 %      Immat GRANS % 0 %      Lymphocytes Relative 26 %      Monocytes Relative 12 %      Eosinophils Relative 4 %      Basophils Relative 1 %      Neutrophils Absolute 3.97 Thousands/µL      Immature Grans Absolute 0.02 Thousand/uL      Lymphocytes Absolute 1.83 Thousands/µL      Monocytes Absolute 0.85 Thousand/µL      Eosinophils Absolute 0.26 Thousand/µL      Basophils Absolute 0.07 Thousands/µL                    XR spine lumbar minimum 4 views non injury   Final Result by Shayy Moreno MD (11/13 1140)      1. No acute osseous abnormality. 2. Facet-predominant degenerative changes. Resident: Erendira Ureña, the attending radiologist, have reviewed the images and agree with the final report above. Workstation performed: [id number]               Procedures  Procedures      ED Course                             SBIRT 20yo+      Flowsheet Row Most Recent Value   Initial Alcohol Screen: US AUDIT-C     1. How often do you have a drink containing alcohol? 0 Filed at: 11/11/2023 1515   2.  How many drinks containing alcohol do you have on a typical day you are drinking? 0 Filed at: 11/11/2023 1515   3a. Male UNDER 65: How often do you have five or more drinks on one occasion? 0 Filed at: 11/11/2023 1515   3b. FEMALE Any Age, or MALE 65+: How often do you have 4 or more drinks on one occassion? 0 Filed at: 11/11/2023 1515   Audit-C Score 0 Filed at: 11/11/2023 1515   SYDNEY: How many times in the past year have you. .. Used an illegal drug or used a prescription medication for non-medical reasons? Never Filed at: 11/11/2023 1515                  Medical Decision Making  Symptoms likely due to musculoskeletal back pain. CNS infection unlikely given lack of fever, neurologic symptoms, and patient's overall well-appearing exam.  Cord compression unlikely due to lack of neurological symptoms. Acute vertebral fracture unlikely in setting of no trauma. Risk  OTC drugs. Prescription drug management. Disposition  Final diagnoses:   Sciatica of right side   Ambulatory dysfunction     Time reflects when diagnosis was documented in both MDM as applicable and the Disposition within this note       Time User Action Codes Description Comment    11/13/2023  3:10 PM Reginold Paci Add [M54.31] Sciatica of right side     11/13/2023  3:10 PM Reginold Paci Add [R26.2] Ambulatory dysfunction           ED Disposition       ED Disposition   Admit    Condition   Stable    Date/Time   Mon Nov 13, 2023 1510    Comment   Case was discussed with KE and the patient's admission status was agreed to be Admission Status: inpatient status to the service of Dr. Jennifer Mireles .                Follow-up Information    None         Current Discharge Medication List        CONTINUE these medications which have NOT CHANGED    Details   amLODIPine (NORVASC) 5 mg tablet Take 1 tablet (5 mg total) by mouth daily  Qty: 90 tablet, Refills: 3    Associated Diagnoses: Hypertension, unspecified type      apixaban (Eliquis) 5 mg Take 1 tablet (5 mg total) by mouth 2 (two) times a day  Qty: 60 tablet, Refills: 4    Associated Diagnoses: Acute deep vein thrombosis (DVT) of calf muscle vein of left lower extremity (HCC)      atorvastatin (LIPITOR) 20 mg tablet Take 1 tablet (20 mg total) by mouth daily at bedtime  Qty: 90 tablet, Refills: 3    Associated Diagnoses: Hyperlipidemia, unspecified hyperlipidemia type      linaCLOtide (Linzess) 145 MCG CAPS Take 1 capsule (145 mcg total) by mouth daily  Qty: 30 capsule, Refills: 5    Associated Diagnoses: Constipation, unspecified constipation type      lisinopril-hydrochlorothiazide (PRINZIDE,ZESTORETIC) 20-25 MG per tablet Take 1 tablet by mouth daily  Qty: 90 tablet, Refills: 3    Associated Diagnoses: Hypertension, unspecified type           No discharge procedures on file. PDMP Review         Value Time User    PDMP Reviewed  Yes 8/14/2023  2:04 PM Sherie Archibald PA-C             ED Provider  Attending physically available and evaluated Anibal Miller. I managed the patient along with the ED Attending.     Electronically Signed by           Arpita Ji MD  11/13/23 0515

## 2023-11-12 ENCOUNTER — APPOINTMENT (INPATIENT)
Dept: RADIOLOGY | Facility: HOSPITAL | Age: 73
DRG: 552 | End: 2023-11-12
Payer: COMMERCIAL

## 2023-11-12 LAB
ANION GAP SERPL CALCULATED.3IONS-SCNC: 6 MMOL/L
BASOPHILS # BLD AUTO: 0.07 THOUSANDS/ÂΜL (ref 0–0.1)
BASOPHILS NFR BLD AUTO: 1 % (ref 0–1)
BUN SERPL-MCNC: 25 MG/DL (ref 5–25)
CALCIUM SERPL-MCNC: 8.9 MG/DL (ref 8.4–10.2)
CHLORIDE SERPL-SCNC: 104 MMOL/L (ref 96–108)
CO2 SERPL-SCNC: 29 MMOL/L (ref 21–32)
CREAT SERPL-MCNC: 0.75 MG/DL (ref 0.6–1.3)
EOSINOPHIL # BLD AUTO: 0.26 THOUSAND/ÂΜL (ref 0–0.61)
EOSINOPHIL NFR BLD AUTO: 4 % (ref 0–6)
ERYTHROCYTE [DISTWIDTH] IN BLOOD BY AUTOMATED COUNT: 14.1 % (ref 11.6–15.1)
GFR SERPL CREATININE-BSD FRML MDRD: 90 ML/MIN/1.73SQ M
GLUCOSE SERPL-MCNC: 95 MG/DL (ref 65–140)
HCT VFR BLD AUTO: 35.7 % (ref 36.5–49.3)
HGB BLD-MCNC: 11.4 G/DL (ref 12–17)
IMM GRANULOCYTES # BLD AUTO: 0.02 THOUSAND/UL (ref 0–0.2)
IMM GRANULOCYTES NFR BLD AUTO: 0 % (ref 0–2)
LYMPHOCYTES # BLD AUTO: 1.83 THOUSANDS/ÂΜL (ref 0.6–4.47)
LYMPHOCYTES NFR BLD AUTO: 26 % (ref 14–44)
MCH RBC QN AUTO: 29.6 PG (ref 26.8–34.3)
MCHC RBC AUTO-ENTMCNC: 31.9 G/DL (ref 31.4–37.4)
MCV RBC AUTO: 93 FL (ref 82–98)
MONOCYTES # BLD AUTO: 0.85 THOUSAND/ÂΜL (ref 0.17–1.22)
MONOCYTES NFR BLD AUTO: 12 % (ref 4–12)
NEUTROPHILS # BLD AUTO: 3.97 THOUSANDS/ÂΜL (ref 1.85–7.62)
NEUTS SEG NFR BLD AUTO: 57 % (ref 43–75)
NRBC BLD AUTO-RTO: 0 /100 WBCS
PLATELET # BLD AUTO: 280 THOUSANDS/UL (ref 149–390)
PMV BLD AUTO: 10.1 FL (ref 8.9–12.7)
POTASSIUM SERPL-SCNC: 3.8 MMOL/L (ref 3.5–5.3)
RBC # BLD AUTO: 3.85 MILLION/UL (ref 3.88–5.62)
SODIUM SERPL-SCNC: 139 MMOL/L (ref 135–147)
WBC # BLD AUTO: 7 THOUSAND/UL (ref 4.31–10.16)

## 2023-11-12 PROCEDURE — 80048 BASIC METABOLIC PNL TOTAL CA: CPT | Performed by: INTERNAL MEDICINE

## 2023-11-12 PROCEDURE — 97167 OT EVAL HIGH COMPLEX 60 MIN: CPT

## 2023-11-12 PROCEDURE — 85025 COMPLETE CBC W/AUTO DIFF WBC: CPT | Performed by: INTERNAL MEDICINE

## 2023-11-12 PROCEDURE — 72110 X-RAY EXAM L-2 SPINE 4/>VWS: CPT

## 2023-11-12 PROCEDURE — 99232 SBSQ HOSP IP/OBS MODERATE 35: CPT | Performed by: INTERNAL MEDICINE

## 2023-11-12 PROCEDURE — 97163 PT EVAL HIGH COMPLEX 45 MIN: CPT

## 2023-11-12 RX ADMIN — DICLOFENAC SODIUM TOPICAL GEL, 1%, 2 G: 10 GEL TOPICAL at 22:28

## 2023-11-12 RX ADMIN — AMLODIPINE BESYLATE 5 MG: 5 TABLET ORAL at 08:53

## 2023-11-12 RX ADMIN — OXYCODONE HYDROCHLORIDE 5 MG: 5 TABLET ORAL at 01:12

## 2023-11-12 RX ADMIN — METHOCARBAMOL 750 MG: 750 TABLET ORAL at 01:12

## 2023-11-12 RX ADMIN — METHOCARBAMOL 750 MG: 750 TABLET ORAL at 11:21

## 2023-11-12 RX ADMIN — DOCUSATE SODIUM 100 MG: 100 CAPSULE, LIQUID FILLED ORAL at 17:11

## 2023-11-12 RX ADMIN — LUBIPROSTONE 8 MCG: 8 CAPSULE, GELATIN COATED ORAL at 11:22

## 2023-11-12 RX ADMIN — APIXABAN 5 MG: 5 TABLET, FILM COATED ORAL at 17:11

## 2023-11-12 RX ADMIN — METHOCARBAMOL 750 MG: 750 TABLET ORAL at 06:03

## 2023-11-12 RX ADMIN — POLYETHYLENE GLYCOL 3350 17 G: 17 POWDER, FOR SOLUTION ORAL at 08:53

## 2023-11-12 RX ADMIN — ACETAMINOPHEN 975 MG: 325 TABLET, FILM COATED ORAL at 13:08

## 2023-11-12 RX ADMIN — DICLOFENAC SODIUM TOPICAL GEL, 1%, 2 G: 10 GEL TOPICAL at 17:12

## 2023-11-12 RX ADMIN — LISINOPRIL 20 MG: 20 TABLET ORAL at 08:52

## 2023-11-12 RX ADMIN — ATORVASTATIN CALCIUM 20 MG: 20 TABLET, FILM COATED ORAL at 22:27

## 2023-11-12 RX ADMIN — METHOCARBAMOL 750 MG: 750 TABLET ORAL at 22:27

## 2023-11-12 RX ADMIN — ACETAMINOPHEN 975 MG: 325 TABLET, FILM COATED ORAL at 22:27

## 2023-11-12 RX ADMIN — HYDROCHLOROTHIAZIDE 25 MG: 25 TABLET ORAL at 08:52

## 2023-11-12 RX ADMIN — DOCUSATE SODIUM 100 MG: 100 CAPSULE, LIQUID FILLED ORAL at 08:53

## 2023-11-12 RX ADMIN — OXYCODONE HYDROCHLORIDE 5 MG: 5 TABLET ORAL at 17:11

## 2023-11-12 RX ADMIN — ACETAMINOPHEN 975 MG: 325 TABLET, FILM COATED ORAL at 06:03

## 2023-11-12 RX ADMIN — METHOCARBAMOL 750 MG: 750 TABLET ORAL at 17:11

## 2023-11-12 RX ADMIN — APIXABAN 5 MG: 5 TABLET, FILM COATED ORAL at 08:52

## 2023-11-12 RX ADMIN — OXYCODONE HYDROCHLORIDE 5 MG: 5 TABLET ORAL at 11:21

## 2023-11-12 NOTE — PROGRESS NOTES
[address] [facility]  Progress Note  Name: Dorcas Rico  MRN: [id number]  Unit/Bed#: -68 I Date of Admission: 11/11/2023   Date of Service: 11/12/2023 I Hospital Day: 1    Assessment/Plan   * Acute low back pain  Assessment & Plan  Acute low back pain, paraspinal spasm  Reports some improvement  Sitting up in chair  Encourage ambulation as able  Follow-up on lumbar x-ray  Continue analgesics, muscle relaxants  Voltaren gel, aqua K-pad  Supportive cares  Physical therapy      DVT (deep venous thrombosis) (HCC)  Assessment & Plan  Continue Eliquis for anticoagulation    Mixed hyperlipidemia  Assessment & Plan  Continue atorvastatin    BMI 33.0-33.9,adult  Assessment & Plan  Therapeutic lifestyle modification encouraged      Prediabetes  Assessment & Plan  Therapeutic lifestyle modification encouraged    HTN (hypertension)  Assessment & Plan  Continue amlodipine 5 mg daily, lisinopril 20 mg daily  Pressures reviewed, acceptable  Monitor blood pressures                 VTE Pharmacologic Prophylaxis: VTE Score: 5 High Risk (Score >/= 5) - Pharmacological DVT Prophylaxis Ordered: apixaban (Eliquis). Sequential Compression Devices Ordered. Patient Centered Rounds: I performed bedside rounds with nursing staff today. Discussions with Specialists or Other Care Team Provider:     Education and Discussions with Family / Patient:  Patient, called left a voice message for daughter Chapin Quintero. Total Time Spent on Date of Encounter in care of patient: 30 mins. This time was spent on one or more of the following: performing physical exam; counseling and coordination of care; obtaining or reviewing history; documenting in the medical record; reviewing/ordering tests, medications or procedures; communicating with other healthcare professionals and discussing with patient's family/caregivers.     Current Length of Stay: 1 day(s)  Current Patient Status: Inpatient   Certification Statement: The patient will continue to require additional inpatient hospital stay due to as outlined  Discharge Plan:  Awaiting clinical and symptomatic improvement    Code Status: Level 1 - Full Code    Subjective:     Reports some improvement in pain  Sitting up in chair  Still with significant muscle spasm inability to stand or ambulate easily  Discussed with RN  Encourage incentive spirometry    Objective:     Vitals:   Temp (24hrs), Av.7 °F (36.5 °C), Min:97.5 °F (36.4 °C), Max:98 °F (36.7 °C)    Temp:  [97.5 °F (36.4 °C)-98 °F (36.7 °C)] 97.6 °F (36.4 °C)  HR:  [56-65] 56  Resp:  [18] 18  BP: (133-164)/(66-84) 133/66  SpO2:  [96 %-99 %] 98 %  Body mass index is 30.76 kg/m². Input and Output Summary (last 24 hours):      Intake/Output Summary (Last 24 hours) at 2023 1235  Last data filed at 2023 1848  Gross per 24 hour   Intake 360 ml   Output 350 ml   Net 10 ml       Physical Exam:   Physical Exam     Comfortably sitting up in chair  Obese  Short thick neck  Mucous members are moist  Neck supple  Lungs diminished breath sounds at bases  Vesicular breath sounds  Heart sounds S1-S2 noted  Abdomen soft nontender  Awake obey simple commands  No rash    Additional Data:     Labs:  Results from last 7 days   Lab Units 23  0615   WBC Thousand/uL 7.00   HEMOGLOBIN g/dL 11.4*   HEMATOCRIT % 35.7*   PLATELETS Thousands/uL 280   NEUTROS PCT % 57   LYMPHS PCT % 26   MONOS PCT % 12   EOS PCT % 4     Results from last 7 days   Lab Units 23  0615   SODIUM mmol/L 139   POTASSIUM mmol/L 3.8   CHLORIDE mmol/L 104   CO2 mmol/L 29   BUN mg/dL 25   CREATININE mg/dL 0.75   ANION GAP mmol/L 6   CALCIUM mg/dL 8.9   GLUCOSE RANDOM mg/dL 95                       Lines/Drains:  Invasive Devices       Peripheral Intravenous Line  Duration             Peripheral IV 23 Distal;Left;Upper;Ventral (anterior) Arm <1 day                          Imaging: Reviewed radiology reports from this admission including: CT head    Recent Cultures (last 7 days):         Last 24 Hours Medication List:   Current Facility-Administered Medications   Medication Dose Route Frequency Provider Last Rate    acetaminophen  975 mg Oral Q8H Farideh Reyes MD      aluminum-magnesium hydroxide-simethicone  30 mL Oral Q6H PRN Miguel Archibald MD      amLODIPine  5 mg Oral Daily Miguel Archibald MD      apixaban  5 mg Oral BID Miguel Archibald MD      atorvastatin  20 mg Oral HS Miguel Archibald MD      Diclofenac Sodium  2 g Topical 4x Daily Miguel Archibald MD      docusate sodium  100 mg Oral BID Miguel Archibald MD      lisinopril  20 mg Oral Daily Miguel Archibald MD      And    hydrochlorothiazide  25 mg Oral Daily Miguel Archibald MD      lidocaine  2 patch Topical Once Miguel Archibald MD      lubiprostone  8 mcg Oral BID Miguel Archibald MD      methocarbamol  750 mg Oral Q6H [address] Miguel Archibald MD      ondansetron  4 mg Intravenous Q6H PRN Miguel Archibald MD      oxyCODONE  5 mg Oral Q6H PRN Miguel Archibald MD      oxyCODONE  2.5 mg Oral Q6H PRN Miguel Archibald MD      polyethylene glycol  17 g Oral Daily Miguel Archibald MD          Today, Patient Was Seen By: Miguel Archibald MD    **Please Note: This note may have been constructed using a voice recognition system. **

## 2023-11-12 NOTE — ASSESSMENT & PLAN NOTE
Acute low back pain, paraspinal spasm  Reports some improvement  Sitting up in chair  Encourage ambulation as able  Follow-up on lumbar x-ray  Continue analgesics, muscle relaxants  Voltaren gel, aqua K-pad  Supportive cares  Physical therapy

## 2023-11-12 NOTE — PLAN OF CARE
Problem: PAIN - ADULT  Goal: Verbalizes/displays adequate comfort level or baseline comfort level  Description: Interventions:  - Encourage patient to monitor pain and request assistance  - Assess pain using appropriate pain scale  - Administer analgesics based on type and severity of pain and evaluate response  - Implement non-pharmacological measures as appropriate and evaluate response  - Consider cultural and social influences on pain and pain management  - Notify physician/advanced practitioner if interventions unsuccessful or patient reports new pain  Outcome: Progressing     Problem: INFECTION - ADULT  Goal: Absence or prevention of progression during hospitalization  Description: INTERVENTIONS:  - Assess and monitor for signs and symptoms of infection  - Monitor lab/diagnostic results  - Monitor all insertion sites, i.e. indwelling lines, tubes, and drains  - Monitor endotracheal if appropriate and nasal secretions for changes in amount and color  - [place] appropriate cooling/warming therapies per order  - Administer medications as ordered  - Instruct and encourage patient and family to use good hand hygiene technique  - Identify and instruct in appropriate isolation precautions for identified infection/condition  Outcome: Progressing  Goal: Absence of fever/infection during neutropenic period  Description: INTERVENTIONS:  - Monitor WBC    Outcome: Progressing     Problem: SAFETY ADULT  Goal: Patient will remain free of falls  Description: INTERVENTIONS:  - Educate patient/family on patient safety including physical limitations  - Instruct patient to call for assistance with activity   - Consult OT/PT to assist with strengthening/mobility   - Keep Call bell within reach  - Keep bed low and locked with side rails adjusted as appropriate  - Keep care items and personal belongings within reach  - Initiate and maintain comfort rounds  - Make Fall Risk Sign visible to staff  - Offer Toileting every  Hours, in advance of need  - Initiate/Maintain alarm  - Obtain necessary fall risk management equipment:   - Apply yellow socks and bracelet for high fall risk patients  - Consider moving patient to room near nurses station  Outcome: Progressing  Goal: Maintain or return to baseline ADL function  Description: INTERVENTIONS:  -  Assess patient's ability to carry out ADLs; assess patient's baseline for ADL function and identify physical deficits which impact ability to perform ADLs (bathing, care of mouth/teeth, toileting, grooming, dressing, etc.)  - Assess/evaluate cause of self-care deficits   - Assess range of motion  - Assess patient's mobility; develop plan if impaired  - Assess patient's need for assistive devices and provide as appropriate  - Encourage maximum independence but intervene and supervise when necessary  - Involve family in performance of ADLs  - Assess for home care needs following discharge   - Consider OT consult to assist with ADL evaluation and planning for discharge  - Provide patient education as appropriate  Outcome: Progressing  Goal: Maintains/Returns to pre admission functional level  Description: INTERVENTIONS:  - Perform BMAT or MOVE assessment daily.   - Set and communicate daily mobility goal to care team and patient/family/caregiver. - Collaborate with rehabilitation services on mobility goals if consulted  - Perform Range of Motion 3 times a day. - Reposition patient every 2 hours.   - Dangle patient 3 times a day  - Stand patient 3 times a day  - Ambulate patient 3 times a day  - Out of bed to chair 3 times a day   - Out of bed for meals 3 times a day  - Out of bed for toileting  - Record patient progress and toleration of activity level   Outcome: Progressing     Problem: DISCHARGE PLANNING  Goal: Discharge to home or other facility with appropriate resources  Description: INTERVENTIONS:  - Identify barriers to discharge w/patient and caregiver  - Arrange for needed discharge resources and transportation as appropriate  - Identify discharge learning needs (meds, wound care, etc.)  - Arrange for interpretive services to assist at discharge as needed  - Refer to Case Management Department for coordinating discharge planning if the patient needs post-hospital services based on physician/advanced practitioner order or complex needs related to functional status, cognitive ability, or social support system  Outcome: Progressing     Problem: Knowledge Deficit  Goal: Patient/family/caregiver demonstrates understanding of disease process, treatment plan, medications, and discharge instructions  Description: Complete learning assessment and assess knowledge base. Interventions:  - Provide teaching at level of understanding  - Provide teaching via preferred learning methods  Outcome: Progressing     Problem: MOBILITY - ADULT  Goal: Maintain or return to baseline ADL function  Description: INTERVENTIONS:  -  Assess patient's ability to carry out ADLs; assess patient's baseline for ADL function and identify physical deficits which impact ability to perform ADLs (bathing, care of mouth/teeth, toileting, grooming, dressing, etc.)  - Assess/evaluate cause of self-care deficits   - Assess range of motion  - Assess patient's mobility; develop plan if impaired  - Assess patient's need for assistive devices and provide as appropriate  - Encourage maximum independence but intervene and supervise when necessary  - Involve family in performance of ADLs  - Assess for home care needs following discharge   - Consider OT consult to assist with ADL evaluation and planning for discharge  - Provide patient education as appropriate  Outcome: Progressing  Goal: Maintains/Returns to pre admission functional level  Description: INTERVENTIONS:  - Perform BMAT or MOVE assessment daily.   - Set and communicate daily mobility goal to care team and patient/family/caregiver.    - Collaborate with rehabilitation services on mobility goals if consulted  - Perform Range of Motion 3 times a day. - Reposition patient every 2 hours.   - Dangle patient 3 times a day  - Stand patient 3 times a day  - Ambulate patient 3 times a day  - Out of bed to chair 3 times a day   - Out of bed for meals 3 times a day  - Out of bed for toileting  - Record patient progress and toleration of activity level   Outcome: Progressing

## 2023-11-12 NOTE — ASSESSMENT & PLAN NOTE
Continue amlodipine 5 mg daily, lisinopril 20 mg daily  Pressures reviewed, acceptable  Monitor blood pressures

## 2023-11-12 NOTE — PLAN OF CARE
Problem: PAIN - ADULT  Goal: Verbalizes/displays adequate comfort level or baseline comfort level  Description: Interventions:  - Encourage patient to monitor pain and request assistance  - Assess pain using appropriate pain scale  - Administer analgesics based on type and severity of pain and evaluate response  - Implement non-pharmacological measures as appropriate and evaluate response  - Consider cultural and social influences on pain and pain management  - Notify physician/advanced practitioner if interventions unsuccessful or patient reports new pain  Outcome: Progressing     Problem: INFECTION - ADULT  Goal: Absence or prevention of progression during hospitalization  Description: INTERVENTIONS:  - Assess and monitor for signs and symptoms of infection  - Monitor lab/diagnostic results  - Monitor all insertion sites, i.e. indwelling lines, tubes, and drains  - Monitor endotracheal if appropriate and nasal secretions for changes in amount and color  - [place] appropriate cooling/warming therapies per order  - Administer medications as ordered  - Instruct and encourage patient and family to use good hand hygiene technique  - Identify and instruct in appropriate isolation precautions for identified infection/condition  Outcome: Progressing  Goal: Absence of fever/infection during neutropenic period  Description: INTERVENTIONS:  - Monitor WBC    Outcome: Progressing     Problem: SAFETY ADULT  Goal: Patient will remain free of falls  Description: INTERVENTIONS:  - Educate patient/family on patient safety including physical limitations  - Instruct patient to call for assistance with activity   - Consult OT/PT to assist with strengthening/mobility   - Keep Call bell within reach  - Keep bed low and locked with side rails adjusted as appropriate  - Keep care items and personal belongings within reach  - Initiate and maintain comfort rounds  - Make Fall Risk Sign visible to staff  - Offer Toileting every 2 Hours, in advance of need  - Initiate/Maintain n/a alarm  - Obtain necessary fall risk management equipment:   - Apply yellow socks and bracelet for high fall risk patients  - Consider moving patient to room near nurses station  Outcome: Progressing  Goal: Maintain or return to baseline ADL function  Description: INTERVENTIONS:  -  Assess patient's ability to carry out ADLs; assess patient's baseline for ADL function and identify physical deficits which impact ability to perform ADLs (bathing, care of mouth/teeth, toileting, grooming, dressing, etc.)  - Assess/evaluate cause of self-care deficits   - Assess range of motion  - Assess patient's mobility; develop plan if impaired  - Assess patient's need for assistive devices and provide as appropriate  - Encourage maximum independence but intervene and supervise when necessary  - Involve family in performance of ADLs  - Assess for home care needs following discharge   - Consider OT consult to assist with ADL evaluation and planning for discharge  - Provide patient education as appropriate  Outcome: Progressing  Goal: Maintains/Returns to pre admission functional level  Description: INTERVENTIONS:  - Perform BMAT or MOVE assessment daily.   - Set and communicate daily mobility goal to care team and patient/family/caregiver. - Collaborate with rehabilitation services on mobility goals if consulted  - Perform Range of Motion 3 times a day. - Reposition patient every 2 hours.   - Dangle patient 2 times a day  - Stand patient 3 times a day  - Ambulate patient 3 times a day  - Out of bed to chair 3 times a day   - Out of bed for meals 3 times a day  - Out of bed for toileting  - Record patient progress and toleration of activity level   Outcome: Progressing     Problem: DISCHARGE PLANNING  Goal: Discharge to home or other facility with appropriate resources  Description: INTERVENTIONS:  - Identify barriers to discharge w/patient and caregiver  - Arrange for needed discharge resources and transportation as appropriate  - Identify discharge learning needs (meds, wound care, etc.)  - Arrange for interpretive services to assist at discharge as needed  - Refer to Case Management Department for coordinating discharge planning if the patient needs post-hospital services based on physician/advanced practitioner order or complex needs related to functional status, cognitive ability, or social support system  Outcome: Progressing     Problem: Knowledge Deficit  Goal: Patient/family/caregiver demonstrates understanding of disease process, treatment plan, medications, and discharge instructions  Description: Complete learning assessment and assess knowledge base.   Interventions:  - Provide teaching at level of understanding  - Provide teaching via preferred learning methods  Outcome: Progressing

## 2023-11-12 NOTE — PLAN OF CARE
Problem: OCCUPATIONAL THERAPY ADULT  Goal: Performs self-care activities at highest level of function for planned discharge setting. See evaluation for individualized goals. Description: Treatment Interventions: ADL retraining, Functional transfer training, Endurance training, Patient/family training, Equipment evaluation/education, Compensatory technique education, Activityengagement          See flowsheet documentation for full assessment, interventions and recommendations. Note: Limitation: Decreased ADL status, Decreased endurance, Decreased self-care trans, Decreased high-level ADLs  Prognosis: Good  Assessment: Pt is a 75 y.o. male who was admitted to [facility] on 11/11/2023 with Acute low back pain . Patient  has a past medical history of Anemia, Cataract, Colon polyp, Diverticulosis, Gastritis, GERD (gastroesophageal reflux disease), Hyperlipidemia, Hypertension, Osteoarthritis of left knee, Pre-diabetes, Throat ulcer, and Visual impairment. At baseline pt was completing adls and mobility, has assist for iadls - does not drive - reports 3 falls. Pt lives alone in [facility] [place], operated by [facility] with elevator access - reports he has home aides x 4hours 5 days/wk. Currently pt requires min assist for overall ADLS and min to mod assist for functional mobility/transfers. Pt currently presents with impairments in the following categories -limited home support, difficulty performing ADLS, difficulty performing IADLS , and environment activity tolerance, endurance, and standing balance/tolerance.  These impairments, as well as pt's fatigue, pain, spinal precautions, decreased caregiver support, risk for falls, and home environment  limit pt's ability to safely engage in all baseline areas of occupation, includingbathing, dressing, toileting, functional mobility/transfers, community mobility, laundry , house maintenance, medication management, meal prep, cleaning, social participation , and leisure activities   OT will continue to follow to address the below stated goals.      Rehab Resource Intensity Level, OT: II (Moderate Resource Intensity)

## 2023-11-12 NOTE — OCCUPATIONAL THERAPY NOTE
Occupational Therapy Evaluation     Patient Name: Anabella Pate  HHISN'K Date: 11/12/2023  Problem List  Principal Problem:    Acute low back pain  Active Problems:    HTN (hypertension)    Prediabetes    BMI 33.0-33.9,adult    Mixed hyperlipidemia    DVT (deep venous thrombosis) ([facility])    Past Medical History  Past Medical History:   Diagnosis Date    Anemia     Cataract     Colon polyp     Diverticulosis     Gastritis     GERD (gastroesophageal reflux disease)     Hyperlipidemia     Hypertension     Osteoarthritis of left knee 06/07/2023    Pre-diabetes     Throat ulcer     Visual impairment      Past Surgical History  Past Surgical History:   Procedure Laterality Date    COLONOSCOPY N/A 08/18/2016    Procedure: COLONOSCOPY;  Surgeon: Macy Bourgeois MD;  Location:  GI LAB; Service:     COLONOSCOPY W/ POLYPECTOMY  2019    repeat 2022    EYE SURGERY Bilateral     TN ARTHRP KNE CONDYLE&PLATU MEDIAL&LAT COMPARTMENTS Left 8/14/2023    Procedure: ARTHROPLASTY KNEE TOTAL;  Surgeon: Yokasta Gomez DO;  Location: [address] [place] [address] OR;  Service: Orthopedics    TONSILLECTOMY      UPPER GASTROINTESTINAL ENDOSCOPY  2019 11/12/23 0845   OT Last Visit   OT Visit Date 11/12/23   Note Type   Note type Evaluation   Pain Assessment   Pain Assessment Tool 0-10   Pain Score 7   Pain Location/Orientation Orientation: Right;Location: Leg;Location: Back; Location: [place] Pain Intervention(s) Repositioned; Ambulation/increased activity; Elevated; Emotional support;Relaxation technique   Restrictions/Precautions   Weight Bearing Precautions Per Order No   Other Precautions Fall Risk;Pain;Spinal precautions   Home Living   Type of Home Apartment   Home Layout One level;Elevator   Bathroom Shower/Tub Tub/shower unit   Bathroom Toilet Standard   Bathroom Equipment Grab bars in [address] Walker;Cane   Prior Function   Level of [place] Independent with ADLs; Independent with functional mobility; Independent with IADLS   Lives With (S)  Alone   Receives Help From Family;Personal care attendant   IADLs Independent with meal prep; Independent with medication management; Family/Friend/Other provides transportation   Falls in the last 6 months 1 to 4  (3 falls)   Vocational Retired   Lifestyle   Autonomy I adls and mobility - reports 3 falls - has assist for iadls - does not drive - reports home aide x4hrs/5days   Reciprocal Relationships supportive family   Service to Others retired   Intrinsic Gratification sedentary   Subjective   Subjective c/o pain   ADL   Eating Assistance 7  Independent   Grooming Assistance 5  [address] [facility] 4  Minimal Assistance    [address] [facility] 4  [address] [facility] 4  [address] [facility] 4  [address]  4  Minimal Assistance   Bed Mobility   Supine to Sit 3  Moderate assistance   Transfers   Sit to Stand 3  Moderate assistance   Stand to Sit 4  Minimal assistance   Functional Mobility   Functional Mobility 4  Minimal assistance   Additional items Rolling walker   Balance   Static Sitting Fair +   Dynamic Sitting Fair   Static Standing Fair   Dynamic Standing Fair -   Ambulatory Poor +   Activity Tolerance   Activity Tolerance Patient limited by fatigue;Patient limited by pain;Treatment limited secondary to medical complications (Comment)   RUE Assessment   RUE Assessment WFL   LUE Assessment   LUE Assessment WFL   Cognition   Overall Cognitive Status WFL   Assessment   Limitation Decreased ADL status; Decreased endurance;Decreased self-care trans;Decreased high-level ADLs   Prognosis Good   Assessment Pt is a 75 y.o. male who was admitted to [address] [place] [address] on 11/11/2023 with Acute low back pain .  Patient  has a past medical history of Anemia, Cataract, Colon polyp, Diverticulosis, Gastritis, GERD (gastroesophageal reflux disease), Hyperlipidemia, Hypertension, Osteoarthritis of left knee, Pre-diabetes, Throat ulcer, and Visual impairment. At baseline pt was completing adls and mobility, has assist for iadls - does not drive - reports 3 falls. Pt lives alone in apt with elevator access - reports he has home aides x 4hours 5 days/wk. Currently pt requires min assist for overall ADLS and min to mod assist for functional mobility/transfers. Pt currently presents with impairments in the following categories -limited home support, difficulty performing ADLS, difficulty performing IADLS , and environment activity tolerance, endurance, and standing balance/tolerance. These impairments, as well as pt's fatigue, pain, spinal precautions, decreased caregiver support, risk for falls, and home environment  limit pt's ability to safely engage in all baseline areas of occupation, includingbathing, dressing, toileting, functional mobility/transfers, community mobility, laundry , house maintenance, medication management, meal prep, cleaning, social participation , and leisure activities   OT will continue to follow to address the below stated goals. Goals   Patient Goals have less pain   LTG Time Frame 10-14   Long Term Goal #1 refer to established goals below   Plan   Treatment Interventions ADL retraining;Functional transfer training; Endurance training;Patient/family training;Equipment evaluation/education; Compensatory technique education; Activityengagement   Goal Expiration Date 11/26/23   OT Frequency 2-3x/wk   Discharge Recommendation   Rehab Resource Intensity Level, OT II (Moderate Resource Intensity)   AM-PAC Daily Activity Inpatient   Lower Body Dressing 3   Bathing 3   Toileting 3   Upper Body Dressing 3   Grooming 4   Eating 4   Daily Activity Raw Score 20   Daily Activity Standardized Score (Calc for Raw Score >=11) 42.03   AM-PAC Applied Cognition Inpatient   Following a Speech/Presentation 4   Understanding Ordinary Conversation 4   Taking Medications 4   Remembering Where Things Are Placed or Put Away 4   Remembering List of 4-5 Errands 4   Taking Care of Complicated Tasks 4   Applied Cognition Raw Score 24   Applied Cognition Standardized Score 62.21   End of Consult   Education Provided Yes   Patient Position at End of Consult Bedside chair;Bed/Chair alarm activated; All needs within reach   Nurse Communication Nurse aware of consult       OCCUPATIONAL THERAPY GOALS:    *Mod I adls after setup with use of AE PRN  *Mod I toileting and clothing management   *Mod I functional mobility and transfers to/from all surfaces with good dynamic balance and safety for participation in dynamic adls and iadl tasks   *Demonstrate good carryover with safe use of RW, spinal precautions/use of proper body mechanics during functional tasks   *Assess DME needs   *Increase activity tolerance to 35-40 minutes for participation in adls and enjoyable activities  *Assist with safe d/c recommendations       [employer]

## 2023-11-12 NOTE — PLAN OF CARE
Problem: PHYSICAL THERAPY ADULT  Goal: Performs mobility at highest level of function for planned discharge setting. See evaluation for individualized goals. Description: Treatment/Interventions: Functional transfer training, ADL retraining, LE strengthening/ROM, Therapeutic exercise, Endurance training, Patient/family training, Bed mobility, Gait training, Spoke to MD, Spoke to nursing, Spoke to case management, Spoke to advanced practitioner, OT, Family  Equipment Recommended: Wheelchair       See flowsheet documentation for full assessment, interventions and recommendations. Outcome: Progressing  Note: Prognosis: Fair  Problem List: Decreased endurance, Decreased range of motion, Decreased strength, Impaired balance, Decreased coordination, Decreased mobility, Orthopedic restrictions, Pain  Assessment: Pt is a 75 y.o male presenting to [facility] on 11/12/23 with acute low back pain for a PT evaluation. Pertinent PMH includes a L TKA on 8/14/2023, anemia, cataracts, colon polyp, diverticulosis, gastritis, GERD, hyperlipidemia, HTN, OA of L knee, pre-diabetes, throat ulcer, and visual impairments. Pt is a high complexity evaluation due to new AD usage, and recent history of L TKA, and high risk of falls. Pt required mod a x 1 for bed mobility requiring assistance of LE and trunk support. Pt required mod a x 1 for transfers requiring VC on hand placement. Pt required min a x 1 with ambulation using a RW requiring VC on proper sequencing. Pt ambulated 60' total demonstrating forward flexed posture, excessively slow gait speed, decreased stride length, narrow ABDIEL, and increased dependence on UE assistance. During the PT evaluation, pt found to have diffuse B/L LE weakness, impaired balance, and decreased endurance. Pt received an [facility] basic mobility inpatient raw score of 15. PT would benefit from skilled PT during the remaining time at [facility] to address these impairments.  PT recommendations at this time is rehab to improve functional independence, decrease fall risk, and improve endurance. Pt was left in the recliner chair at bedside with all needs within reach. Please also refer to the recommendation of the physical therapist for safe discharge planning. Barriers to Discharge: Decreased caregiver support     Rehab Resource Intensity Level, PT: I (Maximum Resource Intensity)    See flowsheet documentation for full assessment.

## 2023-11-12 NOTE — PHYSICAL THERAPY NOTE
PHYSICAL THERAPY EVALUATION          Patient Name: Carole Menard  [id number] Date: 11/12/2023 11/12/23 0840   PT Last Visit   PT Visit Date 11/12/23   Note Type   Note type Evaluation   Pain Assessment   Pain Assessment Tool 0-10   Pain Score 7   Pain Location/Orientation Orientation: Right;Location: Leg;Location: Buttocks   Patient's Stated Pain Goal No pain   Hospital Pain Intervention(s) Ambulation/increased activity;Repositioned   Multiple Pain Sites No   Restrictions/Precautions   Weight Bearing Precautions Per Order No   Home Living   Type of Home Apartment   Home Layout One level;Elevator   Bathroom Shower/Tub Tub/shower unit   Bathroom Toilet Standard   Bathroom Equipment Grab bars in shower; Shower chair   Bathroom Accessibility Accessible   Home Equipment Walker;Cane   Prior Function   Level of [place] Independent with ADLs; Independent with functional mobility   Lives With (S)  Alone   Receives Help From Family   IADLs Independent with driving; Independent with meal prep; Independent with medication management   Falls in the last 6 months 1 to 4   Vocational Retired   Comments Used a cane prior for ambulation   General   Family/Caregiver Present No   Cognition   Arousal/Participation Alert   Orientation Level Oriented X4   Memory Within functional limits   Following Commands Follows all commands and directions without difficulty   Subjective   Subjective I'm ready to start moving   RUE Assessment   RUE Assessment WFL   LUE Assessment   LUE Assessment WFL   RLE Assessment   RLE Assessment WFL   LLE Assessment   LLE Assessment WFL   Bed Mobility   Rolling R 3  Moderate assistance   Additional items Assist x 1; Increased time required;LE management   Rolling L Unable to assess   Supine to Sit 3  Moderate assistance   Additional items Assist x 1; Increased time required;LE management   Transfers   Sit to Stand 3 Moderate assistance   Additional items Assist x 1; Increased time required   Stand to Sit 3  Moderate assistance   Additional items Assist x 1   Ambulation/Elevation   Gait pattern Forward Flexion;Narrow ABDIEL; Antalgic; Improper Weight shift;Decreased foot clearance; Foward flexed; Short stride; Step through pattern   Gait Assistance 4  Minimal assist   Additional items Assist x 1   Assistive Device Rolling walker   Distance 60'   Stair Management Assistance Not tested   Balance   Static Sitting Fair   Dynamic Sitting Fair -   Static Standing Fair   Dynamic Standing Fair -   Ambulatory Poor +   Endurance Deficit   Endurance Deficit Yes   Endurance Deficit Description fatigue   Activity Tolerance   Activity Tolerance Patient limited by fatigue;Patient limited by pain   Medical Staff Made Aware OT Floyd Ely   Nurse Made Aware RN cleared pt for evaluation   Assessment   Prognosis Fair   Problem List Decreased endurance;Decreased range of motion;Decreased strength; Impaired balance;Decreased coordination;Decreased mobility;Orthopedic restrictions;Pain   Assessment Pt is a 75 y.o male presenting to [facility] on 11/12/23 with acute low back pain for a PT evaluation. Pertinent PMH includes a L TKA on 8/14/2023, anemia, cataracts, colon polyp, diverticulosis, gastritis, GERD, hyperlipidemia, HTN, OA of L knee, pre-diabetes, throat ulcer, and visual impairments. Pt is a high complexity evaluation due to new AD usage, and recent history of L TKA, and high risk of falls. Pt required mod a x 1 for bed mobility requiring assistance of LE and trunk support. Pt required mod a x 1 for transfers requiring VC on hand placement. Pt required min a x 1 with ambulation using a RW requiring VC on proper sequencing. Pt ambulated 60' total demonstrating forward flexed posture, excessively slow gait speed, decreased stride length, narrow ABDIEL, and increased dependence on UE assistance.  During the PT evaluation, pt found to have diffuse B/L LE weakness, impaired balance, and decreased endurance. Pt received an [facility] basic mobility inpatient raw score of 15. PT would benefit from skilled PT during the remaining time at [facility] to address these impairments. PT recommendations at this time is rehab to improve functional independence, decrease fall risk, and improve endurance. Pt was left in the recliner chair at bedside with all needs within reach. Please also refer to the recommendation of the physical therapist for safe discharge planning. Barriers to Discharge Decreased caregiver support   Goals   Patient Goals have less pain   STG Expiration Date 11/22/23   Short Term Goal #1 In 10 days, pt will 1) require supervision for all bed mobility to improve functional independence and core strength. 2) require supervision for all transfers to improve overall independence and general LE strength. 3) improve gross LE strength by 1/2 to improve transfers and gait. 4) improve overall balance by 1/2 to improve balance with gait and reduce risk of falls. 5) Ambulate 150' requiring supervision to improve functional mobility and improve overall endurance. PT Treatment Day 0   Plan   Treatment/Interventions Functional transfer training;ADL retraining;LE strengthening/ROM; Therapeutic exercise; Endurance training;Patient/family training;Bed mobility;Gait training;Spoke to MD;Spoke to nursing;Spoke to case management;Spoke to advanced practitioner;OT;Family   PT Frequency 3-5x/wk   Discharge Recommendation   Rehab Resource Intensity Level, PT I (Maximum Resource Intensity)   Equipment Recommended Wheelchair   Wheelchair Package Recommended Standard  (Rolling walker)   AM-PAC Basic Mobility Inpatient   Turning in Flat Bed Without Bedrails 3   Lying on Back to Sitting on Edge of Flat Bed Without Bedrails 3   Moving Bed to Chair 2   Standing Up From Chair Using Arms 2   Walk in Room 3   Climb 3-5 Stairs With Railing 2   Basic Mobility Inpatient Raw Score 15   Basic Mobility Standardized Score 36.97   Highest Level Of Mobility   -[facility] Goal 4: Move to chair/commode   -[facility] Achieved 7: Walk 25 feet or more   Modified [place] Scale   Modified [place] Scale 4   Barthel Index   Feeding 10   Bathing 5   Grooming Score 5   Dressing Score 5   Bladder Score 10   Bowels Score 10   Toilet Use Score 5   Transfers (Bed/Chair) Score 5   Mobility (Level Surface) Score 10   Stairs Score 5   Barthel Index Score 70   End of Consult   Patient Position at End of Consult Bed/Chair alarm activated; All needs within reach; Bedside chair   Eleuterio Coronel,SPT

## 2023-11-13 PROCEDURE — 99232 SBSQ HOSP IP/OBS MODERATE 35: CPT | Performed by: INTERNAL MEDICINE

## 2023-11-13 RX ORDER — MUSCLE RUB CREAM 100; 150 MG/G; MG/G
CREAM TOPICAL 4 TIMES DAILY PRN
Status: DISCONTINUED | OUTPATIENT
Start: 2023-11-13 | End: 2023-11-14 | Stop reason: HOSPADM

## 2023-11-13 RX ORDER — LIDOCAINE 50 MG/G
2 PATCH TOPICAL DAILY
Status: DISCONTINUED | OUTPATIENT
Start: 2023-11-13 | End: 2023-11-14 | Stop reason: HOSPADM

## 2023-11-13 RX ADMIN — LIDOCAINE 2 PATCH: 700 PATCH TOPICAL at 13:11

## 2023-11-13 RX ADMIN — APIXABAN 5 MG: 5 TABLET, FILM COATED ORAL at 17:18

## 2023-11-13 RX ADMIN — AMLODIPINE BESYLATE 5 MG: 5 TABLET ORAL at 09:21

## 2023-11-13 RX ADMIN — HYDROCHLOROTHIAZIDE 25 MG: 25 TABLET ORAL at 09:21

## 2023-11-13 RX ADMIN — ATORVASTATIN CALCIUM 20 MG: 20 TABLET, FILM COATED ORAL at 20:54

## 2023-11-13 RX ADMIN — METHOCARBAMOL 750 MG: 750 TABLET ORAL at 17:17

## 2023-11-13 RX ADMIN — DICLOFENAC SODIUM TOPICAL GEL, 1%, 2 G: 10 GEL TOPICAL at 09:24

## 2023-11-13 RX ADMIN — LISINOPRIL 20 MG: 20 TABLET ORAL at 09:21

## 2023-11-13 RX ADMIN — DOCUSATE SODIUM 100 MG: 100 CAPSULE, LIQUID FILLED ORAL at 17:18

## 2023-11-13 RX ADMIN — OXYCODONE HYDROCHLORIDE 5 MG: 5 TABLET ORAL at 17:23

## 2023-11-13 RX ADMIN — APIXABAN 5 MG: 5 TABLET, FILM COATED ORAL at 09:21

## 2023-11-13 RX ADMIN — METHOCARBAMOL 750 MG: 750 TABLET ORAL at 13:12

## 2023-11-13 RX ADMIN — OXYCODONE HYDROCHLORIDE 5 MG: 5 TABLET ORAL at 05:58

## 2023-11-13 RX ADMIN — ACETAMINOPHEN 975 MG: 325 TABLET, FILM COATED ORAL at 05:58

## 2023-11-13 RX ADMIN — ACETAMINOPHEN 975 MG: 325 TABLET, FILM COATED ORAL at 20:54

## 2023-11-13 RX ADMIN — ACETAMINOPHEN 975 MG: 325 TABLET, FILM COATED ORAL at 13:12

## 2023-11-13 RX ADMIN — POLYETHYLENE GLYCOL 3350 17 G: 17 POWDER, FOR SOLUTION ORAL at 09:21

## 2023-11-13 RX ADMIN — METHOCARBAMOL 750 MG: 750 TABLET ORAL at 23:21

## 2023-11-13 RX ADMIN — DICLOFENAC SODIUM TOPICAL GEL, 1%, 2 G: 10 GEL TOPICAL at 21:00

## 2023-11-13 RX ADMIN — DOCUSATE SODIUM 100 MG: 100 CAPSULE, LIQUID FILLED ORAL at 09:21

## 2023-11-13 RX ADMIN — OXYCODONE HYDROCHLORIDE 5 MG: 5 TABLET ORAL at 23:21

## 2023-11-13 RX ADMIN — METHOCARBAMOL 750 MG: 750 TABLET ORAL at 05:58

## 2023-11-13 RX ADMIN — DICLOFENAC SODIUM TOPICAL GEL, 1%, 2 G: 10 GEL TOPICAL at 17:18

## 2023-11-13 RX ADMIN — LUBIPROSTONE 8 MCG: 8 CAPSULE, GELATIN COATED ORAL at 09:23

## 2023-11-13 NOTE — PLAN OF CARE
Problem: PAIN - ADULT  Goal: Verbalizes/displays adequate comfort level or baseline comfort level  Description: Interventions:  - Encourage patient to monitor pain and request assistance  - Assess pain using appropriate pain scale  - Administer analgesics based on type and severity of pain and evaluate response  - Implement non-pharmacological measures as appropriate and evaluate response  - Consider cultural and social influences on pain and pain management  - Notify physician/advanced practitioner if interventions unsuccessful or patient reports new pain  11/13/2023 0805 by Riri Lee RN  Outcome: Progressing  11/13/2023 0805 by Riri Lee RN  Outcome: Progressing     Problem: INFECTION - ADULT  Goal: Absence or prevention of progression during hospitalization  Description: INTERVENTIONS:  - Assess and monitor for signs and symptoms of infection  - Monitor lab/diagnostic results  - Monitor all insertion sites, i.e. indwelling lines, tubes, and drains  - Monitor endotracheal if appropriate and nasal secretions for changes in amount and color  - [place] appropriate cooling/warming therapies per order  - Administer medications as ordered  - Instruct and encourage patient and family to use good hand hygiene technique  - Identify and instruct in appropriate isolation precautions for identified infection/condition  11/13/2023 0805 by Riri Lee RN  Outcome: Progressing  11/13/2023 0805 by Riri Lee RN  Outcome: Progressing  Goal: Absence of fever/infection during neutropenic period  Description: INTERVENTIONS:  - Monitor WBC    11/13/2023 0805 by Riri Lee RN  Outcome: Progressing  11/13/2023 0805 by Riri Lee RN  Outcome: Progressing     Problem: SAFETY ADULT  Goal: Patient will remain free of falls  Description: INTERVENTIONS:  - Educate patient/family on patient safety including physical limitations  - Instruct patient to call for assistance with activity   - Consult OT/PT to assist with strengthening/mobility   - Keep Call bell within reach  - Keep bed low and locked with side rails adjusted as appropriate  - Keep care items and personal belongings within reach  - Initiate and maintain comfort rounds  - Make Fall Risk Sign visible to staff  - Offer Toileting every 2 Hours, in advance of need  - Initiate/Maintain alarm  - Obtain necessary fall risk management equipment: non-skid footwear, yellow bracelet, pt instructed to call for assistance, call bell in reach and pt rings appropriately, pt in room near nurses' station  - Apply yellow socks and bracelet for high fall risk patients  - Consider moving patient to room near nurses station  11/13/2023 0805 by Laverne Mustafa RN  Outcome: Progressing  11/13/2023 0805 by Laverne Mustafa RN  Outcome: Progressing  Goal: Maintain or return to baseline ADL function  Description: INTERVENTIONS:  -  Assess patient's ability to carry out ADLs; assess patient's baseline for ADL function and identify physical deficits which impact ability to perform ADLs (bathing, care of mouth/teeth, toileting, grooming, dressing, etc.)  - Assess/evaluate cause of self-care deficits   - Assess range of motion  - Assess patient's mobility; develop plan if impaired  - Assess patient's need for assistive devices and provide as appropriate  - Encourage maximum independence but intervene and supervise when necessary  - Involve family in performance of ADLs  - Assess for home care needs following discharge   - Consider OT consult to assist with ADL evaluation and planning for discharge  - Provide patient education as appropriate  11/13/2023 0805 by Laverne Mustafa RN  Outcome: Progressing  11/13/2023 0805 by Laverne Mustafa RN  Outcome: Progressing  Goal: Maintains/Returns to pre admission functional level  Description: INTERVENTIONS:  - Perform BMAT or MOVE assessment daily.   - Set and communicate daily mobility goal to care team and patient/family/caregiver.    - Collaborate with rehabilitation services on mobility goals if consulted  - Perform Range of Motion 3 times a day. - Reposition patient every 2 hours. - Dangle patient 3 times a day  - Stand patient 3 times a day  - Ambulate patient 3 times a day  - Out of bed to chair 3 times a day   - Out of bed for meals 3 times a day  - Out of bed for toileting  - Record patient progress and toleration of activity level   11/13/2023 0805 by Dylan Martinez RN  Outcome: Progressing  11/13/2023 0805 by Dylan Martinez RN  Outcome: Progressing     Problem: DISCHARGE PLANNING  Goal: Discharge to home or other facility with appropriate resources  Description: INTERVENTIONS:  - Identify barriers to discharge w/patient and caregiver  - Arrange for needed discharge resources and transportation as appropriate  - Identify discharge learning needs (meds, wound care, etc.)  - Arrange for interpretive services to assist at discharge as needed  - Refer to Case Management Department for coordinating discharge planning if the patient needs post-hospital services based on physician/advanced practitioner order or complex needs related to functional status, cognitive ability, or social support system  11/13/2023 0805 by Dylan Martinez RN  Outcome: Progressing  11/13/2023 0805 by Dylan Martinez RN  Outcome: Progressing     Problem: Knowledge Deficit  Goal: Patient/family/caregiver demonstrates understanding of disease process, treatment plan, medications, and discharge instructions  Description: Complete learning assessment and assess knowledge base.   Interventions:  - Provide teaching at level of understanding  - Provide teaching via preferred learning methods  11/13/2023 0805 by Dylan Martinez RN  Outcome: Progressing  11/13/2023 0805 by Dylan Martinez RN  Outcome: Progressing     Problem: MOBILITY - ADULT  Goal: Maintain or return to baseline ADL function  Description: INTERVENTIONS:  -  Assess patient's ability to carry out ADLs; assess patient's baseline for ADL function and identify physical deficits which impact ability to perform ADLs (bathing, care of mouth/teeth, toileting, grooming, dressing, etc.)  - Assess/evaluate cause of self-care deficits   - Assess range of motion  - Assess patient's mobility; develop plan if impaired  - Assess patient's need for assistive devices and provide as appropriate  - Encourage maximum independence but intervene and supervise when necessary  - Involve family in performance of ADLs  - Assess for home care needs following discharge   - Consider OT consult to assist with ADL evaluation and planning for discharge  - Provide patient education as appropriate  11/13/2023 0805 by Wally Doan RN  Outcome: Progressing  11/13/2023 0805 by Wally Doan RN  Outcome: Progressing  Goal: Maintains/Returns to pre admission functional level  Description: INTERVENTIONS:  - Perform BMAT or MOVE assessment daily.   - Set and communicate daily mobility goal to care team and patient/family/caregiver. - Collaborate with rehabilitation services on mobility goals if consulted  - Perform Range of Motion 3 times a day. - Reposition patient every 2 hours.   - Dangle patient 3 times a day  - Stand patient 3 times a day  - Ambulate patient 3 times a day  - Out of bed to chair 3 times a day   - Out of bed for meals 3 times a day  - Out of bed for toileting  - Record patient progress and toleration of activity level   11/13/2023 0805 by Wally Doan RN  Outcome: Progressing  11/13/2023 0805 by Wally Doan RN  Outcome: Progressing

## 2023-11-13 NOTE — PROGRESS NOTES
Patient:    MRN:  [id number]    Evi Request ID:  [id number]    Level of care reserved:  [address]    Partner Reserved:  [facility], [address] [place] [address] [phone]    Clinical needs requested:    Geography searched:  10 miles around [address] [place] [address]    Start of Service:    Request sent:  10:24am EST on 11/13/2023 by Nia Hargrove    Partner reserved:  3:46pm EST on 11/13/2023 by Melly Olmedo list shared:  3:24pm EST on 11/13/2023 by Nia Hargrove

## 2023-11-13 NOTE — UTILIZATION REVIEW
NOTIFICATION OF INPATIENT ADMISSION   AUTHORIZATION REQUEST   SERVICING FACILITY:   [address] [place] [address]  Address: [address] [place] [address]  Tax ID: [id number]  NPI: [id number] ATTENDING PROVIDER:  Attending Name and NPI#: Cornell Gil Md [[id number]]  Address: [address] [place] [address]  Phone: [phone]   ADMISSION INFORMATION:  Place of Service: Inpatient [address] [facility]  Place of Service Code: 21  Inpatient Admission Date/Time: 11/11/23  6:00 PM  Discharge Date/Time: No discharge date for patient encounter. Admitting Diagnosis Code/Description:  Back pain [M54.9]     UTILIZATION REVIEW CONTACT:  Jeannette Townsend, Utilization   Network Utilization Review Department  Phone: [phone]  Fax: [phone]  Email: [email or web address]  Contact for approvals/pending authorizations, clinical reviews, and discharge. PHYSICIAN ADVISORY SERVICES:  Medical Necessity Denial & [employer]bxu-mg-Xu[employer] Review  Phone: [phone]  Fax: [phone]  Email: [email or web address]     DISCHARGE SUPPORT TEAM:  For Patients Discharge Needs & Updates  Phone: [phone] opt. 2 Fax: [phone]  Email: [email or web address]

## 2023-11-13 NOTE — ED ATTENDING ATTESTATION
11/11/2023  IMisty MD, saw and evaluated the patient. I have discussed the patient with the resident/non-physician practitioner and agree with the resident's/non-physician practitioner's findings, Plan of Care, and MDM as documented in the resident's/non-physician practitioner's note, except where noted. All available labs and Radiology studies were reviewed. I was present for key portions of any procedure(s) performed by the resident/non-physician practitioner and I was immediately available to provide assistance. At this point I agree with the current assessment done in the Emergency Department. I have conducted an independent evaluation of this patient a history and physical is as follows:    ED Course       25-year-old male with acute exacerbation of left lumbar radiculopathy/sciatica pain    Differential diagnosis: Sciatica, degenerative disc disease lumbar radiculopathy. Doubt cauda equina syndrome doubt epidural abscess doubt discitis. Doubt transverse myelitis symptoms are severe and disabling unable to ambulate at this time due to pain. Neuro exam is otherwise nonfocal with no sensory or motor deficits present. Plan to give trial multimodal analgesia and ambulatory challenge in ED. Patient was reassessed after pain medication unable to ambulate safely in ED found expresses concerns as patient lives alone for his safety. Admit patient to medicine service for further evaluation and management.           Critical Care Time  Procedures

## 2023-11-13 NOTE — CASE MANAGEMENT
Case Management Assessment    Patient name Troy Hamilton  Location /-12 MRN [id number]  : 1950 Date 2023       Current Admission Date: 2023  Current Admission Diagnosis:Acute low back pain   Patient Active Problem List    Diagnosis Date Noted    Acute low back pain 2023    Abnormal CT scan 2023    DVT (deep venous thrombosis) ([address]) 2023    Anemia 2023    Status post total knee replacement, left 2023    Heat syncope 2021    BMI 33.0-33.9,adult 2021    Mixed hyperlipidemia 2021    Chronic pain of both knees 2021    HTN (hypertension) 2019    Prediabetes 2019      LOS (days): 2  Geometric Mean LOS (GMLOS) (days):   Days to GMLOS:     OBJECTIVE:    Risk of Unplanned Readmission Score: 14.22         Current admission status: Inpatient       Preferred Pharmacy:   [address] [facility], [address] [place] [address]  Phone: [phone] Fax: [phone]    Primary Care Provider: Sherryle Edouard, MD    Primary Insurance: [facility]  Secondary Insurance: [address]    ASSESSMENT:  [address] [place] [address] Representative - Daughter   Primary Phone: [phone] (Mobile)  Home Phone: [phone]                 Readmission Root Cause  30 Day Readmission: No    Patient Information  Admitted from[de-identified] Home  Mental Status: Alert  During Assessment patient was accompanied by: Not accompanied during assessment  Assessment information provided by[de-identified] Patient  Primary Caregiver: Self  Support Systems: Daughter, [place] of Residence: [facility] [address] [place] [address] do you live in?: [place] entry access options.  Select all that apply.: Elevator  Type of Current Residence: Apartment  Upon entering residence, is there a bedroom on the main floor (no further steps)?: Yes  Upon entering residence, is there a bathroom on the main floor (no further steps)?: Yes  In the last 12 months, was there a time when you were not able to pay the mortgage or rent on time?: No  In the last 12 months, how many places have you lived?: 1  In the last 12 months, was there a time when you did not have a steady place to sleep or slept in a shelter (including now)?: No  Homeless/housing insecurity resource given?: N/A  Living Arrangements: Lives Alone  Is patient a ?: No    Activities of Daily Living Prior to Admission  Functional Status: Assistance  Completes ADLs independently?: Yes  Ambulates independently?: Yes  Does patient use assisted devices?: Yes  Assisted Devices (DME) used: Walker, Shower Chair, Bedside Commode  Does patient currently own DME?: Yes  What DME does the patient currently own?: Bedside Commode, Shower Chair, Walker  Does patient have a history of Outpatient Therapy (PT/OT)?: No  Does the patient have a history of Short-Term Rehab?: No  Does patient have a history of HHC?: Yes (Has had Rajendra Galvan in the past)  Does patient currently have [facility]?: No    Patient Information Continued  Income Source: Pension/correction  Does patient have prescription coverage?: Yes  Within the past 12 months, you worried that your food would run out before you got the money to buy more.: Never true  Within the past 12 months, the food you bought just didn't last and you didn't have money to get more.: Never true  Food insecurity resource given?: N/A  Does patient receive dialysis treatments?: No  Does patient have a history of substance abuse?: No  Does patient have a history of Mental Health Diagnosis?: No    PHQ 2/9 Screening   Reviewed PHQ 2/9 Depression Screening Score?: No    Means of Transportation  Means of Transport to Appts[de-identified] Friends  In the past 12 months, has lack of transportation kept you from medical appointments or from getting medications?: No  In the past 12 months, has lack of transportation kept you from meetings, work, or from getting things needed for daily living?: No  Was application for public transport provided?: N/A        Summary: Met with patient at bedside to complete initial assessment. Demographics confirmed. Lives alone. Has a daughter locally who helps as needs. Does not drive but has a friend who provide transport to appointments. Discussed therapy reccs of rehab. Patient agreeable but requesting CM to contact his daughters. Spoke with patient's daughter Catalinagiselle Torres and Kishan Barriga via phone. Both in agreement with rehab for patient. Daughter's provided CM with permission to perform a provider search. Referrals sent via [employer]. Awaiting acceptance.

## 2023-11-13 NOTE — UTILIZATION REVIEW
Initial Clinical Review    Admission: Date/Time/Statement:   Admission Orders (From admission, onward)       Ordered        11/11/23 1800  Inpatient Admission  Once                          Orders Placed This Encounter   Procedures    Inpatient Admission     Standing Status:   Standing     Number of Occurrences:   1     Order Specific Question:   Level of Care     Answer:   Med Surg [16]     Order Specific Question:   Estimated length of stay     Answer:   More than 2 Midnights     Order Specific Question:   Certification     Answer:   I certify that inpatient services are medically necessary for this patient for a duration of greater than two midnights. See H&P and MD Progress Notes for additional information about the patient's course of treatment. ED Arrival Information       Expected   -    Arrival   11/11/2023 15:02    Acuity   Urgent              Means of arrival   Ambulance    Escorted by   [facility]    Service   Hospitalist    Admission type   Emergency              Arrival complaint   -             Chief Complaint   Patient presents with    Back Pain     Patient having sciatica flare up since yesterday when he went to the bathroom. Patient not having any chest pain or dizziness       Initial Presentation: 75 y.o. male with a PMH of history of hypertension, prediabetes, history of DVT on Eliquis, s/p left knee replacement, dyslipidemia who presents with low back pain. History is obtained from the patient and daughter at bedside. Patient reports he woke up today morning and noticed low back pain, he reports he is unable to bear weight, and feels pain radiating down his right lower extremity. Denies sensory symptoms. Denies right lower extremity weakness denies sphincter symptoms. He is unable to bear weight or ambulate due to low back pain.  Plan: Inpatient admission for evaluation and treatment of acute low back pain, DVT, HLD, HTN: follow lumbar xray, analgesics, muscle relaxants, Aqua K pad, Voltaren gel, continue Eliquis, atorvastatin, amlodipine, lisinopril. Date: 11/12   Day 2:     Internal medicine: encourage ambulation as able, follow lumbar xray, continue analgesics and muscle relaxants, Voltaren gel, Aqua K pad, continue Eliquis, atorvastatin, amlodipine, lisinopril. Date: 11/13    Day 3: Has surpassed a 2nd midnight with active treatments and services, which include pain management and lumbar spine xray.       ED Triage Vitals [11/11/23 1512]   Temperature Pulse Respirations Blood Pressure SpO2   97.5 °F (36.4 °C) 65 18 164/84 99 %      Temp Source Heart Rate Source Patient Position - Orthostatic VS BP Location FiO2 (%)   Oral Monitor Lying Left arm --      Pain Score       10 - Worst Possible Pain          Wt Readings from Last 1 Encounters:   11/12/23 86.5 kg (190 lb 9.6 oz)     Additional Vital Signs:     Date/Time Temp Pulse Resp BP MAP (mmHg) SpO2 O2 Device   11/13/23 09:18:11 -- 54 Abnormal  -- 129/69 89 91 % --   11/13/23 06:07:05 97.5 °F (36.4 °C) 55 -- 127/68 88 98 % --   11/13/23 00:09:49 97.6 °F (36.4 °C) 53 Abnormal  -- 117/57 77 97 % --   11/12/23 1930 -- -- -- -- -- -- None (Room air)   11/12/23 15:29:01 97.9 °F (36.6 °C) 61 17 115/59 78 96 % --   11/12/23 0910 -- -- -- -- -- -- None (Room air)   11/12/23 06:14:37 97.6 °F (36.4 °C) 56 -- 133/66 88 98 % --   11/12/23 0116 -- -- -- -- -- -- None (Room air)   11/12/23 01:11:12 98 °F (36.7 °C) 60 18 134/66 89 96 % None (Room air)     Pertinent Labs/Diagnostic Test Results:   XR spine lumbar minimum 4 views non injury    (Results Pending)         Results from last 7 days   Lab Units 11/12/23  0615   WBC Thousand/uL 7.00   HEMOGLOBIN g/dL 11.4*   HEMATOCRIT % 35.7*   PLATELETS Thousands/uL 280   NEUTROS ABS Thousands/µL 3.97         Results from last 7 days   Lab Units 11/12/23  0615   SODIUM mmol/L 139   POTASSIUM mmol/L 3.8   CHLORIDE mmol/L 104   CO2 mmol/L 29   ANION GAP mmol/L 6   BUN mg/dL 25   CREATININE mg/dL 0.75   EGFR ml/min/1.73sq m 90   CALCIUM mg/dL 8.9             Results from last 7 days   Lab Units 11/12/23  0615   GLUCOSE RANDOM mg/dL 95             ED Treatment:   Medication Administration from 11/11/2023 1502 to 11/12/2023 0041         Date/Time Order Dose Route Action     11/11/2023 1541 EST acetaminophen (TYLENOL) tablet 650 mg 650 mg Oral Given     11/11/2023 1541 EST lidocaine (LIDODERM) 5 % patch 1 patch 1 patch Topical Medication Applied     11/11/2023 1541 EST ibuprofen (MOTRIN) tablet 400 mg 400 mg Oral Given     11/11/2023 1541 EST methocarbamol (ROBAXIN) tablet 500 mg 500 mg Oral Given     11/11/2023 1857 EST apixaban (ELIQUIS) tablet 5 mg 5 mg Oral Given     11/11/2023 1846 EST methocarbamol (ROBAXIN) tablet 750 mg 750 mg Oral Given     11/11/2023 1846 EST Diclofenac Sodium (VOLTAREN) 1 % topical gel 2 g 2 g Topical Given          Past Medical History:   Diagnosis Date    Anemia     Cataract     Colon polyp     Diverticulosis     Gastritis     GERD (gastroesophageal reflux disease)     Hyperlipidemia     Hypertension     Osteoarthritis of left knee 06/07/2023    Pre-diabetes     Throat ulcer     Visual impairment      Present on Admission:   HTN (hypertension)   Mixed hyperlipidemia   Prediabetes   DVT (deep venous thrombosis) (HCC)      Admitting Diagnosis: Back pain [M54.9]  Age/Sex: 75 y.o. male  Admission Orders:  Scheduled Medications:  acetaminophen, 975 mg, Oral, Q8H [address]  amLODIPine, 5 mg, Oral, Daily  apixaban, 5 mg, Oral, BID  atorvastatin, 20 mg, Oral, HS  Diclofenac Sodium, 2 g, Topical, 4x Daily  docusate sodium, 100 mg, Oral, BID  lisinopril, 20 mg, Oral, Daily   And  hydrochlorothiazide, 25 mg, Oral, Daily  lidocaine, 2 patch, Topical, Once  lubiprostone, 8 mcg, Oral, BID  methocarbamol, 750 mg, Oral, Q6H ANDRES  polyethylene glycol, 17 g, Oral, Daily      Continuous IV Infusions:     PRN Meds:  aluminum-magnesium hydroxide-simethicone, 30 mL, Oral, Q6H PRN  ondansetron, 4 mg, Intravenous, Q6H PRN  oxyCODONE, 5 mg, Oral, Q6H PRN  oxyCODONE, 2.5 mg, Oral, Q6H PRN        None    Network Utilization Review Department  ATTENTION: Please call with any questions or concerns to [phone] and carefully listen to the prompts so that you are directed to the right person. All voicemails are confidential.   For Discharge needs, contact Care Management DC Support Team at [phone] opt. [phone]  Send all requests for admission clinical reviews, approved or denied determinations and any other requests to dedicated fax number below belonging to the campus where the patient is receiving treatment.  List of dedicated fax numbers for the Facilities:  [place] DENIALS (Administrative/Medical Necessity) [phone]   DISCHARGE SUPPORT TEAM (NETWORK) [id number] Fuentes (Maternity/NICU/Pediatrics) [phone]   [facility] [address] [place] [address] [address] [facility] [phone]   [address] [facility] [address] [address] [facility] [address] [facility] [address] [place] [address] [phone]

## 2023-11-13 NOTE — PROGRESS NOTES
[facility]  Progress Note  Name: Natalio Jimenez  MRN: [id number]  Unit/Bed#: -39 I Date of Admission: 11/11/2023   Date of Service: 11/13/2023 I Hospital Day: 2    Assessment/Plan   * Acute low back pain  Assessment & Plan  Acute low back pain, paraspinal spasm  Continues with pains with some improvement  Lumbar x-ray reveals degenerative changes, no acute abnormality  Continue analgesics, muscle relaxants  Aqua K-pad, Voltaren gel  Supportive cares  Ambulation as able  Physical therapy        DVT (deep venous thrombosis) (HCC)  Assessment & Plan  Continue Eliquis for anticoagulation    Mixed hyperlipidemia  Assessment & Plan  Continue atorvastatin    BMI 33.0-33.9,adult  Assessment & Plan  Therapeutic lifestyle modification encouraged    Prediabetes  Assessment & Plan  Therapeutic lifestyle modification encouraged    HTN (hypertension)  Assessment & Plan  Blood pressures reviewed, acceptable  Continue amlodipine 5 mg p.o. daily, lisinopril 20 mg daily  Avoid hypotension             VTE Pharmacologic Prophylaxis: VTE Score: 5 High Risk (Score >/= 5) - Pharmacological DVT Prophylaxis Ordered: apixaban (Eliquis). Sequential Compression Devices Ordered. Patient Centered Rounds: I performed bedside rounds with nursing staff today. Discussions with Specialists or Other Care Team Provider: Case management    Education and Discussions with Family / Patient:  Discussed with the patient, daughter on speaker phone discussed in detail questions answered. Total Time Spent on Date of Encounter in care of patient: 31 mins. This time was spent on one or more of the following: performing physical exam; counseling and coordination of care; obtaining or reviewing history; documenting in the medical record; reviewing/ordering tests, medications or procedures; communicating with other healthcare professionals and discussing with patient's family/caregivers.     Current Length of Stay: 2 day(s)  Current Patient Status: Inpatient   Certification Statement: The patient will continue to require additional inpatient hospital stay due to as outlined  Discharge Plan:  Awaiting clinical and symptomatic improvement    Code Status: Level 1 - Full Code    Subjective:     Sitting up in chair  Still with 7-8/10 in intensity low back pain  Pain worsened on ambulation  Still difficulty with bearing weight  Encourage incentive spirometry  Encourage out of bed and ambulation with assistance as able    Objective:     Vitals:   Temp (24hrs), Av.7 °F (36.5 °C), Min:97.5 °F (36.4 °C), Max:97.9 °F (36.6 °C)    Temp:  [97.5 °F (36.4 °C)-97.9 °F (36.6 °C)] 97.5 °F (36.4 °C)  HR:  [53-61] 54  Resp:  [17] 17  BP: (115-129)/(57-69) 129/69  SpO2:  [91 %-98 %] 91 %  Body mass index is 30.76 kg/m². Input and Output Summary (last 24 hours):      Intake/Output Summary (Last 24 hours) at 2023 1151  Last data filed at 2023 0601  Gross per 24 hour   Intake 870 ml   Output 1175 ml   Net -305 ml       Physical Exam:   Physical Exam       Comfortably sitting up in chair  Obese  Short thick neck  Lungs diminished breath sounds in the bases  Vesicular breath sounds  Heart sounds S1 and S2 noted  Abdomen soft nontender  Awake obey simple commands  No pedal edema  No rash    Additional Data:     Labs:  Results from last 7 days   Lab Units 23  0615   WBC Thousand/uL 7.00   HEMOGLOBIN g/dL 11.4*   HEMATOCRIT % 35.7*   PLATELETS Thousands/uL 280   NEUTROS PCT % 57   LYMPHS PCT % 26   MONOS PCT % 12   EOS PCT % 4     Results from last 7 days   Lab Units 23  0615   SODIUM mmol/L 139   POTASSIUM mmol/L 3.8   CHLORIDE mmol/L 104   CO2 mmol/L 29   BUN mg/dL 25   CREATININE mg/dL 0.75   ANION GAP mmol/L 6   CALCIUM mg/dL 8.9   GLUCOSE RANDOM mg/dL 95                       Lines/Drains:  Invasive Devices       Peripheral Intravenous Line  Duration             Peripheral IV 23 Distal;Left;Upper;Ventral (anterior) Arm 1 day                          Imaging: Reviewed radiology reports from this admission including: xray(s)    Recent Cultures (last 7 days):         Last 24 Hours Medication List:   Current Facility-Administered Medications   Medication Dose Route Frequency Provider Last Rate    acetaminophen  975 mg Oral Q8H Cara Eddy MD      aluminum-magnesium hydroxide-simethicone  30 mL Oral Q6H PRN Blanche Menon MD      amLODIPine  5 mg Oral Daily Blanche Menon MD      apixaban  5 mg Oral BID Blanche Menon MD      atorvastatin  20 mg Oral HS Blanche Menon MD      Diclofenac Sodium  2 g Topical 4x Daily Blanche Menon MD      docusate sodium  100 mg Oral BID Blanche Menon MD      lisinopril  20 mg Oral Daily Blanche Menon MD      And    hydrochlorothiazide  25 mg Oral Daily Blanche Menon MD      lidocaine  2 patch Topical Once Blanche Menon MD      lubiprostone  8 mcg Oral BID Blanche Menon MD      methocarbamol  750 mg Oral Q6H [address] Blanche Menon MD      ondansetron  4 mg Intravenous Q6H PRN Blanche Menon MD      oxyCODONE  5 mg Oral Q6H PRN Blanche Menon MD      oxyCODONE  2.5 mg Oral Q6H PRN Blanche Menon MD      polyethylene glycol  17 g Oral Daily Blanche Menon MD          Today, Patient Was Seen By: Blanche Menon MD    **Please Note: This note may have been constructed using a voice recognition system. **

## 2023-11-13 NOTE — ASSESSMENT & PLAN NOTE
Blood pressures reviewed, acceptable  Continue amlodipine 5 mg p.o. daily, lisinopril 20 mg daily  Avoid hypotension

## 2023-11-13 NOTE — CASE MANAGEMENT
Case Management Progress Note    Patient name Esau Romo  Location /-84 MRN [id number]  : 1950 Date 2023       LOS (days): 2  Geometric Mean LOS (GMLOS) (days):   Days to GMLOS:        OBJECTIVE:        Current admission status: Inpatient  Preferred Pharmacy:   [address] [facility], 10 [address] [facility] [address]  Phone: [phone] Fax: [phone]    Primary Care Provider: Eusebia Bailey MD    Primary Insurance: [facility]  Secondary Insurance: [address]    PROGRESS NOTE: Patient with multiple accepting facilities. Met with patient and patient's daughter Betty Gastelum at bedside and freedom of choice provided. Both reviewed list and selected [facility].  TCU reserved in [address] Awaiting medical clearance and will request insurance auth.

## 2023-11-13 NOTE — ASSESSMENT & PLAN NOTE
Acute low back pain, paraspinal spasm  Continues with pains with some improvement  Lumbar x-ray reveals degenerative changes, no acute abnormality  Continue analgesics, muscle relaxants  Aqua K-pad, Voltaren gel  Supportive cares  Ambulation as able  Physical therapy

## 2023-11-14 VITALS
BODY MASS INDEX: 30.63 KG/M2 | RESPIRATION RATE: 18 BRPM | WEIGHT: 190.6 LBS | TEMPERATURE: 98 F | DIASTOLIC BLOOD PRESSURE: 56 MMHG | SYSTOLIC BLOOD PRESSURE: 107 MMHG | HEART RATE: 62 BPM | HEIGHT: 66 IN | OXYGEN SATURATION: 93 %

## 2023-11-14 LAB — SARS-COV-2 RNA RESP QL NAA+PROBE: NEGATIVE

## 2023-11-14 PROCEDURE — 87635 SARS-COV-2 COVID-19 AMP PRB: CPT | Performed by: PHYSICIAN ASSISTANT

## 2023-11-14 PROCEDURE — 99238 HOSP IP/OBS DSCHRG MGMT 30/<: CPT | Performed by: PHYSICIAN ASSISTANT

## 2023-11-14 PROCEDURE — NC001 PR NO CHARGE: Performed by: PHYSICIAN ASSISTANT

## 2023-11-14 RX ORDER — DOCUSATE SODIUM 100 MG/1
100 CAPSULE, LIQUID FILLED ORAL 2 TIMES DAILY
Refills: 0
Start: 2023-11-14 | End: 2023-11-29 | Stop reason: ALTCHOICE

## 2023-11-14 RX ORDER — POLYETHYLENE GLYCOL 3350 17 G/17G
17 POWDER, FOR SOLUTION ORAL DAILY
Refills: 0
Start: 2023-11-15 | End: 2023-11-29 | Stop reason: ALTCHOICE

## 2023-11-14 RX ORDER — ACETAMINOPHEN 325 MG/1
975 TABLET ORAL EVERY 8 HOURS SCHEDULED
Refills: 0
Start: 2023-11-14

## 2023-11-14 RX ORDER — LIDOCAINE 50 MG/G
2 PATCH TOPICAL DAILY
Refills: 0
Start: 2023-11-15

## 2023-11-14 RX ORDER — METHOCARBAMOL 750 MG/1
750 TABLET, FILM COATED ORAL EVERY 6 HOURS SCHEDULED
Refills: 0 | Status: ON HOLD
Start: 2023-11-14 | End: 2023-11-24 | Stop reason: SDUPTHER

## 2023-11-14 RX ORDER — OXYCODONE HYDROCHLORIDE 5 MG/1
5 TABLET ORAL EVERY 6 HOURS PRN
Qty: 5 TABLET | Refills: 0 | Status: ON HOLD | OUTPATIENT
Start: 2023-11-14 | End: 2023-11-21 | Stop reason: ALTCHOICE

## 2023-11-14 RX ADMIN — METHOCARBAMOL 750 MG: 750 TABLET ORAL at 17:10

## 2023-11-14 RX ADMIN — LIDOCAINE 2 PATCH: 700 PATCH TOPICAL at 08:52

## 2023-11-14 RX ADMIN — POLYETHYLENE GLYCOL 3350 17 G: 17 POWDER, FOR SOLUTION ORAL at 08:52

## 2023-11-14 RX ADMIN — AMLODIPINE BESYLATE 5 MG: 5 TABLET ORAL at 08:52

## 2023-11-14 RX ADMIN — APIXABAN 5 MG: 5 TABLET, FILM COATED ORAL at 08:52

## 2023-11-14 RX ADMIN — LUBIPROSTONE 8 MCG: 8 CAPSULE, GELATIN COATED ORAL at 08:54

## 2023-11-14 RX ADMIN — DICLOFENAC SODIUM TOPICAL GEL, 1%, 2 G: 10 GEL TOPICAL at 12:35

## 2023-11-14 RX ADMIN — ACETAMINOPHEN 975 MG: 325 TABLET, FILM COATED ORAL at 05:40

## 2023-11-14 RX ADMIN — OXYCODONE HYDROCHLORIDE 5 MG: 5 TABLET ORAL at 05:40

## 2023-11-14 RX ADMIN — DOCUSATE SODIUM 100 MG: 100 CAPSULE, LIQUID FILLED ORAL at 17:10

## 2023-11-14 RX ADMIN — APIXABAN 5 MG: 5 TABLET, FILM COATED ORAL at 17:10

## 2023-11-14 RX ADMIN — HYDROCHLOROTHIAZIDE 25 MG: 25 TABLET ORAL at 08:52

## 2023-11-14 RX ADMIN — METHOCARBAMOL 750 MG: 750 TABLET ORAL at 12:35

## 2023-11-14 RX ADMIN — ACETAMINOPHEN 975 MG: 325 TABLET, FILM COATED ORAL at 14:00

## 2023-11-14 RX ADMIN — LISINOPRIL 20 MG: 20 TABLET ORAL at 08:52

## 2023-11-14 RX ADMIN — OXYCODONE HYDROCHLORIDE 5 MG: 5 TABLET ORAL at 17:13

## 2023-11-14 RX ADMIN — DICLOFENAC SODIUM TOPICAL GEL, 1%, 2 G: 10 GEL TOPICAL at 08:54

## 2023-11-14 RX ADMIN — METHOCARBAMOL 750 MG: 750 TABLET ORAL at 05:40

## 2023-11-14 RX ADMIN — DOCUSATE SODIUM 100 MG: 100 CAPSULE, LIQUID FILLED ORAL at 08:52

## 2023-11-14 NOTE — ASSESSMENT & PLAN NOTE
Acute low back pain, paraspinal spasm  Lumbar x-ray reveals degenerative changes, no acute abnormality  Continue analgesics, muscle relaxants  Aqua K-pad, Voltaren gel, lidocaine patch  Pt reports to some improvement with pain but continues to have pain/spasms with movement.   Recommend continued PT   Supportive cares  Ambulation as able  Physical therapy

## 2023-11-14 NOTE — PROGRESS NOTES
[facility]  Progress Note  Name: Regina Lira  MRN: [id number]  Unit/Bed#: -06 I Date of Admission: 11/11/2023   Date of Service: 11/14/2023 I Hospital Day: 3    Assessment/Plan   * Acute low back pain  Assessment & Plan  Acute low back pain, paraspinal spasm  Lumbar x-ray reveals degenerative changes, no acute abnormality  Continue analgesics, muscle relaxants  Aqua K-pad, Voltaren gel, lidocaine patch  Pt reports to some improvement with pain but continues to have pain/spasms with movement. Recommend continued PT   Supportive cares  Ambulation as able  Physical therapy      DVT (deep venous thrombosis) (HCC)  Assessment & Plan  Continue Eliquis for anticoagulation    Mixed hyperlipidemia  Assessment & Plan  Continue atorvastatin    BMI 33.0-33.9,adult  Assessment & Plan  Therapeutic lifestyle modification encouraged    Prediabetes  Assessment & Plan  Therapeutic lifestyle modification encouraged    HTN (hypertension)  Assessment & Plan  Blood pressures reviewed, acceptable  Continue amlodipine 5 mg p.o. daily, lisinopril 20 mg daily  Avoid hypotension           VTE Pharmacologic Prophylaxis: VTE Score: 5 High Risk (Score >/= 5) - Pharmacological DVT Prophylaxis Ordered: apixaban (Eliquis). Sequential Compression Devices Ordered. Mobility:   Basic Mobility Inpatient Raw Score: 15  -[facility] Goal: 4: Move to chair/commode  -HLM Achieved: 7: Walk 25 feet or more      Patient Centered Rounds: I performed bedside rounds with nursing staff today. Discussions with Specialists or Other Care Team Provider: CM     Education and Discussions with Family / Patient: Attempted to update  (daughter) via phone. Unable to contact. Total Time Spent on Date of Encounter in care of patient: 30 mins.  This time was spent on one or more of the following: performing physical exam; counseling and coordination of care; obtaining or reviewing history; documenting in the medical record; reviewing/ordering tests, medications or procedures; communicating with other healthcare professionals and discussing with patient's family/caregivers. Current Length of Stay: 3 day(s)  Current Patient Status: Inpatient   Certification Statement: The patient will continue to require additional inpatient hospital stay due to rehab planning. Discharge Plan: Anticipate discharge later today or tomorrow to rehab facility. Code Status: Level 1 - Full Code    Subjective:   No acute complaints today. He reports his pain to be 5-7/10. Reports some improvement. He does note that movement does exacerbate his back pain    Used interpretor yoni on patient's phone. Objective:     Vitals:   Temp (24hrs), Av.8 °F (36.6 °C), Min:97.3 °F (36.3 °C), Max:98.1 °F (36.7 °C)    Temp:  [97.3 °F (36.3 °C)-98.1 °F (36.7 °C)] 97.9 °F (36.6 °C)  HR:  [51-58] 58  Resp:  [17-19] 18  BP: (113-129)/(50-69) 128/50  SpO2:  [91 %-99 %] 94 %  Body mass index is 30.76 kg/m². Input and Output Summary (last 24 hours): Intake/Output Summary (Last 24 hours) at 2023 0903  Last data filed at 2023 0543  Gross per 24 hour   Intake 480 ml   Output 1300 ml   Net -820 ml       Physical Exam:   Physical Exam  Vitals reviewed. Constitutional:       General: He is not in acute distress. Appearance: He is not toxic-appearing. HENT:      Head: Normocephalic and atraumatic. Eyes:      Extraocular Movements: Extraocular movements intact. Pulmonary:      Effort: Pulmonary effort is normal. No respiratory distress. Abdominal:      General: There is no distension. Musculoskeletal:         General: No swelling. Normal range of motion. Skin:     General: Skin is warm. Neurological:      General: No focal deficit present. Mental Status: He is alert and oriented to person, place, and time.    Psychiatric:         Mood and Affect: Mood normal.         Behavior: Behavior normal.         Thought Content: Thought content normal.         Judgment: Judgment normal.          Additional Data:     Labs:  Results from last 7 days   Lab Units 11/12/23  0615   WBC Thousand/uL 7.00   HEMOGLOBIN g/dL 11.4*   HEMATOCRIT % 35.7*   PLATELETS Thousands/uL 280   NEUTROS PCT % 57   LYMPHS PCT % 26   MONOS PCT % 12   EOS PCT % 4     Results from last 7 days   Lab Units 11/12/23  0615   SODIUM mmol/L 139   POTASSIUM mmol/L 3.8   CHLORIDE mmol/L 104   CO2 mmol/L 29   BUN mg/dL 25   CREATININE mg/dL 0.75   ANION GAP mmol/L 6   CALCIUM mg/dL 8.9   GLUCOSE RANDOM mg/dL 95                       Lines/Drains:  Invasive Devices       Peripheral Intravenous Line  Duration             Peripheral IV 11/11/23 Distal;Left;Upper;Ventral (anterior) Arm 2 days                          Imaging: No pertinent imaging reviewed.     Recent Cultures (last 7 days):         Last 24 Hours Medication List:   Current Facility-Administered Medications   Medication Dose Route Frequency Provider Last Rate    acetaminophen  975 mg Oral Q8H Nohemy Matthews MD      aluminum-magnesium hydroxide-simethicone  30 mL Oral Q6H PRN Bennett Huffman MD      amLODIPine  5 mg Oral Daily Bennett Huffman MD      apixaban  5 mg Oral BID Bennett Huffman MD      atorvastatin  20 mg Oral HS Bennett Huffman MD      Diclofenac Sodium  2 g Topical 4x Daily Bennett Huffman MD      docusate sodium  100 mg Oral BID Bennett Huffman MD      lisinopril  20 mg Oral Daily Bennett Huffman MD      And    hydrochlorothiazide  25 mg Oral Daily Bennett Huffman MD      lidocaine  2 patch Topical Daily Bennett Huffman MD      lubiprostone  8 mcg Oral BID Bennett Huffman MD      menthol-methyl salicylate   Apply externally 4x Daily PRN Bennett Huffman MD      methocarbamol  750 mg Oral Q6H [address] Bennett Huffman MD      ondansetron  4 mg Intravenous Q6H PRN Bennett Huffman MD oxyCODONE  5 mg Oral Q6H PRN Travis Arzate MD      oxyCODONE  2.5 mg Oral Q6H PRN Travis Arzate MD      polyethylene glycol  17 g Oral Daily Travis Arzate MD          Today, Patient Was Seen By: Ihsan Felipe PA-C    **Please Note: This note may have been constructed using a voice recognition system. **

## 2023-11-14 NOTE — CASE MANAGEMENT
Case Management Discharge Planning Note    Patient name Carole Menard  Location /-51 MRN [id number]  : 1950 Date 2023       Current Admission Date: 2023  Current Admission Diagnosis:Acute low back pain   Patient Active Problem List    Diagnosis Date Noted    Acute low back pain 2023    Abnormal CT scan 2023    DVT (deep venous thrombosis) ([address]) 2023    Anemia 2023    Status post total knee replacement, left 2023    Heat syncope 2021    BMI 33.0-33.9,adult 2021    Mixed hyperlipidemia 2021    Chronic pain of both knees 2021    HTN (hypertension) 2019    Prediabetes 2019      LOS (days): 3  Geometric Mean LOS (GMLOS) (days): 2.90  Days to GMLOS:0.1     OBJECTIVE:  Risk of Unplanned Readmission Score: 14.62         Current admission status: Inpatient   Preferred Pharmacy:   [address] [facility], [address] [place] [address]  Phone: [phone] Fax: [phone]    Primary Care Provider: Millie Suarez MD    Primary Insurance: [facility]  Secondary Insurance: [facility] Number: [id number]

## 2023-11-14 NOTE — CASE MANAGEMENT
[facility] has received approved authorization from insurance: Lang Young in by Rep: Nessa Alixlaurel P#  [phone]  Authorization received for: SNF  Facility:  [address] [place] [address] S #: [id number]   Start of Care: 11/14/2023  Next Review Date: 11/20/2023  Continued Stay Care Coordinator: Enrike Zhong P#: [phone]   Submit next review to F#: [phone]  Care Manager notified: Edmundo Gillespie

## 2023-11-14 NOTE — CASE MANAGEMENT
Case Management Discharge Planning Note    Patient name Babita Reaves  Location /-89 MRN [id number]  : 1950 Date 2023       Current Admission Date: 2023  Current Admission Diagnosis:Acute low back pain   Patient Active Problem List    Diagnosis Date Noted    Acute low back pain 2023    Abnormal CT scan 2023    DVT (deep venous thrombosis) ([address]) 2023    Anemia 2023    Status post total knee replacement, left 2023    Heat syncope 2021    BMI 33.0-33.9,adult 2021    Mixed hyperlipidemia 2021    Chronic pain of both knees 2021    HTN (hypertension) 2019    Prediabetes 2019      LOS (days): 3  Geometric Mean LOS (GMLOS) (days): 2.90  Days to GMLOS:0     OBJECTIVE:  Risk of Unplanned Readmission Score: 14.62    Current admission status: Inpatient   Preferred Pharmacy:   [address] [facility], [address]  Phone: [phone] Fax: [phone]    Primary Care Provider: Italo Hopkins MD    Primary Insurance: [facility]  Secondary Insurance: [address] [place] [address] Avenue:    Discharge planning discussed with[de-identified] patient and cna who interpreted (patient agreed to interpretation). Other Referral/Resources/Interventions Provided:  Interventions: Short Term Rehab  Treatment Team Recommendation: Short Term Rehab  Discharge Destination Plan[de-identified] Short Term Rehab   IMM Given (Date):: 23  IMM Given to[de-identified] Patient. .IMM reviewed with patient, patient agrees with discharge determination. Additional Comments: Insurance authorization received. SLIMS and patient made aware of same. CM is checking with TCU to see if they could take him today. Chan Lara from [facility] said it there may be a bed available and will get back to me. CM phoned daughter to make aware of same, left message to return my call.

## 2023-11-14 NOTE — PLAN OF CARE
Problem: PAIN - ADULT  Goal: Verbalizes/displays adequate comfort level or baseline comfort level  Description: Interventions:  - Encourage patient to monitor pain and request assistance  - Assess pain using appropriate pain scale  - Administer analgesics based on type and severity of pain and evaluate response  - Implement non-pharmacological measures as appropriate and evaluate response  - Consider cultural and social influences on pain and pain management  - Notify physician/advanced practitioner if interventions unsuccessful or patient reports new pain  Outcome: Progressing     Problem: INFECTION - ADULT  Goal: Absence or prevention of progression during hospitalization  Description: INTERVENTIONS:  - Assess and monitor for signs and symptoms of infection  - Monitor lab/diagnostic results  - Monitor all insertion sites, i.e. indwelling lines, tubes, and drains  - Monitor endotracheal if appropriate and nasal secretions for changes in amount and color  - [place] appropriate cooling/warming therapies per order  - Administer medications as ordered  - Instruct and encourage patient and family to use good hand hygiene technique  - Identify and instruct in appropriate isolation precautions for identified infection/condition  Outcome: Progressing  Goal: Absence of fever/infection during neutropenic period  Description: INTERVENTIONS:  - Monitor WBC    Outcome: Progressing     Problem: SAFETY ADULT  Goal: Patient will remain free of falls  Description: INTERVENTIONS:  - Educate patient/family on patient safety including physical limitations  - Instruct patient to call for assistance with activity   - Consult OT/PT to assist with strengthening/mobility   - Keep Call bell within reach  - Keep bed low and locked with side rails adjusted as appropriate  - Keep care items and personal belongings within reach  - Initiate and maintain comfort rounds  - Make Fall Risk Sign visible to staff  - Offer Toileting every 2 Hours, in advance of need  - Initiate/Maintain alarm  - Obtain necessary fall risk management equipment:   - Apply yellow socks and bracelet for high fall risk patients  - Consider moving patient to room near nurses station  Outcome: Progressing  Goal: Maintain or return to baseline ADL function  Description: INTERVENTIONS:  -  Assess patient's ability to carry out ADLs; assess patient's baseline for ADL function and identify physical deficits which impact ability to perform ADLs (bathing, care of mouth/teeth, toileting, grooming, dressing, etc.)  - Assess/evaluate cause of self-care deficits   - Assess range of motion  - Assess patient's mobility; develop plan if impaired  - Assess patient's need for assistive devices and provide as appropriate  - Encourage maximum independence but intervene and supervise when necessary  - Involve family in performance of ADLs  - Assess for home care needs following discharge   - Consider OT consult to assist with ADL evaluation and planning for discharge  - Provide patient education as appropriate  Outcome: Progressing  Goal: Maintains/Returns to pre admission functional level  Description: INTERVENTIONS:  - Perform BMAT or MOVE assessment daily.   - Set and communicate daily mobility goal to care team and patient/family/caregiver. - Collaborate with rehabilitation services on mobility goals if consulted  - Perform Range of Motion 3 times a day. - Reposition patient every 2 hours.   - Dangle patient 3 times a day  - Stand patient 3 times a day  - Ambulate patient 3 times a day  - Out of bed to chair 3 times a day   - Out of bed for meals 3 times a day  - Out of bed for toileting  - Record patient progress and toleration of activity level   Outcome: Progressing     Problem: DISCHARGE PLANNING  Goal: Discharge to home or other facility with appropriate resources  Description: INTERVENTIONS:  - Identify barriers to discharge w/patient and caregiver  - Arrange for needed discharge resources and transportation as appropriate  - Identify discharge learning needs (meds, wound care, etc.)  - Arrange for interpretive services to assist at discharge as needed  - Refer to Case Management Department for coordinating discharge planning if the patient needs post-hospital services based on physician/advanced practitioner order or complex needs related to functional status, cognitive ability, or social support system  Outcome: Progressing     Problem: Knowledge Deficit  Goal: Patient/family/caregiver demonstrates understanding of disease process, treatment plan, medications, and discharge instructions  Description: Complete learning assessment and assess knowledge base. Interventions:  - Provide teaching at level of understanding  - Provide teaching via preferred learning methods  Outcome: Progressing     Problem: MOBILITY - ADULT  Goal: Maintain or return to baseline ADL function  Description: INTERVENTIONS:  -  Assess patient's ability to carry out ADLs; assess patient's baseline for ADL function and identify physical deficits which impact ability to perform ADLs (bathing, care of mouth/teeth, toileting, grooming, dressing, etc.)  - Assess/evaluate cause of self-care deficits   - Assess range of motion  - Assess patient's mobility; develop plan if impaired  - Assess patient's need for assistive devices and provide as appropriate  - Encourage maximum independence but intervene and supervise when necessary  - Involve family in performance of ADLs  - Assess for home care needs following discharge   - Consider OT consult to assist with ADL evaluation and planning for discharge  - Provide patient education as appropriate  Outcome: Progressing  Goal: Maintains/Returns to pre admission functional level  Description: INTERVENTIONS:  - Perform BMAT or MOVE assessment daily.   - Set and communicate daily mobility goal to care team and patient/family/caregiver.    - Collaborate with rehabilitation services on mobility goals if consulted  - Perform Range of Motion 3 times a day. - Reposition patient every 2 hours.   - Dangle patient 3 times a day  - Stand patient 3 times a day  - Ambulate patient 3 times a day  - Out of bed to chair 3 times a day   - Out of bed for meals 3 times a day  - Out of bed for toileting  - Record patient progress and toleration of activity level   Outcome: Progressing

## 2023-11-14 NOTE — CASE MANAGEMENT
[address] received request for authorization from Care Manager.   Authorization request for: SNF  Facility Name: [facility] NPI: [id number]  Facility MD: Justin Draper NPI: [id number]  Authorization initiated by contacting insurance: Mario Stern Via: [employer]   Pending Reference #: [id number]  Clinicals submitted via: [employer]

## 2023-11-15 ENCOUNTER — PATIENT OUTREACH (OUTPATIENT)
Dept: CASE MANAGEMENT | Facility: OTHER | Age: 73
End: 2023-11-15

## 2023-11-15 ENCOUNTER — NURSING HOME VISIT (OUTPATIENT)
Dept: GERIATRICS | Facility: OTHER | Age: 73
End: 2023-11-15
Payer: COMMERCIAL

## 2023-11-15 DIAGNOSIS — H54.7 VISUAL IMPAIRMENT: ICD-10-CM

## 2023-11-15 DIAGNOSIS — R91.8 PULMONARY NODULES: ICD-10-CM

## 2023-11-15 DIAGNOSIS — Z91.89 AT RISK FOR DELIRIUM: ICD-10-CM

## 2023-11-15 DIAGNOSIS — E78.2 MIXED HYPERLIPIDEMIA: ICD-10-CM

## 2023-11-15 DIAGNOSIS — Z91.89 AT RISK FOR CONSTIPATION: ICD-10-CM

## 2023-11-15 DIAGNOSIS — D50.8 OTHER IRON DEFICIENCY ANEMIA: ICD-10-CM

## 2023-11-15 DIAGNOSIS — M54.41 ACUTE MIDLINE LOW BACK PAIN WITH RIGHT-SIDED SCIATICA: Primary | ICD-10-CM

## 2023-11-15 DIAGNOSIS — Z71.89 ADVANCE CARE PLANNING: ICD-10-CM

## 2023-11-15 DIAGNOSIS — R93.89 ABNORMAL CT SCAN: ICD-10-CM

## 2023-11-15 DIAGNOSIS — K21.9 GASTROESOPHAGEAL REFLUX DISEASE WITHOUT ESOPHAGITIS: ICD-10-CM

## 2023-11-15 DIAGNOSIS — R73.03 PREDIABETES: ICD-10-CM

## 2023-11-15 DIAGNOSIS — Z96.652 STATUS POST TOTAL KNEE REPLACEMENT, LEFT: ICD-10-CM

## 2023-11-15 DIAGNOSIS — I10 PRIMARY HYPERTENSION: ICD-10-CM

## 2023-11-15 DIAGNOSIS — I82.462 ACUTE DEEP VEIN THROMBOSIS (DVT) OF CALF MUSCLE VEIN OF LEFT LOWER EXTREMITY (HCC): ICD-10-CM

## 2023-11-15 DIAGNOSIS — K86.2 PANCREATIC CYST: ICD-10-CM

## 2023-11-15 DIAGNOSIS — M15.9 PRIMARY OSTEOARTHRITIS INVOLVING MULTIPLE JOINTS: ICD-10-CM

## 2023-11-15 PROBLEM — M15.0 PRIMARY OSTEOARTHRITIS INVOLVING MULTIPLE JOINTS: Status: ACTIVE | Noted: 2023-11-15

## 2023-11-15 PROCEDURE — 99306 1ST NF CARE HIGH MDM 50: CPT | Performed by: STUDENT IN AN ORGANIZED HEALTH CARE EDUCATION/TRAINING PROGRAM

## 2023-11-15 NOTE — ASSESSMENT & PLAN NOTE
-low iron on labs from Aug 2023  -was previously on oral iron supplementation, appears to have been discontinued as of Sept 2023  -monitor on routine labs - appearing stable/improving  -monitor for acute bleed - no present signs  -transfuse PRN Hb <7  -follow up with PCP

## 2023-11-15 NOTE — ASSESSMENT & PLAN NOTE
Provoked s/p L total knee replacement  Continues on Eliquis (started 8/22/23)  No present acute/associated complaints  Follow up with PCP    US venous duplex from 8/22/23 with:      RIGHT LOWER LIMB LIMITED:  Evaluation shows no evidence of thrombus in the common femoral vein. Doppler evaluation shows a normal response to augmentation maneuvers. LEFT LOWER LIMB:  There is evidence of acute non-occlusive deep vein thrombosis in one of the  gastrocnemius veins. No evidence of superficial thrombophlebitis noted. Doppler evaluation shows a normal response to augmentation maneuvers. Popliteal, posterior tibial and anterior tibial arterial Doppler waveform's are  triphasic. Note: There is a well defined hypoechoic non-vascularized structure with  internal echoes noted anterior to the left popliteal vein and artery measuring  4.5 x 3.0 x 4.8 cm. There are two echogenic structures located in the left inguinal region  measuring approximately 1.8 cm and 1.1 cm suggestive of enlarged lymphatic  channels.

## 2023-11-15 NOTE — PROGRESS NOTES
Outpatient care management referral via HRR report 11/15/23. Discharged 11/14/23 to [facility]. This Admin Coordinator will continue to monitor via chart review.

## 2023-11-15 NOTE — ASSESSMENT & PLAN NOTE
S/p L TKR on 8/14/23  PT/OT  Fall precautions  Encourage appropriate DME use  Follow up with PCP, Orthopedics as appropriate

## 2023-11-15 NOTE — ASSESSMENT & PLAN NOTE
Incidental finding on CT Aug 2023 "7 mm cystic lesion in the head of the pancreas with no appreciable pancreatic ductal dilatation. Statistically this likely represents an IPMN.  Given the size of the lesion, follow-up is recommended in 2 years, preferably with MRI/MRCP"  No apparent acute associated symptoms presently  Follow up with PCP, recommend outpatient surveillance

## 2023-11-15 NOTE — ASSESSMENT & PLAN NOTE
Vision stable  Wears glasses  Ensure vision aids nearby/within reach of patient  Follow up with PCP, Ophthalmology outpatient

## 2023-11-15 NOTE — ASSESSMENT & PLAN NOTE
-multiple joints including bilateral knees (is now s/p L TKR)  -pain control  -PT/OT  -follow up with PCP, Orthopedics

## 2023-11-15 NOTE — ASSESSMENT & PLAN NOTE
Acute low back pain, paraspinal spasm  Lumbar x-ray reveals degenerative changes, no acute abnormality  Continue analgesics, muscle relaxants  Aqua K-pad, Voltaren gel, lidocaine patch  Pt reports to some improvement with pain but continues to have pain/spasms with movement.   Recommend continued PT   Supportive cares  Ambulation as able  Physical therapy -- d/c to rehab

## 2023-11-15 NOTE — ASSESSMENT & PLAN NOTE
-stable  -monitor off medications  -recommend OOB with meals, sit upright for at least 30 minutes afterwards, avoid trigger foods  -continue to monitor  -follow up with PCP, GI as appropriate

## 2023-11-15 NOTE — ASSESSMENT & PLAN NOTE
Incidental findings of pulmonary nodules and pancreatic cyst on CT Aug 2023  Known to PCP, see plan as above, follow up outpatient and consider routine outpatient surveillance

## 2023-11-15 NOTE — DISCHARGE SUMMARY
[address] [facility] [place]  Discharge- [address] [facility] 1950, 75 y.o. male MRN: [id number]  Unit/Bed#: [facility] Encounter: [id number]  Primary Care Provider: Esperanza Trent MD   Date and time admitted to hospital: 11/11/2023  3:10 PM    * Acute low back pain  Assessment & Plan  Acute low back pain, paraspinal spasm  Lumbar x-ray reveals degenerative changes, no acute abnormality  Continue analgesics, muscle relaxants  Aqua K-pad, Voltaren gel, lidocaine patch  Pt reports to some improvement with pain but continues to have pain/spasms with movement. Recommend continued PT   Supportive cares  Ambulation as able  Physical therapy -- d/c to rehab     DVT (deep venous thrombosis) (HCC)  Assessment & Plan  Continue Eliquis for anticoagulation    Mixed hyperlipidemia  Assessment & Plan  Continue atorvastatin    BMI 33.0-33.9,adult  Assessment & Plan  Therapeutic lifestyle modification encouraged    Prediabetes  Assessment & Plan  Therapeutic lifestyle modification encouraged    HTN (hypertension)  Assessment & Plan  Blood pressures reviewed, acceptable  Continue amlodipine 5 mg p.o. daily, lisinopril 20 mg daily  Avoid hypotension      Medical Problems       Resolved Problems  Date Reviewed: 11/15/2023   None       Discharging Physician / Practitioner: Regina Cotto PA-C  PCP: Esperanza Trent MD  Admission Date:   Admission Orders (From admission, onward)       Ordered        11/11/23 1800  Inpatient Admission  Once                          Discharge Date: 11/14/23    Consultations During Hospital Stay:  None    Procedures Performed:   None    Significant Findings / Test Results:   XR lumbar spine: No acute osseous abnormality, facet-predominant degenerative changes     Incidental Findings:   None     Test Results Pending at Discharge (will require follow up):    None     Outpatient Tests Requested:  None    Complications:  None    Reason for Admission: back pain/ambulatory dysfunction    Hospital Course:   Luis Miguel Pereira is a 75 y.o. male patient who originally presented to the hospital on 11/11/2023 due to low back pain with pain radiating down RLE with associated ambulatory dysfunction due to pain. He denied any sensory complaints or weakness, saddle anesthesia, incontinence. XR L spine without acute findings (did note chronic degenerative changes). He was admitted for pain control and PT/OT for safe ambulation. He was started on multimodal pain regimen, which pt reports has been slowly improving his pain. He has been up with PT/OT. He is recommended rehab, which he is agreeable to. He is being discharged to rehab. He should f/u with PCP on discharge. Please see above list of diagnoses and related plan for additional information. Condition at Discharge: good    Discharge Day Visit / Exam:   * Please refer to separate progress note for these details *    Discussion with Family: Attempted to update  (daughter) via phone. Unable to contact. Discharge instructions/Information to patient and family:   See after visit summary for information provided to patient and family. Provisions for Follow-Up Care:  See after visit summary for information related to follow-up care and any pertinent home health orders. Mobility at time of Discharge:   Basic Mobility Inpatient Raw Score: 15  JH-HLM Goal: 4: Move to chair/commode  JH-HLM Achieved: 7: Walk 25 feet or more  HLM Goal achieved. Continue to encourage appropriate mobility. Disposition:   Rehab     Planned Readmission: no     Discharge Statement:  I spent 22 minutes discharging the patient. This time was spent on the day of discharge. I had direct contact with the patient on the day of discharge.  Greater than 50% of the total time was spent examining patient, answering all patient questions, arranging and discussing plan of care with patient as well as directly providing post-discharge instructions. Additional time then spent on discharge activities. Discharge Medications:  See after visit summary for reconciled discharge medications provided to patient and/or family.       **Please Note: This note may have been constructed using a voice recognition system**

## 2023-11-15 NOTE — ASSESSMENT & PLAN NOTE
-most recent A1c 5.6 technically below pre-diabetic level  -monitor glucose on routine labs, no indication for daily/frequent accuchecks at this time  -lifestyle modifications including weight loss, healthy diet, activity as tolerated  -follow up with PCP

## 2023-11-15 NOTE — PROGRESS NOTES
[facility]: [facility] Transitional Care Unit    HISTORY AND PHYSICAL  Nursing Home Place of Service: nursing home place of service: POS 31 Skilled Care-Part A Coverage    NAME: Karen Guaman  : 1950 AGE: 75 y.o. SEX: male MRN: [id number]  DATE OF ENCOUNTER: 11/15/2023    Records Reviewed include: Hospital records    Chief Complaint/ Reason for Admission:   Acute low back pain with sciatica    History of Present Illness:     Karen Guaman is a 75 y.o. male with PMH including hypertension, prediabetes, history of DVT on Eliquis, s/p left knee replacement, dyslipidemia, pancreatic cyst  Patient was hospitalized at [facility] [place] from  to 23  For details of hospitalization, see hospital records including discharge documentation from 23  Briefly, patient hospitalized with acute low back pain radiating down RLE with associated ambulatory dysfunction; workup did not find acute fracture; admitted for pain control and PT/OT/ambulation safety, reported to improve with pain regimen. Patient seen and examined in room  Others present: none initially, daughter Lynne Soria arrives towards end of visit and OK to discuss with her per patient, she was also updated on plan of care  Patient seated in chair  Appears comfortable, awake, alert, oriented to situation, able to converse appropriately. Clear communication was achieved: Hebrew translation services offered but declined by patient, he is able to speak/understand "80 percent" English and per his preference he uses on-phone translation on his smartphone as needed during the conversation, in addition to my conversation in [email or web address] and [place] with patient. Daughter fluent in both [facility] and [email or web address] and [place]. Patient reports feeling "good" overall, appears in good spirits, polite.  Notes he was at home and went to use the toilet, at one point he leaned forward quickly to reach for something and this set off acute low back pain radiating down the right leg and leading to the above hospitalization. He notes the pain presently is around 7/10 located mainly along the lateral R leg from hip down to ankle, some pain at mid lower back but less intense. Better at rest, worse when he goes to stand/move the leg. Overall he does feel the pain has been gradually steadily improving. No new acute or worsening symptoms. Feels he is able to ambulate with walker and motivated to participate in therapy with intent to return home to prior level of function ASAP. No pain anywhere else including chest, stomach, other extremities, head. Breathing well, denies appetite changes or swallowing issues, no N/V/C/D, last BM earlier today normal. No acute cardiopulmonary, abdominal, or urinary symptoms; see ROS for more details. No further questions or acute concerns identified. Lab Review:  11/12: BMP generally stable/non-actionable, eGFR 90; CBC generally stable, Hb 11.4 (trending up, baseline 12-13). Recent iron low at 25. Recent B12 WNL. Recent A1c 5.6. No recent TSH on file. XR L-spine "1. No acute osseous abnormality. 2. Facet-predominant degenerative changes"      CT CAP 8/25/23:  "CTA CHEST:     1) No central, lobar, segmental, or proximal subsegmental pulmonary embolism. Evaluation of the distal subsegmental pulmonary arteries is limited. 2) No acute pulmonary pathology. 3) Several nonspecific pulmonary nodules measuring up to 3 mm. Based on current Fleischner Society 2017 Guidelines on incidental pulmonary nodules, no routine follow-up is needed if the patient is low risk. If the patient is high risk, optional   follow-up chest CT in 12 months can be considered. CT ABDOMEN/PELVIS:     4) Cystitis and ureteritis. No evidence of pyelonephritis or perinephric abscess. 5) No other acute abdominal or pelvic pathology. 6) 7 mm cystic lesion in the head of the pancreas with no appreciable pancreatic ductal dilatation. Statistically this likely represents an IPMN. Given the size of the lesion, follow-up is recommended in 2 years, preferably with MRI/MRCP."    EKG 8/29/23: NSR 70bpm Qtc 490 (borderline prolonged)  No recent echo on file. Social: Patient lives in a 7th floor apartment with elevator access, alone, no pets. Reports he does have an aide who is present around 4 days a week several hours a day who typically helps with housekeeping tasks such as cleaning, cooking. At baseline ambulates with cane or walker PRN, reports 2 falls in the past year. Reports being otherwise independent of ADLs and IADLs, manages his own medications and finances. Lives: Apartment, Alone  Social Support: home aide, daughters  Fall in the past 12 months: 2  Use of assistance Device: Cane and Walker    Allergies    Allergies   Allergen Reactions    Lactose - Food Allergy Diarrhea     diarrhea       Past Medical History  Past Medical History:   Diagnosis Date    Anemia     Cataract     Colon polyp     Diverticulosis     Gastritis     GERD (gastroesophageal reflux disease)     Hyperlipidemia     Hypertension     Osteoarthritis of left knee 06/07/2023    Pre-diabetes     Throat ulcer     Visual impairment         Past Surgical History:   Procedure Laterality Date    COLONOSCOPY N/A 08/18/2016    Procedure: COLONOSCOPY;  Surgeon: Alisha Forrest MD;  Location:  GI LAB;   Service:     COLONOSCOPY W/ POLYPECTOMY  2019    repeat 2022    EYE SURGERY Bilateral     FL ARTHRP KNE CONDYLE&PLATU MEDIAL&LAT COMPARTMENTS Left 8/14/2023    Procedure: ARTHROPLASTY KNEE TOTAL;  Surgeon: Sheryle Shove, DO;  Location: [address] [place] [address] OR;  Service: Orthopedics    TONSILLECTOMY      UPPER GASTROINTESTINAL ENDOSCOPY  2019       Family History  Family History   Problem Relation Age of Onset    Hypertension Mother     Hypertension Father        Social History  Social History     Tobacco Use   Smoking Status Former    Types: Cigarettes    Quit date: 1999    Years since quittin.8   Smokeless Tobacco Never   Tobacco Comments    Patient quit      Social History     Substance and Sexual Activity   Alcohol Use Never    Comment: pt "sober for 30+ years"      Social History     Substance and Sexual Activity   Drug Use No            Physical Exam    Vital Signs  There were no vitals filed for this visit. BP: 116/68 mmHg  11/15/2023 07:40   Temp:97.4 °F  11/15/2023 07:40 Pulse:60 bpm  11/15/2023 07:40 Weight:190.5 Lbs  11/15/2023 06:21   Resp:18 Breaths/min  11/15/2023 07:40 BS: O2:97 %  11/15/2023 07:41 Pain:5  11/15/2023 12:02         Physical Exam  Vitals reviewed. Constitutional:       General: He is not in acute distress. Appearance: He is not toxic-appearing or diaphoretic. HENT:      Head: Normocephalic and atraumatic. Right Ear: External ear normal.      Left Ear: External ear normal.      Nose: No rhinorrhea. Mouth/Throat:      Mouth: Mucous membranes are moist.      Pharynx: Oropharynx is clear. No oropharyngeal exudate or posterior oropharyngeal erythema. Eyes:      General: No scleral icterus. Right eye: No discharge. Left eye: No discharge. Extraocular Movements: Extraocular movements intact. Conjunctiva/sclera: Conjunctivae normal.      Pupils: Pupils are equal, round, and reactive to light. Cardiovascular:      Rate and Rhythm: Normal rate and regular rhythm. Pulmonary:      Effort: Pulmonary effort is normal. No respiratory distress. Breath sounds: No wheezing or rales. Abdominal:      General: Bowel sounds are normal.      Palpations: Abdomen is soft. Tenderness: There is no abdominal tenderness. There is no guarding. Musculoskeletal:         General: No swelling. Cervical back: No rigidity.       Comments: No calf tenderness bilaterally  Mildly hypertonic musculature along right lower back and right hip/thigh region  L knee with vertical past surgical scar appearing well healed   Skin:     General: Skin is warm and dry. Coloration: Skin is not jaundiced. Neurological:      General: No focal deficit present. Mental Status: He is alert and oriented to person, place, and time. Mental status is at baseline. Psychiatric:         Mood and Affect: Mood normal.         Behavior: Behavior normal.         Thought Content: Thought content normal.         Judgment: Judgment normal.         Review of Systems:  Review of Systems   Constitutional:  Negative for appetite change, chills, diaphoresis, fatigue and fever. HENT:  Negative for drooling, ear pain, facial swelling, rhinorrhea, sore throat and trouble swallowing. Eyes:  Negative for photophobia, pain, discharge, redness, itching and visual disturbance (wears glasses). Respiratory:  Negative for cough, choking, chest tightness, shortness of breath and wheezing. Cardiovascular:  Negative for chest pain, palpitations and leg swelling. Gastrointestinal:  Negative for abdominal pain, blood in stool, constipation, diarrhea, nausea and vomiting. Genitourinary:  Negative for difficulty urinating, dysuria, flank pain and hematuria. Musculoskeletal:  Positive for arthralgias, back pain and gait problem. Negative for neck pain. Skin:  Negative for color change. Neurological:  Negative for dizziness, tremors, seizures, facial asymmetry, weakness, light-headedness and headaches. Psychiatric/Behavioral:  Negative for agitation, behavioral problems and confusion. The patient is not nervous/anxious and is not hyperactive.         List of Current Medications:  Current Outpatient Medications   Medication Instructions    acetaminophen (TYLENOL) 975 mg, Oral, Every 8 hours scheduled    amLODIPine (NORVASC) 5 mg, Oral, Daily    apixaban (ELIQUIS) 5 mg, Oral, 2 times daily    atorvastatin (LIPITOR) 20 mg, Oral, Daily at bedtime    Diclofenac Sodium (VOLTAREN) 2 g, Topical, 4 times daily    docusate sodium (COLACE) 100 mg, Oral, 2 times daily    lidocaine (LIDODERM) 5 % 2 patches, Topical, Daily, Remove & Discard patch within 12 hours or as directed by MD Raya 145 mcg, Oral, Daily    lisinopril-hydrochlorothiazide (PRINZIDE,ZESTORETIC) 20-25 MG per tablet 1 tablet, Oral, Daily    methocarbamol (ROBAXIN) 750 mg, Oral, Every 6 hours scheduled    oxyCODONE (ROXICODONE) 5 mg, Oral, Every 6 hours PRN    polyethylene glycol (MIRALAX) 17 g, Oral, Daily         Medication reviewed. All orders signed. Complete list is in the paper chart. Allergies    Allergies   Allergen Reactions    Lactose - Food Allergy Diarrhea     diarrhea       Labs/Diagnostics (reviewed by this provider): I personally reviewed lab results and imaging studies. Full reports are in the paper chart. Assessment/Plan:    GERD (gastroesophageal reflux disease)  -stable  -monitor off medications  -recommend OOB with meals, sit upright for at least 30 minutes afterwards, avoid trigger foods  -continue to monitor  -follow up with PCP, GI as appropriate      DVT (deep venous thrombosis) ([facility])  Provoked s/p L total knee replacement  Continues on Eliquis (started 8/22/23)  No present acute/associated complaints  Follow up with PCP    US venous duplex from 8/22/23 with:      RIGHT LOWER LIMB LIMITED:  Evaluation shows no evidence of thrombus in the common femoral vein. Doppler evaluation shows a normal response to augmentation maneuvers. LEFT LOWER LIMB:  There is evidence of acute non-occlusive deep vein thrombosis in one of the  gastrocnemius veins. No evidence of superficial thrombophlebitis noted. Doppler evaluation shows a normal response to augmentation maneuvers. Popliteal, posterior tibial and anterior tibial arterial Doppler waveform's are  triphasic. Note: There is a well defined hypoechoic non-vascularized structure with  internal echoes noted anterior to the left popliteal vein and artery measuring  4.5 x 3.0 x 4.8 cm.   There are two echogenic structures located in the left inguinal region  measuring approximately 1.8 cm and 1.1 cm suggestive of enlarged lymphatic  channels.     HTN (hypertension)  -monitor BP  -avoid hypotension  -continue amlodipine, lisinopril-HCTZ with holding parameters    Acute low back pain  -acute low back pain with Right sided sciatica  -no inciting trauma; inciting event seems to have been sudden forward lean/reaching towards something while at home on commode, bringing on acute low back pain radiating down right leg  -no acute abnormality on lumbar Xray in hospital  -no alarm symptoms of cauda equina such as saddle anesthesia, acute bowel/bladder incontinence  -likely related to paraspinal muscle spasm  -pain gradually improving  -pain control: arrived to rehab on methocarbamol 750mg q6hr, acetaminophen 975mg q8hr, lidocaine patch, oxycodone 5mg q6hr PRN  -adjust/wean regimen as tolerated  -PT/OT, stretches/exercises as appropriate  -fall precautions  -encourage appropriate DME use    Anemia  -low iron on labs from Aug 2023  -was previously on oral iron supplementation, appears to have been discontinued as of Sept 2023  -monitor on routine labs - appearing stable/improving  -monitor for acute bleed - no present signs  -transfuse PRN Hb <7  -follow up with PCP    Mixed hyperlipidemia  -continue statin    Prediabetes  -most recent A1c 5.6 technically below pre-diabetic level  -monitor glucose on routine labs, no indication for daily/frequent accuchecks at this time  -lifestyle modifications including weight loss, healthy diet, activity as tolerated  -follow up with PCP    Status post total knee replacement, left  S/p L TKR on 8/14/23  PT/OT  Fall precautions  Encourage appropriate DME use  Follow up with PCP, Orthopedics as appropriate    Visual impairment  Vision stable  Wears glasses  Ensure vision aids nearby/within reach of patient  Follow up with PCP, Ophthalmology outpatient    At risk for constipation  At risk due to hospitalization, relative immobility, comorbidities  Monitor stool output  Bowel regimen: miralax and senna as appropriate; consider bisacodyl suppository PRN if no BM in 2-3 days  Encourage mobility as tolerated, PO hydration as appropriate, high fiber diet/prune juice in outpatient setting  Goal is for 1 easy BM every 1-2 days      At risk for delirium  Delirium precautions  -Patient is high risk of delirium due to age, comorbidities, hospitalization/unfamiliar environment  -delirium precautions  -maintain normal sleep/wake cycle  -minimize overnight interruptions, group overnight vitals/labs/nursing checks as possible  -dim lights, close blinds and turn off tv to minimize stimulation and encourage sleep environment in evenings  -ensure that pain is well controlled  -monitor for fecal and urinary retention which may precipitate delirium  -encourage early mobilization and ambulation  -provide frequent reorientation and redirection  -encourage family and friends at the bedside to help help calm patient if anxious  -avoid medications which may precipitate or worsen delirium such as tramadol, benzodiazepine, anticholinergics, and benadryl  -encourage hydration and nutrition   -redirect unwanted behaviors as first line, avoid physical restraints, use chemical restraint only if all other attempts have been unsuccessful (could consider Zyprexa 2.5mg IM Q, monitor for orthostatic hypotension and QTc prolongation with repeat dosing, recommend lowest dose possible for shortest duration possible)      Advance care planning  HCP and code discussion as below    Primary osteoarthritis involving multiple joints  -multiple joints including bilateral knees (is now s/p L TKR)  -pain control  -PT/OT  -follow up with PCP, Orthopedics    Pulmonary nodules  Incidental finding on CT Aug 2023 "Several nonspecific pulmonary nodules measuring up to 3 mm"  No acute pulmonary symptoms  Follow up with PCP, consider routine surveillance as appropriate in outpatient setting at discretion of PCP    Pancreatic cyst  Incidental finding on CT Aug 2023 "7 mm cystic lesion in the head of the pancreas with no appreciable pancreatic ductal dilatation. Statistically this likely represents an IPMN. Given the size of the lesion, follow-up is recommended in 2 years, preferably with MRI/MRCP"  No apparent acute associated symptoms presently  Follow up with PCP, recommend outpatient surveillance    Abnormal CT scan  Incidental findings of pulmonary nodules and pancreatic cyst on CT Aug 2023  Known to PCP, see plan as above, follow up outpatient and consider routine outpatient surveillance       Pain: 7/10, gradually improving  Rehab Potential:Good  Patient Informed of Medical Condition: yes   Patient is Capable of Understanding Their Right: yes   Discharge Plan: anticipate home  Vaccination:   Immunization History   Administered Date(s) Administered    COVID-19 PFIZER VACCINE 0.3 ML IM 03/01/2021, 03/22/2021, 10/12/2021    COVID-19 Pfizer Vac BIVALENT Hans-sucrose 12 Yr+ IM 02/17/2023    Influenza Quadrivalent, 6-35 Months IM 01/10/2018    Influenza, high dose seasonal 0.7 mL 12/04/2018, 09/24/2020    Influenza, seasonal, injectable 09/22/2015    Pneumococcal Conjugate 13-Valent 01/10/2018    Pneumococcal Polysaccharide PPV23 09/22/2015    Tdap 09/22/2015       DVT ppx: continue Eliquis    Advanced Directives: confirmed with patient primary HCP is daughter Corinna Le ([facility] secondary is daughter Julianne Carrasco (lives in [place])  Code status:Full Code Extensive discussion/education with patient today regarding their wishes with respect to resuscitative measures; discussed potential risks vs benefits of resuscitative measures; patient verbalizes understanding, appears to have capacity to make this decision presently, and clearly verbalizes a desire for Full Code, requests for medical team to try to revive him.   PCP: Shanthi      -Total time spent on this encounter today including documentation and workup review, face to face time, history and exam, and documentation/orders was approximately 65 minutes. -This note will be copied to [facility] EMR where vitals and medication orders are placed. Abhishek Turner D.O.   Geriatric Medicine  11/15/2023 1:42 PM

## 2023-11-15 NOTE — ASSESSMENT & PLAN NOTE
-acute low back pain with Right sided sciatica  -no inciting trauma; inciting event seems to have been sudden forward lean/reaching towards something while at home on commode, bringing on acute low back pain radiating down right leg  -no acute abnormality on lumbar Xray in hospital  -no alarm symptoms of cauda equina such as saddle anesthesia, acute bowel/bladder incontinence  -likely related to paraspinal muscle spasm  -pain gradually improving  -pain control: arrived to rehab on methocarbamol 750mg q6hr, acetaminophen 975mg q8hr, lidocaine patch, oxycodone 5mg q6hr PRN  -adjust/wean regimen as tolerated  -PT/OT, stretches/exercises as appropriate  -fall precautions  -encourage appropriate DME use

## 2023-11-15 NOTE — UTILIZATION REVIEW
NOTIFICATION OF ADMISSION DISCHARGE   This is a Notification of Discharge from [address] [place] [address]. Please be advised that this patient has been discharge from our facility. Below you will find the admission and discharge date and time including the patient’s disposition. UTILIZATION REVIEW CONTACT:  Solange Guidry  Utilization   Network Utilization Review Department  Phone: [phone] x carefully listen to the prompts. All voicemails are confidential.  Email: [email or web address]     ADMISSION INFORMATION  PRESENTATION DATE: 11/11/2023  3:10 PM  OBERVATION ADMISSION DATE:   INPATIENT ADMISSION DATE: 11/11/23  6:00 PM   DISCHARGE DATE: 11/14/2023  6:06 PM   DISPOSITION:[facility] SNF/TCU/SNU    Network Utilization Review Department  ATTENTION: Please call with any questions or concerns to [phone] and carefully listen to the prompts so that you are directed to the right person. All voicemails are confidential.   For Discharge needs, contact Care Management DC Support Team at [phone] opt. 2  Send all requests for admission clinical reviews, approved or denied determinations and any other requests to dedicated fax number below belonging to the campus where the patient is receiving treatment.  List of dedicated fax numbers for the Facilities:  [place] DENIALS (Administrative/Medical Necessity) [phone]   DISCHARGE SUPPORT TEAM (Network) [phone] [address] [facility] (Maternity/NICU/Pediatrics) [phone]   [address] [facility] [address] [place] [address] [address] [phone]

## 2023-11-15 NOTE — ASSESSMENT & PLAN NOTE
Incidental finding on CT Aug 2023 "Several nonspecific pulmonary nodules measuring up to 3 mm"  No acute pulmonary symptoms  Follow up with PCP, consider routine surveillance as appropriate in outpatient setting at discretion of PCP

## 2023-11-16 ENCOUNTER — TRANSITIONAL CARE MANAGEMENT (OUTPATIENT)
Dept: FAMILY MEDICINE CLINIC | Facility: CLINIC | Age: 73
End: 2023-11-16

## 2023-11-21 ENCOUNTER — NURSING HOME VISIT (OUTPATIENT)
Dept: GERIATRICS | Facility: OTHER | Age: 73
End: 2023-11-21
Payer: COMMERCIAL

## 2023-11-21 DIAGNOSIS — I82.462 ACUTE DEEP VEIN THROMBOSIS (DVT) OF CALF MUSCLE VEIN OF LEFT LOWER EXTREMITY (HCC): ICD-10-CM

## 2023-11-21 DIAGNOSIS — M54.41 ACUTE RIGHT-SIDED LOW BACK PAIN WITH RIGHT-SIDED SCIATICA: Primary | ICD-10-CM

## 2023-11-21 DIAGNOSIS — I10 PRIMARY HYPERTENSION: ICD-10-CM

## 2023-11-21 DIAGNOSIS — M15.9 PRIMARY OSTEOARTHRITIS INVOLVING MULTIPLE JOINTS: ICD-10-CM

## 2023-11-21 PROCEDURE — 99309 SBSQ NF CARE MODERATE MDM 30: CPT | Performed by: STUDENT IN AN ORGANIZED HEALTH CARE EDUCATION/TRAINING PROGRAM

## 2023-11-21 NOTE — ASSESSMENT & PLAN NOTE
-acute low back pain with Right sided sciatica  -no inciting trauma; inciting event seems to have been sudden forward lean/reaching towards something while at home on commode, bringing on acute low back pain radiating down right leg  -no acute abnormality on lumbar Xray in hospital  -no alarm symptoms of cauda equina such as saddle anesthesia, acute bowel/bladder incontinence  -likely related to paraspinal muscle spasm  -pain gradually improving  -pain control: arrived to rehab on methocarbamol 750mg q6hr, acetaminophen 975mg q8hr, lidocaine patch, oxycodone 5mg q6hr PRN  -adjust/wean regimen as tolerated - voltaren topical at rehab, discontinued oxycodone as of 11/21 as last dose used was recorded 11/16 and not appearing to need recently  -PT/OT, stretches/exercises as appropriate  -fall precautions  -encourage appropriate DME use

## 2023-11-21 NOTE — ASSESSMENT & PLAN NOTE
-monitor BP - overall stable/controlled generally 547N-288A systolic  -avoid hypotension  -continue amlodipine, lisinopril-HCTZ with holding parameters

## 2023-11-21 NOTE — ASSESSMENT & PLAN NOTE
Provoked s/p L total knee replacement  Continues on Eliquis (started 8/22/23)  No present acute/associated complaints, no associated leg swelling or calf tenderness  Follow up with PCP, defer to PCP regarding duration of Eliquis     US venous duplex from 8/22/23 with:      RIGHT LOWER LIMB LIMITED:  Evaluation shows no evidence of thrombus in the common femoral vein. Doppler evaluation shows a normal response to augmentation maneuvers. LEFT LOWER LIMB:  There is evidence of acute non-occlusive deep vein thrombosis in one of the  gastrocnemius veins. No evidence of superficial thrombophlebitis noted. Doppler evaluation shows a normal response to augmentation maneuvers. Popliteal, posterior tibial and anterior tibial arterial Doppler waveform's are  triphasic. Note: There is a well defined hypoechoic non-vascularized structure with  internal echoes noted anterior to the left popliteal vein and artery measuring  4.5 x 3.0 x 4.8 cm. There are two echogenic structures located in the left inguinal region  measuring approximately 1.8 cm and 1.1 cm suggestive of enlarged lymphatic  channels.

## 2023-11-21 NOTE — ASSESSMENT & PLAN NOTE
-multiple joints including bilateral knees (is now s/p L TKR)  -pain control as above - pain overall controlled  -PT/OT  -follow up with PCP, Orthopedics

## 2023-11-21 NOTE — PROGRESS NOTES
[facility]: [facility]    PROGRESS NOTE  Nursing Home Place of Service: nursing home place of service: POS [facility] Skilled Care-Part A Coverage    NAME: Marielle Packer  : 1950 AGE: 75 y.o. SEX: male MRN: [id number]  DATE OF ENCOUNTER: 2023    Assessment and Plan     Problem List Items Addressed This Visit          Cardiovascular and Mediastinum    HTN (hypertension)     -monitor BP - overall stable/controlled generally [id number] systolic  -avoid hypotension  -continue amlodipine, lisinopril-HCTZ with holding parameters         DVT (deep venous thrombosis) (HCC)     Provoked s/p L total knee replacement  Continues on Eliquis (started 23)  No present acute/associated complaints, no associated leg swelling or calf tenderness  Follow up with PCP, defer to PCP regarding duration of Eliquis     US venous duplex from 23 with:      RIGHT LOWER LIMB LIMITED:  Evaluation shows no evidence of thrombus in the common femoral vein. Doppler evaluation shows a normal response to augmentation maneuvers. LEFT LOWER LIMB:  There is evidence of acute non-occlusive deep vein thrombosis in one of the  gastrocnemius veins. No evidence of superficial thrombophlebitis noted. Doppler evaluation shows a normal response to augmentation maneuvers. Popliteal, posterior tibial and anterior tibial arterial Doppler waveform's are  triphasic. Note: There is a well defined hypoechoic non-vascularized structure with  internal echoes noted anterior to the left popliteal vein and artery measuring  4.5 x 3.0 x 4.8 cm. There are two echogenic structures located in the left inguinal region  measuring approximately 1.8 cm and 1.1 cm suggestive of enlarged lymphatic  channels.             Musculoskeletal and Integument    Primary osteoarthritis involving multiple joints     -multiple joints including bilateral knees (is now s/p L TKR)  -pain control as above - pain overall controlled  -PT/OT  -follow up with PCP, Orthopedics            Other    Acute low back pain - Primary     -acute low back pain with Right sided sciatica  -no inciting trauma; inciting event seems to have been sudden forward lean/reaching towards something while at home on commode, bringing on acute low back pain radiating down right leg  -no acute abnormality on lumbar Xray in hospital  -no alarm symptoms of cauda equina such as saddle anesthesia, acute bowel/bladder incontinence  -likely related to paraspinal muscle spasm  -pain gradually improving  -pain control: arrived to rehab on methocarbamol 750mg q6hr, acetaminophen 975mg q8hr, lidocaine patch, oxycodone 5mg q6hr PRN  -adjust/wean regimen as tolerated - voltaren topical at rehab, discontinued oxycodone as of 11/21 as last dose used was recorded 11/16 and not appearing to need recently  -PT/OT, stretches/exercises as appropriate  -fall precautions  -encourage appropriate DME use                Chief Complaint     Follow up pain    History of Present Illness     Marielle Packer is a 75 y.o. male who was seen today for follow up. 75 y.o. male with PMH including hypertension, prediabetes, history of DVT on Eliquis, s/p left knee replacement, dyslipidemia, pancreatic cyst     Patient seen and examined in room  Others present: none  Patient seated in chair  Appears comfortable, awake, alert, oriented to situation, able to converse appropriately. Clear communication achieved in combination with patient's English-speaking fluency and, per his personal preference, use of his phone translation tool when needed. Patient in good spirits, has been noted ambulating in hallway with therapy. He notes his right low back/leg pain has been steadily gradually improving over time. Much better overall this week. Pain at the right low back/hip radiating down the leg is at worst around 5/10 recently, down to 2/10 at times, overall getting better.  Worsens when he goes to stand but gets better as he remains standing or walks. No new acute or worsening symptoms. Feels he is able to ambulate with walker and motivated to participate in therapy with intent to return home to prior level of function ASAP. No pain anywhere else including chest, stomach, other extremities, head. Breathing well, denies appetite changes or swallowing issues, no N/V/C/D, last BM yesterday was normal. No acute cardiopulmonary, abdominal, or urinary symptoms; see ROS for more details. No further questions or acute concerns identified. Lab Review:  11/12: BMP generally stable/non-actionable, eGFR 90; CBC generally stable, Hb 11.4 (trending up, baseline 12-13). Recent iron low at 25. Recent B12 WNL. Recent A1c 5.6. No recent TSH on file. 11/16: BMP with K 3.4, CBC with Hb 11.5  11/20: BMP generally stable/non-actionable, Mg 1.9     XR L-spine "1. No acute osseous abnormality. 2. Facet-predominant degenerative changes"        CT CAP 8/25/23:  "CTA CHEST:     1) No central, lobar, segmental, or proximal subsegmental pulmonary embolism. Evaluation of the distal subsegmental pulmonary arteries is limited. 2) No acute pulmonary pathology. 3) Several nonspecific pulmonary nodules measuring up to 3 mm. Based on current Fleischner Society 2017 Guidelines on incidental pulmonary nodules, no routine follow-up is needed if the patient is low risk. If the patient is high risk, optional   follow-up chest CT in 12 months can be considered. CT ABDOMEN/PELVIS:     4) Cystitis and ureteritis. No evidence of pyelonephritis or perinephric abscess. 5) No other acute abdominal or pelvic pathology. 6) 7 mm cystic lesion in the head of the pancreas with no appreciable pancreatic ductal dilatation. Statistically this likely represents an IPMN.  Given the size of the lesion, follow-up is recommended in 2 years, preferably with MRI/MRCP."     EKG 8/29/23: NSR 70bpm Qtc 490 (borderline prolonged)  No recent echo on file. The following portions of the patient's history were reviewed and updated as appropriate: allergies, current medications, past family history, past medical history, past social history, past surgical history and problem list.    Review of Systems     Review of Systems   Constitutional:  Negative for appetite change, chills, diaphoresis, fatigue and fever. HENT:  Negative for drooling, ear pain, facial swelling, rhinorrhea, sore throat and trouble swallowing. Eyes:  Negative for photophobia, pain, discharge, redness, itching and visual disturbance (wears glasses). Respiratory:  Negative for cough, choking, chest tightness, shortness of breath and wheezing. Cardiovascular:  Negative for chest pain, palpitations and leg swelling. Gastrointestinal:  Negative for abdominal pain, blood in stool, constipation, diarrhea, nausea and vomiting. Genitourinary:  Negative for difficulty urinating, dysuria, flank pain and hematuria. Musculoskeletal:  Positive for arthralgias (gradually improving), back pain (gradually improving) and gait problem. Negative for neck pain. Skin:  Negative for color change. Neurological:  Negative for dizziness, tremors, seizures, facial asymmetry, weakness, light-headedness and headaches. Psychiatric/Behavioral:  Negative for agitation, behavioral problems and confusion. The patient is not nervous/anxious and is not hyperactive.         Active Problem List     Patient Active Problem List   Diagnosis    HTN (hypertension)    GERD (gastroesophageal reflux disease)    Prediabetes    BMI 33.0-33.9,adult    Mixed hyperlipidemia    Chronic pain of both knees    Heat syncope    Status post total knee replacement, left    DVT (deep venous thrombosis) (HCC)    Anemia    Abnormal CT scan    Acute low back pain    Visual impairment    At risk for constipation    At risk for delirium    Advance care planning    Primary osteoarthritis involving multiple joints Pulmonary nodules    Pancreatic cyst       Objective     Vital Signs:     BP: 130/65 mmHg  11/21/2023 07:47   Temp:96.8 °F  11/21/2023 07:47 Pulse:89 bpm  11/21/2023 07:47 Weight:191.1 Lbs  11/21/2023 05:05   Resp:18 Breaths/min  11/21/2023 07:47 BS: O2:97 %  11/21/2023 07:56 Pain:0  11/21/2023 05:55       Physical Exam  Vitals reviewed. Constitutional:       General: He is not in acute distress. Appearance: He is not toxic-appearing or diaphoretic. HENT:      Head: Normocephalic and atraumatic. Right Ear: External ear normal.      Left Ear: External ear normal.      Nose: No rhinorrhea. Mouth/Throat:      Mouth: Mucous membranes are moist.      Pharynx: Oropharynx is clear. No oropharyngeal exudate or posterior oropharyngeal erythema. Eyes:      General: No scleral icterus. Right eye: No discharge. Left eye: No discharge. Extraocular Movements: Extraocular movements intact. Conjunctiva/sclera: Conjunctivae normal.      Pupils: Pupils are equal, round, and reactive to light. Cardiovascular:      Rate and Rhythm: Normal rate and regular rhythm. Pulmonary:      Effort: Pulmonary effort is normal. No respiratory distress. Breath sounds: No wheezing or rales. Abdominal:      General: Bowel sounds are normal.      Palpations: Abdomen is soft. Tenderness: There is no abdominal tenderness. There is no guarding. Musculoskeletal:         General: No swelling. Cervical back: No rigidity. Comments: No calf tenderness bilaterally  Mildly hypertonic musculature along right lower back and right hip/thigh region, somewhat improved  L knee with vertical past surgical scar appearing well healed   Skin:     General: Skin is warm and dry. Coloration: Skin is not jaundiced. Neurological:      General: No focal deficit present. Mental Status: He is alert and oriented to person, place, and time. Mental status is at baseline.       Comments: Strength 5/5 age-appropriate bilateral lower extremities   Psychiatric:         Mood and Affect: Mood normal.         Behavior: Behavior normal.         Thought Content: Thought content normal.         Judgment: Judgment normal.         Pertinent Laboratory/Diagnostic Studies:  Laboratory and Imaging studies reviewed. Full report in the paper chart. Current Medications   Medications reviewed and updated in facility chart.      -Total time spent on this encounter today including documentation and workup review, face to face time, history and exam, and documentation/orders was approximately 30 minutes. -This note will be copied to [facility] EMR where vitals and medication orders are placed. Gracie Perez D.O.   Geriatric Medicine  11/21/2023 12:44 PM

## 2023-11-22 ENCOUNTER — PATIENT OUTREACH (OUTPATIENT)
Dept: CASE MANAGEMENT | Facility: OTHER | Age: 73
End: 2023-11-22

## 2023-11-22 NOTE — PROGRESS NOTES
Chart review complete Update obtained from [facility]). The patient is currently admitted to the SNF and is receiving skilled therapies No LCD at this time. This Admin Coordinator will continue to monitor via chart review.

## 2023-11-24 ENCOUNTER — NURSING HOME VISIT (OUTPATIENT)
Dept: GERIATRICS | Facility: OTHER | Age: 73
End: 2023-11-24
Payer: COMMERCIAL

## 2023-11-24 DIAGNOSIS — E78.2 MIXED HYPERLIPIDEMIA: ICD-10-CM

## 2023-11-24 DIAGNOSIS — R91.8 PULMONARY NODULES: ICD-10-CM

## 2023-11-24 DIAGNOSIS — I10 PRIMARY HYPERTENSION: ICD-10-CM

## 2023-11-24 DIAGNOSIS — M54.31 SCIATICA OF RIGHT SIDE: ICD-10-CM

## 2023-11-24 DIAGNOSIS — Z96.652 STATUS POST TOTAL KNEE REPLACEMENT, LEFT: ICD-10-CM

## 2023-11-24 DIAGNOSIS — R73.03 PREDIABETES: ICD-10-CM

## 2023-11-24 DIAGNOSIS — I82.462 ACUTE DEEP VEIN THROMBOSIS (DVT) OF CALF MUSCLE VEIN OF LEFT LOWER EXTREMITY (HCC): ICD-10-CM

## 2023-11-24 DIAGNOSIS — M54.41 ACUTE RIGHT-SIDED LOW BACK PAIN WITH RIGHT-SIDED SCIATICA: Primary | ICD-10-CM

## 2023-11-24 DIAGNOSIS — D50.8 OTHER IRON DEFICIENCY ANEMIA: ICD-10-CM

## 2023-11-24 DIAGNOSIS — K21.9 GASTROESOPHAGEAL REFLUX DISEASE WITHOUT ESOPHAGITIS: ICD-10-CM

## 2023-11-24 DIAGNOSIS — H54.7 VISUAL IMPAIRMENT: ICD-10-CM

## 2023-11-24 DIAGNOSIS — R93.89 ABNORMAL CT SCAN: ICD-10-CM

## 2023-11-24 DIAGNOSIS — I10 HYPERTENSION, UNSPECIFIED TYPE: ICD-10-CM

## 2023-11-24 DIAGNOSIS — M15.9 PRIMARY OSTEOARTHRITIS INVOLVING MULTIPLE JOINTS: ICD-10-CM

## 2023-11-24 DIAGNOSIS — K86.2 PANCREATIC CYST: ICD-10-CM

## 2023-11-24 PROCEDURE — 99316 NF DSCHRG MGMT 30 MIN+: CPT | Performed by: STUDENT IN AN ORGANIZED HEALTH CARE EDUCATION/TRAINING PROGRAM

## 2023-11-24 RX ORDER — METHOCARBAMOL 750 MG/1
750 TABLET, FILM COATED ORAL EVERY 6 HOURS PRN
Qty: 40 TABLET | Refills: 0 | OUTPATIENT
Start: 2023-11-24 | End: 2023-12-04

## 2023-11-24 RX ORDER — METHOCARBAMOL 750 MG/1
750 TABLET, FILM COATED ORAL EVERY 6 HOURS PRN
Qty: 40 TABLET | Refills: 0 | Status: SHIPPED | OUTPATIENT
Start: 2023-11-24 | End: 2023-12-04

## 2023-11-24 RX ORDER — AMLODIPINE BESYLATE 2.5 MG/1
2.5 TABLET ORAL DAILY
Qty: 30 TABLET | Refills: 0 | Status: SHIPPED | OUTPATIENT
Start: 2023-11-25

## 2023-11-24 NOTE — ASSESSMENT & PLAN NOTE
-acute low back pain with Right sided sciatica  -no inciting trauma; inciting event seems to have been sudden forward lean/reaching towards something while at home on commode, bringing on acute low back pain radiating down right leg  -no acute abnormality on lumbar Xray in hospital  -no alarm symptoms of cauda equina such as saddle anesthesia, acute bowel/bladder incontinence  -likely related to paraspinal muscle spasm  -pain gradually improving  -pain control: arrived to rehab on methocarbamol 750mg q6hr, acetaminophen 975mg q8hr, lidocaine patch, oxycodone 5mg q6hr PRN  -adjust/wean regimen as tolerated - voltaren topical at rehab, discontinued oxycodone as of 11/21 as last dose used was recorded 11/16 and not appearing to need recently  -PT/OT, stretches/exercises as appropriate  -fall precautions  -encourage appropriate DME use  -follow up with PCP, continue to wean pain regimen as able/as appropriate in outpatient setting

## 2023-11-24 NOTE — TELEPHONE ENCOUNTER
Patient reports already having muscle relaxer at home, will not change order at this time, to follow up with PCP

## 2023-11-24 NOTE — ASSESSMENT & PLAN NOTE
-multiple joints including bilateral knees (is now s/p L TKR)  -pain overall controlled  -PT/OT  -follow up with PCP, Orthopedics

## 2023-11-24 NOTE — ASSESSMENT & PLAN NOTE
Incidental finding on CT Aug 2023 "7 mm cystic lesion in the head of the pancreas with no appreciable pancreatic ductal dilatation. Statistically this likely represents an IPMN.  Given the size of the lesion, follow-up is recommended in 2 years, preferably with MRI/MRCP"  No apparent acute associated symptoms presently  Follow up with PCP, recommend outpatient surveillance as appropriate

## 2023-11-24 NOTE — PROGRESS NOTES
[facility]: [facility] Transitional Care Unit    DISCHARGE SUMMARY  Nursing Home Place of Service: 31: SNF/Short Term Rehab    NAME: Regino Miller  : 1950 AGE: 75 y.o. SEX: male MRN: [id number]  DATE OF ENCOUNTER: 2023    DATE OF ADMISSION: 23 DATE OF DISCHARGE:23 DISCHARGE DISPOSITION: home    HPI: Regino Miller is a 75 y.o. male with PMH including hypertension, prediabetes, history of DVT on Eliquis, s/p left knee replacement, dyslipidemia, pancreatic cyst     Reason for admission: Patient was hospitalized at [facility] from  to 23  For details of hospitalization, see hospital records including discharge documentation from 23  Briefly, patient hospitalized with acute low back pain radiating down RLE with associated ambulatory dysfunction; workup did not find acute fracture; admitted for pain control and PT/OT/ambulation safety, reported to improve with pain regimen. Course of stay: Patient was admitted to [address] [place] [address] for rehabilitation due to physical deconditioning and acute pain/muscle spasm as above. Significant events during the stay include: n/a. The patient participated in PT/OT. Patient seen and examined in room  Others present: none  Patient seated in chair  Appears comfortable, awake, alert, oriented to situation, able to converse appropriately. Clear communication achieved in combination with patient's English-speaking fluency, provider's Cambodian, and, per his personal preference, use of his phone translation tool when needed. Patient in very good spirits, dressed and ready to return home which he is very pleased about. He has been noted ambulating in hallway with therapy on a regular basis recently. He notes his right low back/leg pain has been steadily gradually improving over time. Much better overall.  Pain at the right low back/hip radiating down the leg is at worst around 5/10 recently (pain worst when he goes to stand up but otherwise fine at rest and once he is standing/walking); overall getting better. No new acute or worsening symptoms. Feels nearly back to his baseline and feels safe going home. No pain anywhere else including chest, stomach, other extremities, head. Breathing well, denies appetite changes or swallowing issues, no N/V/C/D, reports daily normal BM recently and that last BM today was normal. No acute cardiopulmonary, abdominal, or urinary symptoms; see ROS for more details. No further questions or acute concerns identified. Lab Review:  11/12: BMP generally stable/non-actionable, eGFR 90; CBC generally stable, Hb 11.4 (trending up, baseline 12-13). Recent iron low at 25. Recent B12 WNL. Recent A1c 5.6. No recent TSH on file. 11/16: BMP with K 3.4, CBC with Hb 11.5  11/20: BMP generally stable/non-actionable, Mg 1.9     XR L-spine "1. No acute osseous abnormality. 2. Facet-predominant degenerative changes"        CT CAP 8/25/23:  "CTA CHEST:     1) No central, lobar, segmental, or proximal subsegmental pulmonary embolism. Evaluation of the distal subsegmental pulmonary arteries is limited. 2) No acute pulmonary pathology. 3) Several nonspecific pulmonary nodules measuring up to 3 mm. Based on current Fleischner Society 2017 Guidelines on incidental pulmonary nodules, no routine follow-up is needed if the patient is low risk. If the patient is high risk, optional   follow-up chest CT in 12 months can be considered. CT ABDOMEN/PELVIS:     4) Cystitis and ureteritis. No evidence of pyelonephritis or perinephric abscess. 5) No other acute abdominal or pelvic pathology. 6) 7 mm cystic lesion in the head of the pancreas with no appreciable pancreatic ductal dilatation. Statistically this likely represents an IPMN.  Given the size of the lesion, follow-up is recommended in 2 years, preferably with MRI/MRCP."     EKG 8/29/23: NSR 70bpm Qtc 490 (borderline prolonged)  No recent echo on file. Assessment/Plan:    GERD (gastroesophageal reflux disease)  -stable  -monitor off medications  -recommend OOB with meals, sit upright for at least 30 minutes afterwards, avoid trigger foods  -continue to monitor  -follow up with PCP, GI as appropriate    Pancreatic cyst  Incidental finding on CT Aug 2023 "7 mm cystic lesion in the head of the pancreas with no appreciable pancreatic ductal dilatation. Statistically this likely represents an IPMN. Given the size of the lesion, follow-up is recommended in 2 years, preferably with MRI/MRCP"  No apparent acute associated symptoms presently  Follow up with PCP, recommend outpatient surveillance as appropriate    Pulmonary nodules  Incidental finding on CT Aug 2023 "Several nonspecific pulmonary nodules measuring up to 3 mm"  No acute pulmonary symptoms  Follow up with PCP, consider routine surveillance as appropriate in outpatient setting at discretion of PCP    DVT (deep venous thrombosis) ([address])  Provoked s/p L total knee replacement  Continues on Eliquis (started 8/22/23)  No present acute/associated complaints, no associated leg swelling or calf tenderness  Follow up with PCP, defer to PCP regarding duration of Eliquis     US venous duplex from 8/22/23 with:      RIGHT LOWER LIMB LIMITED:  Evaluation shows no evidence of thrombus in the common femoral vein. Doppler evaluation shows a normal response to augmentation maneuvers. LEFT LOWER LIMB:  There is evidence of acute non-occlusive deep vein thrombosis in one of the  gastrocnemius veins. No evidence of superficial thrombophlebitis noted. Doppler evaluation shows a normal response to augmentation maneuvers. Popliteal, posterior tibial and anterior tibial arterial Doppler waveform's are  triphasic.      Note: There is a well defined hypoechoic non-vascularized structure with  internal echoes noted anterior to the left popliteal vein and artery measuring  4.5 x 3.0 x 4.8 cm. There are two echogenic structures located in the left inguinal region  measuring approximately 1.8 cm and 1.1 cm suggestive of enlarged lymphatic  channels.     HTN (hypertension)  -monitor BP - overall stable/controlled generally [id number] systolic  -avoid hypotension  -continue amlodipine, lisinopril-HCTZ with holding parameters; as of 11/24 given BP being overall well controlled/soft at times will change his amlodipine to 2.5mg daily  -recommend close follow up with PCP, consider further weaning of BP regimen as appropriate in outpatient setting    Primary osteoarthritis involving multiple joints  -multiple joints including bilateral knees (is now s/p L TKR)  -pain overall controlled  -PT/OT  -follow up with PCP, Orthopedics    Abnormal CT scan  Incidental findings of pulmonary nodules and pancreatic cyst on CT Aug 2023  Known to PCP, see plan as above, follow up outpatient and consider routine outpatient surveillance    Acute low back pain  -acute low back pain with Right sided sciatica  -no inciting trauma; inciting event seems to have been sudden forward lean/reaching towards something while at home on commode, bringing on acute low back pain radiating down right leg  -no acute abnormality on lumbar Xray in hospital  -no alarm symptoms of cauda equina such as saddle anesthesia, acute bowel/bladder incontinence  -likely related to paraspinal muscle spasm  -pain gradually improving  -pain control: arrived to rehab on methocarbamol 750mg q6hr, acetaminophen 975mg q8hr, lidocaine patch, oxycodone 5mg q6hr PRN  -adjust/wean regimen as tolerated - voltaren topical at rehab, discontinued oxycodone as of 11/21 as last dose used was recorded 11/16 and not appearing to need recently  -PT/OT, stretches/exercises as appropriate  -fall precautions  -encourage appropriate DME use  -follow up with PCP, continue to wean pain regimen as able/as appropriate in outpatient setting    Anemia  -low iron on labs from Aug 2023  -was previously on oral iron supplementation, appears to have been discontinued as of Sept 2023  -monitor on routine labs - appearing stable/improving  -monitor for acute bleed - no present signs  -transfuse PRN Hb <7  -follow up with PCP    Mixed hyperlipidemia  continue statin     Prediabetes  -most recent A1c 5.6 technically below pre-diabetic level  -monitor glucose on routine labs, no indication for daily/frequent accuchecks at this time  -lifestyle modifications including weight loss, healthy diet, activity as tolerated  -follow up with PCP    Status post total knee replacement, left  S/p L TKR on 8/14/23  PT/OT  Fall precautions  Encourage appropriate DME use  Follow up with PCP, Orthopedics as appropriate    Visual impairment  Vision stable  Wears glasses  Ensure vision aids nearby/within reach of patient  Follow up with PCP, Ophthalmology outpatient           Review of Systems   Constitutional:  Negative for appetite change, chills, diaphoresis, fatigue and fever. HENT:  Negative for drooling, ear pain, facial swelling, rhinorrhea, sore throat and trouble swallowing. Eyes:  Negative for photophobia, pain, discharge, redness, itching and visual disturbance (wears glasses). Respiratory:  Negative for cough, choking, chest tightness, shortness of breath and wheezing. Cardiovascular:  Negative for chest pain, palpitations and leg swelling. Gastrointestinal:  Negative for abdominal pain, blood in stool, constipation, diarrhea, nausea and vomiting. Genitourinary:  Negative for difficulty urinating, dysuria, flank pain and hematuria. Musculoskeletal:  Positive for arthralgias (gradually improving), back pain (gradually improving) and gait problem. Negative for neck pain. Skin:  Negative for color change. Neurological:  Negative for dizziness, tremors, seizures, facial asymmetry, weakness, light-headedness and headaches. Psychiatric/Behavioral:  Negative for agitation, behavioral problems and confusion. The patient is not nervous/anxious and is not hyperactive. Vital Signs:     BP: 100/56 mmHg  11/24/2023 07:43   Temp:96.6 °F  11/24/2023 07:43   Pulse:57 bpm  11/24/2023 07:43 Weight:191.1 Lbs  11/21/2023 05:05   Resp:18 Breaths/min  11/24/2023 07:43 BS: O2:98 %  11/23/2023 20:25 Pain:0  11/24/2023 05:47       Exam:     Physical Exam  Vitals reviewed. Constitutional:       General: He is not in acute distress. Appearance: He is not toxic-appearing or diaphoretic. HENT:      Head: Normocephalic and atraumatic. Right Ear: External ear normal.      Left Ear: External ear normal.      Nose: No rhinorrhea. Mouth/Throat:      Mouth: Mucous membranes are moist.      Pharynx: Oropharynx is clear. No oropharyngeal exudate or posterior oropharyngeal erythema. Eyes:      General: No scleral icterus. Right eye: No discharge. Left eye: No discharge. Extraocular Movements: Extraocular movements intact. Conjunctiva/sclera: Conjunctivae normal.      Pupils: Pupils are equal, round, and reactive to light. Cardiovascular:      Rate and Rhythm: Normal rate and regular rhythm. Pulmonary:      Effort: Pulmonary effort is normal. No respiratory distress. Breath sounds: No wheezing or rales. Abdominal:      General: Bowel sounds are normal.      Palpations: Abdomen is soft. Tenderness: There is no abdominal tenderness. There is no guarding. Musculoskeletal:         General: No swelling. Cervical back: No rigidity. Comments: No calf tenderness bilaterally  Minimally hypertonic musculature along right lower back and right hip/thigh region,seems improved  L knee with vertical past surgical scar appearing well healed   Skin:     General: Skin is warm and dry. Coloration: Skin is not jaundiced. Neurological:      General: No focal deficit present.       Mental Status: He is alert and oriented to person, place, and time. Mental status is at baseline. Comments: Strength 5/5 age-appropriate bilateral lower extremities   Psychiatric:         Mood and Affect: Mood normal.         Behavior: Behavior normal.         Thought Content: Thought content normal.         Judgment: Judgment normal.           Discharge Medications: See discharge medication list which was reviewed and signed. Status at time of discharge: Stable    Discussion with patient/family as appropriate and further instructions:  -Fall precautions  -Aspiration precautions  -Bleeding precautions  -Monitor for signs/symptoms of infection  -Medication list was reviewed and signed  -DME form was completed    Follow-up Recommendations: Please follow-up with your primary care physician within 7-10 days of discharge to review medication changes and current status. Problem List Follow-up Recommendations:      -Total time spent on this encounter today including documentation and workup review, face to face time, history and exam, and documentation/orders was approximately 45 minutes. -This note will be copied to [facility] EMR where vitals and medication orders are placed. Daniel Jennings D.O.   Geriatric Medicine  11/24/2023 12:33 PM

## 2023-11-24 NOTE — ASSESSMENT & PLAN NOTE
-monitor BP - overall stable/controlled generally [id number] systolic  -avoid hypotension  -continue amlodipine, lisinopril-HCTZ with holding parameters; as of 11/24 given BP being overall well controlled/soft at times will change his amlodipine to 2.5mg daily  -recommend close follow up with PCP, consider further weaning of BP regimen as appropriate in outpatient setting

## 2023-11-27 ENCOUNTER — PATIENT OUTREACH (OUTPATIENT)
Dept: CASE MANAGEMENT | Facility: OTHER | Age: 73
End: 2023-11-27

## 2023-11-27 ENCOUNTER — TRANSITIONAL CARE MANAGEMENT (OUTPATIENT)
Dept: FAMILY MEDICINE CLINIC | Facility: CLINIC | Age: 73
End: 2023-11-27

## 2023-11-27 DIAGNOSIS — N17.9 AKI (ACUTE KIDNEY INJURY) (HCC): Primary | ICD-10-CM

## 2023-11-27 NOTE — PROGRESS NOTES
ADT alert received the patient discharged 11/24/23 to Home. I have removed myself off of the care team, added the CM to the care team who will follow the patient through the episode, sent the care manager an inbasket notifying them of the HRR Referal.  Ambulatory referral placed for complex care management. Ambulatory referral placed for complex care management. Discharge paperwork attached to this encounter.

## 2023-11-28 ENCOUNTER — PATIENT OUTREACH (OUTPATIENT)
Dept: FAMILY MEDICINE CLINIC | Facility: CLINIC | Age: 73
End: 2023-11-28

## 2023-11-28 NOTE — PROGRESS NOTES
Contacted patient for f/u post hospitalization and rehab stay for sciatica of R side using [employer]  services. Patient is managing independently at home and denies needing additional assistance. He goes to a senior center several days per week. He states the pain is less intense and he is not taking pain medication. He reports a good appetite. He is taking all medications as prescribed. Follow up with PCP scheduled for tomorrow. He uses public bus for transportation. He denies any needs at this time.

## 2023-11-29 ENCOUNTER — OFFICE VISIT (OUTPATIENT)
Dept: FAMILY MEDICINE CLINIC | Facility: CLINIC | Age: 73
End: 2023-11-29
Payer: COMMERCIAL

## 2023-11-29 VITALS
OXYGEN SATURATION: 98 % | HEIGHT: 66 IN | WEIGHT: 196 LBS | TEMPERATURE: 97 F | SYSTOLIC BLOOD PRESSURE: 130 MMHG | BODY MASS INDEX: 31.5 KG/M2 | DIASTOLIC BLOOD PRESSURE: 61 MMHG | RESPIRATION RATE: 17 BRPM | HEART RATE: 61 BPM

## 2023-11-29 DIAGNOSIS — D50.8 OTHER IRON DEFICIENCY ANEMIA: ICD-10-CM

## 2023-11-29 DIAGNOSIS — I82.462 ACUTE DEEP VEIN THROMBOSIS (DVT) OF CALF MUSCLE VEIN OF LEFT LOWER EXTREMITY (HCC): ICD-10-CM

## 2023-11-29 DIAGNOSIS — I10 PRIMARY HYPERTENSION: ICD-10-CM

## 2023-11-29 DIAGNOSIS — M54.16 LUMBAR BACK PAIN WITH RADICULOPATHY AFFECTING RIGHT LOWER EXTREMITY: Primary | ICD-10-CM

## 2023-11-29 PROBLEM — Z91.89 AT RISK FOR DELIRIUM: Status: RESOLVED | Noted: 2023-11-15 | Resolved: 2023-11-29

## 2023-11-29 PROBLEM — Z91.89 AT RISK FOR CONSTIPATION: Status: RESOLVED | Noted: 2023-11-15 | Resolved: 2023-11-29

## 2023-11-29 PROBLEM — T67.1XXA HEAT SYNCOPE: Status: RESOLVED | Noted: 2021-09-16 | Resolved: 2023-11-29

## 2023-11-29 PROBLEM — Z71.89 ADVANCE CARE PLANNING: Status: RESOLVED | Noted: 2023-11-15 | Resolved: 2023-11-29

## 2023-11-29 PROCEDURE — 99496 TRANSJ CARE MGMT HIGH F2F 7D: CPT | Performed by: PHYSICIAN ASSISTANT

## 2023-11-29 NOTE — PROGRESS NOTES
Assessment & Plan     1. Lumbar back pain with radiculopathy affecting right lower extremity  Assessment & Plan:  History of low back pain in past with similar symptoms, had injection with improvement. Reviewed recent admission followed by SNF from 11/14 to 11/24/23 with improvement. Continues to take Robaxin BID. No longer requiring other analgesics. Ordered MRI lumbar spine. Consider repeat PT, patient recently completed multiple sessions due to hx of TKR. Ambulating with walker. Reviewed XR lumbar on 11/12/23 with following:  Grade 1 anterolisthesis of L4 on L5 secondary to bulky facet osteophytes. Multilevel disc height loss is relatively mild, greatest diffusely at L4-5 and posteriorly at L5-S1. Moderate bulky facet arthropathy at and below L1-2. Orders:  -     MRI lumbar spine wo contrast; Future; Expected date: 11/29/2023    2. Acute deep vein thrombosis (DVT) of calf muscle vein of left lower extremity (HCC)  Assessment & Plan:  Provoked s/p L TKR. Started Eliquis in 8/2023. Continue for total of 6 months. 3. Primary hypertension  Assessment & Plan:  Stable BP on lisinopril/HCTZ and amlodipine. Continue same dose. Recommend weight loss. Recommend DASH and low salt diet. Discussed importance of diet/exercise. 4. Other iron deficiency anemia  Assessment & Plan:  Lab Results   Component Value Date    WBC 6.81 11/22/2023    HGB 11.9 (L) 11/22/2023    HCT 36.7 11/22/2023    MCV 89 11/22/2023     11/22/2023     Stable, cecal polyp per colonoscopy in 2022. Repeat lab in 3 months. Subjective     Transitional Care Management Review:   Salina Smith is a 75 y.o. male here for TCM follow up.      During the TCM phone call patient stated:  TCM Call     Date and time call was made  11/27/2023 11:50 AM    Patient was hospitialized at  [address]; [address]    Date of Admission  11/14/23    Date of discharge  11/24/23    [facility] Current Symptoms  None      TCM Call     Post hospital issues  None    Should patient be enrolled in anticoag monitoring? No    Scheduled for follow up? Yes    Did you obtain your prescribed medications  Yes    Do you need help managing your prescriptions or medications  No    Is transportation to your appointment needed  No    I have advised the patient to call PCP with any new or worsening symptoms  Fauzia Segura RMA    Living Arrangements  Family members    Support System  None    Have you fallen in the last 12 months  No    Interperter language line needed  No        Lindsay Miner is a 75 y.o. male with a h/o HTN, recent TKR of L knee presenting for follow up after being discharged from skilled nursing facility for low back pain. Patient was hospitalized 17 days ago for acute lower back pain and sciatic pain. He was hospitalized for 3 days then transferred to skilled nursing facility. He reports his walking and balance have improved and he is now using a walker all the time. He lives in a studio apartment and reports he is able to clean most of his house without help. He has trouble cleaning the bathroom. His daughter is local in [facility] and checks on him often. Pain is well controlled but he does notice more right sciatic pain when he walks for a long time. He is not taking anything for constipation as he now has normal bowel movements. No other complaints at this time. History was conducted in Portuguese without the use of . [place], Medical Student obtained portion of history and physical           Review of Systems   Constitutional:  Negative for chills and fever. HENT:  Negative for ear pain and sore throat. Eyes:  Negative for pain and visual disturbance. Respiratory:  Negative for cough and shortness of breath. Cardiovascular:  Negative for chest pain and palpitations. Gastrointestinal:  Negative for abdominal pain and vomiting. Genitourinary:  Negative for dysuria and hematuria. Musculoskeletal:  Positive for back pain (mild). Negative for arthralgias. Skin:  Negative for color change and rash. Neurological:  Negative for seizures and syncope. All other systems reviewed and are negative. Objective     /61 (BP Location: Left arm, Patient Position: Sitting, Cuff Size: Standard)   Pulse 61   Temp (!) 97 °F (36.1 °C) (Tympanic)   Resp 17   Ht 5' 6" (1.676 m)   Wt 88.9 kg (196 lb)   SpO2 98%   BMI 31.64 kg/m²      Physical Exam  Vitals and nursing note reviewed. Constitutional:       Appearance: Normal appearance. Comments: Ambulating with rollator   HENT:      Head: Normocephalic and atraumatic. Cardiovascular:      Rate and Rhythm: Normal rate and regular rhythm. Pulmonary:      Effort: Pulmonary effort is normal.      Breath sounds: Normal breath sounds. Musculoskeletal:      Right lower leg: No edema. Left lower leg: No edema. Neurological:      Mental Status: He is alert.       Comments: Normal heel and toe lift  5/5 strength in b/l LE       Medications have been reviewed by provider in current encounter    Abida Mullins PA-C

## 2023-11-29 NOTE — ASSESSMENT & PLAN NOTE
History of low back pain in past with similar symptoms, had injection with improvement. Reviewed recent admission followed by SNF from 11/14 to 11/24/23 with improvement. Continues to take Robaxin BID. No longer requiring other analgesics. Ordered MRI lumbar spine. Consider repeat PT, patient recently completed multiple sessions due to hx of TKR. Ambulating with walker. Reviewed XR lumbar on 11/12/23 with following:  Grade 1 anterolisthesis of L4 on L5 secondary to bulky facet osteophytes. Multilevel disc height loss is relatively mild, greatest diffusely at L4-5 and posteriorly at L5-S1. Moderate bulky facet arthropathy at and below L1-2.

## 2023-11-29 NOTE — ASSESSMENT & PLAN NOTE
Lab Results   Component Value Date    WBC 6.81 11/22/2023    HGB 11.9 (L) 11/22/2023    HCT 36.7 11/22/2023    MCV 89 11/22/2023     11/22/2023     Stable, cecal polyp per colonoscopy in 2022. Repeat lab in 3 months.

## 2023-11-29 NOTE — ASSESSMENT & PLAN NOTE
Stable BP on lisinopril/HCTZ and amlodipine. Continue same dose. Recommend weight loss. Recommend DASH and low salt diet. Discussed importance of diet/exercise.

## 2023-12-07 ENCOUNTER — HOSPITAL ENCOUNTER (OUTPATIENT)
Dept: MRI IMAGING | Facility: HOSPITAL | Age: 73
End: 2023-12-07
Payer: COMMERCIAL

## 2023-12-07 DIAGNOSIS — M54.16 LUMBAR BACK PAIN WITH RADICULOPATHY AFFECTING RIGHT LOWER EXTREMITY: ICD-10-CM

## 2023-12-07 PROCEDURE — 72148 MRI LUMBAR SPINE W/O DYE: CPT

## 2023-12-07 PROCEDURE — G1004 CDSM NDSC: HCPCS

## 2023-12-13 DIAGNOSIS — M48.062 SPINAL STENOSIS OF LUMBAR REGION WITH NEUROGENIC CLAUDICATION: Primary | ICD-10-CM

## 2023-12-15 ENCOUNTER — TELEPHONE (OUTPATIENT)
Age: 73
End: 2023-12-15

## 2023-12-15 NOTE — TELEPHONE ENCOUNTER
Spoke with Millicent Fung from [facility] ([phone])  Requesting and order for a Shower Chair. Provider ordered a shower bench which cannot be used. Donna Yan would like another order placed for a [employer]. From [address] [place] [address] ([phone])     Please fax order to Millicent Fung @ [phone] @ her attention.

## 2023-12-20 DIAGNOSIS — I10 PRIMARY HYPERTENSION: ICD-10-CM

## 2023-12-20 RX ORDER — AMLODIPINE BESYLATE 2.5 MG/1
2.5 TABLET ORAL DAILY
Qty: 30 TABLET | Refills: 0 | OUTPATIENT
Start: 2023-12-20

## 2024-01-03 ENCOUNTER — CONSULT (OUTPATIENT)
Dept: NEUROSURGERY | Facility: CLINIC | Age: 74
End: 2024-01-03
Payer: COMMERCIAL

## 2024-01-03 ENCOUNTER — TELEPHONE (OUTPATIENT)
Age: 74
End: 2024-01-03

## 2024-01-03 VITALS
OXYGEN SATURATION: 97 % | RESPIRATION RATE: 18 BRPM | TEMPERATURE: 97.6 F | BODY MASS INDEX: 31.5 KG/M2 | HEART RATE: 81 BPM | SYSTOLIC BLOOD PRESSURE: 126 MMHG | WEIGHT: 196 LBS | DIASTOLIC BLOOD PRESSURE: 72 MMHG | HEIGHT: 66 IN

## 2024-01-03 DIAGNOSIS — M54.16 LUMBAR RADICULOPATHY: Primary | ICD-10-CM

## 2024-01-03 DIAGNOSIS — M48.062 SPINAL STENOSIS OF LUMBAR REGION WITH NEUROGENIC CLAUDICATION: ICD-10-CM

## 2024-01-03 PROCEDURE — 99204 OFFICE O/P NEW MOD 45 MIN: CPT | Performed by: SPECIALIST

## 2024-01-03 NOTE — TELEPHONE ENCOUNTER
Caller: monalisa Olvera    Doctor:     Reason for call: call was dropped after I got an .  We didn't get any further than name,  and address    Call back#: n/a

## 2024-01-03 NOTE — PROGRESS NOTES
History and Physical - Neurosurgery   Wade Le 73 y.o. male MRN: [id number]      Assessment:      I examined the patient, reviewed the diagnostic studies, and had a very pleasant and detailed discussion with Wade regarding our findings.  We used a  via way of the iPAD. We reviewed with the patient in detail the MR study on the computer monitor, and Wade was easily able to appreciate with us the degenerative changes in the lumbar spine and stenosis. Fortunately, on exam the patient's strength was equal and symmetric. His exam was severely limited, however, secondary to pain. The duration of his complaints have been about 4-5 weeks, and worsened after a fall recently.  On Eliquis 5 mg twice daily status post left total knee replacement.  It seems that the majority of his back pain may have been exaggerated by the recent knee surgery and likely shifting dynamics on load-bearing and his back.     The bulk of our discussion today was devoted to treatment options, and we reviewed both surgical and nonsurgical treatment modalities.  The patient is currently doing physical therapy, but has not completed this yet.  He also has had no formal pain management as of this point.  Understandably, the patient recently had knee surgery, and if at all possible, would like to avoid any further surgery at this moment.  Surgery in the lumbar area would include a multilevel lumbar decompression.  At this point it was decided that the patient will try all nonsurgical treatment options first, and then utilize surgery as a last resort.  Considering he is currently in the midst of physical therapy, I felt that it would not be unreasonable to have him referred to pain management to see if there is anything further they can assist with.  I explained to the patient that they will evaluate him and they will determine whether an epidural injection or other type of treatment may be most beneficial.  The patient  will notify our office in the future should none of this work.  I invited him to call me and schedule an appointment, and at that point we can have more extensive discussions regarding surgery.  The patient was totally agreeable with this option.  I answered all of his questions.  It was a pleasure seeing him today.    Bashir Beyer MD     Plan:    Follow-up: prn    Problem List Items Addressed This Visit    None  Visit Diagnoses       Lumbar radiculopathy    -  Primary    Spinal stenosis of lumbar region with neurogenic claudication        Relevant Orders    Ambulatory referral to Spine & Pain Management                Subjective/Objective     Chief Complaint    Back and radiating right leg pain.     HPI    This is a pleasant 73-year-old gentleman who had a knee replacement in August 2023.  A few months later, as he was recovering, he began experiencing severe back pain as well as radiating right leg pain.  He did present to the emergency department on 1 occasion for the pain.  He was ordered physical therapy, as well as neurosurgery follow-up.  He is here today to discuss treatment options for his ongoing back pain.  He says that the pain did slightly improve since it started about 4 5 weeks ago.  He did fall about 3 weeks back, which seem to exacerbate the pain.  He has not had formal pain management as of yet.  He denies any bowel or bladder incontinence.  He does use a cane to assist with walking secondary to the pain.    ROSA LEE personally reviewed and updated.    Review of Systems   Gastrointestinal: Negative.    Genitourinary: Negative.    Musculoskeletal:  Positive for back pain (lower back pain radiates to the right side of hips and butt an ddown the right leg), gait problem (limping and uses cane for support) and myalgias (in right leg).   Neurological:  Positive for weakness (too much activity causes weakness in legs) and numbness (in right leg, foot and toes, right side).   Psychiatric/Behavioral:   "Positive for sleep disturbance (not sleeping well because of pain).        Family History    Family History   Problem Relation Age of Onset    Hypertension Mother     Hypertension Father        Social History    Social History     Socioeconomic History    Marital status: Single     Spouse name: Not on file    Number of children: Not on file    Years of education: Not on file    Highest education level: Not on file   Occupational History    Not on file   Tobacco Use    Smoking status: Former     Current packs/day: 0.00     Types: Cigarettes     Quit date:      Years since quittin.0    Smokeless tobacco: Never    Tobacco comments:     Patient quit   Vaping Use    Vaping status: Never Used   Substance and Sexual Activity    Alcohol use: Never     Comment: pt \"sober for 30+ years\"    Drug use: No    Sexual activity: Not Currently     Partners: Female     Comment:    Other Topics Concern    Not on file   Social History Narrative    Lives with family     Social Determinants of Health     Financial Resource Strain: Low Risk  (2023)    Overall Financial Resource Strain (CARDIA)     Difficulty of Paying Living Expenses: Not hard at all   Food Insecurity: No Food Insecurity (2023)    Hunger Vital Sign     Worried About Running Out of Food in the Last Year: Never true     Ran Out of Food in the Last Year: Never true   Transportation Needs: No Transportation Needs (2023)    PRAPARE - Transportation     Lack of Transportation (Medical): No     Lack of Transportation (Non-Medical): No   Physical Activity: Inactive (2021)    Exercise Vital Sign     Days of Exercise per Week: 0 days     Minutes of Exercise per Session: 0 min   Stress: Not on file   Social Connections: Not on file   Intimate Partner Violence: Not on file   Housing Stability: Low Risk  (2023)    Housing Stability Vital Sign     Unable to Pay for Housing in the Last Year: No     Number of Places Lived in the Last Year: 1    "  Unstable Housing in the Last Year: No       Past Medical History    Past Medical History:   Diagnosis Date    Anemia     Cataract     Colon polyp     Diverticulosis     Gastritis     GERD (gastroesophageal reflux disease)     Hyperlipidemia     Hypertension     Osteoarthritis of left knee 06/07/2023    Pre-diabetes     Throat ulcer     Visual impairment        Surgical History    Past Surgical History:   Procedure Laterality Date    COLONOSCOPY N/A 08/18/2016    Procedure: COLONOSCOPY;  Surgeon: Mariano Paz MD;  Location:  GI LAB;  Service:     COLONOSCOPY W/ POLYPECTOMY  2019    repeat 2022    EYE SURGERY Bilateral     [place] ARTHRP KNE CONDYLE&PLATU MEDIAL&LAT COMPARTMENTS Left 8/14/2023    Procedure: ARTHROPLASTY KNEE TOTAL;  Surgeon: Abby Garibay DO;  Location:  MAIN OR;  Service: Orthopedics    TONSILLECTOMY      UPPER GASTROINTESTINAL ENDOSCOPY  2019       Medications      Current Outpatient Medications:     acetaminophen (TYLENOL) 325 mg tablet, Take 3 tablets (975 mg total) by mouth every 8 (eight) hours, Disp: , Rfl: 0    amLODIPine (NORVASC) 2.5 mg tablet, Take 1 tablet (2.5 mg total) by mouth daily Do not start before November 25, 2023., Disp: 30 tablet, Rfl: 0    apixaban (Eliquis) 5 mg, Take 1 tablet (5 mg total) by mouth 2 (two) times a day, Disp: 60 tablet, Rfl: 4    atorvastatin (LIPITOR) 20 mg tablet, Take 1 tablet (20 mg total) by mouth daily at bedtime, Disp: 90 tablet, Rfl: 3    Diclofenac Sodium (VOLTAREN) 1 %, Apply 2 g topically 4 (four) times a day, Disp: , Rfl: 0    lidocaine (LIDODERM) 5 %, Apply 2 patches topically over 12 hours daily Remove & Discard patch within 12 hours or as directed by MD Do not start before November 15, 2023., Disp: , Rfl: 0    lisinopril-hydrochlorothiazide (PRINZIDE,ZESTORETIC) 20-25 MG per tablet, Take 1 tablet by mouth daily, Disp: 90 tablet, Rfl: 3    methocarbamol (ROBAXIN) 750 mg tablet, Take 1 tablet (750 mg total) by mouth every 6 (six) hours as  "needed for muscle spasms for up to 10 days, Disp: 40 tablet, Rfl: 0    Allergies    Allergies   Allergen Reactions    Lactose - Food Allergy Diarrhea     diarrhea       The following portions of the patient's history were reviewed and updated as appropriate: allergies, current medications, past family history, past medical history, past social history, past surgical history, and problem list.    Investigations    I personally reviewed the MRI results with the patient from 12/7/23:    -Spondylosis of the lumbar spine most prominent at L3-L4 and L4-L5 where there is moderate to severe spinal canal narrowing.     -Within the right dorsal spinal column at L4-L5 there is a 5 mm T2 hyperintense lesion likely representing a synovial cyst and contributing to spinal canal narrowing.     -Additional spondylosis of the cervical spine including mild to moderate spinal canal narrowing at L2-L3, multilevel encroachment/impingement of the descending nerve roots and varying degrees of neuroforaminal narrowing most prominent and moderate on the right at L4-L5.    Physical Exam    Vitals:  Blood pressure 126/72, pulse 81, temperature 97.6 °F (36.4 °C), temperature source Temporal, resp. rate 18, height 5' 6\" (1.676 m), weight 88.9 kg (196 lb), SpO2 97%.,Body mass index is 31.64 kg/m².    Physical Exam  General appearance: alert, appears stated age, cooperative and no distress  Head: Normocephalic, without obvious abnormality, atraumatic  Eyes: EOMI, PERRL  Neck: supple, symmetrical, trachea midline and NT  Back: no kyphosis present, no tenderness to percussion or palpation  Lungs: non labored breathing, equal breath sounds bilaterally without wheezing or crackles  Heart: regular heart rate, normal S1, S2, no murmors  Abdomen: Soft, nontender, nondistended. No pain to palpation  Neurologic:   Mental status: Alert, oriented x 3-[place] speaking only, thought content appropriate  Cranial nerves: grossly intact (Cranial nerves " II-XII)  Motor: moving all extremities without focal weakness. Exam liited secondary to pain.   Reflexes: 2+ and symmetric  Coordination: finger to nose normal bilaterally, no drift bilaterally       Raghu Beyer MD

## 2024-01-23 ENCOUNTER — OFFICE VISIT (OUTPATIENT)
Dept: OBGYN CLINIC | Facility: MEDICAL CENTER | Age: 74
End: 2024-01-23
Payer: COMMERCIAL

## 2024-01-23 VITALS
DIASTOLIC BLOOD PRESSURE: 70 MMHG | HEIGHT: 66 IN | SYSTOLIC BLOOD PRESSURE: 123 MMHG | BODY MASS INDEX: 31.72 KG/M2 | HEART RATE: 67 BPM | WEIGHT: 197.4 LBS

## 2024-01-23 DIAGNOSIS — M17.11 PRIMARY OSTEOARTHRITIS OF RIGHT KNEE: Primary | ICD-10-CM

## 2024-01-23 DIAGNOSIS — Z96.652 STATUS POST TOTAL KNEE REPLACEMENT, LEFT: ICD-10-CM

## 2024-01-23 PROCEDURE — 99213 OFFICE O/P EST LOW 20 MIN: CPT | Performed by: PHYSICIAN ASSISTANT

## 2024-01-23 PROCEDURE — 20610 DRAIN/INJ JOINT/BURSA W/O US: CPT | Performed by: PHYSICIAN ASSISTANT

## 2024-01-23 RX ORDER — METHYLPREDNISOLONE ACETATE 40 MG/ML
2 INJECTION, SUSPENSION INTRA-ARTICULAR; INTRALESIONAL; INTRAMUSCULAR; SOFT TISSUE
Status: COMPLETED | OUTPATIENT
Start: 2024-01-23 | End: 2024-01-23

## 2024-01-23 RX ORDER — LIDOCAINE HYDROCHLORIDE 10 MG/ML
3 INJECTION, SOLUTION INFILTRATION; PERINEURAL
Status: COMPLETED | OUTPATIENT
Start: 2024-01-23 | End: 2024-01-23

## 2024-01-23 RX ADMIN — LIDOCAINE HYDROCHLORIDE 3 ML: 10 INJECTION, SOLUTION INFILTRATION; PERINEURAL at 14:45

## 2024-01-23 RX ADMIN — METHYLPREDNISOLONE ACETATE 2 ML: 40 INJECTION, SUSPENSION INTRA-ARTICULAR; INTRALESIONAL; INTRAMUSCULAR; SOFT TISSUE at 14:45

## 2024-01-23 NOTE — PROGRESS NOTES
Assessment/Plan     1. Primary osteoarthritis of right knee    2. Status post total knee replacement, left      Orders Placed This Encounter   Procedures    Large joint arthrocentesis    Ambulatory Referral to Physical Therapy         Patient has severe right knee osteoarthritis.   Patient is a surgical candidate at this time. He would like to continue with non operative treatment for right knee.   Injections: Patient received right knee steroid injection today. Tolerated the procedure well. Post injection instructions reviewed including information on glucose monitoring for diabetic patients. Patient aware that they may repeat steroid injection every 3 months if needed.   Medications: Tylenol up to 3000 mg per day  PT: PT script provided. Patient may go for education on a home exercise program. Encouraged patient to work on his motion in bilateral knees.   Bracing: Continue short hinge knee brace as needed for comfort.   Activity: Continue activity as tolerated.   Encouraged patient to follow up with Pain management for his back. Has appt scheduled.   Plan for next appt:  repeat right knee CSI      Return in about 3 months (around 4/23/2024).    I answered all of the patient's questions during the visit and provided education of the patient's condition during the visit.  The patient verbalized understanding of the information given and agrees with the plan.  This note was dictated using [employer] software.  It may contain errors including improperly dictated words.  Please contact physician directly for any questions.    History of Present Illness   Chief complaint:   Chief Complaint   Patient presents with    Right Knee - Follow-up       HPI: Wade Le is a 73 y.o. male that c/o right knee pain.      Mechanism of Injury: NA  Pain Description: located medial and lateral aspects, described as achy  Palliating Factors: rest  Provoking Factors: nothing  Associated Symptoms: instability in the right  "leg  Medications: takes tylenol with some relief  Able to take NSAIDs? If not, why: no, on eliquis for DVT  Physical Therapy or Home Exercises: was doing PT for back issue  Injections: last right knee CSI on 10/13/23 which patient reports provided good relief   Bracing: not wearing one currently  Previous Surgery: none  Miscellaneous: Setswana translation provided by DIEGO Ambrosio, left knee s/p TKA is doing alright, feels stiff and he hears some clicking       ROS:    See HPI for musculoskeletal review.   All other systems reviewed are negative     Historical Information   Past Medical History:   Diagnosis Date    Anemia     Cataract     Colon polyp     Diverticulosis     Gastritis     GERD (gastroesophageal reflux disease)     Hyperlipidemia     Hypertension     Osteoarthritis of left knee 2023    Pre-diabetes     Throat ulcer     Visual impairment      Past Surgical History:   Procedure Laterality Date    COLONOSCOPY N/A 2016    Procedure: COLONOSCOPY;  Surgeon: Mariano Paz MD;  Location:  GI LAB;  Service:     COLONOSCOPY W/ POLYPECTOMY      repeat     EYE SURGERY Bilateral     [place] ARTHRP KNE CONDYLE&PLATU MEDIAL&LAT COMPARTMENTS Left 2023    Procedure: ARTHROPLASTY KNEE TOTAL;  Surgeon: Abby Garibay DO;  Location:  MAIN OR;  Service: Orthopedics    TONSILLECTOMY      UPPER GASTROINTESTINAL ENDOSCOPY  2019     Social History   Social History     Substance and Sexual Activity   Alcohol Use Never    Comment: pt \"sober for 30+ years\"     Social History     Substance and Sexual Activity   Drug Use No     Social History     Tobacco Use   Smoking Status Former    Current packs/day: 0.00    Types: Cigarettes    Quit date:     Years since quittin.0   Smokeless Tobacco Never   Tobacco Comments    Patient quit     Family History:   Family History   Problem Relation Age of Onset    Hypertension Mother     Hypertension Father        Current Outpatient Medications on File Prior to " Visit   Medication Sig Dispense Refill    acetaminophen (TYLENOL) 325 mg tablet Take 3 tablets (975 mg total) by mouth every 8 (eight) hours  0    amLODIPine (NORVASC) 2.5 mg tablet Take 1 tablet (2.5 mg total) by mouth daily Do not start before November 25, 2023. 30 tablet 0    apixaban (Eliquis) 5 mg Take 1 tablet (5 mg total) by mouth 2 (two) times a day 60 tablet 4    atorvastatin (LIPITOR) 20 mg tablet Take 1 tablet (20 mg total) by mouth daily at bedtime 90 tablet 3    Diclofenac Sodium (VOLTAREN) 1 % Apply 2 g topically 4 (four) times a day  0    lidocaine (LIDODERM) 5 % Apply 2 patches topically over 12 hours daily Remove & Discard patch within 12 hours or as directed by MD Do not start before November 15, 2023.  0    lisinopril-hydrochlorothiazide (PRINZIDE,ZESTORETIC) 20-25 MG per tablet Take 1 tablet by mouth daily 90 tablet 3    methocarbamol (ROBAXIN) 750 mg tablet Take 1 tablet (750 mg total) by mouth every 6 (six) hours as needed for muscle spasms for up to 10 days 40 tablet 0     No current facility-administered medications on file prior to visit.     Allergies   Allergen Reactions    Lactose - Food Allergy Diarrhea     diarrhea       Current Outpatient Medications on File Prior to Visit   Medication Sig Dispense Refill    acetaminophen (TYLENOL) 325 mg tablet Take 3 tablets (975 mg total) by mouth every 8 (eight) hours  0    amLODIPine (NORVASC) 2.5 mg tablet Take 1 tablet (2.5 mg total) by mouth daily Do not start before November 25, 2023. 30 tablet 0    apixaban (Eliquis) 5 mg Take 1 tablet (5 mg total) by mouth 2 (two) times a day 60 tablet 4    atorvastatin (LIPITOR) 20 mg tablet Take 1 tablet (20 mg total) by mouth daily at bedtime 90 tablet 3    Diclofenac Sodium (VOLTAREN) 1 % Apply 2 g topically 4 (four) times a day  0    lidocaine (LIDODERM) 5 % Apply 2 patches topically over 12 hours daily Remove & Discard patch within 12 hours or as directed by MD Do not start before November 15, 2023.  0  "   lisinopril-hydrochlorothiazide (PRINZIDE,ZESTORETIC) 20-25 MG per tablet Take 1 tablet by mouth daily 90 tablet 3    methocarbamol (ROBAXIN) 750 mg tablet Take 1 tablet (750 mg total) by mouth every 6 (six) hours as needed for muscle spasms for up to 10 days 40 tablet 0     No current facility-administered medications on file prior to visit.       Objective   Vitals: Blood pressure 123/70, pulse 67, height 5' 6\" (1.676 m), weight 89.5 kg (197 lb 6.4 oz).,Body mass index is 31.86 kg/m².    PE:  AAOx 3  WDWN  Hearing intact, no drainage from eyes  Regular rate  no audible wheezing  no abdominal distension  LE compartments soft, skin intact    rightknee:    Appearance:  No  swelling   No  ecchymosis  No  obvious joint deformity   No  effusion   Palpation/Tenderness:  Yes  TTP over medial joint line  Yes  TTP over lateral joint line   No  TTP over patella  No  TTP over patellar tendon  No  TTP over pes anserine bursa  Active Range of Motion:  AROM: 0- 110  Special Tests:  Valgus Stress Test: negative  Varus Stress Test: negative    Left knee:  AROM 5- 100 degrees        Large joint arthrocentesis: R knee  Universal Protocol:  Consent: Verbal consent obtained.  Risks and benefits: risks, benefits and alternatives were discussed  Consent given by: patient  Site marked: the operative site was marked  Supporting Documentation  Indications: pain   Procedure Details  Location: knee - R knee  Preparation: Patient was prepped and draped in the usual sterile fashion  Needle size: 22 G  Ultrasound guidance: no  Approach: anterolateral  Medications administered: 3 mL lidocaine 1 %; 2 mL methylPREDNISolone acetate 40 mg/mL    Patient tolerance: patient tolerated the procedure well with no immediate complications  Dressing:  Sterile dressing applied            Scribe Attestation      I,:  Jessica Lee PA-C am acting as a scribe while in the presence of the attending physician.:       I,:  Abby Garibay, DO personally " performed the services described in this documentation    as scribed in my presence.:

## 2024-01-29 ENCOUNTER — TELEPHONE (OUTPATIENT)
Dept: RADIOLOGY | Facility: CLINIC | Age: 74
End: 2024-01-29

## 2024-01-29 ENCOUNTER — CONSULT (OUTPATIENT)
Dept: PAIN MEDICINE | Facility: CLINIC | Age: 74
End: 2024-01-29
Payer: COMMERCIAL

## 2024-01-29 VITALS
OXYGEN SATURATION: 97 % | HEART RATE: 66 BPM | BODY MASS INDEX: 31.47 KG/M2 | DIASTOLIC BLOOD PRESSURE: 72 MMHG | HEIGHT: 66 IN | SYSTOLIC BLOOD PRESSURE: 115 MMHG | WEIGHT: 195.8 LBS

## 2024-01-29 DIAGNOSIS — M54.40 LOW BACK PAIN WITH SCIATICA, SCIATICA LATERALITY UNSPECIFIED, UNSPECIFIED BACK PAIN LATERALITY, UNSPECIFIED CHRONICITY: ICD-10-CM

## 2024-01-29 DIAGNOSIS — M47.816 LUMBAR SPONDYLOSIS: ICD-10-CM

## 2024-01-29 DIAGNOSIS — M48.062 SPINAL STENOSIS OF LUMBAR REGION WITH NEUROGENIC CLAUDICATION: ICD-10-CM

## 2024-01-29 DIAGNOSIS — M54.16 LUMBAR RADICULOPATHY: Primary | ICD-10-CM

## 2024-01-29 PROCEDURE — 1160F RVW MEDS BY RX/DR IN RCRD: CPT | Performed by: ANESTHESIOLOGY

## 2024-01-29 PROCEDURE — 99204 OFFICE O/P NEW MOD 45 MIN: CPT | Performed by: ANESTHESIOLOGY

## 2024-01-29 PROCEDURE — 1159F MED LIST DOCD IN RCRD: CPT | Performed by: ANESTHESIOLOGY

## 2024-01-29 NOTE — PROGRESS NOTES
Assessment  1. Lumbar radiculopathy    2. Spinal stenosis of lumbar region with neurogenic claudication    3. Lumbar spondylosis    4. Low back pain with sciatica, sciatica laterality unspecified, unspecified back pain laterality, unspecified chronicity        Plan  73-year-old male with a history of hypertension, DVT on anticoagulation, referred by Dr. Beyer, presenting for initial consultation regarding lumbosacral back pain that radiates into the posterolateral aspect of the right lower extremity with associated subjective weakness.  Pain began shortly after the patient's left total knee replacement in August 2023.  MRI of the lumbar spine demonstrates multilevel spondylosis.  Severe central stenosis at L3-4 and L4-5.  Impingement of the descending L4 roots at L3-4 and descending L5 roots at L4-5.  The patient did do physical therapy for his knee, however not for his low back and leg symptoms.  He does take Tylenol as needed on a sparing basis without much relief.  Robaxin, topical lidocaine, and topical Voltaren were ineffective.  Unable to take NSAIDs secondary to anticoagulation.  The patient symptoms are consistent with lumbar radiculopathy predominantly in the L5 and S1 distribution and neurogenic claudication stemming from multilevel stenosis.    1.  I will schedule the patient for right L4 and L5 TFESI.  Will send permission to hold Eliquis to prescribing physician in preparation for procedure  2.  I did offer the patient a trial of gabapentin which she declined today  3.  The patient declined physical therapy today  4.  I will follow-up with the patient in 4 to 6 weeks      Complete risks and benefits including bleeding, infection, tissue reaction, nerve injury and allergic reaction were discussed. The approach was demonstrated using models and literature was provided. Verbal and written consent was obtained.    My impressions and treatment recommendations were discussed in detail with the patient who  verbalized understanding and had no further questions.  Discharge instructions were provided. I personally saw and examined the patient and I agree with the above discussed plan of care.    No orders of the defined types were placed in this encounter.    No orders of the defined types were placed in this encounter.      History of Present Illness    Wade Le is a 73 y.o. male with a history of hypertension, DVT on anticoagulation, referred by Dr. Beyer, presenting for initial consultation regarding lumbosacral back pain that radiates into the posterolateral aspect of the right lower extremity with associated subjective weakness.  Pain began shortly after the patient's left total knee replacement in August 2023.  He denies any left lower extremity symptoms with the exception of some knee pain.  He denies any bowel incontinence or saddle anesthesia.  He does have occasional bladder incontinence which is longstanding and unchanged.  MRI of the lumbar spine demonstrates multilevel spondylosis.  Severe central stenosis at L3-4 and L4-5.  Impingement of the descending L4 roots at L3-4 and descending L5 roots at L4-5.  The patient did do physical therapy for his knee, however not for his low back and leg symptoms.  He does take Tylenol as needed on a sparing basis without much relief.  Robaxin, topical lidocaine, and topical Voltaren were ineffective.  Unable to take NSAIDs secondary to anticoagulation.  The patient rates his pain an 8 out of 10 and the pain is nearly constant.  The pain does not follow any particular pattern throughout the day.  The pain is described as burning, sharp, aching, pressure-like, and throbbing.  The pain is increased with standing, walking, and exercise.  The pain is alleviated with sitting and lying down.    Other than as stated above, the patient denies any interval changes in medications, medical condition, mental condition, symptoms, or allergies since the last office  visit.    I have personally reviewed and/or updated the patient's past medical history, past surgical history, family history, social history, current medications, allergies, and vital signs today.     Review of Systems   Musculoskeletal:  Positive for joint swelling.       Patient Active Problem List   Diagnosis    HTN (hypertension)    GERD (gastroesophageal reflux disease)    Prediabetes    BMI 31.0-31.9,adult    Mixed hyperlipidemia    Chronic pain of both knees    Status post total knee replacement, left    DVT (deep venous thrombosis) (HCC)    Anemia    Abnormal CT scan    Lumbar back pain with radiculopathy affecting right lower extremity    Visual impairment    Primary osteoarthritis involving multiple joints    Pulmonary nodules    Pancreatic cyst       Past Medical History:   Diagnosis Date    Anemia     Cataract     Colon polyp     Diverticulosis     Gastritis     GERD (gastroesophageal reflux disease)     Hyperlipidemia     Hypertension     Osteoarthritis of left knee 2023    Pre-diabetes     Throat ulcer     Visual impairment        Past Surgical History:   Procedure Laterality Date    COLONOSCOPY N/A 2016    Procedure: COLONOSCOPY;  Surgeon: Mariano Paz MD;  Location:  GI LAB;  Service:     COLONOSCOPY W/ POLYPECTOMY  2019    repeat     EYE SURGERY Bilateral     VT ARTHRP KNE CONDYLE&PLATU MEDIAL&LAT COMPARTMENTS Left 2023    Procedure: ARTHROPLASTY KNEE TOTAL;  Surgeon: Abby Garibay DO;  Location:  MAIN OR;  Service: Orthopedics    TONSILLECTOMY      UPPER GASTROINTESTINAL ENDOSCOPY  2019       Family History   Problem Relation Age of Onset    Hypertension Mother     Hypertension Father        Social History     Occupational History    Not on file   Tobacco Use    Smoking status: Former     Current packs/day: 0.00     Types: Cigarettes     Quit date:      Years since quittin.0    Smokeless tobacco: Never    Tobacco comments:     Patient quit   Vaping Use     "Vaping status: Never Used   Substance and Sexual Activity    Alcohol use: Never     Comment: pt \"sober for 30+ years\"    Drug use: No    Sexual activity: Not Currently     Partners: Female     Comment:        Current Outpatient Medications on File Prior to Visit   Medication Sig    acetaminophen (TYLENOL) 325 mg tablet Take 3 tablets (975 mg total) by mouth every 8 (eight) hours    amLODIPine (NORVASC) 2.5 mg tablet Take 1 tablet (2.5 mg total) by mouth daily Do not start before November 25, 2023.    apixaban (Eliquis) 5 mg Take 1 tablet (5 mg total) by mouth 2 (two) times a day    atorvastatin (LIPITOR) 20 mg tablet Take 1 tablet (20 mg total) by mouth daily at bedtime    Diclofenac Sodium (VOLTAREN) 1 % Apply 2 g topically 4 (four) times a day    lidocaine (LIDODERM) 5 % Apply 2 patches topically over 12 hours daily Remove & Discard patch within 12 hours or as directed by MD Do not start before November 15, 2023.    lisinopril-hydrochlorothiazide (PRINZIDE,ZESTORETIC) 20-25 MG per tablet Take 1 tablet by mouth daily    methocarbamol (ROBAXIN) 750 mg tablet Take 1 tablet (750 mg total) by mouth every 6 (six) hours as needed for muscle spasms for up to 10 days     No current facility-administered medications on file prior to visit.       Allergies   Allergen Reactions    Lactose - Food Allergy Diarrhea     diarrhea       Physical Exam    /72   Pulse 66   Wt 88.8 kg (195 lb 12.8 oz)   SpO2 97%   BMI 31.60 kg/m²     Constitutional: normal, well developed, well nourished, alert, in no distress and non-toxic and no overt pain behavior.  Eyes: anicteric  HEENT: grossly intact  Neck: supple, symmetric, trachea midline and no masses   Pulmonary:even and unlabored  Cardiovascular:No edema or pitting edema present  Skin:Normal without rashes or lesions and well hydrated  Psychiatric:Mood and affect appropriate  Neurologic:Cranial Nerves II-XII grossly intact  Musculoskeletal:antalgic gait.  Right lumbar " paraspinals tender to palpation from L4-S1.  Bilateral SI joints nontender to palpation.  Bilateral patellar and Achilles reflexes were 0 out of 4.  No clonus noted bilaterally.  Bilateral lower extremity strength 5 out of 5 in all muscle groups.  Sensation intact to light touch in L3-S1 dermatomes bilaterally.  Positive straight leg raise on the right and negative on the left.  Negative Maurilio's test bilaterally.    Imaging  MRI LUMBAR SPINE WITHOUT CONTRAST     INDICATION: M54.16: Radiculopathy, lumbar region.     COMPARISON: Lumbar spine radiographs from 11/12/2023     TECHNIQUE:  Multiplanar, multisequence imaging of the lumbar spine was performed. .        IMAGE QUALITY:  Diagnostic     FINDINGS:     VERTEBRAL BODIES:  There are 5 lumbar type vertebral bodies. Minimal grade 1 anterolisthesis of L4 on L5. Mild levocurvature of the lumbar spine..     No compression fracture.    Endplate degenerative bone marrow changes at L5-S1.     SACRUM:  Normal signal within the sacrum. No evidence of insufficiency or stress fracture.     DISTAL CORD AND CONUS:  Normal size and signal within the distal cord and conus.     PARASPINAL SOFT TISSUES:  Paraspinal soft tissues are unremarkable.     LOWER THORACIC DISC SPACES: Small disc bulge at T12-L1. No canal stenosis or foraminal narrowing.     LUMBAR DISC SPACES:     L1-L2: Small diffuse disc bulge and mild facet arthrosis. No spinal canal or foraminal narrowing.     L2-L3: Diffuse disc bulge, superimposed central disc protrusion, ligamentum flavum thickening and facet arthrosis. Mild to moderate spinal canal narrowing. Left greater than right subarticular recess narrowing with encroachment of the left descending L3   nerve roots. Mild right neuroforaminal narrowing.     L3-L4: Diffuse disc bulge, superimposed right subarticular disc protrusion, ligamentum flavum thickening and facet arthrosis. Moderate to severe spinal canal narrowing. Right greater than left subarticular  recess narrowing, encroaching/impinging the   descending L4 nerve roots. Mild to moderate right and mild left neuroforaminal narrowing.     L4-L5: Diffuse disc bulge, ligamentum flavum thickening and facet arthrosis. T2 hyperintense lesion within the right dorsal spinal column measuring 0.5 cm, favored to represent a synovial cyst. Resultant moderate to severe spinal canal narrowing.   Bilateral subarticular recess narrowing, impinging the descending L5 nerve roots. Moderate right and mild to moderate left neuroforaminal narrowing.     L5-S1: Small diffuse disc bulge, and facet arthrosis. Right lateral recess narrowing, encroaching the exiting right L5 nerve roots. Mild bilateral neuroforaminal narrowing.     OTHER FINDINGS: Stable right renal cyst measuring 2.2 cm.     IMPRESSION:     -Spondylosis of the lumbar spine most prominent at L3-L4 and L4-L5 where there is moderate to severe spinal canal narrowing as detailed above. Neurosurgical consult is recommended.     -Within the right dorsal spinal column at L4-L5 there is a 5 mm T2 hyperintense lesion likely representing a synovial cyst and contributing to spinal canal narrowing.     -Additional spondylosis of the cervical spine including mild to moderate spinal canal narrowing at L2-L3, multilevel encroachment/impingement of the descending nerve roots and varying degrees of neuroforaminal narrowing most prominent and moderate on the   right at L4-L5. Details above.

## 2024-01-29 NOTE — TELEPHONE ENCOUNTER
Faxed request for patient to hold Eliquis to Dr. Tulio Maki @ [phone]  Will update once we receive response.    Office phone number [phone]

## 2024-02-08 NOTE — PROGRESS NOTES
PT Evaluation     Today's date: 2024  Patient name: Wade Le  : 1950  MRN: [id number]  Referring provider: Jessica Lee PA-C  Dx:   Encounter Diagnosis     ICD-10-CM    1. Chronic pain of left knee  M25.562     G89.29       2. Primary osteoarthritis of right knee  M17.11 Ambulatory Referral to Physical Therapy      3. Status post total knee replacement, left  Z96.652 Ambulatory Referral to Physical Therapy      4. Chronic pain of right knee  M25.561     G89.29           Start Time: 1100  Stop Time: 1200  Total time in clinic (min): 60 minutes    Assessment  Assessment details: Wade Le is a 73 y.o. male referred to physical therapy for primary OA of R knee and s/p TKR of L knee. Primary impairments include increased pain with functional activities, decreased BLE strength, L knee AROM dysfunction, L knee motor control dysfunction, decreased gait quality, and impaired static/dynamic balance which is limiting his ability to perform ADLs and recreational activities without pain or functional restrictions. Pt showed decreased static and dynamic balance as well as decreased values with 5xSTS and TUG which may suggest pt is at risk for falls. Exercises will also be utilized to improve balance and decrease risk for calls. Pt was provided with a basic HEP which will be reviewed in the upcoming session. Pt was educated on anatomy and physiology of diagnosis and demonstrated verbal understanding. Pt would benefit from skilled PT interventions to increase functional lower extremity strength, increase pain free ROM, decrease risk for falls, and facilitate return to recreational activities and ADL management/independence with less limitations and pain. 1:1 with Juan Carlos Clemente DPT entirety of tx.  Impairments: abnormal gait, abnormal or restricted ROM, abnormal movement, activity intolerance, impaired balance, impaired physical strength, lacks appropriate home exercise program, pain  with function, poor posture  and poor body mechanics    Symptom irritability: moderateBarriers to therapy: None  Understanding of Dx/Px/POC: excellent   Prognosis: good    Goals  Short term goals:   STGs to be met in 3-4 weeks:  1.  Increase BLE strength by half grade or more to increase functional strength.  2.  Decrease subjective report of pain by 25% or more during STS and transfers to improve QoL.   3.  Improve static balance in SLS and tandem balance by 3 seconds or more to improve balance.   4.  Decreased 5xSTS and TUG by 2 seconds or greater to decrease risk for falls.   5.  Independent with basic HEP.     Long term goals:  LTG's to be met by DC:  1.  Ambulate community distances with little to no pain or difficulty.  2.  Perform all transfers without pain and difficulty.  3.  Perform recreational activities / ADLs with little pain or difficulty.  4.  Increase strength to 4/5 or better in BLEs and core/paraspinal musculature.   5.  Independent with advanced HEP.   6.  Increase FOTO to predicted value by DC.    Plan  Patient would benefit from: skilled physical therapy and PT eval  Referral necessary: No  Planned therapy interventions: abdominal trunk stabilization, balance/weight bearing training, body mechanics training, functional ROM exercises, home exercise program, gait training, joint mobilization, manual therapy, massage, neuromuscular re-education, patient education, postural training, strengthening, therapeutic activities, therapeutic exercise, graded activity, graded exercise, graded motor, stretching and balance  Frequency: 1x week  Duration in weeks: 10  Plan of Care beginning date: 2/9/2024  Plan of Care expiration date: 4/19/2024  Treatment plan discussed with: patient        Subjective  HPI: Pt referred to physical therapy for primary OA of R knee and s/p TKR of L knee. He mentioned that he has more pain and difficulty in the L knee that affects how he walks. He mentioned that he has most pain  with getting out of bed and chairs due to his L knee and back pain. He describes the pain as a soreness around the entire L knee. Pt notes that he fell about 2 months ago and had some increased pain at the L knee. Pt lives by himself in a studio apartment with elevator access. Pt notes that he can walk about 30 mins to an hour before he has to sit down to take a break.   Pain Location: L knee  Pain Intensity: Current: 0/10, Worst: 7/10, Best: 0/10  EPIFANIO: Insidious  DOI: Chronic  Aggravating Factors: STS, steps, getting out of bed  Alleviating Factors: Rest  Living Situation: Pt lives by himself in a studio apartment with elevator access.   Goals: To be able to walk further without pain  PLOF: Sedentary, goes to senior center by walking from his apt    Objective    Standing Posture & Lower Extremity Alignment:  Structure/Joint         Pelvis (Tilt)  Anterior X Neutral  Posterior   Iliac Crests  Right Elevated X Neutral  Left Elevated   Knee - Frontal   Genuvalgum X Neutral  Genuvarum   Knee - Sagittal  Genurecurvatum X Neutral     Rearfoot  Valgus X Neutral  Varus   Forefoot  Abducted X Neutral     Arch  Pes Planus X Neutral  Pes Cavus     Range of Motion: Goniometric revealed the following findings (in degrees).  Joint Motion Right: 2/9/2024 Left: 2/9/2024   Hip Extension 25% limited 25% limited   Hip External Rotation 50 50   Hip Internal Rotation 15 15   Knee Extension -5 0   Knee Flexion 125 110 (p!)   Ankle Dorsiflexion 5 3   Ankle Plantarflexion 40 40     Strength: MMT revealed the following findings.  Joint Motion Right: 2/9/2024 Left: 2/9/2024   Hip Flexion 4-/5 4-/5   Hip Abduction 3+/5 3+/5   Hip Adduction 4-/5 4-/5   Hip Extension 3+/5 3+/5   Knee Extension 3+/5 (p!) 3+/5 (p!)   Knee Flexion 3+/5 3+/5   Ankle Plantarflexion 4/5 4/5   Ankle Dorsiflexion 4/5 4/5     Light Touch Sensation:  RLE - 5/5  LLE - 5/5    Deep Tendon Reflexes:  Patellar Reflex - normal BL  Ankle Reflex - normal BL    Additional  Assessments:  Observation: Slight edema at medial and lateral jt lines of the L knee  Palpation: increased pain at the medial and lateral jt lines of L knee  Joint Mobility: Decreased terminal knee flexion due to pain  Gait Pattern: Antalgic gait, decreased Stance time on LLE, decreased step length, excessive step width  Balance:   2/9/2024  SLS: R= 3 secs / L = 2 secs  Tandem Stance: R= 3 secs / L = 2 secs    Functional Strength/Mobility Exam:   Functional Squat: Decreased eccentric control during descent, hip shift towards R side  Timed Up and Go:   2/9/2024: w/ SPC = 15.1s, w/o SPC = 16.8s  5xSTS:   2/9/2024 = 17.7s    Access Code: [email or web address]  URL: [email or web address]  Date: 02/09/2024  Prepared by: Juan Carlos Clemente    Exercises  - Supine Bridge  - 1 x daily - 7 x weekly - 3 sets - 10 reps - 2 segundos hold  - Supine Active Straight Leg Raise  - 1 x daily - 7 x weekly - 3 sets - 10 reps - 3 segundos hold  - Seated Knee Extension with Resistance  - 1 x daily - 7 x weekly - 3 sets - 10 reps - 3 segundos hold  - Squat with Chair and Counter Support  - 1 x daily - 7 x weekly - 3 sets - 8 reps  - Standing Single Leg Stance with Counter Support  - 1 x daily - 7 x weekly - 1 sets - 10 reps - 10 segundos hold       Precautions: PMH includes anemia, cataract, HLD, HTN, OA of L knee with L knee TH+KR on 8/14/23, R knee pain  POC expires Unit limit Auth Expiration date PT/OT + Visit Limit?   4/19/24 4 units TBD BOMN         Visit/Unit Tracking  AUTH Status:  Date 2/9/24        TBD Used 1         Remaining             Pertinent Findings:      POC End Date: 4/19/24                                                                                          Test / Measure  2/9/2024   FOTO (Predicted 70) 55   L Knee ROM 0-110   BLE Strength 3+ to 4-/5   5xSTS 17.7s   TUG w/ SPC = 15.1s, w/o SPC = 16.8s       Visit Number:  1          Manuals  2/8                 Knee Flex/Ext PROM                   Patella Mobilizaiton                                                            Neuro Re-Ed                   Bridging   10x                 Seated Heel Slides                   Sitting Knee Ext   10x BTB                 Mini Squats @ bar  5x                 SLS                   Tandem Balance                   Pretty Negotiation                   Standing Terminal Ext                      Ther Ex                   HEP Review + Pt Edu  10 min                 Recumbent / NS                   SLR Flex/ABD  10x                 Tball Press + March                   Heel Raises                   Leg Press                   Tandem Walking                                                           Ther Activity                   Sit-to-Stand                   Step Ups/Down                   Gait Training                                                           Modalities

## 2024-02-09 ENCOUNTER — EVALUATION (OUTPATIENT)
Dept: PHYSICAL THERAPY | Facility: REHABILITATION | Age: 74
End: 2024-02-09
Payer: COMMERCIAL

## 2024-02-09 DIAGNOSIS — M25.561 CHRONIC PAIN OF RIGHT KNEE: ICD-10-CM

## 2024-02-09 DIAGNOSIS — G89.29 CHRONIC PAIN OF RIGHT KNEE: ICD-10-CM

## 2024-02-09 DIAGNOSIS — M17.11 PRIMARY OSTEOARTHRITIS OF RIGHT KNEE: ICD-10-CM

## 2024-02-09 DIAGNOSIS — Z96.652 STATUS POST TOTAL KNEE REPLACEMENT, LEFT: ICD-10-CM

## 2024-02-09 DIAGNOSIS — M25.562 CHRONIC PAIN OF LEFT KNEE: Primary | ICD-10-CM

## 2024-02-09 DIAGNOSIS — G89.29 CHRONIC PAIN OF LEFT KNEE: Primary | ICD-10-CM

## 2024-02-09 PROCEDURE — 97112 NEUROMUSCULAR REEDUCATION: CPT

## 2024-02-09 PROCEDURE — 97161 PT EVAL LOW COMPLEX 20 MIN: CPT

## 2024-02-09 PROCEDURE — 97110 THERAPEUTIC EXERCISES: CPT

## 2024-02-10 DIAGNOSIS — I82.462 ACUTE DEEP VEIN THROMBOSIS (DVT) OF CALF MUSCLE VEIN OF LEFT LOWER EXTREMITY (HCC): ICD-10-CM

## 2024-02-12 RX ORDER — APIXABAN 5 MG/1
5 TABLET, FILM COATED ORAL 2 TIMES DAILY
Qty: 180 TABLET | Refills: 1 | Status: SHIPPED | OUTPATIENT
Start: 2024-02-12

## 2024-02-13 ENCOUNTER — APPOINTMENT (OUTPATIENT)
Dept: PHYSICAL THERAPY | Facility: REHABILITATION | Age: 74
End: 2024-02-13
Payer: COMMERCIAL

## 2024-02-14 ENCOUNTER — OFFICE VISIT (OUTPATIENT)
Dept: PHYSICAL THERAPY | Facility: REHABILITATION | Age: 74
End: 2024-02-14
Payer: COMMERCIAL

## 2024-02-14 DIAGNOSIS — G89.29 CHRONIC PAIN OF LEFT KNEE: ICD-10-CM

## 2024-02-14 DIAGNOSIS — G89.29 CHRONIC PAIN OF RIGHT KNEE: ICD-10-CM

## 2024-02-14 DIAGNOSIS — M25.561 CHRONIC PAIN OF RIGHT KNEE: ICD-10-CM

## 2024-02-14 DIAGNOSIS — M25.562 CHRONIC PAIN OF LEFT KNEE: ICD-10-CM

## 2024-02-14 DIAGNOSIS — M17.11 PRIMARY OSTEOARTHRITIS OF RIGHT KNEE: Primary | ICD-10-CM

## 2024-02-14 DIAGNOSIS — Z96.652 STATUS POST TOTAL KNEE REPLACEMENT, LEFT: ICD-10-CM

## 2024-02-14 PROCEDURE — 97112 NEUROMUSCULAR REEDUCATION: CPT

## 2024-02-14 PROCEDURE — 97110 THERAPEUTIC EXERCISES: CPT

## 2024-02-14 PROCEDURE — 97140 MANUAL THERAPY 1/> REGIONS: CPT

## 2024-02-14 NOTE — PROGRESS NOTES
"Daily Note     Today's date: 2024  Patient name: Wade Le  : 1950  MRN: [id number]  Referring provider: Jessica Lee PA-C  Dx:   Encounter Diagnosis     ICD-10-CM    1. Primary osteoarthritis of right knee  M17.11       2. Status post total knee replacement, left  Z96.652       3. Chronic pain of left knee  M25.562     G89.29       4. Chronic pain of right knee  M25.561     G89.29           Start Time: 1410  Stop Time: 1454  Total time in clinic (min): 44 minutes    Subjective: Pt c/o 5/10 pain today in L knee with ambulation.       Objective: See treatment diary below      Assessment: Tolerated treatment well. Good introduction to initial exercises. Pt does demonstrate poor dynamic balance during pball press+march, CG required with no LOB. Pt notes fatigue post-tx, only pain increase with STS's but relief with rest. Pt notes pain described as \"pressure.\" Patient would benefit from continued PT. 1:1 with Kalyan Aragon DPT for entirety of tx.       Plan: Continue per plan of care.      Precautions: PMH includes anemia, cataract, HLD, HTN, OA of L knee with L knee TH+KR on 23, R knee pain  POC expires Unit limit Auth Expiration date PT/OT + Visit Limit?   24 4 units 24 BOMN         Visit/Unit Tracking  AUTH Status:  Date 24 visits Used 1           Pertinent Findings:      POC End Date: 24                                                                                          Test / Measure  2024   FOTO (Predicted 70) 55   L Knee ROM 0-110   BLE Strength 3+ to 4-/5   5xSTS 17.7s   TUG w/ SPC = 15.1s, w/o SPC = 16.8s       Visit Number:  1 2         Manuals                 Knee Flex/Ext PROM    CM 8' L knee               Patella Mobilizaiton                                                           Neuro Re-Ed                   Bridging   10x  3x10 5#               Seated Heel Slides                   Sitting " Knee Ext   10x BTB  3x10 ea 5#               Mini Squats @ bar  5x                 SLS                   Tandem Balance                   Pretty Negotiation                   Standing Terminal Ext                      Ther Ex                   HEP Review + Pt Edu  10 min                 Recumbent / NS    10' RB               SLR Flex/ABD  10x                 Tball Press + March    2x10 ea               Heel Raises   3x10               Leg Press                  Tandem Walking                                                          Ther Activity                   Sit-to-Stand   2x10               Step Ups/Down                   Gait Training                                                           Modalities

## 2024-02-20 ENCOUNTER — HOSPITAL ENCOUNTER (OUTPATIENT)
Dept: RADIOLOGY | Facility: CLINIC | Age: 74
Discharge: HOME | End: 2024-02-20
Admitting: ANESTHESIOLOGY
Payer: COMMERCIAL

## 2024-02-20 VITALS
RESPIRATION RATE: 18 BRPM | TEMPERATURE: 97.7 F | SYSTOLIC BLOOD PRESSURE: 152 MMHG | HEART RATE: 57 BPM | OXYGEN SATURATION: 96 % | DIASTOLIC BLOOD PRESSURE: 73 MMHG

## 2024-02-20 DIAGNOSIS — M54.16 LUMBAR RADICULOPATHY: ICD-10-CM

## 2024-02-20 PROCEDURE — 64483 NJX AA&/STRD TFRM EPI L/S 1: CPT | Performed by: ANESTHESIOLOGY

## 2024-02-20 PROCEDURE — 64484 NJX AA&/STRD TFRM EPI L/S EA: CPT | Performed by: ANESTHESIOLOGY

## 2024-02-20 RX ORDER — PAPAVERINE HCL 150 MG
15 CAPSULE, EXTENDED RELEASE ORAL ONCE
Status: COMPLETED | OUTPATIENT
Start: 2024-02-20 | End: 2024-02-20

## 2024-02-20 RX ADMIN — IOHEXOL 2 ML: 300 INJECTION, SOLUTION INTRAVENOUS at 13:18

## 2024-02-20 RX ADMIN — LIDOCAINE HYDROCHLORIDE 2 ML: 20 INJECTION, SOLUTION EPIDURAL; INFILTRATION; INTRACAUDAL; PERINEURAL at 13:19

## 2024-02-20 RX ADMIN — DEXAMETHASONE SODIUM PHOSPHATE 15 MG: 10 INJECTION, SOLUTION INTRAMUSCULAR; INTRAVENOUS at 13:19

## 2024-02-20 NOTE — DISCHARGE INSTRUCTIONS
Epidural Steroid Injection   WHAT YOU NEED TO KNOW:   An epidural steroid injection (SIXTO) is a procedure to inject steroid medicine into the epidural space. The epidural space is between your spinal cord and vertebrae. Steroids reduce inflammation and fluid buildup in your spine that may be causing pain. You may be given pain medicine along with the steroids.          ACTIVITY  Do not drive or operate machinery today.  No strenuous activity today - bending, lifting, etc.  You may resume normal activites starting tomorrow - start slowly and as tolerated.  You may shower today, but no tub baths or hot tubs.  You may have numbness for several hours from the local anesthetic. Please use caution and common sense, especially with weight-bearing activities.    CARE OF THE INJECTION SITE  If you have soreness or pain, apply ice to the area today (20 minutes on/20 minutes off).  Starting tomorrow, you may use warm, moist heat or ice if needed.  You may have an increase or change in your discomfort for 36-48 hours after your treatment.  Apply ice and continue with any pain medication you have been prescribed.  Notify the [facility] if you have any of the following: redness, drainage, swelling, headache, stiff neck or fever above 100°F.    SPECIAL INSTRUCTIONS  Our office will contact you in approximately 7 days for a progress report.    MEDICATIONS  Continue to take all routine medications. Resume Eliquis 2/21/24  Our office may have instructed you to hold some medications.    As no general anesthesia was used in today's procedure, you should not experience any side effects related to anesthesia.     If you are diabetic, the steroids used in today's injection may temporarily increase your blood sugar levels after the first few days after your injection. Please keep a close eye on your sugars and alert the doctor who manages your diabetes if your sugars are significantly high from your baseline or you are  symptomatic.     If you have a problem specifically related to your procedure, please call our office at [phone].  Problems not related to your procedure should be directed to your primary care physician.

## 2024-02-20 NOTE — H&P
History of Present Illness: The patient is a 73 y.o. male who presents with complaints of low back and leg pain.    Past Medical History:   Diagnosis Date    Anemia     Cataract     Colon polyp     Diverticulosis     Gastritis     GERD (gastroesophageal reflux disease)     Hyperlipidemia     Hypertension     Osteoarthritis of left knee 06/07/2023    Pre-diabetes     Throat ulcer     Visual impairment        Past Surgical History:   Procedure Laterality Date    COLONOSCOPY N/A 08/18/2016    Procedure: COLONOSCOPY;  Surgeon: Mariano Paz MD;  Location:  GI LAB;  Service:     COLONOSCOPY W/ POLYPECTOMY  2019    repeat 2022    EYE SURGERY Bilateral     VA ARTHRP KNE CONDYLE&PLATU MEDIAL&LAT COMPARTMENTS Left 8/14/2023    Procedure: ARTHROPLASTY KNEE TOTAL;  Surgeon: Abby Garibay DO;  Location:  MAIN OR;  Service: Orthopedics    TONSILLECTOMY      UPPER GASTROINTESTINAL ENDOSCOPY  2019         Current Outpatient Medications:     acetaminophen (TYLENOL) 325 mg tablet, Take 3 tablets (975 mg total) by mouth every 8 (eight) hours, Disp: , Rfl: 0    amLODIPine (NORVASC) 2.5 mg tablet, Take 1 tablet (2.5 mg total) by mouth daily Do not start before November 25, 2023., Disp: 30 tablet, Rfl: 0    apixaban (Eliquis) 5 mg, TAKE 1 TABLET (5 MG TOTAL) BY MOUTH 2 (TWO) TIMES A DAY, Disp: 180 tablet, Rfl: 1    atorvastatin (LIPITOR) 20 mg tablet, Take 1 tablet (20 mg total) by mouth daily at bedtime, Disp: 90 tablet, Rfl: 3    Diclofenac Sodium (VOLTAREN) 1 %, Apply 2 g topically 4 (four) times a day, Disp: , Rfl: 0    lidocaine (LIDODERM) 5 %, Apply 2 patches topically over 12 hours daily Remove & Discard patch within 12 hours or as directed by MD Do not start before November 15, 2023., Disp: , Rfl: 0    lisinopril-hydrochlorothiazide (PRINZIDE,ZESTORETIC) 20-25 MG per tablet, Take 1 tablet by mouth daily, Disp: 90 tablet, Rfl: 3    methocarbamol (ROBAXIN) 750 mg tablet, Take 1 tablet (750 mg total) by mouth every 6  (six) hours as needed for muscle spasms for up to 10 days, Disp: 40 tablet, Rfl: 0    Allergies   Allergen Reactions    Lactose - Food Allergy Diarrhea     diarrhea       Physical Exam:   Vitals:    02/20/24 1302   BP: 138/74   Pulse: 58   Resp: 18   Temp: 97.7 °F (36.5 °C)   SpO2: 94%     General: Awake, Alert, Oriented x 3, Mood and affect appropriate  Respiratory: Respirations even and unlabored  Cardiovascular: Peripheral pulses intact; no edema  Musculoskeletal Exam: Right lumbar paraspinals TTP    ASA Score: 3         Assessment:   1. Lumbar radiculopathy        Plan: Right L4 and L5 TFESI

## 2024-02-22 ENCOUNTER — OFFICE VISIT (OUTPATIENT)
Dept: PHYSICAL THERAPY | Facility: REHABILITATION | Age: 74
End: 2024-02-22
Payer: COMMERCIAL

## 2024-02-22 DIAGNOSIS — G89.29 CHRONIC PAIN OF LEFT KNEE: ICD-10-CM

## 2024-02-22 DIAGNOSIS — M25.562 CHRONIC PAIN OF LEFT KNEE: ICD-10-CM

## 2024-02-22 DIAGNOSIS — M25.561 CHRONIC PAIN OF RIGHT KNEE: ICD-10-CM

## 2024-02-22 DIAGNOSIS — M17.11 PRIMARY OSTEOARTHRITIS OF RIGHT KNEE: Primary | ICD-10-CM

## 2024-02-22 DIAGNOSIS — G89.29 CHRONIC PAIN OF RIGHT KNEE: ICD-10-CM

## 2024-02-22 DIAGNOSIS — Z96.652 STATUS POST TOTAL KNEE REPLACEMENT, LEFT: ICD-10-CM

## 2024-02-22 PROCEDURE — 97112 NEUROMUSCULAR REEDUCATION: CPT

## 2024-02-22 PROCEDURE — 97530 THERAPEUTIC ACTIVITIES: CPT

## 2024-02-22 PROCEDURE — 97110 THERAPEUTIC EXERCISES: CPT

## 2024-02-22 NOTE — PROGRESS NOTES
Daily Note     Today's date: 2024  Patient name: Wade Le  : 1950  MRN: [id number]  Referring provider: Jessica Lee PA-C  Dx:   Encounter Diagnosis     ICD-10-CM    1. Primary osteoarthritis of right knee  M17.11       2. Status post total knee replacement, left  Z96.652       3. Chronic pain of left knee  M25.562     G89.29       4. Chronic pain of right knee  M25.561     G89.29           Start Time: 1400  Stop Time: 1445  Total time in clinic (min): 45 minutes    Subjective: Pt notes that his knees have been feeling slightly better but he continues to have pain and difficulty with transfers.       Objective: See treatment diary below      Assessment: Tolerated treatment well. Patient demonstrated fatigue post treatment and would benefit from continued PT. Pt was introduced to SLR and sitting knee ext with increased resistance and responded well without excessive increase in pain or soreness. He was challenged with increased volume with STS due to decreased endurance. He was most challenged by step taps due to decreased dynamic balance as he displayed slight LoB and benefits from supervision during dynamic balance exercises. Continue to progress as tolerated, 1:1 with Juan Carlos Clemente DPT entirety of tx.      Plan: Continue per plan of care.      Precautions: PMH includes anemia, cataract, HLD, HTN, OA of L knee with L knee TH+KR on 23, R knee pain  POC expires Unit limit Auth Expiration date PT/OT + Visit Limit?   24 4 units 24 BOMN         Visit/Unit Tracking  AUTH Status:  Date 24 visits Used 1 2 3       Remaining           Pertinent Findings:      POC End Date: 24                                                                                          Test / Measure  2024   FOTO (Predicted 70) 55   L Knee ROM 0-110   BLE Strength 3+ to 4-/5   5xSTS 17.7s   TUG w/ SPC = 15.1s, w/o SPC = 16.8s       Visit Number:  1 2 3       "  Manuals  2/8 2/14 2/22             Knee Flex/Ext PROM    CM 8' L knee  MC BL             [employer]                                                           Neuro Re-Ed                   Bridging   10x  3x10 5#  3x10 5#             Seated Heel Slides                   Sitting Knee Ext   10x BTB  3x10 ea 5#  3x10 ea 7#, BTB             Mini Squats @ bar  5x    2x10, 5x no assist             SLS                   Tandem Balance                   Pretty Negotiation                   Standing Terminal Ext                      Ther Ex                   HEP Review + Pt Edu  10 min                 Recumbent / NS    10' RB  NS, lvl 6 7'             SLR Flex/ABD  10x    2x10, 2# AW             Tball Press + March    2x10 ea  2x10 ea             Heel Raises   3x10  3x10             Leg Press                  Tandem Walking                                                          Ther Activity                   Sit-to-Stand   2x10  2x10, 1x5 no UE             Step Ups/Down      2x5 ea step taps, 6\" step             Gait Training                                                           Modalities                                                                      "

## 2024-02-27 ENCOUNTER — TELEPHONE (OUTPATIENT)
Dept: PAIN MEDICINE | Facility: CLINIC | Age: 74
End: 2024-02-27

## 2024-02-29 ENCOUNTER — OFFICE VISIT (OUTPATIENT)
Dept: PHYSICAL THERAPY | Facility: REHABILITATION | Age: 74
End: 2024-02-29
Payer: COMMERCIAL

## 2024-02-29 DIAGNOSIS — M25.562 CHRONIC PAIN OF LEFT KNEE: ICD-10-CM

## 2024-02-29 DIAGNOSIS — Z96.652 STATUS POST TOTAL KNEE REPLACEMENT, LEFT: ICD-10-CM

## 2024-02-29 DIAGNOSIS — G89.29 CHRONIC PAIN OF LEFT KNEE: ICD-10-CM

## 2024-02-29 DIAGNOSIS — M17.11 PRIMARY OSTEOARTHRITIS OF RIGHT KNEE: Primary | ICD-10-CM

## 2024-02-29 DIAGNOSIS — M25.561 CHRONIC PAIN OF RIGHT KNEE: ICD-10-CM

## 2024-02-29 DIAGNOSIS — G89.29 CHRONIC PAIN OF RIGHT KNEE: ICD-10-CM

## 2024-02-29 PROCEDURE — 97112 NEUROMUSCULAR REEDUCATION: CPT | Performed by: PHYSICAL THERAPIST

## 2024-02-29 PROCEDURE — 97110 THERAPEUTIC EXERCISES: CPT | Performed by: PHYSICAL THERAPIST

## 2024-02-29 PROCEDURE — 97530 THERAPEUTIC ACTIVITIES: CPT | Performed by: PHYSICAL THERAPIST

## 2024-02-29 NOTE — PROGRESS NOTES
Daily Note     Today's date: 2024  Patient name: Wade Le  : 1950  MRN: [id number]  Referring provider: Jessica Zaldivar PA-C  Dx:   Encounter Diagnosis     ICD-10-CM    1. Primary osteoarthritis of right knee  M17.11       2. Status post total knee replacement, left  Z96.652       3. Chronic pain of left knee  M25.562     G89.29       4. Chronic pain of right knee  M25.561     G89.29                      Subjective: patient offers no new complaints.      Objective: See treatment diary below      Assessment: Tolerated treatment well. Continued with program as outlined below. He required cueing throughout the session for proper technique, able to maintain corrections after cueing. He was able to complete 10 sit to stands without use of UE for assistance. Patient demonstrated fatigue post treatment and would benefit from continued PT      Plan: Continue per plan of care.  Progress treatment as tolerated.       Precautions: PMH includes anemia, cataract, HLD, HTN, OA of L knee with L knee TH+KR on 23, R knee pain  POC expires Unit limit Auth Expiration date PT/OT + Visit Limit?   24 4 units 24 BOMN         Visit/Unit Tracking  AUTH Status:  Date 24 visits Used 1 2 3 4      Remaining   20        Pertinent Findings:      POC End Date: 24                                                                                          Test / Measure  2024   FOTO (Predicted 70) 55   L Knee ROM 0-110   BLE Strength 3+ to 4-/5   5xSTS 17.7s   TUG w/ SPC = 15.1s, w/o SPC = 16.8s       Visit Number:  1 2 3 4       Manuals             Knee Flex/Ext PROM    CM 8' L knee  MC BL  SK           [employer]                                                           Neuro Re-Ed                   Bridging   10x  3x10 5#  3x10 5#  3x10 5#           Seated Heel Slides                   Sitting Knee Ext   10x BTB  3x10 ea 5#  3x10 ea  "7#, BTB  3x10 ea 7#           Mini Squats @ bar  5x    2x10, 5x no assist  2x10           SLS                   Tandem Balance                   Pretty Negotiation                   Standing Terminal Ext                      Ther Ex                   HEP Review + Pt Edu  10 min                 Recumbent / NS    10' RB  NS, lvl 6 7'  NS 7'   lvl 6            SLR Flex/ABD  10x    2x10, 2# AW   2x10, 2# AW            Tball Press + March    2x10 ea  2x10 ea 2x10 ea            Heel Raises   3x10  3x10  3x10           Leg Press                  Tandem Walking                                                          Ther Activity                   Sit-to-Stand   2x10  2x10, 1x5 no UE  x10 no UE           Step Ups/Down      2x5 ea step taps, 6\" step  x10 ea step ups  no UE L UE R           Gait Training                                                           Modalities                                                                        "

## 2024-03-06 ENCOUNTER — OFFICE VISIT (OUTPATIENT)
Dept: PHYSICAL THERAPY | Facility: REHABILITATION | Age: 74
End: 2024-03-06
Payer: COMMERCIAL

## 2024-03-06 DIAGNOSIS — G89.29 CHRONIC PAIN OF RIGHT KNEE: ICD-10-CM

## 2024-03-06 DIAGNOSIS — M25.562 CHRONIC PAIN OF LEFT KNEE: ICD-10-CM

## 2024-03-06 DIAGNOSIS — M25.561 CHRONIC PAIN OF RIGHT KNEE: ICD-10-CM

## 2024-03-06 DIAGNOSIS — G89.29 CHRONIC PAIN OF LEFT KNEE: ICD-10-CM

## 2024-03-06 DIAGNOSIS — M17.11 PRIMARY OSTEOARTHRITIS OF RIGHT KNEE: Primary | ICD-10-CM

## 2024-03-06 DIAGNOSIS — Z96.652 STATUS POST TOTAL KNEE REPLACEMENT, LEFT: ICD-10-CM

## 2024-03-06 PROCEDURE — 97112 NEUROMUSCULAR REEDUCATION: CPT

## 2024-03-06 PROCEDURE — 97110 THERAPEUTIC EXERCISES: CPT

## 2024-03-06 NOTE — PROGRESS NOTES
Daily Note     Today's date: 3/6/2024  Patient name: Wade Le  : 1950  MRN: [id number]  Referring provider: Jessica Zaldivar PA-C  Dx:   Encounter Diagnosis     ICD-10-CM    1. Primary osteoarthritis of right knee  M17.11       2. Status post total knee replacement, left  Z96.652       3. Chronic pain of left knee  M25.562     G89.29       4. Chronic pain of right knee  M25.561     G89.29           Start Time: 1533  Stop Time: 1626  Total time in clinic (min): 53 minutes    Subjective: Pt notes that his knees have been feeling better but he continues to have pain with increased walking.       Objective: See treatment diary below      Assessment: Tolerated treatment well. Patient would benefit from continued PT, Pt was progressed with increased resistance with STS to improve functional LE strength and responded well without excessive increase in pain or soreness. He was most challenged with introduction to shellie negotiation this visit with and without UE assist which shows decreased dynamic balance. Pt responded well to supervision during balance exercises to decrease risk for falls. He responded well to Vcs for foot positioning and shellie clearance during dynamic balance exercises and showed improved technique with cueing. 1:1 with Juan Carlos Clemente DPT entirety of tx.      Plan: Continue per plan of care.      Precautions: PMH includes anemia, cataract, HLD, HTN, OA of L knee with L knee TH+KR on 23, R knee pain  POC expires Unit limit Auth Expiration date PT/OT + Visit Limit?   24 4 units 24 BOMN         Visit/Unit Tracking  AUTH Status:  Date 2/9/24 2/14 2/22 2/29 3/6    24 visits Used 1 2 3 4 5     Remaining  23 22 21 20 19       Pertinent Findings:      POC End Date: 24                                                                                          Test / Measure  2024   FOTO (Predicted 70) 55   L Knee ROM 0-110   BLE Strength 3+ to 4-/5   5xSTS 17.7s   TUG  "w/ SPC = 15.1s, w/o SPC = 16.8s       Visit Number:  1 2 3 4 5      Manuals  2/8  2/14  2/22  2/29  3/6         Knee Flex/Ext PROM    CM 8' L knee  MC BL  SK MC         [employer]                                                           Neuro Re-Ed                   Bridging   10x  3x10 5#  3x10 5#  3x10 5#  3x10 10#         Seated Heel Slides                   Sitting Knee Ext   10x BTB  3x10 ea 5#  3x10 ea 7#, BTB  3x10 ea 7#  3x10 ea 7# + RTB         Mini Squats @ bar  5x    2x10, 5x no assist  2x10          SLS                   Tandem Balance                   Pretty Negotiation w/ supervision          fwd/lat 3 laps ea          Standing Terminal Ext                      Ther Ex                   HEP Review + Pt Edu  10 min                 Recumbent / NS    10' RB  NS, lvl 6 7'  NS 7'   lvl 6   NS 7'   lvl 6          SLR Flex/ABD  10x    2x10, 2# AW   2x10, 2# AW   2x10, 2# AW          Tball Press + March    2x10 ea  2x10 ea 2x10 ea   NV         Heel Raises   3x10  3x10  3x10  3x10         Leg Press                  Tandem Walking                                                          Ther Activity                   Sit-to-Stand   2x10  2x10, 1x5 no UE  x10 no UE  3x7, 8# DB         Step Ups/Down      2x5 ea step taps, 6\" step  x10 ea step ups  no UE L UE R           Gait Training                                                           Modalities                                                                          "

## 2024-03-07 ENCOUNTER — APPOINTMENT (OUTPATIENT)
Dept: PHYSICAL THERAPY | Facility: REHABILITATION | Age: 74
End: 2024-03-07
Payer: COMMERCIAL

## 2024-03-12 ENCOUNTER — HOSPITAL ENCOUNTER (OUTPATIENT)
Dept: NON INVASIVE DIAGNOSTICS | Facility: CLINIC | Age: 74
Discharge: HOME | End: 2024-03-12
Payer: COMMERCIAL

## 2024-03-12 VITALS
HEART RATE: 62 BPM | WEIGHT: 195 LBS | SYSTOLIC BLOOD PRESSURE: 152 MMHG | DIASTOLIC BLOOD PRESSURE: 73 MMHG | HEIGHT: 66 IN | BODY MASS INDEX: 31.34 KG/M2

## 2024-03-12 DIAGNOSIS — I10 HYPERTENSION, UNSPECIFIED TYPE: ICD-10-CM

## 2024-03-12 LAB
AORTIC ROOT: 3.3 CM
AORTIC VALVE MEAN VELOCITY: 10.9 M/S
APICAL FOUR CHAMBER EJECTION FRACTION: 59 %
ASCENDING AORTA: 3.3 CM
AV AREA BY CONTINUOUS VTI: 3.4 CM2
AV AREA PEAK VELOCITY: 3.1 CM2
AV LVOT MEAN GRADIENT: 3 MMHG
AV LVOT PEAK GRADIENT: 6 MMHG
AV MEAN GRADIENT: 5 MMHG
AV PEAK GRADIENT: 11 MMHG
AV VALVE AREA: 3.37 CM2
AV VELOCITY RATIO: 0.75
BSA FOR ECHO PROCEDURE: 1.98 M2
DOP CALC AO PEAK VEL: 1.66 M/S
DOP CALC AO VTI: 32.01 CM
DOP CALC LVOT AREA: 4.15 CM2
DOP CALC LVOT CARDIAC INDEX: 3.23 L/MIN/M2
DOP CALC LVOT CARDIAC OUTPUT: 6.39 L/MIN
DOP CALC LVOT DIAMETER: 2.3 CM
DOP CALC LVOT PEAK VEL VTI: 26 CM
DOP CALC LVOT PEAK VEL: 1.25 M/S
DOP CALC LVOT STROKE INDEX: 52 ML/M2
DOP CALC LVOT STROKE VOLUME: 107.97
E WAVE DECELERATION TIME: 360 MS
E/A RATIO: 0.76
FRACTIONAL SHORTENING: 29 (ref 28–44)
INTERVENTRICULAR SEPTUM IN DIASTOLE (PARASTERNAL SHORT AXIS VIEW): 1 CM
INTERVENTRICULAR SEPTUM: 1 CM (ref 0.6–1.1)
LAAS-AP2: 17.3 CM2
LAAS-AP4: 11.8 CM2
LEFT ATRIUM SIZE: 3.6 CM
LEFT ATRIUM VOLUME (MOD BIPLANE): 33 ML
LEFT ATRIUM VOLUME INDEX (MOD BIPLANE): 16.7 ML/M2
LEFT INTERNAL DIMENSION IN SYSTOLE: 3.5 CM (ref 2.1–4)
LEFT VENTRICULAR INTERNAL DIMENSION IN DIASTOLE: 4.9 CM (ref 3.5–6)
LEFT VENTRICULAR POSTERIOR WALL IN END DIASTOLE: 1 CM
LEFT VENTRICULAR STROKE VOLUME: 65 ML
LVSV (TEICH): 65 ML
MV E'TISSUE VEL-LAT: 15 CM/S
MV E'TISSUE VEL-SEP: 7 CM/S
MV PEAK A VEL: 1.09 M/S
MV PEAK E VEL: 83 CM/S
MV STENOSIS PRESSURE HALF TIME: 104 MS
MV VALVE AREA P 1/2 METHOD: 2.12
RIGHT ATRIUM AREA SYSTOLE A4C: 10.6 CM2
RIGHT VENTRICLE ID DIMENSION: 3.1 CM
SL CV LEFT ATRIUM LENGTH A2C: 5.1 CM
SL CV LV EF: 60
SL CV PED ECHO LEFT VENTRICLE DIASTOLIC VOLUME (MOD BIPLANE) 2D: 114 ML
SL CV PED ECHO LEFT VENTRICLE SYSTOLIC VOLUME (MOD BIPLANE) 2D: 49 ML
TRICUSPID ANNULAR PLANE SYSTOLIC EXCURSION: 2 CM

## 2024-03-12 PROCEDURE — 93306 TTE W/DOPPLER COMPLETE: CPT

## 2024-03-12 PROCEDURE — 93306 TTE W/DOPPLER COMPLETE: CPT | Performed by: INTERNAL MEDICINE

## 2024-03-13 ENCOUNTER — APPOINTMENT (OUTPATIENT)
Dept: PHYSICAL THERAPY | Facility: REHABILITATION | Age: 74
End: 2024-03-13
Payer: COMMERCIAL

## 2024-03-14 ENCOUNTER — OFFICE VISIT (OUTPATIENT)
Dept: PHYSICAL THERAPY | Facility: REHABILITATION | Age: 74
End: 2024-03-14
Payer: COMMERCIAL

## 2024-03-14 DIAGNOSIS — Z96.652 STATUS POST TOTAL KNEE REPLACEMENT, LEFT: ICD-10-CM

## 2024-03-14 DIAGNOSIS — M25.561 CHRONIC PAIN OF RIGHT KNEE: ICD-10-CM

## 2024-03-14 DIAGNOSIS — M17.11 PRIMARY OSTEOARTHRITIS OF RIGHT KNEE: Primary | ICD-10-CM

## 2024-03-14 DIAGNOSIS — M25.562 CHRONIC PAIN OF LEFT KNEE: ICD-10-CM

## 2024-03-14 DIAGNOSIS — G89.29 CHRONIC PAIN OF RIGHT KNEE: ICD-10-CM

## 2024-03-14 DIAGNOSIS — G89.29 CHRONIC PAIN OF LEFT KNEE: ICD-10-CM

## 2024-03-14 PROCEDURE — 97112 NEUROMUSCULAR REEDUCATION: CPT

## 2024-03-14 PROCEDURE — 97110 THERAPEUTIC EXERCISES: CPT

## 2024-03-14 PROCEDURE — 97530 THERAPEUTIC ACTIVITIES: CPT

## 2024-03-14 NOTE — PROGRESS NOTES
Daily Note     Today's date: 3/14/2024  Patient name: Wade Le  : 1950  MRN: [id number]  Referring provider: Jessica Zaldivar PA-C  Dx:   Encounter Diagnosis     ICD-10-CM    1. Primary osteoarthritis of right knee  M17.11       2. Status post total knee replacement, left  Z96.652       3. Chronic pain of left knee  M25.562     G89.29       4. Chronic pain of right knee  M25.561     G89.29           Start Time: 1035  Stop Time: 1115  Total time in clinic (min): 40 minutes    Subjective: Pt note that his legs have been feeling a little bit better and feels this was due to the change in weather.       Objective: See treatment diary below      Assessment: Tolerated treatment well. Patient would benefit from continued PT. Pt was introduced to leg press to improve functional LE strength and responded well without excessive increase in pain or soreness. He continues to be most challenged by dynamic balance exercises as displayed by difficulty with marching and shellie negotiation. He continues to respond well to supervision during hurdles due to LoB and decreased knee stability. Continue to progress as tolerated, 1:1 with Juan Carlos Clemente DPT entirety of tx.      Plan: Continue per plan of care.      Precautions: PMH includes anemia, cataract, HLD, HTN, OA of L knee with L knee TH+KR on 23, R knee pain  POC expires Unit limit Auth Expiration date PT/OT + Visit Limit?   24 4 units 24 BOMN         Visit/Unit Tracking  AUTH Status:  Date 2/9/24 2/14 2/22 2/29 3/6 3/14   24 visits Used 1 2 3 4 5 6    Remaining  23 22 21 20 19 18      Pertinent Findings:      POC End Date: 24                                                                                          Test / Measure  2024   FOTO (Predicted 70) 55   L Knee ROM 0-110   BLE Strength 3+ to 4-/5   5xSTS 17.7s   TUG w/ SPC = 15.1s, w/o SPC = 16.8s       Visit Number:  1 2 3 4 5 6     Manuals  2/8  2/14  2/22  2/29  3/6  3/14     "   Knee Flex/Ext PROM    CM 8' L knee  MC BL  SK MC  MC       [employer]                                                           Neuro Re-Ed                   Bridging   10x  3x10 5#  3x10 5#  3x10 5#  3x10 10#  3x10 10#       Seated Heel Slides                   Sitting Knee Ext   10x BTB  3x10 ea 5#  3x10 ea 7#, BTB  3x10 ea 7#  3x10 ea 7# + RTB  3x10 ea 7# + RTB       Mini Squats @ bar  5x    2x10, 5x no assist  2x10          SLS                   Tandem Balance                   Pretty Negotiation w/ supervision          fwd/lat 3 laps ea   fwd/lat 3 laps ea        Standing Terminal Ext                      Ther Ex                   HEP Review + Pt Edu  10 min                 Recumbent / NS    10' RB  NS, lvl 6 7'  NS 7'   lvl 6   NS 7'   lvl 6   NS 7'   lvl 6        SLR Flex/ABD  10x    2x10, 2# AW   2x10, 2# AW   2x10, 2# AW   3x8, 3# AW        Tball Press + March    2x10 ea  2x10 ea 2x10 ea   NV         Heel Raises   3x10  3x10  3x10  3x10  3x10       Leg Press           3x8, 70#       Tandem Walking                                                          Ther Activity                   Sit-to-Stand   2x10  2x10, 1x5 no UE  x10 no UE  3x7, 8# DB  3x8, 10#       Step Ups/Down      2x5 ea step taps, 6\" step  x10 ea step ups  no UE L UE R    6\" step, 8x ea with hip flex march       Gait Training                                                           Modalities                                                                            "

## 2024-03-19 ENCOUNTER — OFFICE VISIT (OUTPATIENT)
Dept: PHYSICAL THERAPY | Facility: REHABILITATION | Age: 74
End: 2024-03-19
Payer: COMMERCIAL

## 2024-03-19 DIAGNOSIS — M25.562 CHRONIC PAIN OF LEFT KNEE: ICD-10-CM

## 2024-03-19 DIAGNOSIS — M17.11 PRIMARY OSTEOARTHRITIS OF RIGHT KNEE: Primary | ICD-10-CM

## 2024-03-19 DIAGNOSIS — M25.561 CHRONIC PAIN OF RIGHT KNEE: ICD-10-CM

## 2024-03-19 DIAGNOSIS — G89.29 CHRONIC PAIN OF LEFT KNEE: ICD-10-CM

## 2024-03-19 DIAGNOSIS — Z96.652 STATUS POST TOTAL KNEE REPLACEMENT, LEFT: ICD-10-CM

## 2024-03-19 DIAGNOSIS — G89.29 CHRONIC PAIN OF RIGHT KNEE: ICD-10-CM

## 2024-03-19 PROCEDURE — 97112 NEUROMUSCULAR REEDUCATION: CPT

## 2024-03-19 PROCEDURE — 97110 THERAPEUTIC EXERCISES: CPT

## 2024-03-19 PROCEDURE — 97530 THERAPEUTIC ACTIVITIES: CPT

## 2024-03-19 NOTE — PROGRESS NOTES
Daily Note     Today's date: 3/19/2024  Patient name: Wade Le  : 1950  MRN: [id number]  Referring provider: Jessica Zaldivar PA-C  Dx:   Encounter Diagnosis     ICD-10-CM    1. Primary osteoarthritis of right knee  M17.11       2. Status post total knee replacement, left  Z96.652       3. Chronic pain of left knee  M25.562     G89.29       4. Chronic pain of right knee  M25.561     G89.29           Start Time: 1215  Stop Time: 1300  Total time in clinic (min): 45 minutes    Subjective: Pt mentioned that his knees have been feeling okay but is slightly sore today from walking more these past few days.       Objective: See treatment diary below      Assessment: Tolerated treatment well. Patient would benefit from continued PT. Pt was challenged with increased resistance with STS but was able to perform with good technique and increased time with rest breaks. He continues to be most challenged with dynamic balance exercises especially with shellie negotiation. He noted slight increase in pain at the BL knees with fwd step up with foam as dynamic knee stability continues to be decreased. Continue to progress as tolerated, 1:1 with Juan Carlos Clemente DPT entirety of tx.      Plan: Continue per plan of care.      Precautions: PMH includes anemia, cataract, HLD, HTN, OA of L knee with L knee TH+KR on 23, R knee pain  POC expires Unit limit Auth Expiration date PT/OT + Visit Limit?   24 4 units 24 BOMN         Visit/Unit Tracking  AUTH Status:  Date 2/9/24 2/14 2/22 2/29 3/6 3/14 3/19   24 visits Used 1 2 3 4 5 6 7    Remaining  23 22 21 20 19 18 17      Pertinent Findings:      POC End Date: 24                                                                                          Test / Measure  2024   FOTO (Predicted 70) 55   L Knee ROM 0-110   BLE Strength 3+ to 4-/5   5xSTS 17.7s   TUG w/ SPC = 15.1s, w/o SPC = 16.8s       Visit Number:  1 2 3 4 5 6 7    Manuals     "2/22  2/29  3/6  3/14  3/19     Knee Flex/Ext PROM    CM 8' L knee  MC BL  SK MC  MC  MC     [employer]                                                           Neuro Re-Ed                   Bridging   10x  3x10 5#  3x10 5#  3x10 5#  3x10 10#  3x10 10#  Uni, 2x8 ea     Seated Heel Slides                   Sitting Knee Ext   10x BTB  3x10 ea 5#  3x10 ea 7#, BTB  3x10 ea 7#  3x10 ea 7# + RTB  3x10 ea 7# + RTB       Mini Squats @ bar  5x    2x10, 5x no assist  2x10          SLS                   Tandem Balance              Marching, 3 laps     Pretty Negotiation w/ supervision          fwd/lat 3 laps ea   fwd/lat 3 laps ea   fwd/lat 3 laps ea      Standing Terminal Ext                      Ther Ex                   HEP Review + Pt Edu  10 min                 Recumbent / NS    10' RB  NS, lvl 6 7'  NS 7'   lvl 6   NS 7'   lvl 6   NS 7'   lvl 6   NS 7'   lvl 6      SLR Flex/ABD  10x    2x10, 2# AW   2x10, 2# AW   2x10, 2# AW   2x10, 3# AW  2x10, 4# AW     Tball Press + March    2x10 ea  2x10 ea 2x10 ea   NV         Heel Raises   3x10  3x10  3x10  3x10  3x10  3x10     Leg Press           3x8, 70#  3x8, 70#     Tandem Walking                                                          Ther Activity                   Sit-to-Stand   2x10  2x10, 1x5 no UE  x10 no UE  3x7, 8# DB  3x7, 7#  3x7, 10#     Step Ups/Down      2x5 ea step taps, 6\" step  x10 ea step ups  no UE L UE R    6\" step, 8x ea with hip flex march  6\" step, 8x ea with hip flex march     Gait Training                                                           Modalities                                                                              "

## 2024-03-20 ENCOUNTER — APPOINTMENT (OUTPATIENT)
Dept: PHYSICAL THERAPY | Facility: REHABILITATION | Age: 74
End: 2024-03-20
Payer: COMMERCIAL

## 2024-03-27 ENCOUNTER — OFFICE VISIT (OUTPATIENT)
Dept: PHYSICAL THERAPY | Facility: REHABILITATION | Age: 74
End: 2024-03-27
Payer: COMMERCIAL

## 2024-03-27 DIAGNOSIS — Z96.652 STATUS POST TOTAL KNEE REPLACEMENT, LEFT: ICD-10-CM

## 2024-03-27 DIAGNOSIS — M25.561 CHRONIC PAIN OF RIGHT KNEE: ICD-10-CM

## 2024-03-27 DIAGNOSIS — M25.562 CHRONIC PAIN OF LEFT KNEE: ICD-10-CM

## 2024-03-27 DIAGNOSIS — M17.11 PRIMARY OSTEOARTHRITIS OF RIGHT KNEE: Primary | ICD-10-CM

## 2024-03-27 DIAGNOSIS — G89.29 CHRONIC PAIN OF LEFT KNEE: ICD-10-CM

## 2024-03-27 DIAGNOSIS — G89.29 CHRONIC PAIN OF RIGHT KNEE: ICD-10-CM

## 2024-03-27 PROCEDURE — 97110 THERAPEUTIC EXERCISES: CPT

## 2024-03-27 PROCEDURE — 97112 NEUROMUSCULAR REEDUCATION: CPT

## 2024-03-27 PROCEDURE — 97530 THERAPEUTIC ACTIVITIES: CPT

## 2024-03-27 NOTE — PROGRESS NOTES
Daily Note     Today's date: 3/27/2024  Patient name: Wade Le  : 1950  MRN: [id number]  Referring provider: Jessica Zaldivar PA-C  Dx:   Encounter Diagnosis     ICD-10-CM    1. Primary osteoarthritis of right knee  M17.11       2. Chronic pain of left knee  M25.562     G89.29       3. Status post total knee replacement, left  Z96.652       4. Chronic pain of right knee  M25.561     G89.29           Start Time: 1125  Stop Time: 1220  Total time in clinic (min): 55 minutes    Subjective: Pt notes that his knee has been feeling okay but he continues to have pain at the L knee with walking for longer periods of time. Pt would like to be discharged next visit as he wants to continue with a HEP.       Objective: See treatment diary below      Assessment: Tolerated treatment well. Patient would benefit from continued PT. Pt was able to progress with increased resistance with leg press which shows improving functional LE strength. He continues to be most challenged with dynamic balance exercises with shellie negotiation and SLS as displayed by LoB and need for supervision. He continues to respond well to Vcs for foot placement and knee/hip recruitment during SLS movements. Continue to progress as tolerated, 1:1 with Juan Carlos Clemente DPT entirety of tx.      Plan: Potential discharge next visit.     Precautions: PMH includes anemia, cataract, HLD, HTN, OA of L knee with L knee TH+KR on 23, R knee pain  POC expires Unit limit Auth Expiration date PT/OT + Visit Limit?   24 4 units 24 BOMN         Visit/Unit Tracking  AUTH Status:  Date 2/9/24 2/14 2/22 2/29 3/6 3/14 3/19 3/27   24 visits Used 1 2 3 4 5 6 7 8    Remaining  23 22 21 20 19 18 17 16      Pertinent Findings:      POC End Date: 24                                                                                          Test / Measure  2024   FOTO (Predicted 70) 55   L Knee ROM 0-110   BLE Strength 3+ to 4-/5   5xSTS 17.7s  "  TUG w/ SPC = 15.1s, w/o SPC = 16.8s       Visit Number:  1 2 3 4 5 6 7 8   Manuals  2/8  2/14  2/22  2/29  3/6  3/14  3/19  3/27   Knee Flex/Ext PROM    CM 8' L knee  MC BL  SK MC  MC  MC  MC   Patella Mobilizaiton                                                           Neuro Re-Ed                   Bridging   10x  3x10 5#  3x10 5#  3x10 5#  3x10 10#  3x10 10#  Uni, 2x8 ea  Uni, 2x8 ea   Seated Heel Slides                   Sitting Knee Ext   10x BTB  3x10 ea 5#  3x10 ea 7#, BTB  3x10 ea 7#  3x10 ea 7# + RTB  3x10 ea 7# + RTB       Mini Squats @ bar  5x    2x10, 5x no assist  2x10          SLS                   Tandem Balance              Marching, 3 laps  Marching, 3 laps   Pretty Negotiation w/ supervision          fwd/lat 3 laps ea   fwd/lat 3 laps ea   fwd/lat 3 laps ea   fwd/lat 3 laps ea    Standing Terminal Ext                      Ther Ex                   HEP Review + Pt Edu  10 min                 Recumbent / NS    10' RB  NS, lvl 6 7'  NS 7'   lvl 6   NS 7'   lvl 6   NS 7'   lvl 6   NS 7'   lvl 6   NS 7'   lvl 6    SLR Flex/ABD  10x    2x10, 2# AW   2x10, 2# AW   2x10, 2# AW   2x10, 3# AW  2x10, 4# AW  2x10, 4# AW   Tball Press + March    2x10 ea  2x10 ea 2x10 ea   NV         Heel Raises   3x10  3x10  3x10  3x10  3x10  3x10  3x10   Leg Press           3x8, 70#  3x8, 70#  3x8, 85#   Tandem Walking                                                          Ther Activity                   Sit-to-Stand   2x10  2x10, 1x5 no UE  x10 no UE  3x7, 8# DB  3x7, 7#  3x7, 10#  3x8, 10#   Step Ups/Down      2x5 ea step taps, 6\" step  x10 ea step ups  no UE L UE R    6\" step, 8x ea with hip flex march  6\" step, 8x ea with hip flex march 6\" step, 8x ea with hip flex march   Gait Training                                                           Modalities                                                                                "

## 2024-04-10 ENCOUNTER — OFFICE VISIT (OUTPATIENT)
Dept: PHYSICAL THERAPY | Facility: REHABILITATION | Age: 74
End: 2024-04-10
Payer: COMMERCIAL

## 2024-04-10 DIAGNOSIS — M25.561 CHRONIC PAIN OF RIGHT KNEE: ICD-10-CM

## 2024-04-10 DIAGNOSIS — M17.11 PRIMARY OSTEOARTHRITIS OF RIGHT KNEE: Primary | ICD-10-CM

## 2024-04-10 DIAGNOSIS — G89.29 CHRONIC PAIN OF LEFT KNEE: ICD-10-CM

## 2024-04-10 DIAGNOSIS — G89.29 CHRONIC PAIN OF RIGHT KNEE: ICD-10-CM

## 2024-04-10 DIAGNOSIS — Z96.652 STATUS POST TOTAL KNEE REPLACEMENT, LEFT: ICD-10-CM

## 2024-04-10 DIAGNOSIS — M25.562 CHRONIC PAIN OF LEFT KNEE: ICD-10-CM

## 2024-04-10 PROCEDURE — 97110 THERAPEUTIC EXERCISES: CPT

## 2024-04-10 PROCEDURE — 97164 PT RE-EVAL EST PLAN CARE: CPT

## 2024-04-10 PROCEDURE — 97530 THERAPEUTIC ACTIVITIES: CPT

## 2024-04-10 PROCEDURE — 97112 NEUROMUSCULAR REEDUCATION: CPT

## 2024-04-10 NOTE — PROGRESS NOTES
PT Discharge Summary     Today's date: 4/10/2024  Patient name: Wade Le  : 1950  MRN: [id number]  Referring provider: Jessica Zaldivar PA-C  Dx:   Encounter Diagnosis     ICD-10-CM    1. Primary osteoarthritis of right knee  M17.11       2. Chronic pain of left knee  M25.562     G89.29       3. Status post total knee replacement, left  Z96.652       4. Chronic pain of right knee  M25.561     G89.29           Start Time: 1030  Stop Time: 1115  Total time in clinic (min): 45 minutes    Assessment  Assessment details: Wade Le is a 73 y.o. male attending physical therapy for primary OA of R knee and s/p TKR of L knee. Pt has been responding well to physical therapy as he displays improved ROM, strength, and decreased pain with functional activities. He also shows improving gait quality and balance but continues to be most challenged with dynamic balance exercises. His improvements in functional strength and endurance can be seen in his improved MMT values and decreased 5xSTS value. He would like to be discharged this visit as he feels he has met his personal and functional goals and would like to continue with his HEP. He showed good technique with HEP and will reach out if he has any questions in the future. Discharge from physical therapy as of 4/10/2024.     Goals  Short term goals:   STGs to be met in 3-4 weeks:  1.  Increase BLE strength by half grade or more to increase functional strength. (MET)  2.  Decrease subjective report of pain by 25% or more during STS and transfers to improve QoL. (MET)  3.  Improve static balance in SLS and tandem balance by 3 seconds or more to improve balance. (MET)  4.  Decreased 5xSTS and TUG by 2 seconds or greater to decrease risk for falls. (MET)  5.  Independent with basic HEP. (MET)    Long term goals:  LTG's to be met by DC:  1.  Ambulate community distances with little to no pain or difficulty. (MET)  2.  Perform all transfers without pain  and difficulty. (MET)  3.  Perform recreational activities / ADLs with little pain or difficulty. (MET)  4.  Increase strength to 4/5 or better in BLEs and core/paraspinal musculature. (MET)  5.  Independent with advanced HEP. (MET)  6.  Increase FOTO to predicted value by DC. (MET)    Plan  Discharge from physical therapy as of 4/10/2024.         Subjective  Pt believes physical therapy has been helping with his pain and functional limitations as he believes he is 70% improved. Pt mentioned that he  feels he has improved ROM and strength which has allowed him to return to ADLs and recreational activities with less functional limitations. He would like to be discharged as he feels he has met his personal and functional goals. In terms of pain, his current pain is a 0/10 and the worst it has been in the last week was a 0/10.     Objective    Range of Motion: Goniometric revealed the following findings (in degrees).  Joint Motion Right: 2/9/2024 Left: 2/9/2024 Right: 4/10/2024 Left: 4/10/2024   Hip Extension 25% limited 25% limited WFL WFL   Hip External Rotation 50 50 50 50   Hip Internal Rotation 15 15 15 15   Knee Extension -5 0 0 0   Knee Flexion 125 110 (p!) 120 120   Ankle Dorsiflexion 5 3 5 5   Ankle Plantarflexion 40 40 40 40     Strength: MMT revealed the following findings.  Joint Motion Right: 2/9/2024 Left: 2/9/2024 Right: 4/10/2024 Left: 4/10/2024   Hip Flexion 4-/5 4-/5 4+/5 4+/5   Hip Abduction 3+/5 3+/5 4/5 4/5   Hip Adduction 4-/5 4-/5 4/5 4/5   Hip Extension 3+/5 3+/5 4/5 4/5   Knee Extension 3+/5 (p!) 3+/5 (p!) 4/5 4/5   Knee Flexion 3+/5 3+/5 4/5 4/5   Ankle Plantarflexion 4/5 4/5 4/5 4/5   Ankle Dorsiflexion 4/5 4/5 4/5 4/5       Additional Assessments:  Observation: WFL  Palpation: No pain with palpation  Joint Mobility: WFL  Gait Pattern: Improving gait quality, ambulating with SPC  Balance:   2/9/2024  SLS: R= 5 secs / L = 5 secs  Tandem Stance: R= 5 secs / L = 5 secs    Functional  Strength/Mobility Exam:   Functional Squat: WFL  Timed Up and Go:   2/9/2024: w/ SPC = 15.1s, w/o SPC = 16.8s  5xSTS:   2/9/2024 = 17.7s  4/10/2024 = 11s       Precautions: PMH includes anemia, cataract, HLD, HTN, OA of L knee with L knee TH+KR on 8/14/23, R knee pain  POC expires Unit limit Auth Expiration date PT/OT + Visit Limit?   4/19/24 4 units 12/31/24 BOMN         Visit/Unit Tracking  AUTH Status:  Date 2/9/24 2/14 2/22 2/29 3/6 3/14 3/19 3/27 4/10   24 visits Used 1 2 3 4 5 6 7 8 9    Remaining  23 22 21 20 19 18 17 16 15      Pertinent Findings:      POC End Date: 4/19/24                                                                                          Test / Measure  2/9/2024   FOTO (Predicted 70) 55   L Knee ROM 0-110   BLE Strength 3+ to 4-/5   5xSTS 17.7s   TUG w/ SPC = 15.1s, w/o SPC = 16.8s       Visit Number:  1 2 3 4 5 6 7 8 8   Manuals  2/8  2/14  2/22  2/29  3/6  3/14  3/19  3/27 4/10   Knee Flex/Ext PROM    CM 8' L knee  MC BL  SK MC  MC  MC  MC    Patella Mobilizaiton                                                              Neuro Re-Ed                    Bridging   10x  3x10 5#  3x10 5#  3x10 5#  3x10 10#  3x10 10#  Uni, 2x8 ea  Uni, 2x8 ea Uni, 2x8 ea   Seated Heel Slides                    Sitting Knee Ext   10x BTB  3x10 ea 5#  3x10 ea 7#, BTB  3x10 ea 7#  3x10 ea 7# + RTB  3x10 ea 7# + RTB        Mini Squats @ bar  5x    2x10, 5x no assist  2x10           SLS                    Tandem Balance              Marching, 3 laps  Marching, 3 laps Marching, 3 laps   Pretty Negotiation w/ supervision          fwd/lat 3 laps ea   fwd/lat 3 laps ea   fwd/lat 3 laps ea   fwd/lat 3 laps ea  fwd/lat 3 laps ea    Standing Terminal Ext                        Ther Ex                    HEP Review + Pt Edu  10 min               RE - MC   Recumbent / NS    10' RB  NS, lvl 6 7'  NS 7'   lvl 6   NS 7'   lvl 6   NS 7'   lvl 6   NS 7'   lvl 6   NS 7'   lvl 6  NS 7'   lvl 6    SLR Flex/ABD  10x    2x10,  "2# AW   2x10, 2# AW   2x10, 2# AW   2x10, 3# AW  2x10, 4# AW  2x10, 4# AW 2x10, 4# AW   Tball Press + March    2x10 ea  2x10 ea 2x10 ea   NV          Heel Raises   3x10  3x10  3x10  3x10  3x10  3x10  3x10 3x10   Leg Press           3x8, 70#  3x8, 70#  3x8, 85# 3x8, 85#   Tandem Walking                                                             Ther Activity                    Sit-to-Stand   2x10  2x10, 1x5 no UE  x10 no UE  3x7, 8# DB  3x7, 7#  3x7, 10#  3x8, 10# 3x8, 10#   Step Ups/Down      2x5 ea step taps, 6\" step  x10 ea step ups  no UE L UE R    6\" step, 8x ea with hip flex march 6\" step, 8x ea with hip flex march 6\" step, 8x ea with hip flex march 6\" step, 8x ea with hip flex march   Gait Training                                                              Modalities                                                                         "

## 2024-04-26 ENCOUNTER — OFFICE VISIT (OUTPATIENT)
Dept: OBGYN CLINIC | Facility: MEDICAL CENTER | Age: 74
End: 2024-04-26
Payer: COMMERCIAL

## 2024-04-26 VITALS
DIASTOLIC BLOOD PRESSURE: 74 MMHG | WEIGHT: 193 LBS | HEIGHT: 66 IN | SYSTOLIC BLOOD PRESSURE: 117 MMHG | BODY MASS INDEX: 31.02 KG/M2 | HEART RATE: 78 BPM

## 2024-04-26 DIAGNOSIS — Z96.652 STATUS POST TOTAL KNEE REPLACEMENT, LEFT: ICD-10-CM

## 2024-04-26 DIAGNOSIS — M17.11 PRIMARY OSTEOARTHRITIS OF RIGHT KNEE: Primary | ICD-10-CM

## 2024-04-26 PROCEDURE — 20610 DRAIN/INJ JOINT/BURSA W/O US: CPT | Performed by: ORTHOPAEDIC SURGERY

## 2024-04-26 PROCEDURE — 99213 OFFICE O/P EST LOW 20 MIN: CPT | Performed by: ORTHOPAEDIC SURGERY

## 2024-04-26 RX ORDER — METHYLPREDNISOLONE ACETATE 40 MG/ML
2 INJECTION, SUSPENSION INTRA-ARTICULAR; INTRALESIONAL; INTRAMUSCULAR; SOFT TISSUE
Status: COMPLETED | OUTPATIENT
Start: 2024-04-26 | End: 2024-04-26

## 2024-04-26 RX ORDER — LIDOCAINE HYDROCHLORIDE 10 MG/ML
3 INJECTION, SOLUTION INFILTRATION; PERINEURAL
Status: COMPLETED | OUTPATIENT
Start: 2024-04-26 | End: 2024-04-26

## 2024-04-26 RX ADMIN — LIDOCAINE HYDROCHLORIDE 3 ML: 10 INJECTION, SOLUTION INFILTRATION; PERINEURAL at 11:00

## 2024-04-26 RX ADMIN — METHYLPREDNISOLONE ACETATE 2 ML: 40 INJECTION, SUSPENSION INTRA-ARTICULAR; INTRALESIONAL; INTRAMUSCULAR; SOFT TISSUE at 11:00

## 2024-04-26 NOTE — PROGRESS NOTES
Assessment/Plan:  1. Primary osteoarthritis of right knee    2. Status post total knee replacement, left      Orders Placed This Encounter   Procedures    Large joint arthrocentesis: R knee     Patient has severe right knee osteoarthritis.  After a discussion of risks and benefits the patient elected to proceed with a right knee steroid injection today.  Patient should ice and avoid strenuous activity for 1-2 days if needed.  Patient should avoid vaccines for 2 weeks if possible.  If patient is diabetic should also monitor glucose over the next 7 to 10 days.    Can take Tylenol 1,000mg by mouth every 8 hours as needed for pain.  Do not exceed 3,000mg per day.  No NSAIDs due to being on Eliquis.   Continue HEP.   Continue short hinge knee brace as needed for comfort.    Continue activity as tolerated.     Return in about 3 months (around 7/26/2024) for Recheck, right knee CSI.    I answered all of the patient's questions during the visit and provided education of the patient's condition during the visit.  The patient verbalized understanding of the information given and agrees with the plan.  This note was dictated using [employer] software.  It may contain errors including improperly dictated words.  Please contact physician directly for any questions.    Subjective   Chief Complaint: No chief complaint on file.      HPI  Wade Le is a 73 y.o. male who presents for follow up for severe right knee osteoarthritis.  Patient was last seen 1/23/2024 with patient received right knee steroid injection and PT prescription was provided.  Patient states he received about 3 months of relief with steroid injection of the right knee.  Patient states he has been going to physical therapy which is been beneficial for him. Patient states he continues to take Tylenol as needed for pain control and cannot take NSAIDs due to being on Eliquis.  Patient would like to proceed with right knee steroid injection at today's  "visit.      Review of Systems  ROS:    See [facility] for musculoskeletal review.   All other systems reviewed are negative     History:  Past Medical History:   Diagnosis Date    Anemia     Cataract     Colon polyp     Diverticulosis     Gastritis     GERD (gastroesophageal reflux disease)     Hyperlipidemia     Hypertension     Osteoarthritis of left knee 2023    Pre-diabetes     Throat ulcer     Visual impairment      Past Surgical History:   Procedure Laterality Date    COLONOSCOPY N/A 2016    Procedure: COLONOSCOPY;  Surgeon: Mariano Paz MD;  Location:  GI LAB;  Service:     COLONOSCOPY W/ POLYPECTOMY  2019    repeat     EYE SURGERY Bilateral     OR ARTHRP KNE CONDYLE&PLATU MEDIAL&LAT COMPARTMENTS Left 2023    Procedure: ARTHROPLASTY KNEE TOTAL;  Surgeon: Abby Garibay DO;  Location:  MAIN OR;  Service: Orthopedics    TONSILLECTOMY      UPPER GASTROINTESTINAL ENDOSCOPY  2019     Social History   Social History     Substance and Sexual Activity   Alcohol Use Never    Comment: pt \"sober for 30+ years\"     Social History     Substance and Sexual Activity   Drug Use No     Social History     Tobacco Use   Smoking Status Former    Current packs/day: 0.00    Types: Cigarettes    Quit date:     Years since quittin.3   Smokeless Tobacco Never   Tobacco Comments    Patient quit     Family History:   Family History   Problem Relation Age of Onset    Hypertension Mother     Hypertension Father        Current Outpatient Medications on File Prior to Visit   Medication Sig Dispense Refill    acetaminophen (TYLENOL) 325 mg tablet Take 3 tablets (975 mg total) by mouth every 8 (eight) hours  0    amLODIPine (NORVASC) 2.5 mg tablet Take 1 tablet (2.5 mg total) by mouth daily Do not start before 2023. 30 tablet 0    apixaban (Eliquis) 5 mg TAKE 1 TABLET (5 MG TOTAL) BY MOUTH 2 (TWO) TIMES A  tablet 1    atorvastatin (LIPITOR) 20 mg tablet Take 1 tablet (20 mg total) by mouth " "daily at bedtime 90 tablet 3    Diclofenac Sodium (VOLTAREN) 1 % Apply 2 g topically 4 (four) times a day  0    lidocaine (LIDODERM) 5 % Apply 2 patches topically over 12 hours daily Remove & Discard patch within 12 hours or as directed by MD Do not start before November 15, 2023.  0    lisinopril-hydrochlorothiazide (PRINZIDE,ZESTORETIC) 20-25 MG per tablet Take 1 tablet by mouth daily 90 tablet 3    methocarbamol (ROBAXIN) 750 mg tablet Take 1 tablet (750 mg total) by mouth every 6 (six) hours as needed for muscle spasms for up to 10 days 40 tablet 0     No current facility-administered medications on file prior to visit.     Allergies   Allergen Reactions    Lactose - Food Allergy Diarrhea     diarrhea        Objective     /74   Pulse 78   Ht 5' 6\" (1.676 m)   Wt 87.5 kg (193 lb)   BMI 31.15 kg/m²      PE:  AAOx 3  WDWN  Hearing intact, no drainage from eyes  no audible wheezing  no abdominal distension  LE compartments soft, skin intact    Ortho Exam:  right Knee:   No erythema  no swelling  no effusion  no warmth  AROM: 0-110  Stable to varus/valgus stress  TTP medial joint line      Large joint arthrocentesis: R knee  Universal Protocol:  Consent: Verbal consent obtained.  Risks and benefits: risks, benefits and alternatives were discussed  Consent given by: patient  Patient understanding: patient states understanding of the procedure being performed  Site marked: the operative site was marked  Patient identity confirmed: verbally with patient  Supporting Documentation  Indications: pain   Procedure Details  Location: knee - R knee  Needle size: 22 G  Ultrasound guidance: no  Approach: anterolateral  Medications administered: 3 mL lidocaine 1 %; 2 mL methylPREDNISolone acetate 40 mg/mL    Patient tolerance: patient tolerated the procedure well with no immediate complications  Dressing:  Sterile dressing applied          Scribe Attestation      I,:  Omar Mackey am acting as a scribe while in the " presence of the attending physician.:       I,:  Abby Garibay DO personally performed the services described in this documentation    as scribed in my presence.:

## 2024-05-21 ENCOUNTER — TELEPHONE (OUTPATIENT)
Dept: OTHER | Facility: OTHER | Age: 74
End: 2024-05-21

## 2024-07-06 ENCOUNTER — TELEPHONE (OUTPATIENT)
Dept: OTHER | Facility: OTHER | Age: 74
End: 2024-07-06

## 2024-07-09 ENCOUNTER — CONSULT (OUTPATIENT)
Dept: UROLOGY | Facility: AMBULATORY SURGERY CENTER | Age: 74
End: 2024-07-09

## 2024-07-09 VITALS
SYSTOLIC BLOOD PRESSURE: 116 MMHG | BODY MASS INDEX: 31.5 KG/M2 | OXYGEN SATURATION: 98 % | HEART RATE: 62 BPM | DIASTOLIC BLOOD PRESSURE: 68 MMHG | WEIGHT: 196 LBS | HEIGHT: 66 IN

## 2024-07-09 DIAGNOSIS — N32.81 OAB (OVERACTIVE BLADDER): ICD-10-CM

## 2024-07-09 DIAGNOSIS — R35.1 NOCTURIA: Primary | ICD-10-CM

## 2024-07-09 LAB — POST-VOID RESIDUAL VOLUME, ML POC: 14 ML

## 2024-07-09 RX ORDER — TROSPIUM CHLORIDE 20 MG/1
20 TABLET, FILM COATED ORAL 2 TIMES DAILY
Qty: 60 TABLET | Refills: 3 | Status: SHIPPED | OUTPATIENT
Start: 2024-07-09 | End: 2024-11-06

## 2024-07-09 NOTE — ASSESSMENT & PLAN NOTE
PVR performed today found to be 14 mL  We discussed treatment with pharmacotherapy including anticholinergics versus beta agonist, as well as more invasive procedures including PTNS, bladder stim, and Botox injections into the bladder  We discussed bladder irritants as well as restricting fluid intake 2 hours prior to bedtime  The patient was interested in trialing a anticholinergic such as trospium chloride.  We discussed that these medications can take up to 3 months to provide maximal benefit.  Additionally, we discussed side effects including dry eyes, dry mouth, and urinary retention.  The patient will trial trospium chloride 20 mg daily and follow-up in 3 to 4 months to reevaluate symptoms.  If the patient is not achieving adequate benefit we can consider trialing a beta agonist versus referring the patient to pelvic floor physical therapy.

## 2024-07-09 NOTE — PROGRESS NOTES
7/9/2024      Assessment and Plan    73 y.o. male new patient to [facility] for urology    OAB (overactive bladder)  PVR performed today found to be 14 mL  We discussed treatment with pharmacotherapy including anticholinergics versus beta agonist, as well as more invasive procedures including PTNS, bladder stim, and Botox injections into the bladder  We discussed bladder irritants as well as restricting fluid intake 2 hours prior to bedtime  The patient was interested in trialing a anticholinergic such as trospium chloride.  We discussed that these medications can take up to 3 months to provide maximal benefit.  Additionally, we discussed side effects including dry eyes, dry mouth, and urinary retention.  The patient will trial trospium chloride 20 mg daily and follow-up in 3 to 4 months to reevaluate symptoms.  If the patient is not achieving adequate benefit we can consider trialing a beta agonist versus referring the patient to pelvic floor physical therapy.        History of Present Illness  Wade Le is a 73 y.o. male here for evaluation of nocturia x 2-3 and worsening urinary urgency.  The patient reports that he has had urinary urgency since he was 9 years old.  The patient notes that at that time he frequently wet his bed at night.  The patient states that this improved as he got older, but there were few occurrences every now and then where he would still want his bed as an adult.  Today, the patient notes that he is urinating 2-3 times every hour.  The patient reports that he drinks 1 cup of coffee in the morning and that a majority of his fluid intake throughout the day as soda with very little water.  The patient denies difficulty initiating his urinary stream and notes that it is a strong stream.  Patient reports that there have been a few occurrences where his urinary urgency is so bad, that before he gets to the bathroom involuntarily leaks urine before he can get to the bathroom.  " The patient denies dysuria, hematuria, incomplete bladder emptying, worsening urinary frequency, or postvoid dribbling.  The patient had a PVR done in the office today which was found to be 14 mL.        Review of Systems   Constitutional:  Negative for chills and fever.   HENT:  Negative for ear pain and sore throat.    Eyes:  Negative for pain and visual disturbance.   Respiratory:  Negative for cough and shortness of breath.    Cardiovascular:  Negative for chest pain and palpitations.   Gastrointestinal:  Negative for abdominal pain and vomiting.   Genitourinary:  Positive for frequency and urgency. Negative for decreased urine volume, difficulty urinating, dysuria, flank pain and hematuria.   Musculoskeletal:  Negative for arthralgias and back pain.   Skin:  Negative for color change and rash.   Neurological:  Negative for seizures and syncope.   All other systems reviewed and are negative.               Vitals  Vitals:    07/09/24 1249   BP: 116/68   BP Location: Left arm   Patient Position: Sitting   Cuff Size: Adult   Pulse: 62   SpO2: 98%   Weight: 88.9 kg (196 lb)   Height: 5' 6\" (1.676 m)       Physical Exam  Vitals reviewed.   Constitutional:       General: He is not in acute distress.     Appearance: Normal appearance. He is not ill-appearing.   HENT:      Head: Normocephalic and atraumatic.      Nose: Nose normal.   Eyes:      General: No scleral icterus.  Pulmonary:      Effort: No respiratory distress.   Abdominal:      General: Abdomen is flat. There is no distension.      Palpations: Abdomen is soft.      Tenderness: There is no abdominal tenderness.   Musculoskeletal:         General: Normal range of motion.      Cervical back: Normal range of motion.   Skin:     General: Skin is warm.      Coloration: Skin is not jaundiced.   Neurological:      Mental Status: He is alert and oriented to person, place, and time.      Gait: Gait normal.   Psychiatric:         Mood and Affect: Mood normal.         " "Behavior: Behavior normal.           Past History  Past Medical History:   Diagnosis Date    Anemia     Cataract     Colon polyp     Diverticulosis     Gastritis     GERD (gastroesophageal reflux disease)     Hyperlipidemia     Hypertension     Osteoarthritis of left knee 2023    Pre-diabetes     Throat ulcer     Visual impairment      Social History     Socioeconomic History    Marital status: Single     Spouse name: None    Number of children: None    Years of education: None    Highest education level: None   Occupational History    None   Tobacco Use    Smoking status: Former     Current packs/day: 0.00     Types: Cigarettes     Quit date:      Years since quittin.5    Smokeless tobacco: Never    Tobacco comments:     Patient quit   Vaping Use    Vaping status: Never Used   Substance and Sexual Activity    Alcohol use: Never     Comment: pt \"sober for 30+ years\"    Drug use: No    Sexual activity: Not Currently     Partners: Female     Comment:    Other Topics Concern    None   Social History Narrative    Lives with family     Social Determinants of Health     Financial Resource Strain: Low Risk  (2023)    Overall Financial Resource Strain (CARDIA)     Difficulty of Paying Living Expenses: Not hard at all   Food Insecurity: No Food Insecurity (2023)    Hunger Vital Sign     Worried About Running Out of Food in the Last Year: Never true     Ran Out of Food in the Last Year: Never true   Transportation Needs: No Transportation Needs (2023)    PRAPARE - Transportation     Lack of Transportation (Medical): No     Lack of Transportation (Non-Medical): No   Physical Activity: Inactive (2021)    Exercise Vital Sign     Days of Exercise per Week: 0 days     Minutes of Exercise per Session: 0 min   Stress: Not on file   Social Connections: Not on file   Intimate Partner Violence: Not on file   Housing Stability: Low Risk  (2023)    Housing Stability Vital Sign     Unable " to Pay for Housing in the Last Year: No     Number of Times Moved in the Last Year: 1     Homeless in the Last Year: No     Social History     Tobacco Use   Smoking Status Former    Current packs/day: 0.00    Types: Cigarettes    Quit date:     Years since quittin.5   Smokeless Tobacco Never   Tobacco Comments    Patient quit     Family History   Problem Relation Age of Onset    Hypertension Mother     Hypertension Father        The following portions of the patient's history were reviewed and updated as appropriate: allergies, current medications, past medical history, past social history, past surgical history and problem list.    Results  Recent Results (from the past 1 hour(s))   POCT Measure PVR    Collection Time: 24 12:53 PM   Result Value Ref Range    POST-VOID RESIDUAL VOLUME, ML POC 14 mL   ]  Lab Results   Component Value Date    PSA 1.6 2022     Lab Results   Component Value Date    GLUCOSE 84 10/28/2015    CALCIUM 8.8 2023     10/28/2015    K 3.7 2023    CO2 31 2023     2023    BUN 28 (H) 2023    CREATININE 0.96 2023     Lab Results   Component Value Date    WBC 6.81 2023    HGB 11.9 (L) 2023    HCT 36.7 2023    MCV 89 2023     2023

## 2024-07-26 ENCOUNTER — OFFICE VISIT (OUTPATIENT)
Dept: OBGYN CLINIC | Facility: MEDICAL CENTER | Age: 74
End: 2024-07-26
Payer: COMMERCIAL

## 2024-07-26 VITALS
HEART RATE: 60 BPM | HEIGHT: 66 IN | BODY MASS INDEX: 31.92 KG/M2 | DIASTOLIC BLOOD PRESSURE: 69 MMHG | WEIGHT: 198.6 LBS | SYSTOLIC BLOOD PRESSURE: 138 MMHG

## 2024-07-26 DIAGNOSIS — G89.29 CHRONIC PAIN OF RIGHT KNEE: ICD-10-CM

## 2024-07-26 DIAGNOSIS — M17.11 PRIMARY OSTEOARTHRITIS OF RIGHT KNEE: Primary | ICD-10-CM

## 2024-07-26 DIAGNOSIS — M25.561 CHRONIC PAIN OF RIGHT KNEE: ICD-10-CM

## 2024-07-26 PROCEDURE — 20610 DRAIN/INJ JOINT/BURSA W/O US: CPT | Performed by: ORTHOPAEDIC SURGERY

## 2024-07-26 PROCEDURE — 99213 OFFICE O/P EST LOW 20 MIN: CPT | Performed by: ORTHOPAEDIC SURGERY

## 2024-07-26 RX ORDER — BUPIVACAINE HYDROCHLORIDE 2.5 MG/ML
2 INJECTION, SOLUTION INFILTRATION; PERINEURAL
Status: COMPLETED | OUTPATIENT
Start: 2024-07-26 | End: 2024-07-26

## 2024-07-26 RX ORDER — TRIAMCINOLONE ACETONIDE 40 MG/ML
40 INJECTION, SUSPENSION INTRA-ARTICULAR; INTRAMUSCULAR
Status: COMPLETED | OUTPATIENT
Start: 2024-07-26 | End: 2024-07-26

## 2024-07-26 RX ADMIN — TRIAMCINOLONE ACETONIDE 40 MG: 40 INJECTION, SUSPENSION INTRA-ARTICULAR; INTRAMUSCULAR at 11:30

## 2024-07-26 RX ADMIN — BUPIVACAINE HYDROCHLORIDE 2 ML: 2.5 INJECTION, SOLUTION INFILTRATION; PERINEURAL at 11:30

## 2024-07-26 NOTE — PROGRESS NOTES
Assessment & Plan     1. Primary osteoarthritis of right knee    2. Chronic pain of right knee      Orders Placed This Encounter   Procedures    Large joint arthrocentesis         Patient has severe right knee osteoarthritis.   Non operative and operative treatment options were reviewed with patient today.   Injections: Patient received right knee steroid injection today. Tolerated the procedure well. Post injection instructions reviewed including information on glucose monitoring for diabetic patients. Patient aware that they may repeat steroid injection every 3 months if needed.   Medications: Tylenol up to 3000 mg per day  Bracing: Continue short hinge knee brace as needed for comfort.   Activity: Continue activity as tolerated.   Plan for next appt: repeat evaluation and discuss repeat corticosteroid injection       Return in about 3 months (around 10/26/2024).    I answered all of the patient's questions during the visit and provided education of the patient's condition during the visit.  The patient verbalized understanding of the information given and agrees with the plan.  This note was dictated using FindIt software.  It may contain errors including improperly dictated words.  Please contact physician directly for any questions.    History of Present Illness   Chief complaint:   Chief Complaint   Patient presents with    Right Knee - Follow-up       HPI: Wade Le is a 73 y.o. male that c/o right knee pain secondary to osteoarthritis. Patient has been managing his activity related knee pain with repeat corticosteroid injections, his last injection was 4/26/24 with benefit. He continues to get 2-3 months of relief. Denies any acute injury or trauma.      Pain Description: constant ache  Palliating Factors: activity modification, physical therapy  Provoking Factors: prolonged weight bearing  Associated Symptoms: stiffness  Medications: tylenol daily   Able to take NSAIDs? If not, why: no, due  "to Eliquis   Physical Therapy or Home Exercises: yes PT with transition to HEP 3 months ago   Injections: Right knee CSI 24  Bracing: hinge knee brace as needed  Previous Surgery: No  Miscellaneous:      ROS:    See HPI for musculoskeletal review.   All other systems reviewed are negative     Historical Information   Past Medical History:   Diagnosis Date    Anemia     Cataract     Colon polyp     Diverticulosis     Gastritis     GERD (gastroesophageal reflux disease)     Hyperlipidemia     Hypertension     Osteoarthritis of left knee 2023    Pre-diabetes     Throat ulcer     Visual impairment      Past Surgical History:   Procedure Laterality Date    COLONOSCOPY N/A 2016    Procedure: COLONOSCOPY;  Surgeon: Mariano Paz MD;  Location:  GI LAB;  Service:     COLONOSCOPY W/ POLYPECTOMY  2019    repeat     EYE SURGERY Bilateral     MI ARTHRP KNE CONDYLE&PLATU MEDIAL&LAT COMPARTMENTS Left 2023    Procedure: ARTHROPLASTY KNEE TOTAL;  Surgeon: Abby Garibay DO;  Location:  MAIN OR;  Service: Orthopedics    TONSILLECTOMY      UPPER GASTROINTESTINAL ENDOSCOPY  2019     Social History   Social History     Substance and Sexual Activity   Alcohol Use Never    Comment: pt \"sober for 30+ years\"     Social History     Substance and Sexual Activity   Drug Use No     Social History     Tobacco Use   Smoking Status Former    Current packs/day: 0.00    Types: Cigarettes    Quit date:     Years since quittin.5   Smokeless Tobacco Never   Tobacco Comments    Patient quit     Family History:   Family History   Problem Relation Age of Onset    Hypertension Mother     Hypertension Father        Current Outpatient Medications on File Prior to Visit   Medication Sig Dispense Refill    acetaminophen (TYLENOL) 325 mg tablet Take 3 tablets (975 mg total) by mouth every 8 (eight) hours  0    amLODIPine (NORVASC) 2.5 mg tablet Take 1 tablet (2.5 mg total) by mouth daily Do not start before November " 25, 2023. 30 tablet 0    apixaban (Eliquis) 5 mg TAKE 1 TABLET (5 MG TOTAL) BY MOUTH 2 (TWO) TIMES A  tablet 1    atorvastatin (LIPITOR) 20 mg tablet Take 1 tablet (20 mg total) by mouth daily at bedtime 90 tablet 3    Diclofenac Sodium (VOLTAREN) 1 % Apply 2 g topically 4 (four) times a day  0    lidocaine (LIDODERM) 5 % Apply 2 patches topically over 12 hours daily Remove & Discard patch within 12 hours or as directed by MD Do not start before November 15, 2023.  0    lisinopril-hydrochlorothiazide (PRINZIDE,ZESTORETIC) 20-25 MG per tablet Take 1 tablet by mouth daily 90 tablet 3    methocarbamol (ROBAXIN) 750 mg tablet Take 1 tablet (750 mg total) by mouth every 6 (six) hours as needed for muscle spasms for up to 10 days 40 tablet 0    trospium chloride (SANCTURA) 20 mg tablet Take 1 tablet (20 mg total) by mouth 2 (two) times a day 60 tablet 3     No current facility-administered medications on file prior to visit.     Allergies   Allergen Reactions    Lactose - Food Allergy Diarrhea     diarrhea       Current Outpatient Medications on File Prior to Visit   Medication Sig Dispense Refill    acetaminophen (TYLENOL) 325 mg tablet Take 3 tablets (975 mg total) by mouth every 8 (eight) hours  0    amLODIPine (NORVASC) 2.5 mg tablet Take 1 tablet (2.5 mg total) by mouth daily Do not start before November 25, 2023. 30 tablet 0    apixaban (Eliquis) 5 mg TAKE 1 TABLET (5 MG TOTAL) BY MOUTH 2 (TWO) TIMES A  tablet 1    atorvastatin (LIPITOR) 20 mg tablet Take 1 tablet (20 mg total) by mouth daily at bedtime 90 tablet 3    Diclofenac Sodium (VOLTAREN) 1 % Apply 2 g topically 4 (four) times a day  0    lidocaine (LIDODERM) 5 % Apply 2 patches topically over 12 hours daily Remove & Discard patch within 12 hours or as directed by MD Do not start before November 15, 2023.  0    lisinopril-hydrochlorothiazide (PRINZIDE,ZESTORETIC) 20-25 MG per tablet Take 1 tablet by mouth daily 90 tablet 3    methocarbamol  "(ROBAXIN) 750 mg tablet Take 1 tablet (750 mg total) by mouth every 6 (six) hours as needed for muscle spasms for up to 10 days 40 tablet 0    trospium chloride (SANCTURA) 20 mg tablet Take 1 tablet (20 mg total) by mouth 2 (two) times a day 60 tablet 3     No current facility-administered medications on file prior to visit.       Objective   Vitals: Blood pressure 138/69, pulse 60, height 5' 6\" (1.676 m), weight 90.1 kg (198 lb 9.6 oz).,Body mass index is 32.05 kg/m².    PE:  AAOx 3  WDWN  Hearing intact, no drainage from eyes  Regular rate  no audible wheezing  no abdominal distension  LE compartments soft, skin intact    rightknee:    Appearance:  No  swelling   No  ecchymosis  No  obvious joint deformity   No  effusion   Palpation/Tenderness:  Yes  TTP over medial joint line  No  TTP over lateral joint line   No  TTP over patella  No  TTP over patellar tendon  No  TTP over pes anserine bursa  Active Range of Motion:  AROM: 0-110  Special Tests:  Valgus Stress Test: negative  Varus Stress Test: negative      Large joint arthrocentesis: R knee  Universal Protocol:  Consent: Verbal consent obtained.  Risks and benefits: risks, benefits and alternatives were discussed  Consent given by: patient  Timeout called at: 7/26/2024 12:08 PM.  Patient understanding: patient states understanding of the procedure being performed  Patient identity confirmed: verbally with patient  Supporting Documentation  Indications: pain and diagnostic evaluation   Procedure Details  Location: knee - R knee  Preparation: Patient was prepped and draped in the usual sterile fashion  Needle size: 22 G  Ultrasound guidance: no  Approach: anterolateral  Medications administered: 2 mL bupivacaine 0.25 %; 40 mg triamcinolone acetonide 40 mg/mL    Patient tolerance: patient tolerated the procedure well with no immediate complications  Dressing:  Sterile dressing applied            Scribe Attestation      I,:  Hilda Esteves am acting as a scribe " while in the presence of the attending physician.:       I,:  Abby Garibay, DO personally performed the services described in this documentation    as scribed in my presence.:

## 2024-09-03 DIAGNOSIS — I10 HYPERTENSION, UNSPECIFIED TYPE: ICD-10-CM

## 2024-09-04 RX ORDER — LISINOPRIL AND HYDROCHLOROTHIAZIDE 20; 25 MG/1; MG/1
1 TABLET ORAL DAILY
Qty: 90 TABLET | Refills: 1 | Status: SHIPPED | OUTPATIENT
Start: 2024-09-04

## 2024-10-09 ENCOUNTER — OFFICE VISIT (OUTPATIENT)
Dept: UROLOGY | Facility: AMBULATORY SURGERY CENTER | Age: 74
End: 2024-10-09

## 2024-10-09 VITALS
SYSTOLIC BLOOD PRESSURE: 140 MMHG | OXYGEN SATURATION: 95 % | HEART RATE: 75 BPM | HEIGHT: 66 IN | BODY MASS INDEX: 33.07 KG/M2 | DIASTOLIC BLOOD PRESSURE: 70 MMHG | WEIGHT: 205.8 LBS

## 2024-10-09 DIAGNOSIS — N32.81 OAB (OVERACTIVE BLADDER): ICD-10-CM

## 2024-10-09 DIAGNOSIS — R35.1 NOCTURIA: Primary | ICD-10-CM

## 2024-10-09 DIAGNOSIS — Z12.5 SCREENING FOR PROSTATE CANCER: ICD-10-CM

## 2024-10-09 LAB — POST-VOID RESIDUAL VOLUME, ML POC: 37 ML

## 2024-10-09 NOTE — PROGRESS NOTES
10/9/2024      Assessment and Plan    73 y.o. male managed by our office    OAB (overactive bladder)  PVR performed the office today found to be 37 mL  Patient was initiated on trospium chloride 20 mg daily at the last office visit.  Patient reports resolution of his urinary frequency and urgency since initiating therapy with this medication.  Patient will continue on trospium chloride 20 mg daily and we will continue to monitor for worsening/progression of lower urinary tract symptoms    Screening for prostate cancer  Patient denies a family history of prostate cancer, but notes that he has not undergone a rectal exam in several years.  Patient's last PSA was performed 5/9/2022 and found to be 1.6  YANIRA performed today.  Palpably benign prostate.  Refer to physical exam findings.  We discussed that the patient should repeat his PSA in 1 month and that we will call with results if abnormal.  Additionally, the patient should return to the office in 1 year for follow-up with a PSA prior to undergo a YANIRA at that time.  With you patient being 74 years old next year we can consider discontinuing prostate cancer screening as long as the PSA and YANIRA remains stable.        History of Present Illness  Wade Le is a 73 y.o. male here for evaluation of overactive bladder and prostate cancer screening.  Patient was last seen in the office on 7/9/2024.  Patient previously noted nocturia x 2-3 worsening urinary urgency.  Patient stated that he has had urinary urgency since he was 9 years old and a few occurrences of wetting the bed as an adult.  At our last office visit, we initiate the patient on trospium chloride 20 mg daily for treatment of his lower urinary tract symptoms.  PVR performed in the office today was found to be 37 mL.  Patient's last PSA was performed 5/9/2022 and found to be 1.6.  Today, the patient reports resolution of his urinary frequency and urgency since initiating therapy with the trospium  "chloride.  Additionally, patient states his nocturia is down to 1 time a night.  Patient is really happy with the results of the medication.  The patient notes that he has not undergone a rectal exam in several years.  Otherwise, the patient denies dysuria, hematuria, worsening urinary frequency/urgency, urinary incontinence, or feelings of incomplete bladder emptying.        Review of Systems   Constitutional:  Negative for chills and fever.   HENT:  Negative for ear pain and sore throat.    Eyes:  Negative for pain and visual disturbance.   Respiratory:  Negative for cough and shortness of breath.    Cardiovascular:  Negative for chest pain and palpitations.   Gastrointestinal:  Negative for abdominal pain and vomiting.   Genitourinary:  Negative for decreased urine volume, difficulty urinating, dysuria, flank pain, frequency, hematuria and urgency.   Musculoskeletal:  Negative for arthralgias and back pain.   Skin:  Negative for color change and rash.   Neurological:  Negative for seizures and syncope.   All other systems reviewed and are negative.               Vitals  Vitals:    10/09/24 1458   BP: 140/70   BP Location: Right arm   Patient Position: Sitting   Cuff Size: Standard   Pulse: 75   SpO2: 95%   Weight: 93.4 kg (205 lb 12.8 oz)   Height: 5' 6\" (1.676 m)       Physical Exam  Vitals reviewed.   Constitutional:       General: He is not in acute distress.     Appearance: Normal appearance. He is not ill-appearing.   HENT:      Head: Normocephalic and atraumatic.      Nose: Nose normal.   Eyes:      General: No scleral icterus.  Pulmonary:      Effort: No respiratory distress.   Abdominal:      General: Abdomen is flat. There is no distension.      Palpations: Abdomen is soft.      Tenderness: There is no abdominal tenderness.   Genitourinary:     Comments: YANIRA performed today.  Palpably enlarged prostate, but smooth bilaterally without nodularity or induration.  Musculoskeletal:         General: Normal " "range of motion.      Cervical back: Normal range of motion.   Skin:     General: Skin is warm.      Coloration: Skin is not jaundiced.   Neurological:      Mental Status: He is alert and oriented to person, place, and time.      Gait: Gait normal.   Psychiatric:         Mood and Affect: Mood normal.         Behavior: Behavior normal.           Past History  Past Medical History:   Diagnosis Date    Anemia     Cataract     Colon polyp     Diverticulosis     Gastritis     GERD (gastroesophageal reflux disease)     Hyperlipidemia     Hypertension     Osteoarthritis of left knee 2023    Pre-diabetes     Throat ulcer     Visual impairment      Social History     Socioeconomic History    Marital status: Single     Spouse name: None    Number of children: None    Years of education: None    Highest education level: None   Occupational History    None   Tobacco Use    Smoking status: Former     Current packs/day: 0.00     Types: Cigarettes     Quit date:      Years since quittin.    Smokeless tobacco: Never    Tobacco comments:     Patient quit   Vaping Use    Vaping status: Never Used   Substance and Sexual Activity    Alcohol use: Never     Comment: pt \"sober for 30+ years\"    Drug use: No    Sexual activity: Not Currently     Partners: Female     Comment:    Other Topics Concern    None   Social History Narrative    Lives with family     Social Determinants of Health     Financial Resource Strain: Low Risk  (2023)    Overall Financial Resource Strain (CARDIA)     Difficulty of Paying Living Expenses: Not hard at all   Food Insecurity: No Food Insecurity (2023)    Hunger Vital Sign     Worried About Running Out of Food in the Last Year: Never true     Ran Out of Food in the Last Year: Never true   Transportation Needs: No Transportation Needs (2023)    PRAPARE - Transportation     Lack of Transportation (Medical): No     Lack of Transportation (Non-Medical): No   Physical " Activity: Inactive (2021)    Exercise Vital Sign     Days of Exercise per Week: 0 days     Minutes of Exercise per Session: 0 min   Stress: Not on file   Social Connections: Not on file   Intimate Partner Violence: Not on file   Housing Stability: Low Risk  (2023)    Housing Stability Vital Sign     Unable to Pay for Housing in the Last Year: No     Number of Times Moved in the Last Year: 1     Homeless in the Last Year: No     Social History     Tobacco Use   Smoking Status Former    Current packs/day: 0.00    Types: Cigarettes    Quit date:     Years since quittin.7   Smokeless Tobacco Never   Tobacco Comments    Patient quit     Family History   Problem Relation Age of Onset    Hypertension Mother     Hypertension Father        The following portions of the patient's history were reviewed and updated as appropriate: allergies, current medications, past medical history, past social history, past surgical history and problem list.    Results  No results found for this or any previous visit (from the past 1 hour(s)).  ]  Lab Results   Component Value Date    PSA 1.6 2022     Lab Results   Component Value Date    GLUCOSE 84 10/28/2015    CALCIUM 8.8 2023     10/28/2015    K 3.7 2023    CO2 31 2023     2023    BUN 28 (H) 2023    CREATININE 0.96 2023     Lab Results   Component Value Date    WBC 6.81 2023    HGB 11.9 (L) 2023    HCT 36.7 2023    MCV 89 2023     2023

## 2024-10-09 NOTE — ASSESSMENT & PLAN NOTE
Patient denies a family history of prostate cancer, but notes that he has not undergone a rectal exam in several years.  Patient's last PSA was performed 5/9/2022 and found to be 1.6  YANIRA performed today.  Palpably benign prostate.  Refer to physical exam findings.  We discussed that the patient should repeat his PSA in 1 month and that we will call with results if abnormal.  Additionally, the patient should return to the office in 1 year for follow-up with a PSA prior to undergo a YANIRA at that time.  With you patient being 74 years old next year we can consider discontinuing prostate cancer screening as long as the PSA and YANIRA remains stable.

## 2024-10-09 NOTE — ASSESSMENT & PLAN NOTE
PVR performed the office today found to be 37 mL  Patient was initiated on trospium chloride 20 mg daily at the last office visit.  Patient reports resolution of his urinary frequency and urgency since initiating therapy with this medication.  Patient will continue on trospium chloride 20 mg daily and we will continue to monitor for worsening/progression of lower urinary tract symptoms

## 2024-10-14 ENCOUNTER — TELEPHONE (OUTPATIENT)
Age: 74
End: 2024-10-14

## 2024-10-14 NOTE — TELEPHONE ENCOUNTER
Pt called back to advise he will not be able to make it to his appt at 1pm due to transportation issues. He is asking if he can come in later, call disconnected while attempting to pull up schedule to see if patient can be rescheduled. Please call back at [phone]. Thank you.

## 2024-10-14 NOTE — TELEPHONE ENCOUNTER
Patient called; warm transfer to  [facility] #[id number]. Patient stated he was trying to check in for appointment on his phone but there was no where to do so. I advised that he needs to set up his My chart account in order to do that. Patient would like to be shown how to do that when he comes in for AWV today.

## 2024-10-18 ENCOUNTER — TELEPHONE (OUTPATIENT)
Age: 74
End: 2024-10-18

## 2024-10-18 ENCOUNTER — TELEPHONE (OUTPATIENT)
Dept: FAMILY MEDICINE CLINIC | Facility: CLINIC | Age: 74
End: 2024-10-18

## 2024-10-18 NOTE — TELEPHONE ENCOUNTER
Pt was called to confirm the 10/21/24 appt and was notified to be need change the PCP under [employer]. Pt understands and stated that he will call insurance today.

## 2024-10-18 NOTE — TELEPHONE ENCOUNTER
Patient called; warm transfer to [facility] #[id number]. Patient asked to speak with Dr Maki; advised patient he was not in office today. Patient stated he will wait till Monday at his appointment to talk to Dr Maki.

## 2024-10-18 NOTE — TELEPHONE ENCOUNTER
Caller: andreina    Doctor: LUIS    Reason for call: called for his PCP phone number and phone number was given     Call back#:

## 2024-10-21 ENCOUNTER — OFFICE VISIT (OUTPATIENT)
Dept: FAMILY MEDICINE CLINIC | Facility: CLINIC | Age: 74
End: 2024-10-21
Payer: COMMERCIAL

## 2024-10-21 VITALS
SYSTOLIC BLOOD PRESSURE: 130 MMHG | OXYGEN SATURATION: 97 % | BODY MASS INDEX: 32.14 KG/M2 | WEIGHT: 200 LBS | HEIGHT: 66 IN | RESPIRATION RATE: 16 BRPM | DIASTOLIC BLOOD PRESSURE: 70 MMHG | TEMPERATURE: 97.8 F | HEART RATE: 64 BPM

## 2024-10-21 DIAGNOSIS — Z00.00 MEDICARE ANNUAL WELLNESS VISIT, SUBSEQUENT: Primary | ICD-10-CM

## 2024-10-21 DIAGNOSIS — Z29.11 NEED FOR RSV IMMUNIZATION: ICD-10-CM

## 2024-10-21 DIAGNOSIS — Z23 NEED FOR SHINGLES VACCINE: ICD-10-CM

## 2024-10-21 PROBLEM — Z12.5 SCREENING FOR PROSTATE CANCER: Status: RESOLVED | Noted: 2024-10-09 | Resolved: 2024-10-21

## 2024-10-21 PROBLEM — G89.29 CHRONIC PAIN OF BOTH KNEES: Status: RESOLVED | Noted: 2021-09-09 | Resolved: 2024-10-21

## 2024-10-21 PROBLEM — M25.561 CHRONIC PAIN OF BOTH KNEES: Status: RESOLVED | Noted: 2021-09-09 | Resolved: 2024-10-21

## 2024-10-21 PROBLEM — D64.9 ANEMIA: Status: RESOLVED | Noted: 2023-08-28 | Resolved: 2024-10-21

## 2024-10-21 PROBLEM — K21.9 GERD (GASTROESOPHAGEAL REFLUX DISEASE): Status: RESOLVED | Noted: 2019-05-31 | Resolved: 2024-10-21

## 2024-10-21 PROBLEM — M25.562 CHRONIC PAIN OF BOTH KNEES: Status: RESOLVED | Noted: 2021-09-09 | Resolved: 2024-10-21

## 2024-10-21 PROCEDURE — G0439 PPPS, SUBSEQ VISIT: HCPCS | Performed by: FAMILY MEDICINE

## 2024-10-21 RX ORDER — ZOSTER VACCINE RECOMBINANT, ADJUVANTED 50 MCG/0.5
0.5 KIT INTRAMUSCULAR ONCE
Qty: 1 EACH | Refills: 1 | Status: SHIPPED | OUTPATIENT
Start: 2024-10-21 | End: 2024-10-21

## 2024-10-21 NOTE — PATIENT INSTRUCTIONS
Patient Education     Preventing falls in adults   The Basics   Written by the doctors and editors at [facility]   Am I at risk of falling? -- Falls can happen to anyone, no matter how old they are. Several things can increase your risk of a fall, including:   Vision problems   Having certain chronic health conditions, being sick, having recently been in the hospital, or having had surgery   Changing the medicines you take   An unsafe or unfamiliar setting (for example, a room with rugs or furniture that might trip you, or an area you don't know well)  Your risk of falling increases as you grow older. That's because getting older can make it harder to walk steadily and keep your balance. Also, the effects of falls are more serious for older people.  Overall, 3 to 4 out of every 10 people over the age of 65 fall each year. Many people who fracture a hip never fully recover to how they were before the fracture. If you have fallen in the past, you are at higher risk of falling again.  How can my doctor help me avoid falling? -- Your doctor can talk to you about the following things:   Past falls - It is important to tell your doctor about any times that you have fallen or almost fallen. They can then suggest ways to prevent another fall.   Your health conditions - Some health problems can put you at risk of falling. These include conditions that affect eyesight, hearing, muscle strength, or balance.   What to do if you are in the hospital - Falls can happen in the hospital because you are in an unfamiliar place. You might feel unsteady or drowsy from medicines or anesthesia. Or you might be attached to catheters or IV tubing that you could trip over. You are also likely to be weaker than usual.   Your medicines - Certain medicines can increase the risk of falling. These include some medicines for sleeping problems, anxiety, high blood pressure, or depression. Adding new medicines, or changing doses of some medicines, can  also affect your risk of falling.  The more your doctor knows about your situation, the better they can help you. For example, if you fell because you have a condition that causes pain, your doctor might suggest treatments to help with the pain. Or if any of your medicines are making you dizzy and more likely to fall, your doctor might switch you to a different medicine.  Is there anything I can do on my own? -- Yes. To help keep from falling, you can:   Make your home safer - To avoid falling at home, get rid of things that might make you trip or slip. This can include furniture, electrical cords, clutter, and loose rugs (figure 1). Keep your home well lit so you can easily see where you are going. Avoid storing things in high places so you don't have to reach or climb.   Wear non-slip socks or sturdy shoes that fit well - Wearing shoes with high heels or slippery soles, or shoes that are too loose, can lead to falls. Walking around in bare feet, or only socks, can also increase your risk of falling.   Take vitamin D pills - Taking vitamin D might lower the risk of falls in older people. This is because vitamin D helps make bones and muscles stronger. Your doctor can talk to you about whether you should take extra vitamin D, and how much.   Stay active - Moving your body regularly can help lower your risk of falling. It might also help prevent you from getting hurt if you do fall. It is best to do a few different activities that help with both strength and balance. There are many kinds of exercise that can be safe for older people. These include walking, swimming, and nadege chi (a Chinese martial art involving slow, gentle movements).   Use a cane, walker, or other safety devices - If your doctor recommends that you use a cane or walker, make sure that it's the right size and you know how to use it. There are other devices that might help you avoid falling, too. These include grab bars or a sturdy seat for the  shower, non-slip bath mats, and hand rails or treads for the stairs (to prevent slipping).   Take extra care if you have had surgery or are in the hospital - Ask for help with getting out of bed, getting up from a chair, or going to the bathroom. Your body needs time to heal, and it's normal to need help with these things while you recover. It can also help to keep your belongings within reach, and avoid walking around in the dark. If you need help, use the call button instead of trying to get up.  If you worry that you could fall, you can get an emergency alert system. This is usually an alarm button that lets you call for help if you fall and can't get up. If you live alone and you do not have an emergency alert system, always carry a cell phone or portable phone with you when moving around the house.  How do I get up from a fall? -- If you do fall, try not to be afraid. Stay calm, and take slow, deep breaths. If someone is near you, call for help right away. If you have an emergency alert system, use it.  Try to find out if you are hurt. If you are hurt badly, trying to get up can make things worse. If you do not think that you are hurt badly, come up with a plan to get up off of the floor.  Some tips to get up after a fall if you are alone:   Look around you for a piece of sturdy furniture such as a couch or chair. Try to move your body to the furniture. You might need to scoot, crawl, or roll to get close. Do these movements very slowly. If you feel any sharp pains, you might need to stop.   Once you are close to the furniture, roll onto your side. Pull your knees up toward your chest, and try to get on your hands and knees.   Put your hands on the seat of the couch or chair.   Bring 1 leg forward so the foot is flat on the floor. If you have a stronger leg, move this leg first.   Now, try to stand up. Once both feet are on the ground, slowly turn and lower yourself to sit down on the seat.  What should I do  after a fall? -- If you had a fall, see your doctor right away, even if you aren't hurt. Your doctor can try to figure out what caused you to fall, and how likely you are to fall again. They will do an exam and talk to you about your health problems, medicines, and activities. They can also check how well you walk, move, and balance. Then, they can suggest things you can do to lower your risk of falling again.  Many older people have a hard time recovering after a fall. Doing things to prevent falling can help you protect your health and independence.  All topics are updated as new evidence becomes available and our peer review process is complete.  This topic retrieved from [employer] on: Apr 04, 2024.  Topic 61806 Version 25.0  Release: 32.2.4 - C32.93  © 2024 UpToDate, Inc. and/or its affiliates. All rights reserved.  figure 1: How to avoid falling at home     This picture shows some of the things that can cause a fall in your home. Look around and remove any loose rugs, electrical cords, clutter, or furniture that could trip you.  Graphic 47888 Version 1.0  Consumer Information Use and Disclaimer   Disclaimer: This generalized information is a limited summary of diagnosis, treatment, and/or medication information. It is not meant to be comprehensive and should be used as a tool to help the user understand and/or assess potential diagnostic and treatment options. It does NOT include all information about conditions, treatments, medications, side effects, or risks that may apply to a specific patient. It is not intended to be medical advice or a substitute for the medical advice, diagnosis, or treatment of a health care provider based on the health care provider's examination and assessment of a patient's specific and unique circumstances. Patients must speak with a health care provider for complete information about their health, medical questions, and treatment options, including any risks or benefits regarding  use of medications. This information does not endorse any treatments or medications as safe, effective, or approved for treating a specific patient. UpToDate, Inc. and its affiliates disclaim any warranty or liability relating to this information or the use thereof.The use of this information is governed by the Terms of Use, available at https://www.[employer].com/en/know/clinical-effectiveness-terms. 2024© UpToDate, Inc. and its affiliates and/or licensors. All rights reserved.  Copyright   © 2024 UpToDate, Inc. and/or its affiliates. All rights reserved.    Medicare Preventive Visit Patient Instructions  Thank you for completing your Welcome to Medicare Visit or Medicare Annual Wellness Visit today. Your next wellness visit will be due in one year (10/22/2025).  The screening/preventive services that you may require over the next 5-10 years are detailed below. Some tests may not apply to you based off risk factors and/or age. Screening tests ordered at today's visit but not completed yet may show as past due. Also, please note that scanned in results may not display below.  Preventive Screenings:  Service Recommendations Previous Testing/Comments   Colorectal Cancer Screening  Colonoscopy    Fecal Occult Blood Test (FOBT)/Fecal Immunochemical Test (FIT)  Fecal DNA/Cologuard Test  Flexible Sigmoidoscopy Age: 45-75 years old   Colonoscopy: every 10 years (May be performed more frequently if at higher risk)  OR  FOBT/FIT: every 1 year  OR  Cologuard: every 3 years  OR  Sigmoidoscopy: every 5 years  Screening may be recommended earlier than age 45 if at higher risk for colorectal cancer. Also, an individualized decision between you and your healthcare provider will decide whether screening between the ages of 76-85 would be appropriate. Colonoscopy: 06/01/2022  FOBT/FIT: Not on file  Cologuard: Not on file  Sigmoidoscopy: Not on file    Screening Current     Prostate Cancer Screening Individualized decision between  patient and health care provider in men between ages of 55-69   Medicare will cover every 12 months beginning on the day after your 50th birthday PSA: 1.6 ng/mL           Hepatitis C Screening Once for adults born between 1945 and 1965  More frequently in patients at high risk for Hepatitis C Hep C Antibody: 09/22/2015    Screening Current   Diabetes Screening 1-2 times per year if you're at risk for diabetes or have pre-diabetes Fasting glucose: 96 mg/dL (5/9/2022)  A1C: 5.6 % (7/19/2023)  Screening Current   Cholesterol Screening Once every 5 years if you don't have a lipid disorder. May order more often based on risk factors. Lipid panel: 02/22/2023  Screening Not Indicated  History Lipid Disorder      Other Preventive Screenings Covered by Medicare:  Abdominal Aortic Aneurysm (AAA) Screening: covered once if your at risk. You're considered to be at risk if you have a family history of AAA or a male between the age of 65-75 who smoking at least 100 cigarettes in your lifetime.  Lung Cancer Screening: covers low dose CT scan once per year if you meet all of the following conditions: (1) Age 55-77; (2) No signs or symptoms of lung cancer; (3) Current smoker or have quit smoking within the last 15 years; (4) You have a tobacco smoking history of at least 20 pack years (packs per day x number of years you smoked); (5) You get a written order from a healthcare provider.  Glaucoma Screening: covered annually if you're considered high risk: (1) You have diabetes OR (2) Family history of glaucoma OR (3)  aged 50 and older OR (4)  American aged 65 and older  Osteoporosis Screening: covered every 2 years if you meet one of the following conditions: (1) Have a vertebral abnormality; (2) On glucocorticoid therapy for more than 3 months; (3) Have primary hyperparathyroidism; (4) On osteoporosis medications and need to assess response to drug therapy.  HIV Screening: covered annually if you're between  the age of 15-65. Also covered annually if you are younger than 15 and older than 65 with risk factors for HIV infection. For pregnant patients, it is covered up to 3 times per pregnancy.    Immunizations:  Immunization Recommendations   Influenza Vaccine Annual influenza vaccination during flu season is recommended for all persons aged >= 6 months who do not have contraindications   Pneumococcal Vaccine   * Pneumococcal conjugate vaccine = PCV13 (Prevnar 13), PCV15 (Vaxneuvance), PCV20 (Prevnar 20)  * Pneumococcal polysaccharide vaccine = PPSV23 (Pneumovax) Adults 19-65 yo with certain risk factors or if 65+ yo  If never received any pneumonia vaccine: recommend Prevnar 20 (PCV20)  Give PCV20 if previously received 1 dose of PCV13 or PPSV23   Hepatitis B Vaccine 3 dose series if at intermediate or high risk (ex: diabetes, end stage renal disease, liver disease)   Respiratory syncytial virus (RSV) Vaccine - COVERED BY MEDICARE PART D  * RSVPreF3 (Arexvy) CDC recommends that adults 60 years of age and older may receive a single dose of RSV vaccine using shared clinical decision-making (SCDM)   Tetanus (Td) Vaccine - COST NOT COVERED BY MEDICARE PART B Following completion of primary series, a booster dose should be given every 10 years to maintain immunity against tetanus. Td may also be given as tetanus wound prophylaxis.   Tdap Vaccine - COST NOT COVERED BY MEDICARE PART B Recommended at least once for all adults. For pregnant patients, recommended with each pregnancy.   Shingles Vaccine (Shingrix) - COST NOT COVERED BY MEDICARE PART B  2 shot series recommended in those 19 years and older who have or will have weakened immune systems or those 50 years and older     Health Maintenance Due:      Topic Date Due   • Colorectal Cancer Screening  05/31/2027   • Hepatitis C Screening  Completed     Immunizations Due:      Topic Date Due   • Influenza Vaccine (1) 09/01/2024   • COVID-19 Vaccine (6 - 2023-24 season)  09/01/2024     Advance Directives   What are advance directives?  Advance directives are legal documents that state your wishes and plans for medical care. These plans are made ahead of time in case you lose your ability to make decisions for yourself. Advance directives can apply to any medical decision, such as the treatments you want, and if you want to donate organs.   What are the types of advance directives?  There are many types of advance directives, and each state has rules about how to use them. You may choose a combination of any of the following:  Living will:  This is a written record of the treatment you want. You can also choose which treatments you do not want, which to limit, and which to stop at a certain time. This includes surgery, medicine, IV fluid, and tube feedings.   Durable power of  for healthcare (DPAHC):  This is a written record that states who you want to make healthcare choices for you when you are unable to make them for yourself. This person, called a proxy, is usually a family member or a friend. You may choose more than 1 proxy.  Do not resuscitate (DNR) order:  A DNR order is used in case your heart stops beating or you stop breathing. It is a request not to have certain forms of treatment, such as CPR. A DNR order may be included in other types of advance directives.  Medical directive:  This covers the care that you want if you are in a coma, near death, or unable to make decisions for yourself. You can list the treatments you want for each condition. Treatment may include pain medicine, surgery, blood transfusions, dialysis, IV or tube feedings, and a ventilator (breathing machine).  Values history:  This document has questions about your views, beliefs, and how you feel and think about life. This information can help others choose the care that you would choose.  Why are advance directives important?  An advance directive helps you control your care. Although spoken  wishes may be used, it is better to have your wishes written down. Spoken wishes can be misunderstood, or not followed. Treatments may be given even if you do not want them. An advance directive may make it easier for your family to make difficult choices about your care.   Weight Management   Why it is important to manage your weight:  Being overweight increases your risk of health conditions such as heart disease, high blood pressure, type 2 diabetes, and certain types of cancer. It can also increase your risk for osteoarthritis, sleep apnea, and other respiratory problems. Aim for a slow, steady weight loss. Even a small amount of weight loss can lower your risk of health problems.  How to lose weight safely:  A safe and healthy way to lose weight is to eat fewer calories and get regular exercise. You can lose up about 1 pound a week by decreasing the number of calories you eat by 500 calories each day.   Healthy meal plan for weight management:  A healthy meal plan includes a variety of foods, contains fewer calories, and helps you stay healthy. A healthy meal plan includes the following:  Eat whole-grain foods more often.  A healthy meal plan should contain fiber. Fiber is the part of grains, fruits, and vegetables that is not broken down by your body. Whole-grain foods are healthy and provide extra fiber in your diet. Some examples of whole-grain foods are whole-wheat breads and pastas, oatmeal, brown rice, and bulgur.  Eat a variety of vegetables every day.  Include dark, leafy greens such as spinach, kale, jenny greens, and mustard greens. Eat yellow and orange vegetables such as carrots, sweet potatoes, and winter squash.   Eat a variety of fruits every day.  Choose fresh or canned fruit (canned in its own juice or light syrup) instead of juice. Fruit juice has very little or no fiber.  Eat low-fat dairy foods.  Drink fat-free (skim) milk or 1% milk. Eat fat-free yogurt and low-fat cottage cheese. Try  low-fat cheeses such as mozzarella and other reduced-fat cheeses.  Choose meat and other protein foods that are low in fat.  Choose beans or other legumes such as split peas or lentils. Choose fish, skinless poultry (chicken or turkey), or lean cuts of red meat (beef or pork). Before you cook meat or poultry, cut off any visible fat.   Use less fat and oil.  Try baking foods instead of frying them. Add less fat, such as margarine, sour cream, regular salad dressing and mayonnaise to foods. Eat fewer high-fat foods. Some examples of high-fat foods include french fries, doughnuts, ice cream, and cakes.  Eat fewer sweets.  Limit foods and drinks that are high in sugar. This includes candy, cookies, regular soda, and sweetened drinks.  Exercise:  Exercise at least 30 minutes per day on most days of the week. Some examples of exercise include walking, biking, dancing, and swimming. You can also fit in more physical activity by taking the stairs instead of the elevator or parking farther away from stores. Ask your healthcare provider about the best exercise plan for you.      © Copyright [employer] 2018 Information is for End User's use only and may not be sold, redistributed or otherwise used for commercial purposes. All illustrations and images included in CareNotes® are the copyrighted property of [employer].D.A.M., Inc. or [employer]

## 2024-10-23 ENCOUNTER — TELEPHONE (OUTPATIENT)
Age: 74
End: 2024-10-23

## 2024-10-23 NOTE — TELEPHONE ENCOUNTER
Pt called, confirmed provider ordered lab work for pt.  Pt does not need to hand carry lab orders, labs are visible in chart - pt can go to any [facility]'s lab, or the hospital to have lab work drawn, no ppw needed.  Pt thanked us for the information.   assisted with the call  Jessica  ID# [id number]  Noted for visibility.

## 2024-10-29 ENCOUNTER — TELEPHONE (OUTPATIENT)
Dept: OBGYN CLINIC | Facility: HOSPITAL | Age: 74
End: 2024-10-29

## 2024-10-29 ENCOUNTER — TELEPHONE (OUTPATIENT)
Dept: OBGYN CLINIC | Facility: MEDICAL CENTER | Age: 74
End: 2024-10-29

## 2024-10-29 NOTE — TELEPHONE ENCOUNTER
Left message (Serbian) need to RS 11/1/24, Dr Garibay will be out of the office, please reschedule patient when returning call back.     Thank you.

## 2024-10-31 ENCOUNTER — APPOINTMENT (OUTPATIENT)
Dept: LAB | Facility: CLINIC | Age: 74
End: 2024-10-31
Payer: COMMERCIAL

## 2024-10-31 DIAGNOSIS — Z00.00 MEDICARE ANNUAL WELLNESS VISIT, SUBSEQUENT: ICD-10-CM

## 2024-10-31 DIAGNOSIS — Z12.5 SCREENING FOR PROSTATE CANCER: ICD-10-CM

## 2024-10-31 LAB
ALBUMIN SERPL BCG-MCNC: 4.1 G/DL (ref 3.5–5)
ALP SERPL-CCNC: 69 U/L (ref 34–104)
ALT SERPL W P-5'-P-CCNC: 20 U/L (ref 7–52)
ANION GAP SERPL CALCULATED.3IONS-SCNC: 11 MMOL/L (ref 4–13)
AST SERPL W P-5'-P-CCNC: 25 U/L (ref 13–39)
BASOPHILS # BLD AUTO: 0.07 THOUSANDS/ÂΜL (ref 0–0.1)
BASOPHILS NFR BLD AUTO: 1 % (ref 0–1)
BILIRUB SERPL-MCNC: 0.41 MG/DL (ref 0.2–1)
BUN SERPL-MCNC: 23 MG/DL (ref 5–25)
CALCIUM SERPL-MCNC: 9 MG/DL (ref 8.4–10.2)
CHLORIDE SERPL-SCNC: 102 MMOL/L (ref 96–108)
CHOLEST SERPL-MCNC: 139 MG/DL
CO2 SERPL-SCNC: 28 MMOL/L (ref 21–32)
CREAT SERPL-MCNC: 0.9 MG/DL (ref 0.6–1.3)
EOSINOPHIL # BLD AUTO: 0.25 THOUSAND/ÂΜL (ref 0–0.61)
EOSINOPHIL NFR BLD AUTO: 4 % (ref 0–6)
ERYTHROCYTE [DISTWIDTH] IN BLOOD BY AUTOMATED COUNT: 13.3 % (ref 11.6–15.1)
GFR SERPL CREATININE-BSD FRML MDRD: 83 ML/MIN/1.73SQ M
GLUCOSE P FAST SERPL-MCNC: 91 MG/DL (ref 65–99)
HCT VFR BLD AUTO: 40.4 % (ref 36.5–49.3)
HDLC SERPL-MCNC: 55 MG/DL
HGB BLD-MCNC: 13.1 G/DL (ref 12–17)
IMM GRANULOCYTES # BLD AUTO: 0.03 THOUSAND/UL (ref 0–0.2)
IMM GRANULOCYTES NFR BLD AUTO: 1 % (ref 0–2)
LDLC SERPL CALC-MCNC: 71 MG/DL (ref 0–100)
LYMPHOCYTES # BLD AUTO: 1.8 THOUSANDS/ÂΜL (ref 0.6–4.47)
LYMPHOCYTES NFR BLD AUTO: 28 % (ref 14–44)
MCH RBC QN AUTO: 30.2 PG (ref 26.8–34.3)
MCHC RBC AUTO-ENTMCNC: 32.4 G/DL (ref 31.4–37.4)
MCV RBC AUTO: 93 FL (ref 82–98)
MONOCYTES # BLD AUTO: 0.74 THOUSAND/ÂΜL (ref 0.17–1.22)
MONOCYTES NFR BLD AUTO: 11 % (ref 4–12)
NEUTROPHILS # BLD AUTO: 3.58 THOUSANDS/ÂΜL (ref 1.85–7.62)
NEUTS SEG NFR BLD AUTO: 55 % (ref 43–75)
NRBC BLD AUTO-RTO: 0 /100 WBCS
PLATELET # BLD AUTO: 265 THOUSANDS/UL (ref 149–390)
PMV BLD AUTO: 10.7 FL (ref 8.9–12.7)
POTASSIUM SERPL-SCNC: 4.1 MMOL/L (ref 3.5–5.3)
PROT SERPL-MCNC: 7.4 G/DL (ref 6.4–8.4)
PSA SERPL-MCNC: 1.41 NG/ML (ref 0–4)
RBC # BLD AUTO: 4.34 MILLION/UL (ref 3.88–5.62)
SODIUM SERPL-SCNC: 141 MMOL/L (ref 135–147)
TRIGL SERPL-MCNC: 66 MG/DL
WBC # BLD AUTO: 6.47 THOUSAND/UL (ref 4.31–10.16)

## 2024-10-31 PROCEDURE — 80053 COMPREHEN METABOLIC PANEL: CPT

## 2024-10-31 PROCEDURE — 85025 COMPLETE CBC W/AUTO DIFF WBC: CPT

## 2024-10-31 PROCEDURE — 80061 LIPID PANEL: CPT

## 2024-10-31 PROCEDURE — 36415 COLL VENOUS BLD VENIPUNCTURE: CPT

## 2024-10-31 PROCEDURE — G0103 PSA SCREENING: HCPCS

## 2024-11-01 ENCOUNTER — TELEPHONE (OUTPATIENT)
Dept: UROLOGY | Facility: AMBULATORY SURGERY CENTER | Age: 74
End: 2024-11-01

## 2024-11-01 NOTE — TELEPHONE ENCOUNTER
I was able to call and speak with patient to let him know that we reviewed his most recent PSA and it returned low and stable at a value of 1.407.  Patient can repeat his PSA in 1 year from his last. Patient is aware of everything and understood.          ----- Message from Geovany Keith PA-C sent at 11/1/2024  9:07 AM EDT -----  I reviewed the patient's most recent PSA and it returned low and stable at a value of 1.407.  Patient can repeat his PSA in 1 year from his last.

## 2024-11-08 ENCOUNTER — OFFICE VISIT (OUTPATIENT)
Dept: OBGYN CLINIC | Facility: MEDICAL CENTER | Age: 74
End: 2024-11-08
Payer: COMMERCIAL

## 2024-11-08 VITALS
SYSTOLIC BLOOD PRESSURE: 130 MMHG | HEART RATE: 78 BPM | BODY MASS INDEX: 33.07 KG/M2 | HEIGHT: 66 IN | WEIGHT: 205.8 LBS | DIASTOLIC BLOOD PRESSURE: 74 MMHG

## 2024-11-08 DIAGNOSIS — Z96.652 STATUS POST TOTAL KNEE REPLACEMENT, LEFT: ICD-10-CM

## 2024-11-08 DIAGNOSIS — M17.11 PRIMARY OSTEOARTHRITIS OF RIGHT KNEE: Primary | ICD-10-CM

## 2024-11-08 PROCEDURE — 99213 OFFICE O/P EST LOW 20 MIN: CPT | Performed by: ORTHOPAEDIC SURGERY

## 2024-11-08 PROCEDURE — 20610 DRAIN/INJ JOINT/BURSA W/O US: CPT | Performed by: ORTHOPAEDIC SURGERY

## 2024-11-08 RX ORDER — BUPIVACAINE HYDROCHLORIDE 2.5 MG/ML
2 INJECTION, SOLUTION INFILTRATION; PERINEURAL
Status: COMPLETED | OUTPATIENT
Start: 2024-11-08 | End: 2024-11-08

## 2024-11-08 RX ORDER — TRIAMCINOLONE ACETONIDE 40 MG/ML
40 INJECTION, SUSPENSION INTRA-ARTICULAR; INTRAMUSCULAR
Status: COMPLETED | OUTPATIENT
Start: 2024-11-08 | End: 2024-11-08

## 2024-11-08 RX ADMIN — TRIAMCINOLONE ACETONIDE 40 MG: 40 INJECTION, SUSPENSION INTRA-ARTICULAR; INTRAMUSCULAR at 11:15

## 2024-11-08 RX ADMIN — BUPIVACAINE HYDROCHLORIDE 2 ML: 2.5 INJECTION, SOLUTION INFILTRATION; PERINEURAL at 11:15

## 2024-11-08 NOTE — PROGRESS NOTES
Assessment/Plan:  1. Primary osteoarthritis of right knee    2. Status post total knee replacement, left      Orders Placed This Encounter   Procedures    Large joint arthrocentesis     Patient has severe right knee osteoarthritis.  Patient is also 1 year status post left TKA done on 8/14/2023 doing well  Injections: Patient received right knee steroid injection today. Tolerated the procedure well. Post injection instructions reviewed including information on glucose monitoring for diabetic patients. Patient aware that they may repeat steroid injection every 3 months if needed.   Medications: Tylenol up to 3000 mg per day  Bracing: Continue short hinge knee brace as needed for comfort.   Activity: Continue activity as tolerated.   Plan for next appt: Right knee CSI and XR left knee.    Translation through yoni on patient's phone which is his preference.    Return in about 3 months (around 2/8/2025) for repeat right knee CSI and left knee XR.    I answered all of the patient's questions during the visit and provided education of the patient's condition during the visit.  The patient verbalized understanding of the information given and agrees with the plan.  This note was dictated using [employer] software.  It may contain errors including improperly dictated words.  Please contact physician directly for any questions.    Subjective   Chief Complaint:   Chief Complaint   Patient presents with    Right Knee - Follow-up       HPI  Wade Le is a 73 y.o. male who presents for follow up for severe right knee osteoarthritis.  Patient was last seen 7/26/2024 patient received right knee steroid injection.     Pain Description: constant ache  Palliating Factors: activity modification, physical therapy  Provoking Factors: prolonged weight bearing  Associated Symptoms: stiffness  Medications: tylenol daily   Able to take NSAIDs? If not, why: no, due to Eliquis   Physical Therapy or Home Exercises: Has done PT in the  "past and has transition to HEP.  Injections: Right knee CSI 2024, 3 months of relief.   Bracing: hinge knee brace as needed  Previous Surgery: No    Review of Systems  ROS:    See [facility] for musculoskeletal review.   All other systems reviewed are negative     History:  Past Medical History:   Diagnosis Date    Anemia     Cataract     Colon polyp     Diverticulosis     Gastritis     GERD (gastroesophageal reflux disease)     Hyperlipidemia     Hypertension     Osteoarthritis of left knee 2023    Pre-diabetes     Throat ulcer     Visual impairment      Past Surgical History:   Procedure Laterality Date    COLONOSCOPY N/A 2016    Procedure: COLONOSCOPY;  Surgeon: Mariano Paz MD;  Location:  GI LAB;  Service:     COLONOSCOPY W/ POLYPECTOMY  2019    repeat     EYE SURGERY Bilateral     FL ARTHRP KNE CONDYLE&PLATU MEDIAL&LAT COMPARTMENTS Left 2023    Procedure: ARTHROPLASTY KNEE TOTAL;  Surgeon: Abby Garibay DO;  Location:  MAIN OR;  Service: Orthopedics    TONSILLECTOMY      UPPER GASTROINTESTINAL ENDOSCOPY  2019     Social History   Social History     Substance and Sexual Activity   Alcohol Use Never    Comment: pt \"sober for 30+ years\"     Social History     Substance and Sexual Activity   Drug Use No     Social History     Tobacco Use   Smoking Status Former    Current packs/day: 0.00    Types: Cigarettes    Quit date:     Years since quittin.8   Smokeless Tobacco Never   Tobacco Comments    Patient quit     Family History:   Family History   Problem Relation Age of Onset    Hypertension Mother     Hypertension Father        Current Outpatient Medications on File Prior to Visit   Medication Sig Dispense Refill    amLODIPine (NORVASC) 2.5 mg tablet Take 1 tablet (2.5 mg total) by mouth daily Do not start before 2023. 30 tablet 0    atorvastatin (LIPITOR) 20 mg tablet Take 1 tablet (20 mg total) by mouth daily at bedtime 90 tablet 3    Diclofenac Sodium " "(VOLTAREN) 1 % Apply 2 g topically 4 (four) times a day  0    lisinopril-hydrochlorothiazide (PRINZIDE,ZESTORETIC) 20-25 MG per tablet TAKE 1 TABLET BY MOUTH DAILY 90 tablet 1    trospium chloride (SANCTURA) 20 mg tablet Take 1 tablet (20 mg total) by mouth 2 (two) times a day 60 tablet 3     No current facility-administered medications on file prior to visit.     Allergies   Allergen Reactions    Lactose - Food Allergy Diarrhea     diarrhea        Objective     /74   Pulse 78   Ht 5' 6\" (1.676 m)   Wt 93.4 kg (205 lb 12.8 oz)   BMI 33.22 kg/m²      PE:  AAOx 3  WDWN  Hearing intact, no drainage from eyes  no audible wheezing  no abdominal distension  LE compartments soft, skin intact    Ortho Exam:  bilateral Knee:   No erythema  no swelling  no effusion  no warmth  AROM: 0-110  Stable to varus/valgus stress      Large joint arthrocentesis: R knee  Ponder Protocol:  procedure performed by consultantConsent: Verbal consent obtained.  Risks and benefits: risks, benefits and alternatives were discussed  Consent given by: patient  Patient understanding: patient states understanding of the procedure being performed  Site marked: the operative site was marked  Patient identity confirmed: verbally with patient  Supporting Documentation  Indications: pain   Procedure Details  Location: knee - R knee  Needle size: 22 G  Ultrasound guidance: no  Approach: anterolateral  Medications administered: 2 mL bupivacaine 0.25 %; 40 mg triamcinolone acetonide 40 mg/mL    Patient tolerance: patient tolerated the procedure well with no immediate complications  Dressing:  Sterile dressing applied          Scribe Attestation      I,:  Omar Mackey am acting as a scribe while in the presence of the attending physician.:       I,:  Abby Garibay DO personally performed the services described in this documentation    as scribed in my presence.:                    "

## 2024-12-11 ENCOUNTER — OFFICE VISIT (OUTPATIENT)
Dept: UROLOGY | Facility: AMBULATORY SURGERY CENTER | Age: 74
End: 2024-12-11
Payer: COMMERCIAL

## 2024-12-11 VITALS
HEIGHT: 66 IN | OXYGEN SATURATION: 97 % | BODY MASS INDEX: 32.95 KG/M2 | DIASTOLIC BLOOD PRESSURE: 80 MMHG | HEART RATE: 63 BPM | SYSTOLIC BLOOD PRESSURE: 124 MMHG | WEIGHT: 205 LBS

## 2024-12-11 DIAGNOSIS — Z12.5 SCREENING FOR PROSTATE CANCER: Primary | ICD-10-CM

## 2024-12-11 DIAGNOSIS — N32.81 OAB (OVERACTIVE BLADDER): ICD-10-CM

## 2024-12-11 PROCEDURE — 99213 OFFICE O/P EST LOW 20 MIN: CPT

## 2024-12-11 RX ORDER — TROSPIUM CHLORIDE 20 MG/1
20 TABLET, FILM COATED ORAL 2 TIMES DAILY
Qty: 180 TABLET | Refills: 3 | Status: SHIPPED | OUTPATIENT
Start: 2024-12-11 | End: 2025-04-10

## 2024-12-11 NOTE — ASSESSMENT & PLAN NOTE
Patient was previously started on trospium 20 mg twice daily at his last office visit.  He reports that he has noticed significant improvement in his urinary symptoms since starting the medication.  He wishes to stay on the medication, refills provided.  He will follow-up in 1 year with AUA and PVR.  Orders:    trospium chloride (SANCTURA) 20 mg tablet; Take 1 tablet (20 mg total) by mouth 2 (two) times a day

## 2024-12-11 NOTE — PROGRESS NOTES
Name: Wade Le      : 1950      MRN: [id number]  Encounter Provider: GUERLINE Hernandez  Encounter Date: 2024   Encounter department: [facility] [place]  :  Assessment & Plan  OAB (overactive bladder)  Patient was previously started on trospium 20 mg twice daily at his last office visit.  He reports that he has noticed significant improvement in his urinary symptoms since starting the medication.  He wishes to stay on the medication, refills provided.  He will follow-up in 1 year with AUA and PVR.  Orders:    trospium chloride (SANCTURA) 20 mg tablet; Take 1 tablet (20 mg total) by mouth 2 (two) times a day    Screening for prostate cancer  Patient's PSA has remained low and stable.  YANIRA at last office visit with no palpable concerns.  Prostate cancer screening can be discontinued based off of AUA guidelines.  Lab Results   Component Value Date    PSA 1.407 10/31/2024    PSA 1.6 2022             History of Present Illness   Wade Le is a 74 y.o. male who presents today to the office for follow-up of overactive bladder and prostate cancer screening.  He was initiated on trospium 20 mg twice daily at his last office visit for bothersome lower urinary tract symptoms.  Today in the office he states that he is very satisfied with his urinary symptoms and they have significantly improved since starting the medication.  He does wish to stay on the medication.  He denies any strong family history of prostate cancer.  He denies any urinary/urological complaints today in the office.    Review of Systems   Constitutional:  Negative for chills and fever.   Respiratory: Negative.  Negative for cough and shortness of breath.    Cardiovascular: Negative.  Negative for chest pain.   Gastrointestinal: Negative.  Negative for abdominal distention, abdominal pain, nausea and vomiting.   Genitourinary:  Negative for decreased urine volume, difficulty urinating,  "dysuria, flank pain, frequency, hematuria, penile discharge, penile pain, penile swelling, scrotal swelling, testicular pain and urgency.   Skin: Negative.  Negative for rash.   Neurological: Negative.    Hematological:  Negative for adenopathy. Does not bruise/bleed easily.          Objective   /80 (BP Location: Left arm, Patient Position: Sitting, Cuff Size: Standard)   Pulse 63   Ht 5' 6\" (1.676 m)   Wt 93 kg (205 lb)   SpO2 97%   BMI 33.09 kg/m²     Physical Exam  Vitals reviewed.   Constitutional:       Appearance: Normal appearance.   HENT:      Head: Normocephalic and atraumatic.   Eyes:      Pupils: Pupils are equal, round, and reactive to light.   Cardiovascular:      Rate and Rhythm: Normal rate.   Pulmonary:      Effort: Pulmonary effort is normal.   Musculoskeletal:      Cervical back: Normal range of motion.   Skin:     General: Skin is warm and dry.   Neurological:      General: No focal deficit present.      Mental Status: He is alert and oriented to person, place, and time.   Psychiatric:         Mood and Affect: Mood normal.         Behavior: Behavior normal.         Thought Content: Thought content normal.         Judgment: Judgment normal.          Results  Lab Results   Component Value Date    PSA 1.407 10/31/2024    PSA 1.6 05/09/2022     Lab Results   Component Value Date    GLUCOSE 84 10/28/2015    CALCIUM 9.0 10/31/2024     10/28/2015    K 4.1 10/31/2024    CO2 28 10/31/2024     10/31/2024    BUN 23 10/31/2024    CREATININE 0.90 10/31/2024     Lab Results   Component Value Date    WBC 6.47 10/31/2024    HGB 13.1 10/31/2024    HCT 40.4 10/31/2024    MCV 93 10/31/2024     10/31/2024       Office Urine Dip  No results found for this or any previous visit (from the past hour).]      "

## 2024-12-11 NOTE — ASSESSMENT & PLAN NOTE
Patient's PSA has remained low and stable.  YANIRA at last office visit with no palpable concerns.  Prostate cancer screening can be discontinued based off of AUA guidelines.  Lab Results   Component Value Date    PSA 1.407 10/31/2024    PSA 1.6 05/09/2022

## 2024-12-16 ENCOUNTER — OFFICE VISIT (OUTPATIENT)
Dept: PAIN MEDICINE | Facility: CLINIC | Age: 74
End: 2024-12-16
Payer: COMMERCIAL

## 2024-12-16 VITALS
HEART RATE: 65 BPM | SYSTOLIC BLOOD PRESSURE: 126 MMHG | WEIGHT: 204 LBS | HEIGHT: 66 IN | DIASTOLIC BLOOD PRESSURE: 73 MMHG | BODY MASS INDEX: 32.78 KG/M2

## 2024-12-16 DIAGNOSIS — M47.816 LUMBAR SPONDYLOSIS: ICD-10-CM

## 2024-12-16 DIAGNOSIS — M48.062 SPINAL STENOSIS OF LUMBAR REGION WITH NEUROGENIC CLAUDICATION: ICD-10-CM

## 2024-12-16 DIAGNOSIS — M54.16 LUMBAR RADICULOPATHY: Primary | ICD-10-CM

## 2024-12-16 PROCEDURE — 99214 OFFICE O/P EST MOD 30 MIN: CPT | Performed by: NURSE PRACTITIONER

## 2024-12-16 NOTE — PROGRESS NOTES
Assessment:  1. Lumbar radiculopathy    2. Lumbar spondylosis    3. Spinal stenosis of lumbar region with neurogenic claudication        Plan:  Patient will be scheduled for a right L4 and L5 TFESI to address the inflammatory component of the patient's pain.  Complete risks and benefits including bleeding, infection, tissue reaction, nerve injury and allergic reaction were discussed. The patient was agreeable and verbalized an understanding  Patient will continue to follow with orthopedics regarding knee complaints  Discussed physical therapy however patient declines today  Patient may continue Tylenol as needed  Follow-up pending results of procedure or sooner if needed    Please note that the patient is primarily Indonesian speaking and required interpretive services for this office visit. love Nuñez MA, who speaks fluent Luxembourger was available throughout the entire office visit and interpreted with the patient in agreement and verbalizing understanding.    History of Present Illness:    The patient is a 74 y.o. male with a history of left TKA last seen on 01/29/2024  who presents for a follow up office visit in regards to chronic lumbosacral back pain that radiates into the posterolateral aspect of the right lower extremity.  He denies left-sided symptoms, bowel or bladder incontinence or saddle anesthesia he does continue with knee pain and sees orthopedics for this which he recently received a right intra-articular knee injection November 8, 2024.  He has done physical therapy for his knee however not for his low back and leg symptoms.  He uses Tylenol as needed without much relief.  He has not found relief with Robaxin, topical lidocaine or Voltaren gel.  He did have a right L4 and L5 TFESI February 20, 2024 with 60% relief of his pain for 7 to 8 months    The patient rates his pain an 8 out of 10 on the numeric pain rating scale.  Pain is intermittent in the morning and is described as pressure-like and  numbness    I have personally reviewed and/or updated the patient's past medical history, past surgical history, family history, social history, current medications, allergies, and vital signs today.       Review of Systems:    Review of Systems   Respiratory:  Negative for shortness of breath.    Cardiovascular:  Negative for chest pain.   Gastrointestinal:  Negative for constipation, diarrhea, nausea and vomiting.   Musculoskeletal:  Positive for back pain and gait problem. Negative for arthralgias, joint swelling and myalgias.   Skin:  Negative for rash.   Neurological:  Negative for dizziness, seizures and weakness.   All other systems reviewed and are negative.        Past Medical History:   Diagnosis Date    Anemia     Cataract     Colon polyp     Diverticulosis     Gastritis     GERD (gastroesophageal reflux disease)     Hyperlipidemia     Hypertension     Osteoarthritis of left knee 2023    Pre-diabetes     Throat ulcer     Visual impairment        Past Surgical History:   Procedure Laterality Date    COLONOSCOPY N/A 2016    Procedure: COLONOSCOPY;  Surgeon: Mariano Paz MD;  Location:  GI LAB;  Service:     COLONOSCOPY W/ POLYPECTOMY  2019    repeat     EYE SURGERY Bilateral     NE ARTHRP KNE CONDYLE&PLATU MEDIAL&LAT COMPARTMENTS Left 2023    Procedure: ARTHROPLASTY KNEE TOTAL;  Surgeon: Abby Garibay DO;  Location:  MAIN OR;  Service: Orthopedics    TONSILLECTOMY      UPPER GASTROINTESTINAL ENDOSCOPY  2019       Family History   Problem Relation Age of Onset    Hypertension Mother     Hypertension Father        Social History     Occupational History    Not on file   Tobacco Use    Smoking status: Former     Current packs/day: 0.00     Types: Cigarettes     Quit date:      Years since quittin.9    Smokeless tobacco: Never    Tobacco comments:     Patient quit   Vaping Use    Vaping status: Never Used   Substance and Sexual Activity    Alcohol use: Never     Comment:  "pt \"sober for 30+ years\"    Drug use: No    Sexual activity: Not Currently     Partners: Female     Comment:          Current Outpatient Medications:     amLODIPine (NORVASC) 2.5 mg tablet, Take 1 tablet (2.5 mg total) by mouth daily Do not start before November 25, 2023., Disp: 30 tablet, Rfl: 0    atorvastatin (LIPITOR) 20 mg tablet, Take 1 tablet (20 mg total) by mouth daily at bedtime, Disp: 90 tablet, Rfl: 3    Diclofenac Sodium (VOLTAREN) 1 %, Apply 2 g topically 4 (four) times a day, Disp: , Rfl: 0    lisinopril-hydrochlorothiazide (PRINZIDE,ZESTORETIC) 20-25 MG per tablet, TAKE 1 TABLET BY MOUTH DAILY, Disp: 90 tablet, Rfl: 1    trospium chloride (SANCTURA) 20 mg tablet, Take 1 tablet (20 mg total) by mouth 2 (two) times a day, Disp: 180 tablet, Rfl: 3    Allergies   Allergen Reactions    Lactose - Food Allergy Diarrhea     diarrhea       Physical Exam:    /73   Pulse 65   Ht 5' 6\" (1.676 m)   Wt 92.5 kg (204 lb)   BMI 32.93 kg/m²     Constitutional:normal, well developed, well nourished, alert, in no distress and non-toxic and no overt pain behavior.  Eyes:anicteric  HEENT:grossly intact  Neck:supple, symmetric, trachea midline and no masses   Pulmonary:even and unlabored  Cardiovascular:No edema or pitting edema present  Skin:Normal without rashes or lesions and well hydrated  Psychiatric:Mood and affect appropriate  Neurologic:Cranial Nerves II-XII grossly intact  Musculoskeletal:antalgic and ambulates with cane.  Positive seated straight leg raise on the right      Imaging  FL spine and pain procedure    (Results Pending)         Orders Placed This Encounter   Procedures    FL spine and pain procedure       "

## 2025-01-10 DIAGNOSIS — I10 HYPERTENSION, UNSPECIFIED TYPE: ICD-10-CM

## 2025-01-10 PROBLEM — Z12.5 SCREENING FOR PROSTATE CANCER: Status: RESOLVED | Noted: 2024-10-09 | Resolved: 2025-01-10

## 2025-01-11 RX ORDER — LISINOPRIL AND HYDROCHLOROTHIAZIDE 20; 25 MG/1; MG/1
1 TABLET ORAL DAILY
Qty: 90 TABLET | Refills: 1 | Status: SHIPPED | OUTPATIENT
Start: 2025-01-11

## 2025-01-28 ENCOUNTER — HOSPITAL ENCOUNTER (OUTPATIENT)
Dept: RADIOLOGY | Facility: CLINIC | Age: 75
Discharge: HOME | End: 2025-01-28
Admitting: ANESTHESIOLOGY
Payer: COMMERCIAL

## 2025-01-28 VITALS
TEMPERATURE: 98 F | DIASTOLIC BLOOD PRESSURE: 71 MMHG | HEART RATE: 60 BPM | SYSTOLIC BLOOD PRESSURE: 144 MMHG | RESPIRATION RATE: 16 BRPM | OXYGEN SATURATION: 96 %

## 2025-01-28 DIAGNOSIS — M54.16 LUMBAR RADICULOPATHY: ICD-10-CM

## 2025-01-28 PROCEDURE — 64484 NJX AA&/STRD TFRM EPI L/S EA: CPT | Performed by: ANESTHESIOLOGY

## 2025-01-28 PROCEDURE — 64483 NJX AA&/STRD TFRM EPI L/S 1: CPT | Performed by: ANESTHESIOLOGY

## 2025-01-28 RX ORDER — PAPAVERINE HCL 150 MG
15 CAPSULE, EXTENDED RELEASE ORAL ONCE
Status: COMPLETED | OUTPATIENT
Start: 2025-01-28 | End: 2025-01-28

## 2025-01-28 RX ADMIN — DEXAMETHASONE SODIUM PHOSPHATE 15 MG: 10 INJECTION, SOLUTION INTRAMUSCULAR; INTRAVENOUS at 13:23

## 2025-01-28 RX ADMIN — LIDOCAINE HYDROCHLORIDE 2 ML: 20 INJECTION, SOLUTION EPIDURAL; INFILTRATION; INTRACAUDAL; PERINEURAL at 13:20

## 2025-01-28 RX ADMIN — IOHEXOL 2 ML: 300 INJECTION, SOLUTION INTRAVENOUS at 13:23

## 2025-01-28 NOTE — DISCHARGE INSTR - LAB
Epidural Steroid Injection   WHAT YOU NEED TO KNOW:   An epidural steroid injection (SIXTO) is a procedure to inject steroid medicine into the epidural space. The epidural space is between your spinal cord and vertebrae. Steroids reduce inflammation and fluid buildup in your spine that may be causing pain. You may be given pain medicine along with the steroids.          ACTIVITY  Do not drive or operate machinery today.  No strenuous activity today - bending, lifting, etc.  You may resume normal activites starting tomorrow - start slowly and as tolerated.  You may shower today, but no tub baths or hot tubs.  You may have numbness for several hours from the local anesthetic. Please use caution and common sense, especially with weight-bearing activities.    CARE OF THE INJECTION SITE  If you have soreness or pain, apply ice to the area today (20 minutes on/20 minutes off).  Starting tomorrow, you may use warm, moist heat or ice if needed.  You may have an increase or change in your discomfort for 36-48 hours after your treatment.  Apply ice and continue with any pain medication you have been prescribed.  Notify the [facility] if you have any of the following: redness, drainage, swelling, headache, stiff neck or fever above 100°F.    SPECIAL INSTRUCTIONS  Our office will contact you in approximately 14 days for a progress report.    MEDICATIONS  Continue to take all routine medications.  Our office may have instructed you to hold some medications.    As no general anesthesia was used in today's procedure, you should not experience any side effects related to anesthesia.     If you are diabetic, the steroids used in today's injection may temporarily increase your blood sugar levels after the first few days after your injection. Please keep a close eye on your sugars and alert the doctor who manages your diabetes if your sugars are significantly high from your baseline or you are symptomatic.     If you have a  problem specifically related to your procedure, please call our office at [phone].  Problems not related to your procedure should be directed to your primary care physician.

## 2025-01-28 NOTE — H&P
History of Present Illness: The patient is a 74 y.o. male who presents with complaints of low back and leg pain.    Past Medical History:   Diagnosis Date    Anemia     Cataract     Colon polyp     Diverticulosis     Gastritis     GERD (gastroesophageal reflux disease)     Hyperlipidemia     Hypertension     Osteoarthritis of left knee 06/07/2023    Pre-diabetes     Throat ulcer     Visual impairment        Past Surgical History:   Procedure Laterality Date    COLONOSCOPY N/A 08/18/2016    Procedure: COLONOSCOPY;  Surgeon: Mariano Paz MD;  Location:  GI LAB;  Service:     COLONOSCOPY W/ POLYPECTOMY  2019    repeat 2022    EYE SURGERY Bilateral     ME ARTHRP KNE CONDYLE&PLATU MEDIAL&LAT COMPARTMENTS Left 8/14/2023    Procedure: ARTHROPLASTY KNEE TOTAL;  Surgeon: Abby Garibay DO;  Location:  MAIN OR;  Service: Orthopedics    TONSILLECTOMY      UPPER GASTROINTESTINAL ENDOSCOPY  2019         Current Outpatient Medications:     amLODIPine (NORVASC) 2.5 mg tablet, Take 1 tablet (2.5 mg total) by mouth daily Do not start before November 25, 2023., Disp: 30 tablet, Rfl: 0    atorvastatin (LIPITOR) 20 mg tablet, Take 1 tablet (20 mg total) by mouth daily at bedtime, Disp: 90 tablet, Rfl: 3    Diclofenac Sodium (VOLTAREN) 1 %, Apply 2 g topically 4 (four) times a day, Disp: , Rfl: 0    lisinopril-hydrochlorothiazide (PRINZIDE,ZESTORETIC) 20-25 MG per tablet, TAKE 1 TABLET BY MOUTH DAILY, Disp: 90 tablet, Rfl: 1    trospium chloride (SANCTURA) 20 mg tablet, Take 1 tablet (20 mg total) by mouth 2 (two) times a day, Disp: 180 tablet, Rfl: 3    Allergies   Allergen Reactions    Lactose - Food Allergy Diarrhea     diarrhea       Physical Exam:   Vitals:    01/28/25 1302   BP: 150/71   Resp: 16   Temp: 98 °F (36.7 °C)   SpO2: 99%     General: Awake, Alert, Oriented x 3, Mood and affect appropriate  Respiratory: Respirations even and unlabored  Cardiovascular: Peripheral pulses intact; no edema  Musculoskeletal Exam:  antalgic gait    ASA Score: 3         Assessment:   1. Lumbar radiculopathy        Plan: Right L4 and L5 TFESI

## 2025-02-11 ENCOUNTER — TELEPHONE (OUTPATIENT)
Dept: PAIN MEDICINE | Facility: CLINIC | Age: 75
End: 2025-02-11

## 2025-02-11 NOTE — TELEPHONE ENCOUNTER
Caller: PT  Doctor/office: Dr Ferro  CB#: [phone]     % of improvement: 75%  Pain Scale (1-10): 5

## 2025-02-12 ENCOUNTER — OFFICE VISIT (OUTPATIENT)
Dept: OBGYN CLINIC | Facility: MEDICAL CENTER | Age: 75
End: 2025-02-12
Payer: COMMERCIAL

## 2025-02-12 VITALS — WEIGHT: 204 LBS | BODY MASS INDEX: 32.78 KG/M2 | HEIGHT: 66 IN

## 2025-02-12 DIAGNOSIS — G89.29 CHRONIC PAIN OF RIGHT KNEE: ICD-10-CM

## 2025-02-12 DIAGNOSIS — M17.11 PRIMARY OSTEOARTHRITIS OF RIGHT KNEE: Primary | ICD-10-CM

## 2025-02-12 DIAGNOSIS — Z96.652 STATUS POST TOTAL KNEE REPLACEMENT, LEFT: ICD-10-CM

## 2025-02-12 DIAGNOSIS — M25.561 CHRONIC PAIN OF RIGHT KNEE: ICD-10-CM

## 2025-02-12 PROCEDURE — 20610 DRAIN/INJ JOINT/BURSA W/O US: CPT | Performed by: ORTHOPAEDIC SURGERY

## 2025-02-12 PROCEDURE — 99213 OFFICE O/P EST LOW 20 MIN: CPT | Performed by: ORTHOPAEDIC SURGERY

## 2025-02-12 RX ORDER — BUPIVACAINE HYDROCHLORIDE 2.5 MG/ML
2 INJECTION, SOLUTION INFILTRATION; PERINEURAL
Status: COMPLETED | OUTPATIENT
Start: 2025-02-12 | End: 2025-02-12

## 2025-02-12 RX ORDER — TRIAMCINOLONE ACETONIDE 40 MG/ML
40 INJECTION, SUSPENSION INTRA-ARTICULAR; INTRAMUSCULAR
Status: COMPLETED | OUTPATIENT
Start: 2025-02-12 | End: 2025-02-12

## 2025-02-12 RX ADMIN — TRIAMCINOLONE ACETONIDE 40 MG: 40 INJECTION, SUSPENSION INTRA-ARTICULAR; INTRAMUSCULAR at 14:15

## 2025-02-12 RX ADMIN — BUPIVACAINE HYDROCHLORIDE 2 ML: 2.5 INJECTION, SOLUTION INFILTRATION; PERINEURAL at 14:15

## 2025-02-12 NOTE — PROGRESS NOTES
Name: Wade Le      : 1950      MRN: [id number]  Encounter Provider: Abby Garibay DO  Encounter Date: 2025   Encounter department: [facility] [place]  :  Assessment & Plan      Patient has severe right knee osteoarthritis.  Patient is also 18 months status post left TKA done on 2023 doing well  Injections: Patient received right knee steroid injection today. Tolerated the procedure well. Post injection instructions reviewed including information on glucose monitoring for diabetic patients. Patient aware that they may repeat steroid injection every 3 months if needed.   Medications: Tylenol up to 3000 mg per day  Bracing: Continue short hinge knee brace as needed for comfort.   Activity: Continue activity as tolerated.   Plan for next appt: Right knee CSI and XR left knee.    Translation through yoni on patient's phone which is his preference.     I answered all of the patient's questions during the visit and provided education of the patient's condition during the visit.  The patient verbalized understanding of the information given and agrees with the plan.  This note was dictated using [employer] software.  It may contain errors including improperly dictated words.  Please contact physician directly for any questions.    Subjective   Chief Complaint:   Chief Complaint   Patient presents with    Right Knee - Follow-up         History of Present Illness     HPI    Wade Le is a 74 y.o. male who presents for follow up for severe right knee osteoarthritis, 18 months status post left TKA 2023. He received right knee CSI at his last visit on 2024 which provided him significant relief until approximately one month ago. He ambulates with a cane due to balance issues. He denies left knee pan, instability, complication s/p left knee TKA 18 months ago.       Length of time knee pain has been present: chronic  Any falls or trauma  "associated with onset of pain: no  Location of pain: medial aspect of right knee  Intermittent or constant: intermittent  Description of pain: aching  Aggravating factors: increased walking and activity  Instability or locking: denies  Pain medication that has been tried: Tylenol  Topical mediation that has been tried: unknown name but it does provide partial relief  Has heat/ice/elevation been tried: yes  Can NSAIDs be taken?  If not why?: No, taking Eliquis  Has PT or home exercises been tried?: Has done PT and transitioned to HEP  Has bracing been tried? OTC or rx?  Hinge knee brace as needed  Have injections been tried?  Steroid/visco?: multiple CSI, last on 11/8/2024 with 2.5 months relief  Any history of surgery on that knee?:  No    Review of Systems  ROS:    See HPI for musculoskeletal review.   All other systems reviewed are negative     History:  Past Medical History:   Diagnosis Date    Anemia     Cataract     Colon polyp     Diverticulosis     Gastritis     GERD (gastroesophageal reflux disease)     Hyperlipidemia     Hypertension     Osteoarthritis of left knee 06/07/2023    Pre-diabetes     Throat ulcer     Visual impairment      Past Surgical History:   Procedure Laterality Date    COLONOSCOPY N/A 08/18/2016    Procedure: COLONOSCOPY;  Surgeon: Mariano Paz MD;  Location:  GI LAB;  Service:     COLONOSCOPY W/ POLYPECTOMY  2019    repeat 2022    EYE SURGERY Bilateral     MN ARTHRP KNE CONDYLE&PLATU MEDIAL&LAT COMPARTMENTS Left 8/14/2023    Procedure: ARTHROPLASTY KNEE TOTAL;  Surgeon: Abby Garibay DO;  Location:  MAIN OR;  Service: Orthopedics    TONSILLECTOMY      UPPER GASTROINTESTINAL ENDOSCOPY  2019     Social History   Social History     Substance and Sexual Activity   Alcohol Use Never    Comment: pt \"sober for 30+ years\"     Social History     Substance and Sexual Activity   Drug Use No     Social History     Tobacco Use   Smoking Status Former    Current packs/day: 0.00    Types: " "Cigarettes    Quit date:     Years since quittin.1   Smokeless Tobacco Never   Tobacco Comments    Patient quit     Family History:   Family History   Problem Relation Age of Onset    Hypertension Mother     Hypertension Father        Current Outpatient Medications on File Prior to Visit   Medication Sig Dispense Refill    amLODIPine (NORVASC) 2.5 mg tablet Take 1 tablet (2.5 mg total) by mouth daily Do not start before 2023. 30 tablet 0    atorvastatin (LIPITOR) 20 mg tablet Take 1 tablet (20 mg total) by mouth daily at bedtime 90 tablet 3    Diclofenac Sodium (VOLTAREN) 1 % Apply 2 g topically 4 (four) times a day  0    lisinopril-hydrochlorothiazide (PRINZIDE,ZESTORETIC) 20-25 MG per tablet TAKE 1 TABLET BY MOUTH DAILY 90 tablet 1    trospium chloride (SANCTURA) 20 mg tablet Take 1 tablet (20 mg total) by mouth 2 (two) times a day 180 tablet 3     No current facility-administered medications on file prior to visit.     Allergies   Allergen Reactions    Lactose - Food Allergy Diarrhea     diarrhea          Objective   Ht 5' 6\" (1.676 m)   Wt 92.5 kg (204 lb)   BMI 32.93 kg/m²          PE:  AAOx 3  WDWN  Hearing intact, no drainage from eyes  no audible wheezing  no abdominal distension  LE compartments soft, skin intact    Ortho Exam:  bilateral Knee:   No erythema  no swelling  no effusion  no warmth  AROM: full  Stable to varus/valgus stress    Imaging Studies:   No new imaging today    Large joint arthrocentesis: R knee  Universal Protocol:  Consent: Verbal consent obtained.  Risks and benefits: risks, benefits and alternatives were discussed  Consent given by: patient  Time out: Immediately prior to procedure a \"time out\" was called to verify the correct patient, procedure, equipment, support staff and site/side marked as required.  Patient understanding: patient states understanding of the procedure being performed  Site marked: the operative site was marked  Patient identity " confirmed: verbally with patient  Supporting Documentation  Indications: pain and diagnostic evaluation   Procedure Details  Location: knee - R knee  Needle size: 22 G  Medications administered: 40 mg triamcinolone acetonide 40 mg/mL; 2 mL bupivacaine 0.25 %    Patient tolerance: patient tolerated the procedure well with no immediate complications  Dressing:  Sterile dressing applied              Scribe Attestation      I,:  Rach Cabezas am acting as a scribe while in the presence of the attending physician.:       I,:  Abby Garibay DO personally performed the services described in this documentation    as scribed in my presence.:

## 2025-04-06 NOTE — DISCHARGE INSTR - AVS FIRST PAGE
"Please contact Dermatology at [phone] to schedule your appointment.          Patient Education     Routine physical for adults   The Basics   Written by the doctors and editors at [facility]   What is a physical? -- A physical is a routine visit, or \"check-up,\" with your doctor. You might also hear it called a \"wellness visit\" or \"preventive visit.\"  During each visit, the doctor will:   Ask about your physical and mental health   Ask about your habits, behaviors, and lifestyle   Do an exam   Give you vaccines if needed   Talk to you about any medicines you take   Give advice about your health   Answer your questions  Getting regular check-ups is an important part of taking care of your health. It can help your doctor find and treat any problems you have. But it's also important for preventing health problems.  A routine physical is different from a \"sick visit.\" A sick visit is when you see a doctor because of a health concern or problem. Since physicals are scheduled ahead of time, you can think about what you want to ask the doctor.  How often should I get a physical? -- It depends on your age and health. In general, for people age 21 years and older:   If you are younger than 50 years, you might be able to get a physical every 3 years.   If you are 50 years or older, your doctor might recommend a physical every year.  If you have an ongoing health condition, like diabetes or high blood pressure, your doctor will probably want to see you more often.  What happens during a physical? -- In general, each visit will include:   Physical exam - The doctor or nurse will check your height, weight, heart rate, and blood pressure. They will also look at your eyes and ears. They will ask about how you are feeling and whether you have any symptoms that bother you.   Medicines - It's a good idea to bring a list of all the medicines you take to each doctor visit. Your doctor will talk to you about your medicines and answer " Discharge Instructions - [address] 67 y.o. male MRN: [id number]  Unit/Bed#: X ray    Weight Bearing Status:                                               Weightbearing as tolerated left lower extremity    DVT prophylaxis:  Continue therapeutic anticoagulation per the emergency department    Pain:  Continue analgesics as directed    Dressing Instructions:   Please keep clean, dry and intact until follow up     Appt Instructions: If you do not have your appointment, please call the clinic at [phone]  Otherwise follow up as scheduled. Contact the office sooner if you experience any increased numbness/tingling in the extremities.       Miscellaneous:  Please follow-up with Dr. Brooke Storm as scheduled "any questions. Tell them if you are having any side effects that bother you. You should also tell them if you are having trouble paying for any of your medicines.   Habits and behaviors - This includes:   Your diet   Your exercise habits   Whether you smoke, drink alcohol, or use drugs   Whether you are sexually active   Whether you feel safe at home  Your doctor will talk to you about things you can do to improve your health and lower your risk of health problems. They will also offer help and support. For example, if you want to quit smoking, they can give you advice and might prescribe medicines. If you want to improve your diet or get more physical activity, they can help you with this, too.   Lab tests, if needed - The tests you get will depend on your age and situation. For example, your doctor might want to check your:   Cholesterol   Blood sugar   Iron level   Vaccines - The recommended vaccines will depend on your age, health, and what vaccines you already had. Vaccines are very important because they can prevent certain serious or deadly infections.   Discussion of screening - \"Screening\" means checking for diseases or other health problems before they cause symptoms. Your doctor can recommend screening based on your age, risk, and preferences. This might include tests to check for:   Cancer, such as breast, prostate, cervical, ovarian, colorectal, prostate, lung, or skin cancer   Sexually transmitted infections, such as chlamydia and gonorrhea   Mental health conditions like depression and anxiety  Your doctor will talk to you about the different types of screening tests. They can help you decide which screenings to have. They can also explain what the results might mean.   Answering questions - The physical is a good time to ask the doctor or nurse questions about your health. If needed, they can refer you to other doctors or specialists, too.  Adults older than 65 years often need other care, too. As " you get older, your doctor will talk to you about:   How to prevent falling at home   Hearing or vision tests   Memory testing   How to take your medicines safely   Making sure that you have the help and support you need at home  All topics are updated as new evidence becomes available and our peer review process is complete.  This topic retrieved from [employer]li on: May 02, 2024.  Topic 939032 Version 1.0  Release: 32.4.3 - C32.122  © 2024 UpToDate, Inc. and/or its affiliates. All rights reserved.  Consumer Information Use and Disclaimer   Disclaimer: This generalized information is a limited summary of diagnosis, treatment, and/or medication information. It is not meant to be comprehensive and should be used as a tool to help the user understand and/or assess potential diagnostic and treatment options. It does NOT include all information about conditions, treatments, medications, side effects, or risks that may apply to a specific patient. It is not intended to be medical advice or a substitute for the medical advice, diagnosis, or treatment of a health care provider based on the health care provider's examination and assessment of a patient's specific and unique circumstances. Patients must speak with a health care provider for complete information about their health, medical questions, and treatment options, including any risks or benefits regarding use of medications. This information does not endorse any treatments or medications as safe, effective, or approved for treating a specific patient. UpToDate, Inc. and its affiliates disclaim any warranty or liability relating to this information or the use thereof.The use of this information is governed by the Terms of Use, available at https://www.wolterskluwer.com/en/know/clinical-effectiveness-terms. 2024© UpToDate, Inc. and its affiliates and/or licensors. All rights reserved.  Copyright   © 2024 UpToDate, Inc. and/or its affiliates. All rights reserved.

## 2025-04-23 ENCOUNTER — OFFICE VISIT (OUTPATIENT)
Dept: FAMILY MEDICINE CLINIC | Facility: CLINIC | Age: 75
End: 2025-04-23
Payer: COMMERCIAL

## 2025-04-23 VITALS
TEMPERATURE: 98 F | SYSTOLIC BLOOD PRESSURE: 126 MMHG | HEIGHT: 66 IN | OXYGEN SATURATION: 97 % | HEART RATE: 68 BPM | DIASTOLIC BLOOD PRESSURE: 70 MMHG | RESPIRATION RATE: 16 BRPM | WEIGHT: 204 LBS | BODY MASS INDEX: 32.78 KG/M2

## 2025-04-23 DIAGNOSIS — M17.11 PRIMARY OSTEOARTHRITIS OF RIGHT KNEE: ICD-10-CM

## 2025-04-23 DIAGNOSIS — I10 HYPERTENSION, UNSPECIFIED TYPE: Primary | ICD-10-CM

## 2025-04-23 DIAGNOSIS — K86.9 PANCREATIC LESION: ICD-10-CM

## 2025-04-23 PROCEDURE — G2211 COMPLEX E/M VISIT ADD ON: HCPCS | Performed by: FAMILY MEDICINE

## 2025-04-23 PROCEDURE — 99214 OFFICE O/P EST MOD 30 MIN: CPT | Performed by: FAMILY MEDICINE

## 2025-04-23 NOTE — PROGRESS NOTES
Name: Wade Guillermo      : 1950      MRN: [id number]  Encounter Provider: Tulio Maki MD  Encounter Date: 2025   Encounter department: [facility]    Assessment & Plan  Hypertension, unspecified type  Hypertension.  His blood pressure is well controlled today at 120/70 mmHg. He is currently on amlodipine 2.5 mg and lisinopril with hydrochlorothiazide 20/25 mg daily. He will continue with his current medication regimen.  Orders:    Comprehensive metabolic panel; Future    Pancreatic lesion   Pancreatic cyst.  A previous abdominal scan revealed a 7 mm cystic lesion in the pancreas without dilatation of the intrapancreatic duct, which could potentially represent a malignant neoplasm. An MRI and blood test have been ordered to further evaluate the pancreatic cyst.  Orders:    MRI abdomen w wo contrast and mrcp; Future    Primary osteoarthritis of right knee   Osteoarthritis.  He reports pain in his right leg due to osteoarthritis. He will make appt with surgeon. The left leg, which was previously operated on, is doing well.                 Subjective     History of Present Illness  The patient is a 74-year-old male who presents for follow-up of hypertension.    He is currently on a daily regimen of amlodipine 2.5 mg and lisinopril with hydrochlorothiazide 20/25 mg.    He has been diagnosed with prostatitis and is under the care of a urologist. His treatment plan includes the administration of Sanctura 20 mg, taken as one tablet twice daily.    He reports experiencing pain in his right leg, which he attributes to osteoarthritis. Despite this, he notes an improvement in his walking ability. He also mentions that his feet are swollen. He has previously received cortisone injections in his back.    MEDICATIONS  Amlodipine, lisinopril with hydrochlorothiazide, Sanctura    Results  Imaging  Abdominal scan shows a 7 mm cystic lesion in the pancreas without  "dilatation of the intrapancreatic duct.     Allergies   Allergen Reactions    Lactose - Food Allergy Diarrhea     diarrhea     Patient Active Problem List   Diagnosis    HTN (hypertension)    Prediabetes    Mixed hyperlipidemia    Status post total knee replacement, left    hsitroy of DVT (deep venous thrombosis) (HCC)    Abnormal CT scan of abdomen    Low back pain with sciatica    Visual impairment    Primary osteoarthritis involving multiple joints    Pulmonary nodules in low risk patient    Pancreatic cyst    Spinal stenosis of lumbar region with neurogenic claudication    Lumbar spondylosis    Lumbar radiculopathy    OAB (overactive bladder)    Nocturia       Current Outpatient Medications:     amLODIPine (NORVASC) 2.5 mg tablet, Take 1 tablet (2.5 mg total) by mouth daily Do not start before November 25, 2023., Disp: 30 tablet, Rfl: 0    atorvastatin (LIPITOR) 20 mg tablet, Take 1 tablet (20 mg total) by mouth daily at bedtime, Disp: 90 tablet, Rfl: 3    Diclofenac Sodium (VOLTAREN) 1 %, Apply 2 g topically 4 (four) times a day, Disp: , Rfl: 0    lisinopril-hydrochlorothiazide (PRINZIDE,ZESTORETIC) 20-25 MG per tablet, TAKE 1 TABLET BY MOUTH DAILY, Disp: 90 tablet, Rfl: 1    trospium chloride (SANCTURA) 20 mg tablet, Take 1 tablet (20 mg total) by mouth 2 (two) times a day, Disp: 180 tablet, Rfl: 3    /70 (BP Location: Left arm, Patient Position: Sitting, Cuff Size: Standard)   Pulse 68   Temp 98 °F (36.7 °C) (Tympanic)   Resp 16   Ht 5' 6\" (1.676 m)   Wt 92.5 kg (204 lb)   SpO2 97%   BMI 32.93 kg/m²     Physical Exam  The patient appears to without distress; gait dysfunction: HEENT is unremarkable, the neck has no masses or enlarged lymph nodes. Respiratory effort is normal. Lung fields are clear; the heart has a normal rhythm without murmurs. Abdomen soft without abnormalities;  Neurological with no focal deficits. Right knee tenderness.                        "

## 2025-04-23 NOTE — ASSESSMENT & PLAN NOTE
Hypertension.  His blood pressure is well controlled today at 120/70 mmHg. He is currently on amlodipine 2.5 mg and lisinopril with hydrochlorothiazide 20/25 mg daily. He will continue with his current medication regimen.  Orders:    Comprehensive metabolic panel; Future

## 2025-04-25 ENCOUNTER — APPOINTMENT (OUTPATIENT)
Dept: LAB | Facility: CLINIC | Age: 75
End: 2025-04-25
Attending: FAMILY MEDICINE
Payer: COMMERCIAL

## 2025-04-25 DIAGNOSIS — I10 HYPERTENSION, UNSPECIFIED TYPE: ICD-10-CM

## 2025-04-25 LAB
ALBUMIN SERPL BCG-MCNC: 4.4 G/DL (ref 3.5–5)
ALP SERPL-CCNC: 65 U/L (ref 34–104)
ALT SERPL W P-5'-P-CCNC: 26 U/L (ref 7–52)
ANION GAP SERPL CALCULATED.3IONS-SCNC: 8 MMOL/L (ref 4–13)
AST SERPL W P-5'-P-CCNC: 25 U/L (ref 13–39)
BILIRUB SERPL-MCNC: 0.58 MG/DL (ref 0.2–1)
BUN SERPL-MCNC: 25 MG/DL (ref 5–25)
CALCIUM SERPL-MCNC: 9 MG/DL (ref 8.4–10.2)
CHLORIDE SERPL-SCNC: 101 MMOL/L (ref 96–108)
CO2 SERPL-SCNC: 31 MMOL/L (ref 21–32)
CREAT SERPL-MCNC: 0.86 MG/DL (ref 0.6–1.3)
GFR SERPL CREATININE-BSD FRML MDRD: 84 ML/MIN/1.73SQ M
GLUCOSE P FAST SERPL-MCNC: 96 MG/DL (ref 65–99)
POTASSIUM SERPL-SCNC: 4 MMOL/L (ref 3.5–5.3)
PROT SERPL-MCNC: 7.6 G/DL (ref 6.4–8.4)
SODIUM SERPL-SCNC: 140 MMOL/L (ref 135–147)

## 2025-04-25 PROCEDURE — 80053 COMPREHEN METABOLIC PANEL: CPT

## 2025-04-25 PROCEDURE — 36415 COLL VENOUS BLD VENIPUNCTURE: CPT

## 2025-04-28 ENCOUNTER — RESULTS FOLLOW-UP (OUTPATIENT)
Dept: FAMILY MEDICINE CLINIC | Facility: CLINIC | Age: 75
End: 2025-04-28

## 2025-04-28 NOTE — TELEPHONE ENCOUNTER
Relayed results to patient as per provider message. Patient expressed understanding and did not have any further questions.     Language Line Assistance with call   Name: Radha   ID Number: [id number]

## 2025-05-02 ENCOUNTER — OFFICE VISIT (OUTPATIENT)
Dept: PAIN MEDICINE | Facility: CLINIC | Age: 75
End: 2025-05-02
Payer: COMMERCIAL

## 2025-05-02 ENCOUNTER — TELEPHONE (OUTPATIENT)
Age: 75
End: 2025-05-02

## 2025-05-02 VITALS — HEIGHT: 66 IN | BODY MASS INDEX: 32.62 KG/M2 | WEIGHT: 203 LBS

## 2025-05-02 DIAGNOSIS — G89.29 CHRONIC RIGHT-SIDED LOW BACK PAIN WITH RIGHT-SIDED SCIATICA: ICD-10-CM

## 2025-05-02 DIAGNOSIS — M54.16 LUMBAR RADICULOPATHY: ICD-10-CM

## 2025-05-02 DIAGNOSIS — M54.41 CHRONIC RIGHT-SIDED LOW BACK PAIN WITH RIGHT-SIDED SCIATICA: ICD-10-CM

## 2025-05-02 DIAGNOSIS — M48.062 SPINAL STENOSIS OF LUMBAR REGION WITH NEUROGENIC CLAUDICATION: Primary | ICD-10-CM

## 2025-05-02 PROCEDURE — G2211 COMPLEX E/M VISIT ADD ON: HCPCS | Performed by: ANESTHESIOLOGY

## 2025-05-02 PROCEDURE — 99214 OFFICE O/P EST MOD 30 MIN: CPT | Performed by: ANESTHESIOLOGY

## 2025-05-02 NOTE — TELEPHONE ENCOUNTER
Caller: Wade     Doctor: Dr. Ferro     Reason for call: Patient called to confirm appt for today     Call back#: [phone]

## 2025-05-02 NOTE — PROGRESS NOTES
Assessment:  1. Spinal stenosis of lumbar region with neurogenic claudication    2. Lumbar radiculopathy    3. Chronic right-sided low back pain with right-sided sciatica        Plan:   #[id number]    Patient is 74-year-old male past medical history of DVT returns for follow-up after receiving his right L4 and L5 transforaminal epidural steroid injection January 2025.  Patient states that he received approximate 75% of improvement in symptoms which lasted for several months.  Patient states that he has recurrence of his right low back pain radiating down posterior lateral aspect of right lower extremity. Patient states that steroid injections usually helping with his right lower extremity symptoms. At this time, suspect this patient symptoms are secondary to right lumbar radiculopathy.    Given that clinical presentation of lumbar radiculopathy matches MRI findings, I would like to proceed forward with performing Right L4 and L5 TFESI. Risks vs benefits discussed in detail with the patient. These risks include, but are not limited to, bleeding, infection, nerve damage, paralysis. Patient is not on anticoagulant therapy. Patient denies contrast allergy. All patient’s questions were answered. Patient understands risks and is willing to pursue the procedure. The approach was demonstrated using models and literature was provided. Verbal consent obtained        My impressions and treatment recommendations were discussed in detail with the patient, who verbalized understanding and had no further questions.        History of Present Illness:    Wade Guillermo is a 74 y.o. male who presents to [facility] for initial evaluation of the above stated pain complaints. The patient has a past medical and chronic pain history as outlined in the assessment section. He was referred by No referring provider defined for this encounter.    Patient is 74-year-old male past medical history of DVT  returns for follow-up after receiving his right L4 and L5 transforaminal epidural steroid injection January 2025.  Patient states that he received approximate 75% of improvement in symptoms which lasted for several months.  Patient states that he continues to have the right low back pain radiating down posterior lateral aspect of right lower extremity.  Patient states that pain is constant, describes the pain as a dull aching and sharp sensation, with associated numbness down right lower extremity.  Patient states that pain interferes with activities of daily living and has not been continuing her treatment at home.    Review of Systems:  General: no recent infections, no unintentional weight loss  Neuro: no saddle anesthesia, no dexterity or balance issues   GI: no changes in bowel habits  : no changes in bladder habits  Hem: no anticoagulant use        Past Medical History:   Diagnosis Date    Anemia     Cataract     Colon polyp     Diverticulosis     Gastritis     GERD (gastroesophageal reflux disease)     Hyperlipidemia     Hypertension     Osteoarthritis of left knee 06/07/2023    Pre-diabetes     Throat ulcer     Visual impairment        Past Surgical History:   Procedure Laterality Date    COLONOSCOPY N/A 08/18/2016    Procedure: COLONOSCOPY;  Surgeon: Mariano Paz MD;  Location:  GI LAB;  Service:     COLONOSCOPY W/ POLYPECTOMY  2019    repeat 2022    EYE SURGERY Bilateral     [facility] ARTHRP KNE CONDYLE&PLATU MEDIAL&LAT COMPARTMENTS Left 8/14/2023    Procedure: ARTHROPLASTY KNEE TOTAL;  Surgeon: Abby Garibay DO;  Location:  MAIN OR;  Service: Orthopedics    TONSILLECTOMY      UPPER GASTROINTESTINAL ENDOSCOPY  2019       Family History   Problem Relation Age of Onset    Hypertension Mother     Hypertension Father        Social History     Occupational History    Not on file   Tobacco Use    Smoking status: Former     Current packs/day: 0.00     Types: Cigarettes     Quit date: 1999     Years since  "quittin.3    Smokeless tobacco: Never    Tobacco comments:     Patient quit   Vaping Use    Vaping status: Never Used   Substance and Sexual Activity    Alcohol use: Never     Comment: pt \"sober for 30+ years\"    Drug use: No    Sexual activity: Not Currently     Partners: Female     Comment:          Current Outpatient Medications:     amLODIPine (NORVASC) 2.5 mg tablet, Take 1 tablet (2.5 mg total) by mouth daily Do not start before 2023., Disp: 30 tablet, Rfl: 0    atorvastatin (LIPITOR) 20 mg tablet, Take 1 tablet (20 mg total) by mouth daily at bedtime, Disp: 90 tablet, Rfl: 3    Diclofenac Sodium (VOLTAREN) 1 %, Apply 2 g topically 4 (four) times a day, Disp: , Rfl: 0    lisinopril-hydrochlorothiazide (PRINZIDE,ZESTORETIC) 20-25 MG per tablet, TAKE 1 TABLET BY MOUTH DAILY, Disp: 90 tablet, Rfl: 1    trospium chloride (SANCTURA) 20 mg tablet, Take 1 tablet (20 mg total) by mouth 2 (two) times a day, Disp: 180 tablet, Rfl: 3    Allergies   Allergen Reactions    Lactose - Food Allergy Diarrhea     diarrhea       Physical Exam:    Ht 5' 6\" (1.676 m)   Wt 92.1 kg (203 lb)   BMI 32.77 kg/m²       Constitutional: no apparent distress, does not appear sedated   HEENT: pupils equal and round, symmetric facial muscles   Neck: supple  Pulmonary: good chest wall excursion, breathing unlabored   Psych: appropriate affect and insight. no evidence of aberrant behavior   Neuro: cranial nerves II-XII grossly intact    MSK:   5/5 strength in B/L LE  Sensation intact to light B/L LE  + Slump's test on R   Gait: ambulates unassisted, gait is not antalgic            Imaging  MRI LUMBAR SPINE WITHOUT CONTRAST     INDICATION: M54.16: Radiculopathy, lumbar region.     COMPARISON: Lumbar spine radiographs from 2023     TECHNIQUE:  Multiplanar, multisequence imaging of the lumbar spine was performed. .        IMAGE QUALITY:  Diagnostic     FINDINGS:     VERTEBRAL BODIES:  There are 5 lumbar type " vertebral bodies. Minimal grade 1 anterolisthesis of L4 on L5. Mild levocurvature of the lumbar spine..     No compression fracture.    Endplate degenerative bone marrow changes at L5-S1.     SACRUM:  Normal signal within the sacrum. No evidence of insufficiency or stress fracture.     DISTAL CORD AND CONUS:  Normal size and signal within the distal cord and conus.     PARASPINAL SOFT TISSUES:  Paraspinal soft tissues are unremarkable.     LOWER THORACIC DISC SPACES: Small disc bulge at T12-L1. No canal stenosis or foraminal narrowing.     LUMBAR DISC SPACES:     L1-L2: Small diffuse disc bulge and mild facet arthrosis. No spinal canal or foraminal narrowing.     L2-L3: Diffuse disc bulge, superimposed central disc protrusion, ligamentum flavum thickening and facet arthrosis. Mild to moderate spinal canal narrowing. Left greater than right subarticular recess narrowing with encroachment of the left descending L3   nerve roots. Mild right neuroforaminal narrowing.     L3-L4: Diffuse disc bulge, superimposed right subarticular disc protrusion, ligamentum flavum thickening and facet arthrosis. Moderate to severe spinal canal narrowing. Right greater than left subarticular recess narrowing, encroaching/impinging the   descending L4 nerve roots. Mild to moderate right and mild left neuroforaminal narrowing.     L4-L5: Diffuse disc bulge, ligamentum flavum thickening and facet arthrosis. T2 hyperintense lesion within the right dorsal spinal column measuring 0.5 cm, favored to represent a synovial cyst. Resultant moderate to severe spinal canal narrowing.   Bilateral subarticular recess narrowing, impinging the descending L5 nerve roots. Moderate right and mild to moderate left neuroforaminal narrowing.     L5-S1: Small diffuse disc bulge, and facet arthrosis. Right lateral recess narrowing, encroaching the exiting right L5 nerve roots. Mild bilateral neuroforaminal narrowing.     OTHER FINDINGS: Stable right renal cyst  "measuring 2.2 cm.     IMPRESSION:     -Spondylosis of the lumbar spine most prominent at L3-L4 and L4-L5 where there is moderate to severe spinal canal narrowing as detailed above. Neurosurgical consult is recommended.     -Within the right dorsal spinal column at L4-L5 there is a 5 mm T2 hyperintense lesion likely representing a synovial cyst and contributing to spinal canal narrowing.     -Additional spondylosis of the cervical spine including mild to moderate spinal canal narrowing at L2-L3, multilevel encroachment/impingement of the descending nerve roots and varying degrees of neuroforaminal narrowing most prominent and moderate on the   right at L4-L5. Details above.     This examination was marked \"immediate notification\" in Epic in order to begin the standard process by which the radiology reading room liaison alerts the referring practitioner.        Workstation performed: [id number]    "

## 2025-05-05 ENCOUNTER — TELEPHONE (OUTPATIENT)
Dept: RADIOLOGY | Facility: CLINIC | Age: 75
End: 2025-05-05

## 2025-05-05 NOTE — TELEPHONE ENCOUNTER
Caller: monalisa Olvera ( interp # [phone])    Doctor: Dr. Ferro    Reason for call: pt returning schedulers call.     Call back#: [phone]

## 2025-05-06 NOTE — TELEPHONE ENCOUNTER
Called patient scheduled procedure  Reviewed all instructions by phone  Mailed copy to home    Used argelia Astorga [id number]

## 2025-05-13 ENCOUNTER — HOSPITAL ENCOUNTER (OUTPATIENT)
Dept: RADIOLOGY | Facility: CLINIC | Age: 75
Discharge: HOME | End: 2025-05-13
Attending: ANESTHESIOLOGY
Payer: COMMERCIAL

## 2025-05-13 VITALS
TEMPERATURE: 97.3 F | DIASTOLIC BLOOD PRESSURE: 79 MMHG | HEART RATE: 67 BPM | SYSTOLIC BLOOD PRESSURE: 130 MMHG | OXYGEN SATURATION: 98 % | RESPIRATION RATE: 20 BRPM

## 2025-05-13 DIAGNOSIS — M54.16 LUMBAR RADICULOPATHY: ICD-10-CM

## 2025-05-13 PROCEDURE — 64483 NJX AA&/STRD TFRM EPI L/S 1: CPT | Performed by: ANESTHESIOLOGY

## 2025-05-13 PROCEDURE — 64484 NJX AA&/STRD TFRM EPI L/S EA: CPT | Performed by: ANESTHESIOLOGY

## 2025-05-13 RX ORDER — PAPAVERINE HCL 150 MG
15 CAPSULE, EXTENDED RELEASE ORAL ONCE
Status: COMPLETED | OUTPATIENT
Start: 2025-05-13 | End: 2025-05-13

## 2025-05-13 RX ADMIN — LIDOCAINE HYDROCHLORIDE 2 ML: 20 INJECTION, SOLUTION EPIDURAL; INFILTRATION; INTRACAUDAL; PERINEURAL at 14:24

## 2025-05-13 RX ADMIN — IOHEXOL 2 ML: 300 INJECTION, SOLUTION INTRAVENOUS at 14:27

## 2025-05-13 RX ADMIN — DEXAMETHASONE SODIUM PHOSPHATE 15 MG: 10 INJECTION, SOLUTION INTRAMUSCULAR; INTRAVENOUS at 14:27

## 2025-05-13 NOTE — H&P
History of Present Illness: The patient is a 74 y.o. male who presents with complaints of low back and leg pain.    Past Medical History:   Diagnosis Date    Anemia     Cataract     Colon polyp     Diverticulosis     Gastritis     GERD (gastroesophageal reflux disease)     Hyperlipidemia     Hypertension     Osteoarthritis of left knee 06/07/2023    Pre-diabetes     Throat ulcer     Visual impairment        Past Surgical History:   Procedure Laterality Date    COLONOSCOPY N/A 08/18/2016    Procedure: COLONOSCOPY;  Surgeon: Mariano Paz MD;  Location:  GI LAB;  Service:     COLONOSCOPY W/ POLYPECTOMY  2019    repeat 2022    EYE SURGERY Bilateral     [facility] ARTHRP KNE CONDYLE&PLATU MEDIAL&LAT COMPARTMENTS Left 8/14/2023    Procedure: ARTHROPLASTY KNEE TOTAL;  Surgeon: Abby Garibay DO;  Location:  MAIN OR;  Service: Orthopedics    TONSILLECTOMY      UPPER GASTROINTESTINAL ENDOSCOPY  2019         Current Outpatient Medications:     amLODIPine (NORVASC) 2.5 mg tablet, Take 1 tablet (2.5 mg total) by mouth daily Do not start before November 25, 2023., Disp: 30 tablet, Rfl: 0    atorvastatin (LIPITOR) 20 mg tablet, Take 1 tablet (20 mg total) by mouth daily at bedtime, Disp: 90 tablet, Rfl: 3    Diclofenac Sodium (VOLTAREN) 1 %, Apply 2 g topically 4 (four) times a day, Disp: , Rfl: 0    lisinopril-hydrochlorothiazide (PRINZIDE,ZESTORETIC) 20-25 MG per tablet, TAKE 1 TABLET BY MOUTH DAILY, Disp: 90 tablet, Rfl: 1    trospium chloride (SANCTURA) 20 mg tablet, Take 1 tablet (20 mg total) by mouth 2 (two) times a day, Disp: 180 tablet, Rfl: 3    Allergies   Allergen Reactions    Lactose - Food Allergy Diarrhea     diarrhea       Physical Exam:   Vitals:    05/13/25 1357   BP: 135/65   Pulse: 88   Resp: 20   Temp: (!) 97.3 °F (36.3 °C)   SpO2: 98%     General: Awake, Alert, Oriented x 3, Mood and affect appropriate  Respiratory: Respirations even and unlabored  Cardiovascular: Peripheral pulses intact; no  edema  Musculoskeletal Exam: antalgic gait    ASA Score: 3    Patient/Chart Verification  Patient ID Verified: Verbal  ID Band Applied: No  Consents Confirmed: To be obtained in the Procedural area  H&P( within 30 days) Verified: To be obtained in the Procedural area  Interval H&P(within 24 hr) Complete (required for Outpatients and Surgery Admit only): To be obtained in the Procedural area  Allergies Reviewed: Yes  Anticoag/NSAID held?: No  Currently on antibiotics?: No    Assessment:   1. Lumbar radiculopathy        Plan: Right L4 and L5 TFESI

## 2025-05-13 NOTE — DISCHARGE INSTR - LAB
INSTRUCCIONES PARA EL HANSEL DE CATHY INYECCIÓN EPIDURAL DE ESTEROIDES    ACTIVIDAD   No conduzca ni opere maquinarias por hoy.   No realice actividades extenuantes por hoy, guru agacharse, levantar objetos, etc.   Puede retomar fracnisco actividades normales desde mañana. Comience progresivamente y en la medida que lo tolere.   Puede ducharse, corona no se bañe en tinas ni en jacuzzis por hoy.   Puede sentir entumecimiento humberto varias horas debido a la anestesia local. Sea precavido y use el sentido común, en especial con las actividades que implican la carga de peso.    CUIDADO DEL ÁREA DE APLICACIÓN DE LA INYECCIÓN   Si siente molestias o dolor, aplique hielo en el área por denies (colóquelo humberto 20 minutos y retírelo humberto otros 20 minutos).   A partir de mañana, podrá aplicar calor húmedo o hielo, si lo necesita.   Puede experimentar un aumento o cambio en el malestar humberto las 36-48 horas posteriores al tratamiento. Aplique hielo y continúe tomando cualquier analgésico que le hayan recetado.   Informe a [facility] si se presenta alguno de estos síntomas: enrojecimiento, secreción, inflamación, dolor de porfirio, rigidez de kulwinder o fiebre superior a 100 °F.    INSTRUCCIONES ESPECIALES  Se pondrán en contacto de nuestro consultorio con usted en aproximadamente 14 días para pedir un informe de progreso.    MEDICAMENTOS   Continúe tomando todos francisco medicamentos de rutina.   Es posible que en nuestro consultorio le hayan indicado que deje de drea algunos medicamentos.   Puede volver a tomarlos el:              Si tiene algún problema relacionado específicamente con el procedimiento, llame a nuestro consultorio al [phone]. Si tiene problemas que no están relacionados con allred procedimiento, debe comunicarse con allred médico de cabecera.

## 2025-05-14 ENCOUNTER — OFFICE VISIT (OUTPATIENT)
Dept: OBGYN CLINIC | Facility: MEDICAL CENTER | Age: 75
End: 2025-05-14
Payer: COMMERCIAL

## 2025-05-14 VITALS — WEIGHT: 210.2 LBS | BODY MASS INDEX: 33.78 KG/M2 | HEIGHT: 66 IN

## 2025-05-14 DIAGNOSIS — G89.29 CHRONIC PAIN OF RIGHT KNEE: ICD-10-CM

## 2025-05-14 DIAGNOSIS — M17.11 PRIMARY OSTEOARTHRITIS OF RIGHT KNEE: Primary | ICD-10-CM

## 2025-05-14 DIAGNOSIS — M25.561 CHRONIC PAIN OF RIGHT KNEE: ICD-10-CM

## 2025-05-14 PROCEDURE — 99213 OFFICE O/P EST LOW 20 MIN: CPT | Performed by: ORTHOPAEDIC SURGERY

## 2025-05-14 PROCEDURE — 20610 DRAIN/INJ JOINT/BURSA W/O US: CPT | Performed by: ORTHOPAEDIC SURGERY

## 2025-05-14 RX ORDER — TRIAMCINOLONE ACETONIDE 40 MG/ML
40 INJECTION, SUSPENSION INTRA-ARTICULAR; INTRAMUSCULAR
Status: COMPLETED | OUTPATIENT
Start: 2025-05-14 | End: 2025-05-14

## 2025-05-14 RX ORDER — BUPIVACAINE HYDROCHLORIDE 2.5 MG/ML
2 INJECTION, SOLUTION INFILTRATION; PERINEURAL
Status: COMPLETED | OUTPATIENT
Start: 2025-05-14 | End: 2025-05-14

## 2025-05-14 RX ADMIN — BUPIVACAINE HYDROCHLORIDE 2 ML: 2.5 INJECTION, SOLUTION INFILTRATION; PERINEURAL at 10:45

## 2025-05-14 RX ADMIN — TRIAMCINOLONE ACETONIDE 40 MG: 40 INJECTION, SUSPENSION INTRA-ARTICULAR; INTRAMUSCULAR at 10:45

## 2025-05-14 NOTE — PROGRESS NOTES
Name: Wade Guillermo      : 1950      MRN: [id number]  Encounter Provider: Abby Garibay DO  Encounter Date: 2025   Encounter department: [facility] [place]  :  Assessment & Plan          Patient has {MILD, MOD, SEV:98497} {DESC; RIGHT/LEFT/BILATERAL:52543} knee osteoarthritis.   Treatment options were discussed with patient today.  Patient {is/is not:36680} a surgical candidate at this time.   Injections: Patient received {right/left/bilateral:} knee steroid injection today. Tolerated the procedure well. Post injection instructions reviewed including information on glucose monitoring for diabetic patients. Patient aware that they may repeat steroid injection every 3 months if needed.   Medications: {med choices:35708}  PT: {PT:97317}  Bracing: {bracing choices:58053}  Activity: {activity choices:40104}  Plan for next appt: {blank choice:47080}    No follow-ups on file.    I answered all of the patient's questions during the visit and provided education of the patient's condition during the visit.  The patient verbalized understanding of the information given and agrees with the plan.  This note was dictated using DataVote software.  It may contain errors including improperly dictated words.  Please contact physician directly for any questions.    Subjective   Chief Complaint: No chief complaint on file.        History of Present Illness {?Quick Links Encounters * My Last Note * Last Note in Specialty * Snapshot * Since Last Visit * History :75462}    HPI    Wade Guillermo is a 74 y.o. male who presents for follow up for     3 weeks ago.   Length of time knee pain has been present: ***  Any falls or trauma associated with onset of pain: ***  Location of pain: ***  Intermittent or constant: ***  Description of pain: ***  Aggravating factors: ***  Instability or locking: ***  Pain medication that has been tried: ***  Topical mediation that has been  "tried: ***  Has heat/ice/elevation been tried: ***  Can NSAIDs be taken?  If not why?: ***  Has PT or home exercises been tried?: ***  Has bracing been tried? OTC or rx?  ***  Have injections been tried?  Steroid/visco?: ***  Any history of surgery on that knee?:  ***    Review of Systems  ROS:    See HPI for musculoskeletal review.   All other systems reviewed are negative     History:  Past Medical History:   Diagnosis Date    Anemia     Cataract     Colon polyp     Diverticulosis     Gastritis     GERD (gastroesophageal reflux disease)     Hyperlipidemia     Hypertension     Osteoarthritis of left knee 06/07/2023    Pre-diabetes     Throat ulcer     Visual impairment      Past Surgical History:   Procedure Laterality Date    COLONOSCOPY N/A 08/18/2016    Procedure: COLONOSCOPY;  Surgeon: Mariano Paz MD;  Location:  GI LAB;  Service:     COLONOSCOPY W/ POLYPECTOMY  2019    repeat 2022    EYE SURGERY Bilateral     [facility] ARTHRP KNE CONDYLE&PLATU MEDIAL&LAT COMPARTMENTS Left 8/14/2023    Procedure: ARTHROPLASTY KNEE TOTAL;  Surgeon: Abby Garibay DO;  Location:  MAIN OR;  Service: Orthopedics    TONSILLECTOMY      UPPER GASTROINTESTINAL ENDOSCOPY  2019     Social History   Social History     Substance and Sexual Activity   Alcohol Use Never    Comment: pt \"sober for 30+ years\"     Social History     Substance and Sexual Activity   Drug Use No     Tobacco Use History[1]  Family History:   Family History   Problem Relation Age of Onset    Hypertension Mother     Hypertension Father        Medications Ordered Prior to Encounter[2]  Allergies[3]       Objective {?Quick Links Trend Vitals * Enter New Vitals * Results Review * Timeline (Adult) * Labs * Imaging * Cardiology * Procedures * Lung Cancer Screening * Surgical eConsent :24770}  Ht 5' 6\" (1.676 m)   Wt 95.3 kg (210 lb 3.2 oz)   BMI 33.93 kg/m²          PE:  AAOx 3  WDWN  Hearing intact, no drainage from eyes  no audible wheezing  no abdominal " "distension  LE compartments soft, skin intact    Ortho Exam:  {right/left/bilateral:}knee:    Appearance:  no swelling   No bruising  no obvious joint deformity   No effusion  Palpation/Tenderness:  +TTP over medial joint line, no TTP over lateral joint line or over patella/patellar tendon  Active Range of Motion:  AROM: ***  Special Tests:  Medial Mickie's Test:  Positive  Lateral Mickie's Test:  Negative  Apley's compression test:  Negative  Lachman's Test:  negative  Anterior Drawer Test:  negative  Valgus Stress Test:  negative  Varus Stress Test:  negative      Imaging Studies: {Results Review Statement:98016::\"No pertinent imaging studies reviewed.\"}  *** {right/left/bilateral:} knee:     Procedures    Scribe Attestation      I,:   am acting as a scribe while in the presence of the attending physician.:       I,:   personally performed the services described in this documentation    as scribed in my presence.:                    [1]   Social History  Tobacco Use   Smoking Status Former    Current packs/day: 0.00    Types: Cigarettes    Quit date:     Years since quittin.3   Smokeless Tobacco Never   Tobacco Comments    Patient quit   [2]   Current Outpatient Medications on File Prior to Visit   Medication Sig Dispense Refill    amLODIPine (NORVASC) 2.5 mg tablet Take 1 tablet (2.5 mg total) by mouth daily Do not start before 2023. 30 tablet 0    atorvastatin (LIPITOR) 20 mg tablet Take 1 tablet (20 mg total) by mouth daily at bedtime 90 tablet 3    Diclofenac Sodium (VOLTAREN) 1 % Apply 2 g topically 4 (four) times a day  0    lisinopril-hydrochlorothiazide (PRINZIDE,ZESTORETIC) 20-25 MG per tablet TAKE 1 TABLET BY MOUTH DAILY 90 tablet 1    trospium chloride (SANCTURA) 20 mg tablet Take 1 tablet (20 mg total) by mouth 2 (two) times a day 180 tablet 3     Current Facility-Administered Medications on File Prior to Visit   Medication Dose Route Frequency Provider Last Rate " Last Admin    [COMPLETED] dexamethasone (PF) (DECADRON) injection 15 mg  15 mg Epidural Once Ike  Pillo, DO   15 mg at 05/13/25 1427    [COMPLETED] iohexol (OMNIPAQUE) 300 mg/mL injection 2 mL  [facility]   2 mL at 05/13/25 1427    [COMPLETED] lidocaine (PF) (XYLOCAINE-MPF) 2 % injection 2 mL  [facility]   2 mL at 05/13/25 1424   [3]   Allergies  Allergen Reactions    Lactose - Food Allergy Diarrhea     diarrhea

## 2025-05-14 NOTE — PROGRESS NOTES
Name: Wade Guillermo      : 1950      MRN: [id number]  Encounter Provider: Abby Garibay DO  Encounter Date: 2025   Encounter department: [facility]  :  Assessment & Plan  Primary osteoarthritis of right knee  Patient has severe right knee osteoarthritis.  Patient is also 21 months status post left TKA done on 2023 doing well  Injections: Patient received right knee steroid injection today. Tolerated the procedure well. Post injection instructions reviewed including information on glucose monitoring for diabetic patients. Patient aware that they may repeat steroid injection every 3 months if needed.   Medications: Tylenol up to 3000 mg per day  Bracing: Continue short hinge knee brace as needed for comfort.   Activity: Continue activity as tolerated.   Plan for next appt: Right knee CSI   Orders:    Large joint arthrocentesis: R knee    Chronic pain of right knee    Orders:    Large joint arthrocentesis: R knee      Polish translation through yoni on patient's phone which is his preference.     Return in about 3 months (around 2025) for re-evaluation.    I answered all of the patient's questions during the visit and provided education of the patient's condition during the visit.  The patient verbalized understanding of the information given and agrees with the plan.  This note was dictated using Octopusapp software.  It may contain errors including improperly dictated words.  Please contact physician directly for any questions.    Subjective   Chief Complaint:   Chief Complaint   Patient presents with    Right Knee - Follow-up         History of Present Illness     HPI    Wade Guillermo is a 74 y.o. male who presents for follow up for right knee pain due to known severe right knee osteoarthritis. He has found relief in the past with periodic CSI, most recently on 25, which provided him significant relief until a gradual return of  symptoms over the past 1-2 weeks. He is also 21 months s/p left TKA performed 8/14/23 and has no left knee related complaints today.       Length of time knee pain has been present: chronic  Any falls or trauma associated with onset of pain: no  Location of pain: medial right knee  Intermittent or constant: intermittent  Description of pain: aching  Aggravating factors: increased activity, rising from rest  Instability or locking: denies  Pain medication that has been tried: Tylenol in the past, nothing currently  Topical mediation that has been tried: unknown name but it has helped in the past  Has heat/ice/elevation been tried: yes  Can NSAIDs be taken?  If not why?: No, taking Eliquis  Has PT or home exercises been tried?: has completed PT and transitioned to HEP  Has bracing been tried? OTC or rx?  Hinge knee brace PRN  Have injections been tried?  Steroid/visco?: multiple CSI, most recently on 2/12/25 with 3 months relief  Any history of surgery on that knee?:  no    Review of Systems  ROS:    See HPI for musculoskeletal review.   All other systems reviewed are negative     History:  Past Medical History:   Diagnosis Date    Anemia     Cataract     Colon polyp     Diverticulosis     Gastritis     GERD (gastroesophageal reflux disease)     Hyperlipidemia     Hypertension     Osteoarthritis of left knee 06/07/2023    Pre-diabetes     Throat ulcer     Visual impairment      Past Surgical History:   Procedure Laterality Date    COLONOSCOPY N/A 08/18/2016    Procedure: COLONOSCOPY;  Surgeon: Mariano Paz MD;  Location:  GI LAB;  Service:     COLONOSCOPY W/ POLYPECTOMY  2019    repeat 2022    EYE SURGERY Bilateral     DE ARTHRP KNE CONDYLE&PLATU MEDIAL&LAT COMPARTMENTS Left 8/14/2023    Procedure: ARTHROPLASTY KNEE TOTAL;  Surgeon: Abby Garibay DO;  Location:  MAIN OR;  Service: Orthopedics    TONSILLECTOMY      UPPER GASTROINTESTINAL ENDOSCOPY  2019     Social History   Social History     Substance and  "Sexual Activity   Alcohol Use Never    Comment: pt \"sober for 30+ years\"     Social History     Substance and Sexual Activity   Drug Use No     Tobacco Use History[1]  Family History:   Family History   Problem Relation Age of Onset    Hypertension Mother     Hypertension Father        Medications Ordered Prior to Encounter[2]  Allergies[3]       Objective   Ht 5' 6\" (1.676 m)   Wt 95.3 kg (210 lb 3.2 oz)   BMI 33.93 kg/m²          PE:  AAOx 3  WDWN  Hearing intact, no drainage from eyes  no audible wheezing  no abdominal distension  LE compartments soft, skin intact    Ortho Exam:  rightknee:    Appearance:  no swelling   No bruising  no obvious joint deformity   No effusion  Palpation/Tenderness:  +TTP over medial joint line, no TTP over lateral joint line or over patella/patellar tendon  Active Range of Motion:  AROM: 0-110  Special Tests:  Medial Mickie's Test:  Positive  Lateral Mickie's Test:  Negative  Apley's compression test:  Negative  Lachman's Test:  negative  Anterior Drawer Test:  negative  Valgus Stress Test:  negative  Varus Stress Test:  negative      Imaging Studies:   No new imaging today    Large joint arthrocentesis: R knee    Performed by: Abby Garibay DO  Authorized by: Abby Garibay DO    Universal Protocol:  Consent: Verbal consent obtained  Risks and benefits: risks, benefits and alternatives were discussed  Consent given by: patient  Time out: Immediately prior to procedure a \"time out\" was called to verify the correct patient, procedure, equipment, support staff and site/side marked as required.  Patient understanding: patient states understanding of the procedure being performed  Site marked: the operative site was marked  Patient identity confirmed: verbally with patient  Supporting Documentation  Indications: pain and diagnostic evaluation     Is this a Visco injection? NoProcedure Details  Location: knee - R knee  Needle size: 22 G  Approach: " superior  Medications administered: 40 mg triamcinolone acetonide 40 mg/mL; 2 mL bupivacaine 0.25 %    Patient tolerance: patient tolerated the procedure well with no immediate complications  Dressing:  Sterile dressing applied          Scribe Attestation      I,:  Rach Cabezas am acting as a scribe while in the presence of the attending physician.:       I,:  Abby Garibay DO personally performed the services described in this documentation    as scribed in my presence.:                     [1]   Social History  Tobacco Use   Smoking Status Former    Current packs/day: 0.00    Types: Cigarettes    Quit date:     Years since quittin.3   Smokeless Tobacco Never   Tobacco Comments    Patient quit   [2]   Current Outpatient Medications on File Prior to Visit   Medication Sig Dispense Refill    amLODIPine (NORVASC) 2.5 mg tablet Take 1 tablet (2.5 mg total) by mouth daily Do not start before 2023. 30 tablet 0    atorvastatin (LIPITOR) 20 mg tablet Take 1 tablet (20 mg total) by mouth daily at bedtime 90 tablet 3    Diclofenac Sodium (VOLTAREN) 1 % Apply 2 g topically 4 (four) times a day  0    lisinopril-hydrochlorothiazide (PRINZIDE,ZESTORETIC) 20-25 MG per tablet TAKE 1 TABLET BY MOUTH DAILY 90 tablet 1    trospium chloride (SANCTURA) 20 mg tablet Take 1 tablet (20 mg total) by mouth 2 (two) times a day 180 tablet 3     No current facility-administered medications on file prior to visit.   [3]   Allergies  Allergen Reactions    Lactose - Food Allergy Diarrhea     diarrhea

## 2025-05-27 ENCOUNTER — TELEPHONE (OUTPATIENT)
Dept: PAIN MEDICINE | Facility: MEDICAL CENTER | Age: 75
End: 2025-05-27

## 2025-06-03 ENCOUNTER — HOSPITAL ENCOUNTER (OUTPATIENT)
Dept: MRI IMAGING | Facility: HOSPITAL | Age: 75
Discharge: HOME | End: 2025-06-03
Attending: FAMILY MEDICINE
Payer: COMMERCIAL

## 2025-06-03 DIAGNOSIS — K86.9 PANCREATIC LESION: ICD-10-CM

## 2025-06-03 PROCEDURE — A9585 GADOBUTROL INJECTION: HCPCS | Performed by: FAMILY MEDICINE

## 2025-06-03 PROCEDURE — 74183 MRI ABD W/O CNTR FLWD CNTR: CPT

## 2025-06-03 RX ORDER — GADOBUTROL 604.72 MG/ML
9 INJECTION INTRAVENOUS
Status: COMPLETED | OUTPATIENT
Start: 2025-06-03 | End: 2025-06-03

## 2025-06-03 RX ADMIN — GADOBUTROL 9 ML: 604.72 INJECTION INTRAVENOUS at 14:50

## 2025-06-11 ENCOUNTER — TELEPHONE (OUTPATIENT)
Age: 75
End: 2025-06-11

## 2025-06-12 ENCOUNTER — TELEPHONE (OUTPATIENT)
Age: 75
End: 2025-06-12

## 2025-06-12 ENCOUNTER — TELEPHONE (OUTPATIENT)
Dept: FAMILY MEDICINE CLINIC | Facility: CLINIC | Age: 75
End: 2025-06-12

## 2025-06-12 DIAGNOSIS — D49.0 IPMN (INTRADUCTAL PAPILLARY MUCINOUS NEOPLASM): Primary | ICD-10-CM

## 2025-06-12 NOTE — TELEPHONE ENCOUNTER
Patient called, asked for a  for call. Once I got connected with  and confirmed patient name and   the call was disconnected before further information could be obtained

## 2025-06-12 NOTE — TELEPHONE ENCOUNTER
"Here are the key points I would like to communicate to you regarding your pancreatic head cyst and the management recommendations:    - Cyst Characteristics:    - You have a 7 mm cyst in the head of your pancreas.    - This cyst does not show any solid enhancing components, which is a reassuring sign.    - Duct Dilatation:    - I noted mild focal dilatation of the main pancreatic duct measuring up to 6 mm, located just upstream from the ampulla.    - This dilatation is separate from the cyst and has not changed since your previous imaging.    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    - The mild focal pancreatic duct dilatation is presumed to be benign.    - However, we cannot entirely exclude the possibility of a mixed IPMN due to the presence of a \"worrisome feature.\"    - Follow-Up Recommendations:  See Oncology surgeon.    -       "

## 2025-07-07 ENCOUNTER — CONSULT (OUTPATIENT)
Dept: SURGICAL ONCOLOGY | Facility: CLINIC | Age: 75
End: 2025-07-07
Attending: FAMILY MEDICINE
Payer: COMMERCIAL

## 2025-07-07 ENCOUNTER — DOCUMENTATION (OUTPATIENT)
Dept: HEMATOLOGY ONCOLOGY | Facility: CLINIC | Age: 75
End: 2025-07-07

## 2025-07-07 VITALS
WEIGHT: 204 LBS | DIASTOLIC BLOOD PRESSURE: 70 MMHG | SYSTOLIC BLOOD PRESSURE: 124 MMHG | HEIGHT: 66 IN | BODY MASS INDEX: 32.78 KG/M2 | HEART RATE: 63 BPM | RESPIRATION RATE: 18 BRPM | OXYGEN SATURATION: 93 % | TEMPERATURE: 99.7 F

## 2025-07-07 DIAGNOSIS — D49.0 IPMN (INTRADUCTAL PAPILLARY MUCINOUS NEOPLASM): ICD-10-CM

## 2025-07-07 DIAGNOSIS — K86.2 PANCREATIC CYST: Primary | ICD-10-CM

## 2025-07-07 PROCEDURE — 99203 OFFICE O/P NEW LOW 30 MIN: CPT | Performed by: SURGERY

## 2025-07-07 NOTE — PROGRESS NOTES
In-basket message received from Dr. Hollingsworth to add patient to the upper GI [facility] on 7/17/2025. Chart reviewed and prep completed.

## 2025-07-07 NOTE — PROGRESS NOTES
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  Small and asymptomatic.  Will follow-up in 6 months with repeat MRI MRCP at that time.  Will also present at tumor board.    Orders:    MRI abdomen w wo contrast and mrcp; Future    Basic metabolic panel; Future    IPMN (intraductal papillary mucinous neoplasm)    Orders:    Ambulatory Referral to Surgical Oncology        History of Present Illness   Chief Complaint   Patient presents with    Consult     Patient is a 74-year-old man who had a bout of diverticulitis last year.  He had a CAT scan which revealed pancreatic cyst.  More recently, he had follow-up MRI which confirmed presence of cyst in the head of the pancreas.  There is some associated ductal dilatation.  There is some concern this could represent IPMN, possible mixed type.  He is referred for evaluation and treatment.  He feels well.  He has no new onset diabetes.  No new abdominal pain.  No history of pancreas cancer.  No history of pancreatitis.      Review of Systems   Constitutional: Negative.    HENT: Negative.     Eyes: Negative.    Respiratory: Negative.     Cardiovascular: Negative.    Gastrointestinal: Negative.    Endocrine: Negative.    Genitourinary: Negative.    Musculoskeletal: Negative.    Skin: Negative.    Allergic/Immunologic: Negative.    Neurological: Negative.    Hematological: Negative.    Psychiatric/Behavioral: Negative.     All other systems reviewed and are negative.   A complete review of systems is negative other than that noted above in the HPI.             Objective   /70 (BP Location: Left arm, Patient Position: Sitting, Cuff Size: Standard)   Pulse 63   Temp 99.7 °F (37.6 °C) (Temporal)    "Resp 18   Ht 5' 6\" (1.676 m)   Wt 92.5 kg (204 lb)   SpO2 93%   BMI 32.93 kg/m²     Pain Screening:  Pain Score: 0-No pain  ECOG    Physical Exam  Vitals reviewed.   Constitutional:       Appearance: Normal appearance.   HENT:      Head: Normocephalic and atraumatic.      Nose: Nose normal.     Eyes:      Extraocular Movements: Extraocular movements intact.      Pupils: Pupils are equal, round, and reactive to light.       Cardiovascular:      Rate and Rhythm: Normal rate and regular rhythm.      Heart sounds: Normal heart sounds.   Pulmonary:      Effort: Pulmonary effort is normal.      Breath sounds: Normal breath sounds.   Abdominal:      General: Abdomen is flat.      Palpations: Abdomen is soft.     Musculoskeletal:         General: Normal range of motion.      Cervical back: Normal range of motion and neck supple.     Skin:     General: Skin is warm and dry.     Neurological:      General: No focal deficit present.      Mental Status: He is alert and oriented to person, place, and time.     Psychiatric:         Mood and Affect: Mood normal.         Behavior: Behavior normal.          Labs: I have reviewed pertinent labs.   Narrative & Impression   MRI OF THE ABDOMEN WITH AND WITHOUT CONTRAST WITH MRCP     INDICATION: 74 years / Male. K86.9: Disease of pancreas, unspecified.     COMPARISON: CT chest/abdomen/pelvis 8/25/2023.     TECHNIQUE: Multiplanar/multisequence MRI of the abdomen with 3D MRCP was performed before and after administration of contrast.     IV Contrast: 9 mL of Gadobutrol injection (SINGLE-DOSE)     FINDINGS:     LOWER CHEST: Unremarkable.     LIVER:  Normal in size and configuration.  No suspicious mass.  Patent hepatic and portal veins.     BILE DUCTS: No intrahepatic or extrahepatic bile duct dilation. Common bile duct is normal in caliber. No choledocholithiasis, biliary stricture or suspicious mass.     GALLBLADDER: Normal.     PANCREAS: Cystic pancreatic lesion as " follows:  -Dominant cyst size: 7 mm series 8 image 57  -Dominant cyst location: Pancreatic head.  -Additional cysts: None.  -Communication with main pancreatic duct: None of the cysts demonstrate definite communication with main pancreatic duct  -Main duct dilation: Yes. Maximum size is 6 mm in the head just upstream from the ampulla. Remainder of the main duct is normal in caliber.  -Type: Branch duct intraductal papillary mucinous neoplasm (BD-IPMN.) Noting that the mild focal main duct dilatation is remote from the cyst, although the possibility of mixed IPMN cannot be entirely excluded.  -High risk stigmata: None.  -Worrisome features: Main pancreatic duct >/= 5mm and <10mm.  (Please note the above only takes into account imaging high risk stigmata/worrisome features. Clinical/laboratory features should be assessed separately.)              ADRENAL GLANDS: Unremarkable.     SPLEEN: Normal.     KIDNEYS/PROXIMAL URETERS: No hydroureteronephrosis. No suspicious renal mass. Right renal simple cysts     BOWEL: No dilated loops of bowel.     PERITONEUM/RETROPERITONEUM: No ascites.     LYMPH NODES: No abdominal lymphadenopathy.     VESSELS: No aneurysm.     ABDOMINAL WALL: Unremarkable.     BONES: No suspicious osseous lesion.     IMPRESSION:     Pancreatic head cyst measuring 7 mm without solid enhancing component. Mild focal main pancreatic duct dilatation measuring up to 6 mm in the head just upstream from the ampulla, although remote from the location of the cyst, and with normal caliber of   the remainder of the main pancreatic duct. This is unchanged, as the previously suggested pancreatic cyst on prior CT actually represents this duct dilatation and the true pancreatic cyst seen on this study was not well seen on CT. Findings likely   represent a sidebranch IPMN with incidental mild focal pancreatic duct dilatation, presumably of benign etiology. However, mixed IPMN cannot be entirely excluded, with follow-up  "recommendations below, given this technically \"worrisome feature.\"     -Management recommendation: Followup in 1-6 months interval according to estimated risk. If patient also has repeated acute pancreatitis that worsens quality of life, and/or is young, fit for surgery, then surgical consultation/management with   consideration for EUS recommended. Preferred imaging modality: abdomen MRI and MRCP with and without IV contrast, or triple phase abdomen CT with IV contrast, or abdomen MRI and MRCP without IV contrast.     Management and follow up recommendations for cystic pancreatic lesions are based on Institutional consensus and international evidence-based Kyoto guidelines for the management of intraductal papillary mucinous neoplasm of the pancreas. Pancreatology 24   (2024) 255-270.     The study was marked in EPIC for significant notification.     Workstation performed: [id number]             "

## 2025-07-07 NOTE — LETTER
2025     No Recipients    Patient: Wade Guillermo   YOB: 1950   Date of Visit: 2025       Dear Dr. Landrum Recipients:    Thank you for referring Wade Guillermo to me for evaluation. Below are my notes for this consultation.    If you have questions, please do not hesitate to call me. I look forward to following your patient along with you.         Sincerely,        Juan Luis Hollingsworth MD        CC: No Recipients    Juan Luis Hollingsworth MD  2025 10:56 AM  Sign when Signing Visit  Name: Wade Guillermo      : 1950      MRN: [id number]  Encounter Provider: Juan Luis Hollingsworth MD  Encounter Date: 2025   Encounter department: 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  Small and asymptomatic.  Will follow-up in 6 months with repeat MRI MRCP at that time.  Will also present at tumor board.    Orders:  •  MRI abdomen w wo contrast and mrcp; Future  •  Basic metabolic panel; Future    IPMN (intraductal papillary mucinous neoplasm)    Orders:  •  Ambulatory Referral to Surgical Oncology        History of Present Illness{?Quick Links Encounters * My Last Note * Last Note in Specialty * Snapshot * Since Last Visit * History :06825}  Chief Complaint   Patient presents with   • Consult     Patient is a 74-year-old man who had a bout of diverticulitis last year.  He had a CAT scan which revealed pancreatic cyst.  More recently, he had follow-up MRI which confirmed presence of cyst in the head of the pancreas.  There is some associated ductal dilatation.  There is some concern this could represent IPMN, possible mixed type.  He is referred for evaluation and treatment.  He feels well.  He has no new onset diabetes.  No new abdominal pain.  No history of pancreas cancer.  No history of pancreatitis.      Review of Systems   Constitutional: Negative.    HENT: Negative.    "  Eyes: Negative.    Respiratory: Negative.     Cardiovascular: Negative.    Gastrointestinal: Negative.    Endocrine: Negative.    Genitourinary: Negative.    Musculoskeletal: Negative.    Skin: Negative.    Allergic/Immunologic: Negative.    Neurological: Negative.    Hematological: Negative.    Psychiatric/Behavioral: Negative.     All other systems reviewed and are negative.   A complete review of systems is negative other than that noted above in the HPI.    {Select to insert medical history sections (Optional):92738}         Objective{?Quick Links Trend Vitals * Enter New Vitals * Results Review * Timeline (Adult) * Labs * Imaging * Cardiology * Procedures * Lung Cancer Screening * Surgical eConsent :16681}  /70 (BP Location: Left arm, Patient Position: Sitting, Cuff Size: Standard)   Pulse 63   Temp 99.7 °F (37.6 °C) (Temporal)   Resp 18   Ht 5' 6\" (1.676 m)   Wt 92.5 kg (204 lb)   SpO2 93%   BMI 32.93 kg/m²     Pain Screening:  Pain Score: 0-No pain  ECOG    Physical Exam  Vitals reviewed.   Constitutional:       Appearance: Normal appearance.   HENT:      Head: Normocephalic and atraumatic.      Nose: Nose normal.     Eyes:      Extraocular Movements: Extraocular movements intact.      Pupils: Pupils are equal, round, and reactive to light.       Cardiovascular:      Rate and Rhythm: Normal rate and regular rhythm.      Heart sounds: Normal heart sounds.   Pulmonary:      Effort: Pulmonary effort is normal.      Breath sounds: Normal breath sounds.   Abdominal:      General: Abdomen is flat.      Palpations: Abdomen is soft.     Musculoskeletal:         General: Normal range of motion.      Cervical back: Normal range of motion and neck supple.     Skin:     General: Skin is warm and dry.     Neurological:      General: No focal deficit present.      Mental Status: He is alert and oriented to person, place, and time.     Psychiatric:         Mood and Affect: Mood normal.         Behavior: " Behavior normal.          Labs: I have reviewed pertinent labs. {[facility] ONCOLOGY LABS (Optional):79252}  Narrative & Impression   MRI OF THE ABDOMEN WITH AND WITHOUT CONTRAST WITH MRCP     INDICATION: 74 years / Male. K86.9: Disease of pancreas, unspecified.     COMPARISON: CT chest/abdomen/pelvis 8/25/2023.     TECHNIQUE: Multiplanar/multisequence MRI of the abdomen with 3D MRCP was performed before and after administration of contrast.     IV Contrast: 9 mL of Gadobutrol injection (SINGLE-DOSE)     FINDINGS:     LOWER CHEST: Unremarkable.     LIVER:  Normal in size and configuration.  No suspicious mass.  Patent hepatic and portal veins.     BILE DUCTS: No intrahepatic or extrahepatic bile duct dilation. Common bile duct is normal in caliber. No choledocholithiasis, biliary stricture or suspicious mass.     GALLBLADDER: Normal.     PANCREAS: Cystic pancreatic lesion as follows:  -Dominant cyst size: 7 mm series 8 image 57  -Dominant cyst location: Pancreatic head.  -Additional cysts: None.  -Communication with main pancreatic duct: None of the cysts demonstrate definite communication with main pancreatic duct  -Main duct dilation: Yes. Maximum size is 6 mm in the head just upstream from the ampulla. Remainder of the main duct is normal in caliber.  -Type: Branch duct intraductal papillary mucinous neoplasm (BD-IPMN.) Noting that the mild focal main duct dilatation is remote from the cyst, although the possibility of mixed IPMN cannot be entirely excluded.  -High risk stigmata: None.  -Worrisome features: Main pancreatic duct >/= 5mm and <10mm.  (Please note the above only takes into account imaging high risk stigmata/worrisome features. Clinical/laboratory features should be assessed separately.)              ADRENAL GLANDS: Unremarkable.     SPLEEN: Normal.     KIDNEYS/PROXIMAL URETERS: No hydroureteronephrosis. No suspicious renal mass. Right renal simple cysts     BOWEL: No dilated loops of bowel.    "  PERITONEUM/RETROPERITONEUM: No ascites.     LYMPH NODES: No abdominal lymphadenopathy.     VESSELS: No aneurysm.     ABDOMINAL WALL: Unremarkable.     BONES: No suspicious osseous lesion.     IMPRESSION:     Pancreatic head cyst measuring 7 mm without solid enhancing component. Mild focal main pancreatic duct dilatation measuring up to 6 mm in the head just upstream from the ampulla, although remote from the location of the cyst, and with normal caliber of   the remainder of the main pancreatic duct. This is unchanged, as the previously suggested pancreatic cyst on prior CT actually represents this duct dilatation and the true pancreatic cyst seen on this study was not well seen on CT. Findings likely   represent a sidebranch IPMN with incidental mild focal pancreatic duct dilatation, presumably of benign etiology. However, mixed IPMN cannot be entirely excluded, with follow-up recommendations below, given this technically \"worrisome feature.\"     -Management recommendation: Followup in 1-6 months interval according to estimated risk. If patient also has repeated acute pancreatitis that worsens quality of life, and/or is young, fit for surgery, then surgical consultation/management with   consideration for EUS recommended. Preferred imaging modality: abdomen MRI and MRCP with and without IV contrast, or triple phase abdomen CT with IV contrast, or abdomen MRI and MRCP without IV contrast.     Management and follow up recommendations for cystic pancreatic lesions are based on Institutional consensus and international evidence-based Kyoto guidelines for the management of intraductal papillary mucinous neoplasm of the pancreas. Pancreatology 24   (2024) 255-270.     The study was marked in EPIC for significant notification.     Workstation performed: [id number]     {Radiology Results Review (Optional):10778}  {Other Study Results Review (Optional):79259::\"No additional pertinent studies " "reviewed.\"}    {Administrative / Billing Section (Optional):33563}  "

## 2025-07-07 NOTE — ASSESSMENT & PLAN NOTE
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  Small and asymptomatic.  Will follow-up in 6 months with repeat MRI MRCP at that time.  Will also present at tumor board.    Orders:    MRI abdomen w wo contrast and mrcp; Future    Basic metabolic panel; Future

## 2025-07-17 ENCOUNTER — DOCUMENTATION (OUTPATIENT)
Dept: HEMATOLOGY ONCOLOGY | Facility: CLINIC | Age: 75
End: 2025-07-17

## 2025-07-17 NOTE — PROGRESS NOTES
UPPER GI [facility] NOTE    NCCN guidelines were readily available for review at this discussion    DATE:  7/17/2025    PRESENTING DOCTOR: Dr Hollingsworth    DIAGNOSIS:  pancreatic cyst  STAGING: N/A    REASON FOR DISCUSSION: Imaging review for Plan of Care  Wade Guillermo is a 74 y.o. male who was presented at the GI Oncology Multidisciplinary Cancer Conference today. Patient who presents with pancreatic cycst, likely IPMN. Being present to discuss surveillance vs intervention with EUS/biopsy.       Imaging/Studies reviewed:   [] CT scan  [x] MRI 6/3/25  [] PET  [] EGD/EUS imaging  [] Colonoscopy    Pertinent labs:  N/A    Pathology: NA    PHYSICIAN RECOMMENDED PLAN:  Recommend surveillance with MRI with [facility] in 6 months.      Port: NA  Oncology Dietician referral: N/A  Feeding tube: N/A    All specialty physicians & supportive services available.  Providers contributing to final recommendations noted below.    GI []    Radiology: [x]    Hepatology: []    Thoracic: []    HemOnc: []    RadOnc: []    SurgOnc: [x]    Clinical Trials: Patient reviewed and not a candidate at this time for a clinical trial at [facility]     Genetics: not discussed    Financial Advocacy: not discussed    Social Work: not discussed     Future Appointments   Date Time Provider Department Center   8/13/2025  1:00 PM GUERLINE Horton [facility]   8/14/2025 11:30 AM Roni Jett PA-C [facility]   10/23/2025 11:00 AM Tulio Maki MD [facility]   12/11/2025  1:20 PM GUERLINE Hernandez [facility]   1/5/2026  1:45 PM AL MRI 1 AL MRI [facility]   1/12/2026 10:45 AM Juan Luis Hollingsworth MD [facility]      Team agreed to plan.The final treatment plan will be left to the discretion of the patient and the treating physician.     DISCLAIMERS:  TO THE TREATING PHYSICIAN:  This conference is a meeting of clinicians from various specialty areas who evaluate and discuss patients for whom a  multidisciplinary treatment approach is being considered. Please note that the above opinion was a consensus of the conference attendees and is intended only to assist in quality care of the discussed patient.  The responsibility for follow up on the input given during the conference, along with any final decisions regarding plan of care, is that of the patient and the patient's provider. Accordingly, appointments have only been recommended based on this information and have NOT been scheduled unless otherwise noted.      TO THE PATIENT:  This summary is a brief record of major aspects of your cancer treatment. You may choose to share a copy with any of your doctors or nurses. However, this is not a detailed or comprehensive record of your care.

## 2025-07-24 DIAGNOSIS — I10 HYPERTENSION, UNSPECIFIED TYPE: ICD-10-CM

## 2025-07-25 RX ORDER — LISINOPRIL AND HYDROCHLOROTHIAZIDE 20; 25 MG/1; MG/1
1 TABLET ORAL DAILY
Qty: 90 TABLET | Refills: 1 | Status: SHIPPED | OUTPATIENT
Start: 2025-07-25

## 2025-08-09 ENCOUNTER — TELEPHONE (OUTPATIENT)
Dept: OTHER | Facility: OTHER | Age: 75
End: 2025-08-09

## 2025-08-13 ENCOUNTER — OFFICE VISIT (OUTPATIENT)
Dept: PAIN MEDICINE | Facility: CLINIC | Age: 75
End: 2025-08-13
Payer: COMMERCIAL

## 2025-08-14 ENCOUNTER — APPOINTMENT (OUTPATIENT)
Dept: RADIOLOGY | Facility: MEDICAL CENTER | Age: 75
End: 2025-08-14
Attending: PHYSICIAN ASSISTANT
Payer: COMMERCIAL

## 2025-08-14 ENCOUNTER — OFFICE VISIT (OUTPATIENT)
Dept: OBGYN CLINIC | Facility: MEDICAL CENTER | Age: 75
End: 2025-08-14
Payer: COMMERCIAL

## (undated) DEVICE — U-DRAPE: Brand: CONVERTORS

## (undated) DEVICE — FRAZIER SUCTION INSTRUMENT 18 FR W/OBTURATOR, NO CONTROL VENT: Brand: FRAZIER

## (undated) DEVICE — STERILE POLYISOPRENE POWDER-FREE SURGICAL GLOVES WITH EMOLLIENT COATING: Brand: PROTEXIS

## (undated) DEVICE — SPONGE LAP 18 X 18 IN STRL RFD

## (undated) DEVICE — BASIC SINGLE BASIN 2-LF: Brand: MEDLINE INDUSTRIES, INC.

## (undated) DEVICE — DRESSING MEPILEX AG BORDER POST-OP 4 X 12 IN

## (undated) DEVICE — DUAL CUT SAGITTAL BLADE

## (undated) DEVICE — CEMENT MIXING SYSTEM WITH FEMORAL BREAKWAY NOZZLE: Brand: REVOLUTION

## (undated) DEVICE — MEDI-VAC TUBING CONNECTOR 6-IN-1 STRAIGHT: Brand: CARDINAL HEALTH

## (undated) DEVICE — HANDPIECE SET WITH RETRACTABLE COAXIAL FAN SPRAY TIP AND SUCTION TUBE: Brand: INTERPULSE

## (undated) DEVICE — CHLORAPREP HI-LITE 26ML ORANGE

## (undated) DEVICE — HOOD: Brand: T7PLUS

## (undated) DEVICE — STOCKINETTE,IMPERVIOUS,12X48,STERILE: Brand: MEDLINE

## (undated) DEVICE — SUT MONOCRYL 4-0 PS-2 27 IN Y426H

## (undated) DEVICE — SURGICAL CLIPPER BLADE GENERAL USE

## (undated) DEVICE — DISPOSABLE OR TOWEL: Brand: CARDINAL HEALTH

## (undated) DEVICE — BETHLEHEM UNIV TOTAL KNEE, KIT: Brand: CARDINAL HEALTH

## (undated) DEVICE — SUT VICRYL 1 CTX 36 IN J977H

## (undated) DEVICE — 2108 SERIES SAGITTAL BLADE, OFFSET (12.4 X 1.24 X 73.7MM)

## (undated) DEVICE — NEEDLE 18 G X 1 1/2

## (undated) DEVICE — SURGICAL GOWN, XL SMARTSLEEVE: Brand: CONVERTORS

## (undated) DEVICE — 2108 SERIES SAGITTAL BLADE (28.9 X 0.64 X 58.7MM)

## (undated) DEVICE — MAYO STAND COVER: Brand: CONVERTORS

## (undated) DEVICE — NEPTUNE E-SEP SMOKE EVACUATION PENCIL, COATED, 70MM BLADE, PUSH BUTTON SWITCH: Brand: NEPTUNE E-SEP

## (undated) DEVICE — 4-PORT MANIFOLD: Brand: NEPTUNE 2

## (undated) DEVICE — SYRINGE 30ML LL

## (undated) DEVICE — INTENDED FOR TISSUE SEPARATION, AND OTHER PROCEDURES THAT REQUIRE A SHARP SURGICAL BLADE TO PUNCTURE OR CUT.: Brand: BARD-PARKER ® SAFETYLOCK CARBON RIB-BACK BLADES

## (undated) DEVICE — STERILE POLYISOPRENE POWDER-FREE SURGICAL GLOVES: Brand: PROTEXIS

## (undated) DEVICE — GAUZE SPONGES,16 PLY: Brand: CURITY

## (undated) DEVICE — 3M™ STERI-STRIP™ REINFORCED ADHESIVE SKIN CLOSURES, R1547, 1/2 IN X 4 IN (12 MM X 100 MM), 6 STRIPS/ENVELOPE: Brand: 3M™ STERI-STRIP™

## (undated) DEVICE — CAPIT KNEE ATTUNE FB W/DOM

## (undated) DEVICE — SIGMOIDOSCOPIC SUCTION INSTRUMENT 18 FR W/WINGED CAP CONTROL AND 6 FOOT (1.8M) TUBING: Brand: SIGMOIDOSCOPIC

## (undated) DEVICE — 3M™ TEGADERM™ TRANSPARENT FILM DRESSING FRAME STYLE, 1626W, 4 IN X 4-3/4 IN (10 CM X 12 CM), 50/CT 4CT/CASE: Brand: 3M™ TEGADERM™

## (undated) DEVICE — 3M™ STERI-DRAPE™ U-DRAPE 1015: Brand: STERI-DRAPE™

## (undated) DEVICE — 450 ML BOTTLE OF 0.05% CHLORHEXIDINE GLUCONATE IN 99.95% STERILE WATER FOR IRRIGATION, USP AND APPLICATOR.: Brand: IRRISEPT ANTIMICROBIAL WOUND LAVAGE

## (undated) DEVICE — STIRRUP STRAP ADULT DISP

## (undated) DEVICE — CUFF TOURNIQUET 30 X 4 IN QUICK CONNECT DISP 1BLA

## (undated) DEVICE — SUT VICRYL 2-0 CT-1 36 IN J945H

## (undated) DEVICE — SKIN MARKER DUAL TIP WITH RULER CAP, FLEXIBLE RULER AND LABELS: Brand: DEVON

## (undated) DEVICE — WEBRIL 6 IN UNSTERILE

## (undated) DEVICE — GLOVE SRG BIOGEL PI ORTHOPEDIC 7

## (undated) DEVICE — WOUND EVAC MED W/NEEDLE 1/8IN